# Patient Record
Sex: FEMALE | Race: WHITE | NOT HISPANIC OR LATINO | Employment: OTHER | URBAN - METROPOLITAN AREA
[De-identification: names, ages, dates, MRNs, and addresses within clinical notes are randomized per-mention and may not be internally consistent; named-entity substitution may affect disease eponyms.]

---

## 2023-09-05 ENCOUNTER — APPOINTMENT (OUTPATIENT)
Dept: CT IMAGING | Facility: HOSPITAL | Age: 68
End: 2023-09-05
Payer: MEDICARE

## 2023-09-05 ENCOUNTER — APPOINTMENT (OUTPATIENT)
Dept: GENERAL RADIOLOGY | Facility: HOSPITAL | Age: 68
End: 2023-09-05
Payer: MEDICARE

## 2023-09-05 ENCOUNTER — HOSPITAL ENCOUNTER (OUTPATIENT)
Facility: HOSPITAL | Age: 68
Setting detail: OBSERVATION
Discharge: HOME | End: 2023-09-07
Attending: EMERGENCY MEDICINE | Admitting: INTERNAL MEDICINE
Payer: MEDICARE

## 2023-09-05 DIAGNOSIS — R29.810 FACIAL DROOP: ICD-10-CM

## 2023-09-05 DIAGNOSIS — I63.9 CEREBROVASCULAR ACCIDENT (CVA), UNSPECIFIED MECHANISM: Primary | ICD-10-CM

## 2023-09-05 DIAGNOSIS — R47.9 DIFFICULTY WITH SPEECH: ICD-10-CM

## 2023-09-05 PROBLEM — I10 ESSENTIAL HYPERTENSION: Status: ACTIVE | Noted: 2023-09-05

## 2023-09-05 PROBLEM — E11.9 TYPE 2 DIABETES MELLITUS: Status: ACTIVE | Noted: 2023-09-05

## 2023-09-05 PROBLEM — I65.21 CAROTID STENOSIS, RIGHT: Status: ACTIVE | Noted: 2023-09-05

## 2023-09-05 PROBLEM — E66.01 OBESITY, CLASS III, BMI 40-49.9 (MORBID OBESITY): Status: ACTIVE | Noted: 2023-09-05

## 2023-09-05 PROBLEM — F31.81 BIPOLAR 2 DISORDER: Status: ACTIVE | Noted: 2023-09-05

## 2023-09-05 PROBLEM — E78.5 HYPERLIPIDEMIA: Status: ACTIVE | Noted: 2023-09-05

## 2023-09-05 PROBLEM — R47.01 APHASIA: Status: ACTIVE | Noted: 2023-09-05

## 2023-09-05 PROBLEM — E66.813 OBESITY, CLASS III, BMI 40-49.9 (MORBID OBESITY): Status: ACTIVE | Noted: 2023-09-05

## 2023-09-05 LAB
ALT SERPL W P-5'-P-CCNC: 9 U/L (ref 1–33)
APTT PPP: 28.7 SECONDS (ref 22–39)
AST SERPL-CCNC: 15 U/L (ref 1–32)
BASOPHILS # BLD AUTO: 0.05 10*3/MM3 (ref 0–0.2)
BASOPHILS NFR BLD AUTO: 0.5 % (ref 0–1.5)
BUN BLDA-MCNC: 20 MG/DL (ref 8–26)
CA-I BLDA-SCNC: 1.29 MMOL/L (ref 1.2–1.32)
CHLORIDE BLDA-SCNC: 102 MMOL/L (ref 98–109)
CO2 BLDA-SCNC: 25 MMOL/L (ref 24–29)
CREAT BLDA-MCNC: 1 MG/DL (ref 0.6–1.3)
DEPRECATED RDW RBC AUTO: 51.4 FL (ref 37–54)
EGFRCR SERPLBLD CKD-EPI 2021: 60.7 ML/MIN/1.73
EOSINOPHIL # BLD AUTO: 0.5 10*3/MM3 (ref 0–0.4)
EOSINOPHIL NFR BLD AUTO: 4.8 % (ref 0.3–6.2)
ERYTHROCYTE [DISTWIDTH] IN BLOOD BY AUTOMATED COUNT: 14.9 % (ref 12.3–15.4)
GLUCOSE BLDC GLUCOMTR-MCNC: 137 MG/DL (ref 70–130)
GLUCOSE BLDC GLUCOMTR-MCNC: 90 MG/DL (ref 70–130)
HCT VFR BLD AUTO: 42 % (ref 34–46.6)
HCT VFR BLDA CALC: 40 % (ref 38–51)
HGB BLD-MCNC: 13.2 G/DL (ref 12–15.9)
HGB BLDA-MCNC: 13.6 G/DL (ref 12–17)
HOLD SPECIMEN: NORMAL
IMM GRANULOCYTES # BLD AUTO: 0.03 10*3/MM3 (ref 0–0.05)
IMM GRANULOCYTES NFR BLD AUTO: 0.3 % (ref 0–0.5)
INR PPP: 1 (ref 0.9–1.1)
LYMPHOCYTES # BLD AUTO: 1.96 10*3/MM3 (ref 0.7–3.1)
LYMPHOCYTES NFR BLD AUTO: 19 % (ref 19.6–45.3)
MCH RBC QN AUTO: 29.6 PG (ref 26.6–33)
MCHC RBC AUTO-ENTMCNC: 31.4 G/DL (ref 31.5–35.7)
MCV RBC AUTO: 94.2 FL (ref 79–97)
MONOCYTES # BLD AUTO: 0.87 10*3/MM3 (ref 0.1–0.9)
MONOCYTES NFR BLD AUTO: 8.4 % (ref 5–12)
NEUTROPHILS NFR BLD AUTO: 6.9 10*3/MM3 (ref 1.7–7)
NEUTROPHILS NFR BLD AUTO: 67 % (ref 42.7–76)
NRBC BLD AUTO-RTO: 0 /100 WBC (ref 0–0.2)
PLATELET # BLD AUTO: 294 10*3/MM3 (ref 140–450)
PMV BLD AUTO: 10.6 FL (ref 6–12)
POTASSIUM BLDA-SCNC: 3.7 MMOL/L (ref 3.5–4.9)
PROTHROMBIN TIME: 13.4 SECONDS
RBC # BLD AUTO: 4.46 10*6/MM3 (ref 3.77–5.28)
SODIUM BLD-SCNC: 141 MMOL/L (ref 138–146)
TROPONIN T SERPL HS-MCNC: 14 NG/L
WBC NRBC COR # BLD: 10.31 10*3/MM3 (ref 3.4–10.8)
WHOLE BLOOD HOLD COAG: NORMAL
WHOLE BLOOD HOLD SPECIMEN: NORMAL

## 2023-09-05 PROCEDURE — 85610 PROTHROMBIN TIME: CPT

## 2023-09-05 PROCEDURE — 85730 THROMBOPLASTIN TIME PARTIAL: CPT | Performed by: EMERGENCY MEDICINE

## 2023-09-05 PROCEDURE — 93005 ELECTROCARDIOGRAM TRACING: CPT | Performed by: EMERGENCY MEDICINE

## 2023-09-05 PROCEDURE — 80047 BASIC METABLC PNL IONIZED CA: CPT

## 2023-09-05 PROCEDURE — 84484 ASSAY OF TROPONIN QUANT: CPT | Performed by: EMERGENCY MEDICINE

## 2023-09-05 PROCEDURE — 84450 TRANSFERASE (AST) (SGOT): CPT | Performed by: EMERGENCY MEDICINE

## 2023-09-05 PROCEDURE — 70496 CT ANGIOGRAPHY HEAD: CPT

## 2023-09-05 PROCEDURE — G0378 HOSPITAL OBSERVATION PER HR: HCPCS

## 2023-09-05 PROCEDURE — 36415 COLL VENOUS BLD VENIPUNCTURE: CPT

## 2023-09-05 PROCEDURE — 70450 CT HEAD/BRAIN W/O DYE: CPT

## 2023-09-05 PROCEDURE — 99285 EMERGENCY DEPT VISIT HI MDM: CPT

## 2023-09-05 PROCEDURE — 85014 HEMATOCRIT: CPT

## 2023-09-05 PROCEDURE — 82948 REAGENT STRIP/BLOOD GLUCOSE: CPT

## 2023-09-05 PROCEDURE — 99204 OFFICE O/P NEW MOD 45 MIN: CPT | Performed by: STUDENT IN AN ORGANIZED HEALTH CARE EDUCATION/TRAINING PROGRAM

## 2023-09-05 PROCEDURE — 84460 ALANINE AMINO (ALT) (SGPT): CPT | Performed by: EMERGENCY MEDICINE

## 2023-09-05 PROCEDURE — 71045 X-RAY EXAM CHEST 1 VIEW: CPT

## 2023-09-05 PROCEDURE — 70498 CT ANGIOGRAPHY NECK: CPT

## 2023-09-05 PROCEDURE — 25510000001 IOPAMIDOL PER 1 ML: Performed by: EMERGENCY MEDICINE

## 2023-09-05 PROCEDURE — 85025 COMPLETE CBC W/AUTO DIFF WBC: CPT | Performed by: EMERGENCY MEDICINE

## 2023-09-05 PROCEDURE — 0042T HC CT CEREBRAL PERFUSION W/WO CONTRAST: CPT

## 2023-09-05 RX ORDER — POLYETHYLENE GLYCOL 3350 17 G/17G
17 POWDER, FOR SOLUTION ORAL DAILY PRN
Status: DISCONTINUED | OUTPATIENT
Start: 2023-09-05 | End: 2023-09-07 | Stop reason: HOSPADM

## 2023-09-05 RX ORDER — SODIUM CHLORIDE 0.9 % (FLUSH) 0.9 %
10 SYRINGE (ML) INJECTION AS NEEDED
Status: DISCONTINUED | OUTPATIENT
Start: 2023-09-05 | End: 2023-09-07 | Stop reason: HOSPADM

## 2023-09-05 RX ORDER — SODIUM CHLORIDE 9 MG/ML
40 INJECTION, SOLUTION INTRAVENOUS AS NEEDED
Status: DISCONTINUED | OUTPATIENT
Start: 2023-09-05 | End: 2023-09-07 | Stop reason: HOSPADM

## 2023-09-05 RX ORDER — METFORMIN HYDROCHLORIDE 500 MG/1
2 TABLET, EXTENDED RELEASE ORAL EVERY 12 HOURS SCHEDULED
COMMUNITY
Start: 2023-08-31

## 2023-09-05 RX ORDER — IBUPROFEN 600 MG/1
1 TABLET ORAL
Status: DISCONTINUED | OUTPATIENT
Start: 2023-09-05 | End: 2023-09-07 | Stop reason: HOSPADM

## 2023-09-05 RX ORDER — FEXOFENADINE HYDROCHLORIDE 180 MG/1
180 TABLET, FILM COATED ORAL DAILY
COMMUNITY
Start: 2023-08-31

## 2023-09-05 RX ORDER — DEXTROSE MONOHYDRATE 25 G/50ML
25 INJECTION, SOLUTION INTRAVENOUS
Status: DISCONTINUED | OUTPATIENT
Start: 2023-09-05 | End: 2023-09-07 | Stop reason: HOSPADM

## 2023-09-05 RX ORDER — BISACODYL 5 MG/1
5 TABLET, DELAYED RELEASE ORAL DAILY PRN
Status: DISCONTINUED | OUTPATIENT
Start: 2023-09-05 | End: 2023-09-07 | Stop reason: HOSPADM

## 2023-09-05 RX ORDER — CLOPIDOGREL BISULFATE 75 MG/1
75 TABLET ORAL DAILY
Status: DISCONTINUED | OUTPATIENT
Start: 2023-09-06 | End: 2023-09-06

## 2023-09-05 RX ORDER — BISACODYL 10 MG
10 SUPPOSITORY, RECTAL RECTAL DAILY PRN
Status: DISCONTINUED | OUTPATIENT
Start: 2023-09-05 | End: 2023-09-07 | Stop reason: HOSPADM

## 2023-09-05 RX ORDER — ACETAMINOPHEN 650 MG/1
650 SUPPOSITORY RECTAL EVERY 4 HOURS PRN
Status: DISCONTINUED | OUTPATIENT
Start: 2023-09-05 | End: 2023-09-07 | Stop reason: HOSPADM

## 2023-09-05 RX ORDER — LOSARTAN POTASSIUM 50 MG/1
1 TABLET ORAL DAILY
COMMUNITY
Start: 2023-08-31

## 2023-09-05 RX ORDER — ACETAMINOPHEN 325 MG/1
650 TABLET ORAL EVERY 4 HOURS PRN
Status: DISCONTINUED | OUTPATIENT
Start: 2023-09-05 | End: 2023-09-07 | Stop reason: HOSPADM

## 2023-09-05 RX ORDER — SEMAGLUTIDE 1.34 MG/ML
1 INJECTION, SOLUTION SUBCUTANEOUS WEEKLY
COMMUNITY
Start: 2023-08-31

## 2023-09-05 RX ORDER — ATORVASTATIN CALCIUM 40 MG/1
80 TABLET, FILM COATED ORAL NIGHTLY
Status: DISCONTINUED | OUTPATIENT
Start: 2023-09-05 | End: 2023-09-07 | Stop reason: HOSPADM

## 2023-09-05 RX ORDER — VENLAFAXINE HYDROCHLORIDE 150 MG/1
1 CAPSULE, EXTENDED RELEASE ORAL DAILY
COMMUNITY
Start: 2023-08-31

## 2023-09-05 RX ORDER — QUETIAPINE FUMARATE 300 MG/1
300 TABLET, FILM COATED ORAL NIGHTLY
COMMUNITY

## 2023-09-05 RX ORDER — ASPIRIN 300 MG/1
300 SUPPOSITORY RECTAL ONCE
Status: DISCONTINUED | OUTPATIENT
Start: 2023-09-05 | End: 2023-09-06

## 2023-09-05 RX ORDER — ATORVASTATIN CALCIUM 40 MG/1
2 TABLET, FILM COATED ORAL DAILY
COMMUNITY
Start: 2023-08-31 | End: 2023-09-07 | Stop reason: HOSPADM

## 2023-09-05 RX ORDER — AMOXICILLIN 250 MG
2 CAPSULE ORAL 2 TIMES DAILY PRN
Status: DISCONTINUED | OUTPATIENT
Start: 2023-09-05 | End: 2023-09-07 | Stop reason: HOSPADM

## 2023-09-05 RX ORDER — ACETAMINOPHEN 160 MG/5ML
650 SOLUTION ORAL EVERY 4 HOURS PRN
Status: DISCONTINUED | OUTPATIENT
Start: 2023-09-05 | End: 2023-09-07 | Stop reason: HOSPADM

## 2023-09-05 RX ORDER — SODIUM CHLORIDE 0.9 % (FLUSH) 0.9 %
10 SYRINGE (ML) INJECTION EVERY 12 HOURS SCHEDULED
Status: DISCONTINUED | OUTPATIENT
Start: 2023-09-05 | End: 2023-09-07 | Stop reason: HOSPADM

## 2023-09-05 RX ORDER — ONDANSETRON 2 MG/ML
4 INJECTION INTRAMUSCULAR; INTRAVENOUS EVERY 6 HOURS PRN
Status: DISCONTINUED | OUTPATIENT
Start: 2023-09-05 | End: 2023-09-07 | Stop reason: HOSPADM

## 2023-09-05 RX ORDER — ASPIRIN 300 MG/1
300 SUPPOSITORY RECTAL DAILY
Status: DISCONTINUED | OUTPATIENT
Start: 2023-09-06 | End: 2023-09-06

## 2023-09-05 RX ORDER — VENLAFAXINE HYDROCHLORIDE 75 MG/1
150 CAPSULE, EXTENDED RELEASE ORAL DAILY
Status: DISCONTINUED | OUTPATIENT
Start: 2023-09-06 | End: 2023-09-07 | Stop reason: HOSPADM

## 2023-09-05 RX ORDER — ASPIRIN 81 MG/1
81 TABLET, CHEWABLE ORAL DAILY
Status: DISCONTINUED | OUTPATIENT
Start: 2023-09-06 | End: 2023-09-06

## 2023-09-05 RX ORDER — ONDANSETRON 4 MG/1
4 TABLET, FILM COATED ORAL EVERY 6 HOURS PRN
Status: DISCONTINUED | OUTPATIENT
Start: 2023-09-05 | End: 2023-09-07 | Stop reason: HOSPADM

## 2023-09-05 RX ORDER — NICOTINE POLACRILEX 4 MG
15 LOZENGE BUCCAL
Status: DISCONTINUED | OUTPATIENT
Start: 2023-09-05 | End: 2023-09-07 | Stop reason: HOSPADM

## 2023-09-05 RX ORDER — CETIRIZINE HYDROCHLORIDE 10 MG/1
5 TABLET ORAL DAILY
Status: DISCONTINUED | OUTPATIENT
Start: 2023-09-06 | End: 2023-09-07 | Stop reason: HOSPADM

## 2023-09-05 RX ORDER — AMLODIPINE BESYLATE 5 MG/1
1 TABLET ORAL DAILY
COMMUNITY
Start: 2023-08-31

## 2023-09-05 RX ORDER — CLOPIDOGREL BISULFATE 75 MG/1
1 TABLET ORAL DAILY
COMMUNITY
Start: 2023-08-31 | End: 2023-09-07 | Stop reason: HOSPADM

## 2023-09-05 RX ORDER — CLOPIDOGREL BISULFATE 75 MG/1
300 TABLET ORAL ONCE
Status: DISCONTINUED | OUTPATIENT
Start: 2023-09-05 | End: 2023-09-06

## 2023-09-05 RX ORDER — INSULIN LISPRO 100 [IU]/ML
2-9 INJECTION, SOLUTION INTRAVENOUS; SUBCUTANEOUS
Status: DISCONTINUED | OUTPATIENT
Start: 2023-09-05 | End: 2023-09-07 | Stop reason: HOSPADM

## 2023-09-05 RX ORDER — MULTIPLE VITAMINS W/ MINERALS TAB 9MG-400MCG
1 TAB ORAL DAILY
COMMUNITY
End: 2023-09-05

## 2023-09-05 RX ORDER — ASPIRIN 325 MG
325 TABLET ORAL ONCE
Status: DISCONTINUED | OUTPATIENT
Start: 2023-09-05 | End: 2023-09-06

## 2023-09-05 RX ADMIN — SODIUM CHLORIDE 1000 ML: 9 INJECTION, SOLUTION INTRAVENOUS at 16:04

## 2023-09-05 RX ADMIN — IOPAMIDOL 115 ML: 755 INJECTION, SOLUTION INTRAVENOUS at 15:18

## 2023-09-05 NOTE — CONSULTS
Stroke Consult Note    Patient Name: Olga Sandoval   MRN: [id number]  Age: 68 y.o.  Sex: female  : 1955    Primary Care Physician: System, Provider Not In  Referring Physician:  No ref. provider found    Chief Complaint/Reason for Consultation:  speech difficulty and confusion    Subjective .  HPI:   68 year old with T2DM, HLD, HTN , residual right facial palsy-H/o [place] palsy who had acute onset of disorientation with some word finding difficulty.  Positive for nonsensical speech..  The total episode lasted for 30 to 45 minutes.  On my evaluation in the emergency department, patient was close to her baseline.    Daughter claims that patient had a similar episode in the past.  Patient takes aspirin and Plavix at home.  No h/o afib, or any anticoagulants  Patient has a loop recorder in place in 2018, no evidence of A-fib detected so far.      Last Known Normal Date/Time:  around 1200 EST     Review of Systems   Constitutional: No fatigue  HENT: Negative for nosebleeds and rhinorrhea.    Eyes: Negative for redness.   Respiratory: Negative for cough.    Gastrointestinal: Negative for anal bleeding.   Endocrine: Negative for polydipsia.   Genitourinary: Negative for enuresis and urgency.   Musculoskeletal: Negative for joint swelling.   Neurological: Negative for tremors.   Psychiatric/Behavioral: Negative for hallucinations.      Temp:  [98.6 °F (37 °C)] 98.6 °F (37 °C)  Heart Rate:  [] 80  Resp:  [18] 18  BP: (119-151)/(66-81) 151/70        NEUR    MENTAL STATUS: AAOx3, memory intact, fund of knowledge appropriate    LANG/SPEECH: Naming and repetition intact, fluent, follows 3-step commands    CRANIAL NERVES:    Pupils equal and reactive, EOMI intact, no gaze deviation, no nystagmus  R facial droop- LMN type , cough and gag intact, shoulder shrug intact, tongue midline     MOTOR:  Moves all extremities equally    SENSORY: Normal to light touch all throughout     COORDINATION: Normal finger to nose and  heel to shin, no tremor, no dysmetria    STATION: Not assessed due to patient condition    GAIT: Not assessed due to patient condition     Acute Stroke Data    Thrombolytic Inclusion / Exclusion Criteria    Time: 18:54 EDT  Person Administering Scale: Alec Brooke MD    Inclusion Criteria  [x]   18 years of age or greater   []   Onset of symptoms < 4.5 hours before beginning treatment (stroke onset = time patient was last seen well or without symptoms).   []   Diagnosis of acute ischemic stroke causing measurable disabling deficit (Complete Hemianopia, Any Aphasia, Visual or Sensory Extinction, Any weakness limiting sustained effort against gravity)   []   Any remaining deficit considered potentially disabling in view of patient and practitioner   Exclusion criteria (Do not proceed with thrombolytic if any are checked under exclusion criteria)  []   Onset unknown or GREATER than 4.5 hours   []   ICH on CT/MRI   []   CT demonstrates hypodensity representing acute or subacute infarct   []   Significant head trauma or prior stroke in the previous 3 months   []   Symptoms suggestive of subarachnoid hemorrhage   []   History of un-ruptured intracranial aneurysm GREATER than 10 mm   []   Recent intracranial or intraspinal surgery within the last 3 months   []   Arterial puncture at a non-compressible site in the previous 7 days   []   Active internal bleeding   []   Acute bleeding tendency   []   Platelet count LESS than 100,000 for known hematological diseases such as leukemia, thrombocytopenia or chronic cirrhosis   []   Current use of anticoagulant with INR GREATER than 1.7 or PT GREATER than 15 seconds, aPTT GREATER than 40 seconds   []   Heparin received within 48 hours, resulting in abnormally elevated aPTT GREATER than upper limit of normal   []   Current use of direct thrombin inhibitors or direct factor Xa inhibitors in the past 48 hours   []   Elevated blood pressure refractory to treatment (systolic  GREATER than 185 mm/Hg or diastolic  GREATER than 110 mm/Hg   []   Suspected infective endocarditis and aortic arch dissection   []   Current use of therapeutic treatment dose of low-molecular-weight heparin (LMWH) within the previous 24 hours   []   Structural GI malignancy or bleed   Relative exclusion for all patients  [x]   Only minor non-disabling symptoms   []   Pregnancy   []   Seizure at onset with postictal residual neurological impairments   []   Major surgery or previous trauma within past 14 days   []   History of previous spontaneous ICH, intracranial neoplasm, or AV malformation   []   Postpartum (within previous 14 days)   []   Recent GI or urinary tract hemorrhage (within previous 21 days)   []   Recent acute MI (within previous 3 months)   []   History of un-ruptured intracranial aneurysm LESS than 10 mm   []   History of ruptured intracranial aneurysm   []   Blood glucose LESS than 50 mg/dL (2.7 mmol/L)   []   Dural puncture within the last 7 days   []   Known GREATER than 10 cerebral microbleeds   Additional exclusions for patients with symptoms onset between 3 and 4.5 hours.  []   Age > 80.   []   On any anticoagulants regardless of INR  >>> Warfarin (Coumadin), Heparin, Enoxaparin (Lovenox), fondaparinux (Arixtra), bivalirudin (Angiomax), Argatroban, dabigatran (Pradaxa), rivaroxaban (Xarelto), or apixaban (Eliquis)   []   Severe stroke (NIHSS > 25).   []   History of BOTH diabetes and previous ischemic stroke.   []   The risks and benefits have been discussed with the patient or family related to the administration of IV thrombolytic for stroke symptoms.   []   I have discussed and reviewed the patient's case and imaging with the attending prior to IV thrombolytic.    Time thrombolytic administered       Past Medical History:   Diagnosis Date    Aneurysm     left carotid    Bipolar 2 disorder     Diabetes mellitus     H/O [place]'s palsy     Left sided facial droop    History of loop recorder      Hyperlipidemia     Hypertension     Stroke     x 4     Past Surgical History:   Procedure Laterality Date    CARDIAC CATHETERIZATION      CATARACT EXTRACTION Bilateral      SECTION       Family History   Problem Relation Age of Onset    Hypertension Mother     Lung cancer Mother     Cataracts Mother     Transient ischemic attack Father     Hypertension Father     Cancer Father     Heart attack Brother     Stroke Brother      Social History     Socioeconomic History    Marital status:    Tobacco Use    Smoking status: Former     Packs/day: 0.50     Types: Cigarettes     Quit date:      Years since quitting: 10.6    Smokeless tobacco: Never   Vaping Use    Vaping Use: Never used   Substance and Sexual Activity    Alcohol use: Never    Drug use: Never    Sexual activity: Defer     No Known Allergies  Prior to Admission medications    Medication Sig Start Date End Date Taking? Authorizing Provider   Allergy Relief 180 MG tablet Take 1 tablet by mouth Daily. 23  Yes Joby Locke MD   amLODIPine (NORVASC) 5 MG tablet Take 1 tablet by mouth Daily. 23  Yes Joby Locke MD   atorvastatin (LIPITOR) 40 MG tablet Take 2 tablets by mouth Daily. 23  Yes Joby Locke MD   clopidogrel (PLAVIX) 75 MG tablet Take 1 tablet by mouth Daily. 23  Yes ProviderJoby MD   losartan (COZAAR) 50 MG tablet Take 1 tablet by mouth Daily. 23  Yes Joby Locke MD   metFORMIN ER (GLUCOPHAGE-XR) 500 MG 24 hr tablet Take 2 tablets by mouth Every 12 (Twelve) Hours. 23  Yes Joby Locke MD   Ozempic, 1 MG/DOSE, 4 MG/3ML solution pen-injector Inject 1 mg under the skin into the appropriate area as directed 1 (One) Time Per Week. Patient takes on 23  Yes Joby Locke MD   QUEtiapine (SEROquel) 300 MG tablet Take 1 tablet by mouth Every Night.   Yes Joby Locke MD   venlafaxine XR (EFFEXOR-XR) 150 MG 24 hr capsule Take 1  capsule by mouth Daily. 23  Yes Joby Locke MD   multivitamin with minerals (LUTEIN-ZEAXANTHIN PO) Take 1 tablet by mouth Daily.  23  ProviderJoby MD       Hospital Meds:  Scheduled-    Infusions-     PRNs-   sodium chloride    Functional Status Prior to Current Stroke/Acadia Score: 1    NIH Stroke Scale  Time: 18:54 EDT  Person Administering Scale: Alec Brooke MD    1a  Level of consciousness: 0=alert; keenly responsive   1b. LOC questions:  0=Performs both tasks correctly   1c. LOC commands: 0=Performs both tasks correctly   2.  Best Gaze: 0=normal   3.  Visual: 0=No visual loss   4. Facial Palsy: 2=Partial paralysis (total or near total paralysis of the lower face)   5a.  Motor left arm: 0=No drift, limb holds 90 (or 45) degrees for full 10 seconds   5b.  Motor right arm: 0=No drift, limb holds 90 (or 45) degrees for full 10 seconds   6a. motor left le=No drift, limb holds 90 (or 45) degrees for full 10 seconds   6b  Motor right le=No drift, limb holds 90 (or 45) degrees for full 10 seconds   7. Limb Ataxia: 0=Absent   8.  Sensory: 0=Normal; no sensory loss   9. Best Language:  0=No aphasia, normal   10. Dysarthria: 0=Normal   11. Extinction and Inattention: 0=No abnormality    Total:   2( baseline, due to [place] palsy)       Results Reviewed:  I have personally reviewed current lab, radiology, and data and agree with results.              Assessment/Plan:  Speech difficulty   2.   Encephalopathy, unspecified     -Acute stroke imaging revealed soft plaque in the left ICA, mild athero in the right ICA. I do not believe patient ICA is > 70% stenosis. On my exam, patient had subtle weakness of the left leg. But antigravity strength in all extremities at least 4+/5  except left lower extremity 4-/5. Minor ischemic stroke vs TIA cannot be ruled out. Recommend admission for stroke work up. Load with plavix 300, followed by 75 daily.  Continue aspirin 81 daily.. We will get an  carotid ultrasound, TTE and other stroke labs. Normal blood pressure goals. Lipitor 40 daily. Stroke order set initiated.  Loop recorder interrogation.      I expect patient to be in the hospital for 1 more day.    Stroke team will continue to follow the patient.     Alec Brooke MD  September 5, 2023  18:54 EDT

## 2023-09-05 NOTE — ED PROVIDER NOTES
[facility]    EMERGENCY DEPARTMENT ENCOUNTER      Pt Name: Olga Sandoval  MRN: [id number]  YOB: 1955  Date of evaluation: 2023  Provider: Joe Serra DO    CHIEF COMPLAINT       Chief Complaint   Patient presents with    Stroke         HISTORY OF PRESENT ILLNESS  (Location/Symptom, Timing/Onset, Context/Setting, Quality, Duration, Modifying Factors, Severity.)   Olga Sandoval is a 68 y.o. female who presents to the emergency department for evaluation by EMS secondary to a concern for speech difficulty, confusion, family concerned about the patient's altered state she was using a phone  and actually has a phone, having difficulty with getting her words out, inappropriate conversations per the daughter who provides majority of the history.  The patient states he has a prior history of previous strokes, chronic Bell's palsy with residual left-sided deficits.  She denies any recent headaches, falls, injury or head trauma.  She states she feels like she is returned back to her normal baseline.  With daughter thinks otherwise that she notes the patient has been altered since at least 10-12 noon today was the last time she was normal.  The patient denies any chest pain or palpitations, no fever or chills, no urinary or bowel complaints.  She denies any other acute systemic complaints at this time.  Patient is awake and alert, no slurred speech, she is answer question appropriately, she does have some left-sided      Nursing notes were reviewed.      PAST MEDICAL HISTORY     Past Medical History:   Diagnosis Date    Aneurysm     left carotid    Bipolar 2 disorder     Diabetes mellitus     H/O Bell's palsy     Left sided facial droop    History of loop recorder     Hyperlipidemia     Hypertension     Stroke     x 4         SURGICAL HISTORY       Past Surgical History:   Procedure Laterality Date    CARDIAC CATHETERIZATION      CATARACT EXTRACTION Bilateral      SECTION           CURRENT  MEDICATIONS       Current Facility-Administered Medications:     acetaminophen (TYLENOL) tablet 650 mg, 650 mg, Oral, Q4H PRN **OR** acetaminophen (TYLENOL) 160 MG/5ML solution 650 mg, 650 mg, Oral, Q4H PRN **OR** acetaminophen (TYLENOL) suppository 650 mg, 650 mg, Rectal, Q4H PRN, Michell Parekh MD    [START ON 9/6/2023] aspirin chewable tablet 81 mg, 81 mg, Oral, Daily **OR** [START ON 9/6/2023] aspirin suppository 300 mg, 300 mg, Rectal, Daily, Toña Phelps APRN    aspirin tablet 325 mg, 325 mg, Oral, Once **OR** aspirin suppository 300 mg, 300 mg, Rectal, Once, Toña Phelps APRN    atorvastatin (LIPITOR) tablet 80 mg, 80 mg, Oral, Nightly, Toña Phelps APRN    sennosides-docusate (PERICOLACE) 8.6-50 MG per tablet 2 tablet, 2 tablet, Oral, BID PRN **AND** polyethylene glycol (MIRALAX) packet 17 g, 17 g, Oral, Daily PRN **AND** bisacodyl (DULCOLAX) EC tablet 5 mg, 5 mg, Oral, Daily PRN **AND** bisacodyl (DULCOLAX) suppository 10 mg, 10 mg, Rectal, Daily PRN, Michell Parekh MD    Calcium Replacement - Follow Nurse / BPA Driven Protocol, , Does not apply, PRN, Michell Parekh MD    [START ON 9/6/2023] cetirizine (zyrTEC) tablet 5 mg, 5 mg, Oral, Daily, Michell Parekh MD    clopidogrel (PLAVIX) tablet 300 mg, 300 mg, Oral, Once **AND** [START ON 9/6/2023] clopidogrel (PLAVIX) tablet 75 mg, 75 mg, Oral, Daily, Toña Phelps APRN    dextrose (D50W) (25 g/50 mL) IV injection 25 g, 25 g, Intravenous, Q15 Min PRN, Michell Parekh MD    dextrose (GLUTOSE) oral gel 15 g, 15 g, Oral, Q15 Min PRN, Michell Parekh MD    [START ON 9/6/2023] empagliflozin (JARDIANCE) tablet 10 mg, 10 mg, Oral, Daily, Michell Parekh MD    glucagon (GLUCAGEN) injection 1 mg, 1 mg, Intramuscular, Q15 Min PRN, Michell Parekh MD    Insulin Lispro (humaLOG) injection 2-9 Units, 2-9 Units, Subcutaneous, 4x Daily AC & at Bedtime, Michell Parekh MD    Magnesium Standard Dose Replacement - Follow Nurse / BPA Driven Protocol, , Does not  apply, PRN, Michell Parekh MD    ondansetron (ZOFRAN) tablet 4 mg, 4 mg, Oral, Q6H PRN **OR** ondansetron (ZOFRAN) injection 4 mg, 4 mg, Intravenous, Q6H PRN, Michell Parekh MD    Phosphorus Replacement - Follow Nurse / BPA Driven Protocol, , Does not apply, PRNIglesia Hannah, MD    Potassium Replacement - Follow Nurse / BPA Driven Protocol, , Does not apply, PRIglesia RG Hannah, MD    sodium chloride 0.9 % flush 10 mL, 10 mL, Intravenous, PRN, Joe Serra,     sodium chloride 0.9 % flush 10 mL, 10 mL, Intravenous, Q12H, Toña Phelps, APRN    sodium chloride 0.9 % flush 10 mL, 10 mL, Intravenous, PRN, Toña Phelps, APRN    sodium chloride 0.9 % flush 10 mL, 10 mL, Intravenous, Q12H, Michell Parekh MD    sodium chloride 0.9 % flush 10 mL, 10 mL, Intravenous, PRN, Michell Parekh MD    sodium chloride 0.9 % infusion 40 mL, 40 mL, Intravenous, PRN, Toña Phelps, APRN    sodium chloride 0.9 % infusion 40 mL, 40 mL, Intravenous, PRN, Michell Parekh MD    [START ON 9/6/2023] venlafaxine XR (EFFEXOR-XR) 24 hr capsule 150 mg, 150 mg, Oral, Daily, Michell Parekh MD    ALLERGIES     Patient has no known allergies.    FAMILY HISTORY       Family History   Problem Relation Age of Onset    Hypertension Mother     Lung cancer Mother     Cataracts Mother     Transient ischemic attack Father     Hypertension Father     Cancer Father     Heart attack Brother     Stroke Brother           SOCIAL HISTORY       Social History     Socioeconomic History    Marital status:    Tobacco Use    Smoking status: Former     Packs/day: 0.50     Types: Cigarettes     Quit date: 2013     Years since quitting: 10.6    Smokeless tobacco: Never   Vaping Use    Vaping Use: Never used   Substance and Sexual Activity    Alcohol use: Never    Drug use: Never    Sexual activity: Defer         PHYSICAL EXAM    (up to 7 for level 4, 8 or more for level 5)     Vitals:    09/05/23 1732 09/05/23 1802 09/05/23 1900 09/05/23 2000    BP: 151/67 151/70 154/95 125/92   BP Location:    Left arm   Patient Position:    Lying   Pulse: 86 80 93 95   Resp:    16   Temp:    98.6 °F (37 °C)   TempSrc:    Oral   SpO2: 95% 98% 96% 96%   Weight:    110 kg (242 lb 11.6 oz)   Height:           Physical Exam  General : Patient is awake, alert, oriented, in no acute distress, nontoxic appearing  HEENT: Pupils are equally round, EOMI, conjunctivae clear  Neck: Neck is supple, full range of motion, trachea midline  Cardiac: Heart regular rate, rhythm, no murmurs, rubs, or gallops  Lungs: Lungs are clear to auscultation, there is no wheezing, rhonchi, or rales. There is no use of accessory muscles  Abdomen: Abdomen is soft, nontender, nondistended. There are no firm or pulsatile masses, no rebound rigidity or guarding  Musculoskeletal: No focal muscle deficits are appreciated  Neuro: Patient is awake alert answer question appropriately, she does have left-sided facial droop, no slurred speech, she does not have any flaccid paralysis in the bilateral upper or lower extremities, generalized weakness is noted, generalized muscle atrophy.  Please refer to stroke team for full NIH score  Dermatology: Skin is warm and dry  Psych: Mentation is grossly normal, cognition is grossly normal. Affect is appropriate      DIAGNOSTIC RESULTS     EKG:  All EKGs are interpreted by the Emergency Department Physician who either signs or Co-signs this chart in the absence of a cardiologist.    ECG 12 Lead ED Triage Standing Order; Acute Stroke (Onset <24 hrs)   Preliminary Result   Test Reason : ED Triage Standing Order~   Blood Pressure :   */*   mmHG   Vent. Rate :  90 BPM     Atrial Rate :  90 BPM      P-R Int : 176 ms          QRS Dur : 100 ms       QT Int : 336 ms       P-R-T Axes :  60 -30  43 degrees      QTc Int : 411 ms      ** Poor data quality, interpretation may be adversely affected   Normal sinus rhythm   Left axis deviation   Low voltage QRS   Abnormal ECG   No  previous ECGs available      Referred By: EDMD           Confirmed By:           RADIOLOGY:     [] Radiologist's Report Reviewed:  XR Chest 1 View   Final Result   Impression:   No acute cardiopulmonary process.         Electronically Signed: Fabricio Weber MD     9/5/2023 3:36 PM EDT     Workstation ID: [id number]      CT Angiogram Head w AI Analysis of LVO   Final Result   Impression:   CT perfusion demonstrates some expected areas of defect correlating with known and presumed areas of chronic volume loss and encephalomalacia noted on CT. There is otherwise no evidence of acute core infarct or territorial ischemic tissue at risk.      CT angiogram demonstrates focal 70 to 80% stenosis of the right ICA origin. There is otherwise no evidence of additional high-grade stenosis, large vessel occlusion or aneurysm in the head and neck.            Electronically Signed: Armen Browning MD     9/5/2023 3:34 PM EDT     Workstation ID: [id number]      CT Angiogram Neck   Final Result   Impression:   CT perfusion demonstrates some expected areas of defect correlating with known and presumed areas of chronic volume loss and encephalomalacia noted on CT. There is otherwise no evidence of acute core infarct or territorial ischemic tissue at risk.      CT angiogram demonstrates focal 70 to 80% stenosis of the right ICA origin. There is otherwise no evidence of additional high-grade stenosis, large vessel occlusion or aneurysm in the head and neck.            Electronically Signed: Armen Browning MD     9/5/2023 3:34 PM EDT     Workstation ID: [id number]      CT CEREBRAL PERFUSION WITH & WITHOUT CONTRAST   Final Result   Impression:   CT perfusion demonstrates some expected areas of defect correlating with known and presumed areas of chronic volume loss and encephalomalacia noted on CT. There is otherwise no evidence of acute core infarct or territorial ischemic tissue at risk.      CT angiogram demonstrates focal 70 to 80% stenosis  of the right ICA origin. There is otherwise no evidence of additional high-grade stenosis, large vessel occlusion or aneurysm in the head and neck.            Electronically Signed: Armen Browning MD     9/5/2023 3:34 PM EDT     Workstation ID: [id number]      CT Head Without Contrast Stroke Protocol   Final Result   Impression:   Multifocal areas of chronic appearing encephalomalacic change are present, favoring areas of old infarct and generalized volume loss. No acute intracranial findings are present otherwise. Consider MRI if there is persistent clinical concern for    superimposed acute ischemia.      Scan performed 9/5/2023 at 3:04 p.m. Results discussed with the stroke team by Nick Browning in person at time of scanning.            Electronically Signed: Armen Browning MD     9/5/2023 3:17 PM EDT     Workstation ID: [id number]      MRI Brain Without Contrast    (Results Pending)       I ordered and independently reviewed the above noted radiographic studies.      I viewed images of CT head which showed multiple chronic infarcts on the right per my independent interpretation.    See radiologist's dictation for official interpretation.      ED BEDSIDE ULTRASOUND:   Performed by ED Physician - none    LABS:    I have reviewed and interpreted all of the currently available lab results from this visit (if applicable):  Results for orders placed or performed during the hospital encounter of 09/05/23   Single High Sensitivity Troponin T    Specimen: Blood   Result Value Ref Range    HS Troponin T 14 (H) <10 ng/L   aPTT    Specimen: Blood   Result Value Ref Range    PTT 28.7 22.0 - 39.0 seconds   AST    Specimen: Blood   Result Value Ref Range    AST (SGOT) 15 1 - 32 U/L   ALT    Specimen: Blood   Result Value Ref Range    ALT (SGPT) 9 1 - 33 U/L   CBC Auto Differential    Specimen: Blood   Result Value Ref Range    WBC 10.31 3.40 - 10.80 10*3/mm3    RBC 4.46 3.77 - 5.28 10*6/mm3    Hemoglobin 13.2 12.0 - 15.9 g/dL     Hematocrit 42.0 34.0 - 46.6 %    MCV 94.2 79.0 - 97.0 fL    MCH 29.6 26.6 - 33.0 pg    MCHC 31.4 (L) 31.5 - 35.7 g/dL    RDW 14.9 12.3 - 15.4 %    RDW-SD 51.4 37.0 - 54.0 fl    MPV 10.6 6.0 - 12.0 fL    Platelets 294 140 - 450 10*3/mm3    Neutrophil % 67.0 42.7 - 76.0 %    Lymphocyte % 19.0 (L) 19.6 - 45.3 %    Monocyte % 8.4 5.0 - 12.0 %    Eosinophil % 4.8 0.3 - 6.2 %    Basophil % 0.5 0.0 - 1.5 %    Immature Grans % 0.3 0.0 - 0.5 %    Neutrophils, Absolute 6.90 1.70 - 7.00 10*3/mm3    Lymphocytes, Absolute 1.96 0.70 - 3.10 10*3/mm3    Monocytes, Absolute 0.87 0.10 - 0.90 10*3/mm3    Eosinophils, Absolute 0.50 (H) 0.00 - 0.40 10*3/mm3    Basophils, Absolute 0.05 0.00 - 0.20 10*3/mm3    Immature Grans, Absolute 0.03 0.00 - 0.05 10*3/mm3    nRBC 0.0 0.0 - 0.2 /100 WBC   POCT Protime/INR    Specimen: Blood   Result Value Ref Range    Protime 13.4 seconds    INR 1 0.9 - 1.1   POC CHEM 8    Specimen: Blood   Result Value Ref Range    Glucose 137 (H) 70 - 130 mg/dL    BUN 20 8 - 26 mg/dL    Creatinine 1.00 0.60 - 1.30 mg/dL    Sodium 141 138 - 146 mmol/L    POC Potassium 3.7 3.5 - 4.9 mmol/L    Chloride 102 98 - 109 mmol/L    Total CO2 25 24 - 29 mmol/L    Hemoglobin 13.6 12.0 - 17.0 g/dL    Hematocrit 40 38 - 51 %    Ionized Calcium 1.29 1.20 - 1.32 mmol/L    eGFR 60.7 >60.0 mL/min/1.73   ECG 12 Lead ED Triage Standing Order; Acute Stroke (Onset <24 hrs)   Result Value Ref Range    QT Interval 336 ms    QTC Interval 411 ms   Green Top (Gel)   Result Value Ref Range    Extra Tube Hold for add-ons.    Lavender Top   Result Value Ref Range    Extra Tube hold for add-on    Gold Top - SST   Result Value Ref Range    Extra Tube Hold for add-ons.    Gray Top   Result Value Ref Range    Extra Tube Hold for add-ons.    Light Blue Top   Result Value Ref Range    Extra Tube Hold for add-ons.         If labs were ordered, I independently reviewed the results and considered them in treating the patient.      EMERGENCY DEPARTMENT  COURSE and DIFFERENTIAL DIAGNOSIS/MDM:   Vitals:  AS OF 21:52 EDT    BP - 125/92  HR - 95  TEMP - 98.6 °F (37 °C) (Oral)  O2 SATS - 96%      Orders placed during this visit:  Orders Placed This Encounter   Procedures    CT Head Without Contrast Stroke Protocol    CT Angiogram Head w AI Analysis of LVO    CT Angiogram Neck    CT CEREBRAL PERFUSION WITH & WITHOUT CONTRAST    XR Chest 1 View    MRI Brain Without Contrast    [place] Draw    Single High Sensitivity Troponin T    aPTT    AST    ALT    CBC Auto Differential    Hemoglobin A1c    Lipid Panel    NPO Diet NPO Type: Strict NPO    Initiate Code Stroke    Perform NIH Stroke Scale    Measure Actual Weight    Head of Bed 30 Degrees or Less    Undress and Gown    Vital Signs    Neuro Checks    Notify MD for SBP < 80 or > 200    Notify Provider for SBP > 140 if Hemorrhagic Stroke    No Hypotonic Fluids    Nursing Dysphagia Screening (Complete Prior to Giving anything PO)    RN to Place Order SLP Consult (IF swallow screen failed) - Eval & Treat Choosing Reason of RN Dysphagia Screen Failed    Vital Signs    Pulse Oximetry, Continuous    Notify Provider    Nursing Dysphagia Screening (Complete Prior to Giving Anything By Mouth)    RN to Place Order SLP Consult - Eval & Treat Choosing Reason of RN Dysphagia Screen Failed    Nurse to Call MD or Nutrition Services for Diet if Patient Passes Dysphagia Screen    Intake and Output    Neuro Checks    NIHSS Assessment    Order CT Head Without Contrast for Neurological Decline    Provide Stroke Education Material    Place Sequential Compression Device    Maintain Sequential Compression Device    Neurological Measures    Activity As Tolerated    Vital Signs    Intake & Output    Weigh Patient    Oral Care    Saline Lock & Maintain IV Access    Place Sequential Compression Device    Maintain Sequential Compression Device    Code Status and Medical Interventions:    Inpatient Neurology Consult Stroke    Inpatient Case Management   Consult    Inpatient Diabetes Educator Consult    OT Consult: Eval & Treat    PT Consult: Eval & Treat As Tolerated    Oxygen Therapy- Nasal Cannula; Titrate 1-6 LPM Per SpO2; 90 - 95%    SLP Consult: Eval & Treat RN Dysphagia Screen Failed    SLP Consult: Eval & Treat Communication Disorder    POC CHEM 8    POCT Protime/INR    POC CHEM 8    POC Glucose Q6H    POC Glucose 4x Daily Before Meals & at Bedtime    ECG 12 Lead ED Triage Standing Order; Acute Stroke (Onset <24 hrs)    Adult Transthoracic Echo Complete W/ Cont if Necessary Per Protocol (With Agitated Saline)    Insert Large-Bore Peripheral IV - Right AC Preferred    Insert Peripheral IV    Insert Peripheral IV    Initiate Observation Status    ED Bed Request    CBC & Differential    Green Top (Gel)    Lavender Top    Gold Top - SST    Gray Top    Light Blue Top       All labs have been independently reviewed by me.  All radiology studies have been reviewed by me and the radiologist dictating the report.  All EKG's have been independently viewed and interpreted by me.      Discussion below represents my analysis of pertinent findings related to patient's condition, differential diagnosis, treatment plan and final disposition.    Differential diagnosis:  The differential diagnosis associated with the patient's presentation includes: CVA, TIA, seizure, neurological deficit    Additional sources  Discussed/ obtained information from independent historians:   [] Spouse  [] Parent  [] Family member  [] Friend  [x] EMS   [] Other:    External (non-ED) record review:   [] Inpatient record:   [] Office record:   [] Outpatient record:   [] Prior Outpatient labs:   [] Prior Outpatient radiology:   [] Primary Care record:   [] Outside ED record:   [] Other:     Patient's care impacted by:   [] Diabetes  [] Hypertension  [] CHF  [] Hyperlipidemia  [] Coronary Artery Disease   [] COPD   [] Cancer   [] Tobacco Abuse   [] Substance Abuse    [x] Other:  CVA    Care significantly affected by Social Determinants of Health (housing and economic circumstances, unemployment)    [] Yes     [x] No   If yes, Patient's care significantly limited by Social Determinants of Health including:   [] Inadequate housing   [] Low income   [] Alcoholism and drug addiction in family   [] Problems related to primary support group   [] Unemployment   [] Problems related to employment   [] Other Social Determinants of Health:       MEDICATIONS ADMINISTERED IN ED:  Medications   sodium chloride 0.9 % flush 10 mL (has no administration in time range)   sodium chloride 0.9 % flush 10 mL (has no administration in time range)   sodium chloride 0.9 % flush 10 mL (has no administration in time range)   sodium chloride 0.9 % infusion 40 mL (has no administration in time range)   atorvastatin (LIPITOR) tablet 80 mg (has no administration in time range)   aspirin tablet 325 mg (has no administration in time range)     Or   aspirin suppository 300 mg (has no administration in time range)   aspirin chewable tablet 81 mg (has no administration in time range)     Or   aspirin suppository 300 mg (has no administration in time range)   clopidogrel (PLAVIX) tablet 300 mg (has no administration in time range)     And   clopidogrel (PLAVIX) tablet 75 mg (has no administration in time range)   cetirizine (zyrTEC) tablet 5 mg (has no administration in time range)   venlafaxine XR (EFFEXOR-XR) 24 hr capsule 150 mg (has no administration in time range)   sodium chloride 0.9 % flush 10 mL (has no administration in time range)   sodium chloride 0.9 % flush 10 mL (has no administration in time range)   sodium chloride 0.9 % infusion 40 mL (has no administration in time range)   sennosides-docusate (PERICOLACE) 8.6-50 MG per tablet 2 tablet (has no administration in time range)     And   polyethylene glycol (MIRALAX) packet 17 g (has no administration in time range)     And   bisacodyl (DULCOLAX) EC tablet 5 mg  (has no administration in time range)     And   bisacodyl (DULCOLAX) suppository 10 mg (has no administration in time range)   Potassium Replacement - Follow Nurse / BPA Driven Protocol (has no administration in time range)   Magnesium Standard Dose Replacement - Follow Nurse / BPA Driven Protocol (has no administration in time range)   Phosphorus Replacement - Follow Nurse / BPA Driven Protocol (has no administration in time range)   Calcium Replacement - Follow Nurse / BPA Driven Protocol (has no administration in time range)   acetaminophen (TYLENOL) tablet 650 mg (has no administration in time range)     Or   acetaminophen (TYLENOL) 160 MG/5ML solution 650 mg (has no administration in time range)     Or   acetaminophen (TYLENOL) suppository 650 mg (has no administration in time range)   ondansetron (ZOFRAN) tablet 4 mg (has no administration in time range)     Or   ondansetron (ZOFRAN) injection 4 mg (has no administration in time range)   dextrose (GLUTOSE) oral gel 15 g (has no administration in time range)   dextrose (D50W) (25 g/50 mL) IV injection 25 g (has no administration in time range)   glucagon (GLUCAGEN) injection 1 mg (has no administration in time range)   Insulin Lispro (humaLOG) injection 2-9 Units (has no administration in time range)   empagliflozin (JARDIANCE) tablet 10 mg (has no administration in time range)   iopamidol (ISOVUE-370) 76 % injection 115 mL (115 mL Intravenous Given 9/5/23 1518)   sodium chloride 0.9 % bolus 1,000 mL (1,000 mL Intravenous New Bag 9/5/23 1604)              This is a unfortunate 68-year-old female who presents for neurological deficit, seen by myself and stroke team immediately upon arrival to CT scanner.  The patient does have history of residual left-sided weakness from chronic Bell's palsy and previous stroke.  Family is concerned about her speech difficulty, right-sided facial droop, CT of the head without acute intracranial abnormality, we did move forward  with advanced imaging, further neurological discussion.  No signs of acute large vessel occlusion, patient is outside of any window for TNK administration.  We did an IV, labs in addition to the further imaging.  Blood work and labs are stable.  The neurological team recommends admission for further neurological assessment, advanced imaging given the patient's previous history and presenting symptoms.  Case discussed with hospitalist, Dr. Schultz, for admission.    PROCEDURES:  Procedures    CRITICAL CARE TIME    Total Critical Care time was 30 minutes, excluding separately reportable procedures.  Acute neurological deficit, slurred speech, facial droop, weakness or chronic stroke work-up, neurological assessment, reevaluations, discussions with consultants. There was a high probability of clinically significant/life threatening deterioration in the patient's condition which required my urgent intervention.      FINAL IMPRESSION      1. Cerebrovascular accident (CVA), unspecified mechanism    2. Difficulty with speech    3. Facial droop          DISPOSITION/PLAN     ED Disposition       ED Disposition   Decision to Admit    Condition   --    Comment   Level of Care: Telemetry [5]   Diagnosis: Aphasia [784.3.ICD-9-CM]                   Comment: Please note this report has been produced using speech recognition software.      Joe Serra DO  Attending Emergency Physician         Joe Serra DO  09/05/23 [id number]

## 2023-09-06 ENCOUNTER — APPOINTMENT (OUTPATIENT)
Dept: CARDIOLOGY | Facility: HOSPITAL | Age: 68
End: 2023-09-06
Payer: MEDICARE

## 2023-09-06 ENCOUNTER — APPOINTMENT (OUTPATIENT)
Dept: MRI IMAGING | Facility: HOSPITAL | Age: 68
End: 2023-09-06
Payer: MEDICARE

## 2023-09-06 LAB
BH CV XLRA MEAS LEFT DIST CCA EDV: 20.4 CM/SEC
BH CV XLRA MEAS LEFT DIST CCA PSV: 96.4 CM/SEC
BH CV XLRA MEAS LEFT DIST ICA EDV: 22.7 CM/SEC
BH CV XLRA MEAS LEFT DIST ICA PSV: 90.9 CM/SEC
BH CV XLRA MEAS LEFT ICA/CCA RATIO: 1.1
BH CV XLRA MEAS LEFT MID CCA EDV: 17.2 CM/SEC
BH CV XLRA MEAS LEFT MID CCA PSV: 94.1 CM/SEC
BH CV XLRA MEAS LEFT MID ICA EDV: 26.7 CM/SEC
BH CV XLRA MEAS LEFT MID ICA PSV: 102 CM/SEC
BH CV XLRA MEAS LEFT PROX CCA EDV: 15.6 CM/SEC
BH CV XLRA MEAS LEFT PROX CCA PSV: 87.5 CM/SEC
BH CV XLRA MEAS LEFT PROX ECA EDV: 15.7 CM/SEC
BH CV XLRA MEAS LEFT PROX ECA PSV: 140 CM/SEC
BH CV XLRA MEAS LEFT PROX ICA EDV: 22 CM/SEC
BH CV XLRA MEAS LEFT PROX ICA PSV: 90.2 CM/SEC
BH CV XLRA MEAS LEFT PROX SCLA PSV: 201 CM/SEC
BH CV XLRA MEAS LEFT VERTEBRAL A EDV: 11.8 CM/SEC
BH CV XLRA MEAS LEFT VERTEBRAL A PSV: 93.2 CM/SEC
BH CV XLRA MEAS RIGHT DIST CCA EDV: 13.2 CM/SEC
BH CV XLRA MEAS RIGHT DIST CCA PSV: 67 CM/SEC
BH CV XLRA MEAS RIGHT DIST ICA EDV: 25.2 CM/SEC
BH CV XLRA MEAS RIGHT DIST ICA PSV: 86.7 CM/SEC
BH CV XLRA MEAS RIGHT ICA/CCA RATIO: 1.5
BH CV XLRA MEAS RIGHT MID CCA EDV: 13.3 CM/SEC
BH CV XLRA MEAS RIGHT MID CCA PSV: 87.6 CM/SEC
BH CV XLRA MEAS RIGHT MID ICA EDV: 20.3 CM/SEC
BH CV XLRA MEAS RIGHT MID ICA PSV: 88.4 CM/SEC
BH CV XLRA MEAS RIGHT PROX CCA EDV: 11.2 CM/SEC
BH CV XLRA MEAS RIGHT PROX CCA PSV: 84.1 CM/SEC
BH CV XLRA MEAS RIGHT PROX ECA EDV: 15.4 CM/SEC
BH CV XLRA MEAS RIGHT PROX ECA PSV: 181 CM/SEC
BH CV XLRA MEAS RIGHT PROX ICA EDV: 19.1 CM/SEC
BH CV XLRA MEAS RIGHT PROX ICA PSV: 134 CM/SEC
BH CV XLRA MEAS RIGHT PROX SCLA PSV: 173 CM/SEC
BH CV XLRA MEAS RIGHT VERTEBRAL A EDV: 19.8 CM/SEC
BH CV XLRA MEAS RIGHT VERTEBRAL A PSV: 99.9 CM/SEC
CHOLEST SERPL-MCNC: 148 MG/DL (ref 0–200)
GLUCOSE BLDC GLUCOMTR-MCNC: 129 MG/DL (ref 70–130)
GLUCOSE BLDC GLUCOMTR-MCNC: 140 MG/DL (ref 70–130)
GLUCOSE BLDC GLUCOMTR-MCNC: 163 MG/DL (ref 70–130)
HBA1C MFR BLD: 5.4 % (ref 4.8–5.6)
HDLC SERPL-MCNC: 44 MG/DL (ref 40–60)
INR PPP: 1.1 (ref 0.8–1.2)
LDLC SERPL CALC-MCNC: 88 MG/DL (ref 0–100)
LDLC/HDLC SERPL: 1.99 {RATIO}
LEFT ARM BP: NORMAL MMHG
PROTHROMBIN TIME: 13.4 SECONDS (ref 12.8–15.2)
QT INTERVAL: 336 MS
QTC INTERVAL: 411 MS
RIGHT ARM BP: NORMAL MMHG
TRIGL SERPL-MCNC: 82 MG/DL (ref 0–150)
VLDLC SERPL-MCNC: 16 MG/DL (ref 5–40)

## 2023-09-06 PROCEDURE — 80061 LIPID PANEL: CPT | Performed by: NURSE PRACTITIONER

## 2023-09-06 PROCEDURE — G0378 HOSPITAL OBSERVATION PER HR: HCPCS

## 2023-09-06 PROCEDURE — 93306 TTE W/DOPPLER COMPLETE: CPT | Performed by: INTERNAL MEDICINE

## 2023-09-06 PROCEDURE — 92610 EVALUATE SWALLOWING FUNCTION: CPT

## 2023-09-06 PROCEDURE — 83036 HEMOGLOBIN GLYCOSYLATED A1C: CPT | Performed by: NURSE PRACTITIONER

## 2023-09-06 PROCEDURE — 92523 SPEECH SOUND LANG COMPREHEN: CPT

## 2023-09-06 PROCEDURE — 93306 TTE W/DOPPLER COMPLETE: CPT

## 2023-09-06 PROCEDURE — 93880 EXTRACRANIAL BILAT STUDY: CPT

## 2023-09-06 PROCEDURE — 82948 REAGENT STRIP/BLOOD GLUCOSE: CPT

## 2023-09-06 PROCEDURE — 97165 OT EVAL LOW COMPLEX 30 MIN: CPT

## 2023-09-06 PROCEDURE — 97161 PT EVAL LOW COMPLEX 20 MIN: CPT

## 2023-09-06 PROCEDURE — 70551 MRI BRAIN STEM W/O DYE: CPT

## 2023-09-06 PROCEDURE — 93880 EXTRACRANIAL BILAT STUDY: CPT | Performed by: INTERNAL MEDICINE

## 2023-09-06 RX ORDER — ASPIRIN 81 MG/1
81 TABLET, CHEWABLE ORAL DAILY
Status: DISCONTINUED | OUTPATIENT
Start: 2023-09-07 | End: 2023-09-07 | Stop reason: HOSPADM

## 2023-09-06 RX ORDER — ASPIRIN 300 MG/1
300 SUPPOSITORY RECTAL DAILY
Status: DISCONTINUED | OUTPATIENT
Start: 2023-09-07 | End: 2023-09-07 | Stop reason: HOSPADM

## 2023-09-06 RX ORDER — QUETIAPINE FUMARATE 100 MG/1
200 TABLET, FILM COATED ORAL NIGHTLY
Status: DISCONTINUED | OUTPATIENT
Start: 2023-09-06 | End: 2023-09-07 | Stop reason: HOSPADM

## 2023-09-06 RX ORDER — CLOPIDOGREL BISULFATE 75 MG/1
300 TABLET ORAL ONCE
Status: COMPLETED | OUTPATIENT
Start: 2023-09-06 | End: 2023-09-06

## 2023-09-06 RX ORDER — ASPIRIN 300 MG/1
300 SUPPOSITORY RECTAL ONCE
Status: COMPLETED | OUTPATIENT
Start: 2023-09-06 | End: 2023-09-06

## 2023-09-06 RX ORDER — ASPIRIN 325 MG
325 TABLET ORAL ONCE
Status: COMPLETED | OUTPATIENT
Start: 2023-09-06 | End: 2023-09-06

## 2023-09-06 RX ORDER — CLOPIDOGREL BISULFATE 75 MG/1
75 TABLET ORAL DAILY
Status: DISCONTINUED | OUTPATIENT
Start: 2023-09-07 | End: 2023-09-07

## 2023-09-06 RX ADMIN — ATORVASTATIN CALCIUM 80 MG: 40 TABLET, FILM COATED ORAL at 21:37

## 2023-09-06 RX ADMIN — VENLAFAXINE HYDROCHLORIDE 150 MG: 75 CAPSULE, EXTENDED RELEASE ORAL at 10:21

## 2023-09-06 RX ADMIN — Medication 10 ML: at 21:37

## 2023-09-06 RX ADMIN — CETIRIZINE HYDROCHLORIDE 5 MG: 10 TABLET, FILM COATED ORAL at 10:19

## 2023-09-06 RX ADMIN — EMPAGLIFLOZIN 10 MG: 10 TABLET, FILM COATED ORAL at 10:19

## 2023-09-06 RX ADMIN — Medication 10 ML: at 00:01

## 2023-09-06 RX ADMIN — Medication 10 ML: at 00:02

## 2023-09-06 RX ADMIN — ASPIRIN 325 MG: 325 TABLET ORAL at 10:19

## 2023-09-06 RX ADMIN — CLOPIDOGREL BISULFATE 300 MG: 75 TABLET ORAL at 10:18

## 2023-09-06 RX ADMIN — QUETIAPINE FUMARATE 200 MG: 100 TABLET ORAL at 21:37

## 2023-09-06 NOTE — PROGRESS NOTES
"    [facility] Medicine Services  PROGRESS NOTE    Patient Name: Olga Sandoval  : 1955  MRN: [id number]    Date of Admission: 2023  Primary Care Physician: System, Provider Not In    Subjective   Subjective     CC:  Speech difficulty, f/u    HPI:  Daughter reports this was \"worse one\" in a while in relation to her speech difficult. H/o prior TIAs  We discuss findings to now  Patient very anxiuos to go home and back to baseline per her report      Objective   Objective     Vital Signs:   Temp:  [98.3 °F (36.8 °C)-98.9 °F (37.2 °C)] 98.7 °F (37.1 °C)  Heart Rate:  [] 78  Resp:  [16-19] 19  BP: ()/(52-95) 144/71     Physical Exam:  Constitutional: No acute distress, awake, alert. Hearing device in place for assist  HENT: NCAT, mucous membranes moist  Respiratory: Clear to auscultation bilaterally, respiratory effort normal   Cardiovascular: RRR, no murmurs, rubs, or gallops  Gastrointestinal: Soft, nontender, nondistended  Musculoskeletal: Muscle tone within normal limits, no joint effusions appreciated  Psychiatric: Appropriate affect, cooperative  Neurologic: Alert and oriented, facial movements symmetric and spontaneous movement of all 4 extremities grossly equal bilaterally, speech clear  Skin: No rashes    Results Reviewed:  LAB RESULTS:      Lab 23  1515 23  1513 23  151   WBC  --   --  10.31   HEMOGLOBIN  --   --  13.2   HEMOGLOBIN, POC  --  13.6  --    HEMATOCRIT  --   --  42.0   HEMATOCRIT POC  --  40  --    PLATELETS  --   --  294   NEUTROS ABS  --   --  6.90   IMMATURE GRANS (ABS)  --   --  0.03   LYMPHS ABS  --   --  1.96   MONOS ABS  --   --  0.87   EOS ABS  --   --  0.50*   MCV  --   --  94.2   PROTIME 13.4  --  13.4   APTT  --   --  28.7         Lab 23  0633 23  1513   CREATININE  --  1.00   EGFR  --  60.7   HEMOGLOBIN A1C 5.40  --          Lab 23  151   ALT (SGPT) 9   AST (SGOT) 15         Lab 23  1515 " 09/05/23  1512   HSTROP T  --  14*   PROTIME 13.4 13.4   INR 1 1.1         Lab 09/06/23  0633   CHOLESTEROL 148   LDL CHOL 88   HDL CHOL 44   TRIGLYCERIDES 82             Brief Urine Lab Results       None            Microbiology Results Abnormal       None            Adult Transthoracic Echo Complete W/ Cont if Necessary Per Protocol (With Agitated Saline)    Result Date: 9/6/2023    Left ventricular ejection fraction appears to be 56 - 60%.   Mild mitral valve stenosis is present. The mitral valve peak gradient is 10 mmHg. The mitral valve mean gradient is 4 mmHg. The mitral valve area (PHT) is 2.4 cm2.   Estimated right ventricular systolic pressure from tricuspid regurgitation is normal (<35 mmHg).   There is a trivial pericardial effusion.     CT Angiogram Neck    Result Date: 9/5/2023  CT CEREBRAL PERFUSION W WO CONTRAST, CT ANGIOGRAM NECK, CT ANGIOGRAM HEAD W AI ANALYSIS OF LVO Date of Exam: 9/5/2023 3:11 PM EDT Indication: Neuro deficit, acute stroke suspected.  Comparison: None available. Technique: Axial CT images of the brain were obtained prior to and after the administration of 115 mL Isovue-370. Core blood volume, core blood flow, mean transit time, and Tmax images were obtained utilizing the Rapid software protocol. A limited CT angiogram of the head was also performed to measure the blood vessel density. Axial IV arterial phase contrast-enhanced CT angiogram of the head and neck. Three-dimensional reconstructions were postprocessed. The radiation dose reduction device was turned on for each scan per the ALARA (As Low as Reasonably Achievable) protocol. Findings: CT perfusion: Color maps demonstrate some expected areas of defect reflecting chronic volume loss noted on noncontrast CT. There is no evidence of definite acute core infarct or significant territorial ischemic tissue at risk. CT ANGIOGRAM: The lung apices are clear. Evaluation of the neck soft tissues demonstrates no pathologic cervical  adenopathy or unexpected aerodigestive tract mass. The osseous structures demonstrate no evidence of acute fracture or aggressive osseous lesion, with some multilevel spondylosis change present. Patent aortic arch with 4 vessel branching, with the left vertebral artery arising directly. The visualized subclavian arteries appear patent proximally. On the right, there is calcific atherosclerosis of the ICA origin with focal 70 to 80% narrowing present by NASCET criteria. Less severe atherosclerosis is present on the left, with mild, less than 50% narrowing noted. The vertebral arteries are normal in course and caliber bilaterally, somewhat diffusely diminutive on the left. Intracranially, the carotid siphons demonstrate calcific atherosclerosis without evidence of significant associated narrowing. The anterior cerebral arteries are normal in course and caliber bilaterally. The right middle cerebral artery demonstrates no evidence of flow-limiting stenosis, large vessel occlusion or aneurysm. The left middle cerebral artery is similarly normal in course and caliber. The vertebrobasilar system is patent, with the left vertebral artery appearing to terminate in PICA. The posterior cerebral arteries are normal in course and caliber bilaterally.     Impression: Impression: CT perfusion demonstrates some expected areas of defect correlating with known and presumed areas of chronic volume loss and encephalomalacia noted on CT. There is otherwise no evidence of acute core infarct or territorial ischemic tissue at risk. CT angiogram demonstrates focal 70 to 80% stenosis of the right ICA origin. There is otherwise no evidence of additional high-grade stenosis, large vessel occlusion or aneurysm in the head and neck. Electronically Signed: Armen Browning MD  9/5/2023 3:34 PM EDT  Workstation ID: [id number]    MRI Brain Without Contrast    Result Date: 9/6/2023  MRI BRAIN WO CONTRAST Date of Exam: 9/6/2023 4:02 PM EDT Indication:  Stroke, follow up.  Comparison: CT 1 day prior. Technique:  Routine multiplanar/multisequence sequence images of the brain were obtained without contrast administration. Findings: No acute infarct is present on diffusion weighted sequences. Some areas of increased diffusion signal are present, without definite ADC correlate along cortical margins of the left occipital lobe which also demonstrate FLAIR hyperintensity, favoring T2 shine through and possible area of evolving early chronic ischemia. There is otherwise relatively advanced age-related change, with some moderate generalized volume loss as well as more focal extensive volume loss of the right temporal lobe, in addition to confluent areas of periventricular white matter T2 hyperintensity, favored to reflect sequela of chronic microvascular ischemia. There is no evidence of intracranial hemorrhage, mass or mass effect. There is ex vacuo prominence of the ventricle secondary to surrounding volume loss. The orbits are normal. The paranasal sinuses appear clear.     Impression: Impression: Probable late subacute to early chronic area of ischemia noted along the left occipital lobe cortex. No evidence of acute infarct. Advanced chronic and age-related changes are present as above, including prominent periventricular leukomalacia and evidence of prior watershed lacunar infarcts, greater on the right. Electronically Signed: Armen Browning MD  9/6/2023 4:25 PM EDT  Workstation ID: [id number]    XR Chest 1 View    Result Date: 9/5/2023  XR CHEST 1 VW Date of Exam: 9/5/2023 3:16 PM EDT Indication: Acute Stroke Protocol (onset < 12 hrs) Comparison: None available. Findings: The lungs are clear. The heart and mediastinal contours appear normal. There is no pleural effusion. The pulmonary vasculature appears normal. The osseous structures appear intact.     Impression: Impression: No acute cardiopulmonary process. Electronically Signed: Fabricio Weber MD  9/5/2023 3:36  PM EDT  Workstation ID: [id number]    Duplex Carotid Ultrasound CAR    Result Date: 9/6/2023    Right internal carotid artery demonstrates a less than 50% stenosis.   Left internal carotid artery demonstrates a less than 50% stenosis.   Bilateral antegrade vertebral artery flow. No previous for comparison     CT Head Without Contrast Stroke Protocol    Result Date: 9/5/2023  CT HEAD WO CONTRAST STROKE PROTOCOL Date of Exam: 9/5/2023 3:05 PM EDT Indication: Neuro deficit, acute, stroke suspected. Comparison: None available. Technique: Axial CT images were obtained of the head without contrast administration.  Reconstructed coronal images were also obtained. Automated exposure control and iterative construction methods were used. Findings: Areas of chronic appearing encephalomalacic change are noted within the right frontal lobe and left posterior temporal and occipital lobes, with some additional focal volume loss present involving the right temporal lobe. Fairly confluent hypoattenuating  periventricular white matter changes are present, likely reflecting sequela of chronic microvascular ischemia. There is otherwise no evidence of intracranial hemorrhage, mass or mass effect. There is ex vacuo prominence of the ventricles secondary to surrounding volume loss. The orbits are normal. The paranasal sinuses are grossly clear.     Impression: Impression: Multifocal areas of chronic appearing encephalomalacic change are present, favoring areas of old infarct and generalized volume loss. No acute intracranial findings are present otherwise. Consider MRI if there is persistent clinical concern for superimposed acute ischemia. Scan performed 9/5/2023 at 3:04 p.m. Results discussed with the stroke team by Nick Browning in person at time of scanning. Electronically Signed: Armen Browning MD  9/5/2023 3:17 PM EDT  Workstation ID: [id number]    CT Angiogram Head w AI Analysis of LVO    Result Date: 9/5/2023  CT CEREBRAL PERFUSION  W WO CONTRAST, CT ANGIOGRAM NECK, CT ANGIOGRAM HEAD W AI ANALYSIS OF LVO Date of Exam: 9/5/2023 3:11 PM EDT Indication: Neuro deficit, acute stroke suspected.  Comparison: None available. Technique: Axial CT images of the brain were obtained prior to and after the administration of 115 mL Isovue-370. Core blood volume, core blood flow, mean transit time, and Tmax images were obtained utilizing the Rapid software protocol. A limited CT angiogram of the head was also performed to measure the blood vessel density. Axial IV arterial phase contrast-enhanced CT angiogram of the head and neck. Three-dimensional reconstructions were postprocessed. The radiation dose reduction device was turned on for each scan per the ALARA (As Low as Reasonably Achievable) protocol. Findings: CT perfusion: Color maps demonstrate some expected areas of defect reflecting chronic volume loss noted on noncontrast CT. There is no evidence of definite acute core infarct or significant territorial ischemic tissue at risk. CT ANGIOGRAM: The lung apices are clear. Evaluation of the neck soft tissues demonstrates no pathologic cervical adenopathy or unexpected aerodigestive tract mass. The osseous structures demonstrate no evidence of acute fracture or aggressive osseous lesion, with some multilevel spondylosis change present. Patent aortic arch with 4 vessel branching, with the left vertebral artery arising directly. The visualized subclavian arteries appear patent proximally. On the right, there is calcific atherosclerosis of the ICA origin with focal 70 to 80% narrowing present by NASCET criteria. Less severe atherosclerosis is present on the left, with mild, less than 50% narrowing noted. The vertebral arteries are normal in course and caliber bilaterally, somewhat diffusely diminutive on the left. Intracranially, the carotid siphons demonstrate calcific atherosclerosis without evidence of significant associated narrowing. The anterior cerebral  arteries are normal in course and caliber bilaterally. The right middle cerebral artery demonstrates no evidence of flow-limiting stenosis, large vessel occlusion or aneurysm. The left middle cerebral artery is similarly normal in course and caliber. The vertebrobasilar system is patent, with the left vertebral artery appearing to terminate in PICA. The posterior cerebral arteries are normal in course and caliber bilaterally.     Impression: Impression: CT perfusion demonstrates some expected areas of defect correlating with known and presumed areas of chronic volume loss and encephalomalacia noted on CT. There is otherwise no evidence of acute core infarct or territorial ischemic tissue at risk. CT angiogram demonstrates focal 70 to 80% stenosis of the right ICA origin. There is otherwise no evidence of additional high-grade stenosis, large vessel occlusion or aneurysm in the head and neck. Electronically Signed: Armen Browning MD  9/5/2023 3:34 PM EDT  Workstation ID: [id number]    CT CEREBRAL PERFUSION WITH & WITHOUT CONTRAST    Result Date: 9/5/2023  CT CEREBRAL PERFUSION W WO CONTRAST, CT ANGIOGRAM NECK, CT ANGIOGRAM HEAD W AI ANALYSIS OF LVO Date of Exam: 9/5/2023 3:11 PM EDT Indication: Neuro deficit, acute stroke suspected.  Comparison: None available. Technique: Axial CT images of the brain were obtained prior to and after the administration of 115 mL Isovue-370. Core blood volume, core blood flow, mean transit time, and Tmax images were obtained utilizing the Rapid software protocol. A limited CT angiogram of the head was also performed to measure the blood vessel density. Axial IV arterial phase contrast-enhanced CT angiogram of the head and neck. Three-dimensional reconstructions were postprocessed. The radiation dose reduction device was turned on for each scan per the ALARA (As Low as Reasonably Achievable) protocol. Findings: CT perfusion: Color maps demonstrate some expected areas of defect reflecting  chronic volume loss noted on noncontrast CT. There is no evidence of definite acute core infarct or significant territorial ischemic tissue at risk. CT ANGIOGRAM: The lung apices are clear. Evaluation of the neck soft tissues demonstrates no pathologic cervical adenopathy or unexpected aerodigestive tract mass. The osseous structures demonstrate no evidence of acute fracture or aggressive osseous lesion, with some multilevel spondylosis change present. Patent aortic arch with 4 vessel branching, with the left vertebral artery arising directly. The visualized subclavian arteries appear patent proximally. On the right, there is calcific atherosclerosis of the ICA origin with focal 70 to 80% narrowing present by NASCET criteria. Less severe atherosclerosis is present on the left, with mild, less than 50% narrowing noted. The vertebral arteries are normal in course and caliber bilaterally, somewhat diffusely diminutive on the left. Intracranially, the carotid siphons demonstrate calcific atherosclerosis without evidence of significant associated narrowing. The anterior cerebral arteries are normal in course and caliber bilaterally. The right middle cerebral artery demonstrates no evidence of flow-limiting stenosis, large vessel occlusion or aneurysm. The left middle cerebral artery is similarly normal in course and caliber. The vertebrobasilar system is patent, with the left vertebral artery appearing to terminate in PICA. The posterior cerebral arteries are normal in course and caliber bilaterally.     Impression: Impression: CT perfusion demonstrates some expected areas of defect correlating with known and presumed areas of chronic volume loss and encephalomalacia noted on CT. There is otherwise no evidence of acute core infarct or territorial ischemic tissue at risk. CT angiogram demonstrates focal 70 to 80% stenosis of the right ICA origin. There is otherwise no evidence of additional high-grade stenosis, large  vessel occlusion or aneurysm in the head and neck. Electronically Signed: Armen Browning MD  9/5/2023 3:34 PM EDT  Workstation ID: [id number]     Results for orders placed during the hospital encounter of 09/05/23    Adult Transthoracic Echo Complete W/ Cont if Necessary Per Protocol (With Agitated Saline)    Interpretation Summary    Left ventricular ejection fraction appears to be 56 - 60%.    Mild mitral valve stenosis is present. The mitral valve peak gradient is 10 mmHg. The mitral valve mean gradient is 4 mmHg. The mitral valve area (PHT) is 2.4 cm2.    Estimated right ventricular systolic pressure from tricuspid regurgitation is normal (<35 mmHg).    There is a trivial pericardial effusion.      Current medications:  Scheduled Meds:[START ON 9/7/2023] aspirin, 81 mg, Oral, Daily   Or  [START ON 9/7/2023] aspirin, 300 mg, Rectal, Daily  atorvastatin, 80 mg, Oral, Nightly  cetirizine, 5 mg, Oral, Daily  [START ON 9/7/2023] clopidogrel, 75 mg, Oral, Daily  empagliflozin, 10 mg, Oral, Daily  insulin lispro, 2-9 Units, Subcutaneous, 4x Daily AC & at Bedtime  sodium chloride, 10 mL, Intravenous, Q12H  sodium chloride, 10 mL, Intravenous, Q12H  venlafaxine XR, 150 mg, Oral, Daily      Continuous Infusions:   PRN Meds:.  acetaminophen **OR** acetaminophen **OR** acetaminophen    senna-docusate sodium **AND** polyethylene glycol **AND** bisacodyl **AND** bisacodyl    Calcium Replacement - Follow Nurse / BPA Driven Protocol    dextrose    dextrose    glucagon (human recombinant)    Magnesium Standard Dose Replacement - Follow Nurse / BPA Driven Protocol    ondansetron **OR** ondansetron    Phosphorus Replacement - Follow Nurse / BPA Driven Protocol    Potassium Replacement - Follow Nurse / BPA Driven Protocol    sodium chloride    sodium chloride    sodium chloride    sodium chloride    sodium chloride    Assessment & Plan   Assessment & Plan     Active Hospital Problems    Diagnosis  POA    **Aphasia [R47.01]  Yes     Bipolar 2 disorder [F31.81]  Yes    Type 2 diabetes mellitus [E11.9]  Yes    Essential hypertension [I10]  Yes    Hyperlipidemia [E78.5]  Yes    Carotid stenosis, right [I65.21]  Yes    Obesity, Class III, BMI 40-49.9 (morbid obesity) [E66.01]  Yes      Resolved Hospital Problems   No resolved problems to display.        Brief Hospital Course to date:  Olga Sandoval is a 68 y.o. female w h/o prior CVAs, DMII, HTN who presented with episode of speech difficulty, concerning for TIA.    Episode of speech difficulty, concern for TIA  Subacute CVA  H/o CVAs  -concern for TIA based on presentation and patient prior history. MRI w subacute CVA, not consistent in timing to current presentation  -statin, plavix, ASA    Concern for R-ICA stenosis - initial concern for 70-80% stenosis, on doppler reassuringly <50%, not needing intervention     DMII - well controlled on metformin alone, no further checks needed except per stroke protocol  HTN - controlled wo home amlodipine or losartan for now, will monitor  BMI 42  Bipolar II - home seroquel 200 mg verified w daughter 9/6, scripts in [place] so unable to verify w secondary source.    Expected Discharge Location and Transportation: home  Expected Discharge 9/7  Expected Discharge Date: 9/7/2023; Expected Discharge Time:      DVT prophylaxis:  Mechanical DVT prophylaxis orders are present.        CODE STATUS:   Code Status and Medical Interventions:   Ordered at: 09/05/23 2045     Level Of Support Discussed With:    Patient     Code Status (Patient has no pulse and is not breathing):    CPR (Attempt to Resuscitate)     Medical Interventions (Patient has pulse or is breathing):    Full Support       Tonie Warner MD  09/06/23

## 2023-09-06 NOTE — THERAPY EVALUATION
Patient Name: Olga Sandoval  : 1955    MRN: [id number]                              Today's Date: 2023       Admit Date: 2023    Visit Dx:     ICD-10-CM ICD-9-CM   1. Cerebrovascular accident (CVA), unspecified mechanism  I63.9 434.91   2. Difficulty with speech  R47.9 784.59   3. Facial droop  R29.810 781.94     Patient Active Problem List   Diagnosis    Aphasia    Bipolar 2 disorder    Type 2 diabetes mellitus    Essential hypertension    Hyperlipidemia    Carotid stenosis, right    Obesity, Class III, BMI 40-49.9 (morbid obesity)     Past Medical History:   Diagnosis Date    Aneurysm     left carotid    Bipolar 2 disorder     Diabetes mellitus     H/O Bell's palsy     Left sided facial droop    History of loop recorder     Hyperlipidemia     Hypertension     Stroke     x 4     Past Surgical History:   Procedure Laterality Date    CARDIAC CATHETERIZATION      CATARACT EXTRACTION Bilateral      SECTION        General Information       Row Name 23 1057          Physical Therapy Time and Intention    Document Type evaluation  -MB     Mode of Treatment physical therapy  -MB       Row Name 23 1057          General Information    Patient Profile Reviewed yes  -MB     Prior Level of Function independent:;bed mobility;ADL's;transfer;w/c or scooter  PTA pt was I w/ ADLs, w/c tranfsers, and household distances with w/c mobility. Pt has not walked in approx 5 years d/t being fearful of falls; endorses 5 or 6 recent falls.  -MB     Existing Precautions/Restrictions fall  Belkofski has amplifier in room, anxious w/ mobility  -MB     Barriers to Rehab medically complex;previous functional deficit;hearing deficit  -MB       Row Name 23 1050          Living Environment    People in Home child(janki), adult  Pt. currently staying w/ dtr and plans to return there at D/C.  -MB       Row Name 23 1057          Home Main Entrance    Number of Stairs, Main Entrance other (see comments)  ramp  -MB        Row Name 09/06/23 1057          Stairs Within Home, Primary    Number of Stairs, Within Home, Primary none  -MB       Row Name 09/06/23 1057          Cognition    Orientation Status (Cognition) oriented x 4  -MB       Row Name 09/06/23 1057          Safety Issues, Functional Mobility    Safety Issues Affecting Function (Mobility) awareness of need for assistance;insight into deficits/self-awareness;positioning of assistive device;safety precaution awareness;safety precautions follow-through/compliance  -MB     Impairments Affecting Function (Mobility) endurance/activity tolerance;shortness of breath;strength;balance  -MB               User Key  (r) = Recorded By, (t) = Taken By, (c) = Cosigned By      Initials Name Provider Type    MB Veronica Christian, PT Physical Therapist                   Mobility       Row Name 09/06/23 1356          Bed Mobility    Bed Mobility supine-sit;sit-supine  -MB     Supine-Sit [place] (Bed Mobility) contact guard  -MB     Sit-Supine [place] (Bed Mobility) minimum assist (75% patient effort)  -MB     Assistive Device (Bed Mobility) bed rails;head of bed elevated  -MB     Comment, (Bed Mobility) Pt. required assist to support trunk and manage BLEs.  -MB       Row Name 09/06/23 1356          Transfers    Comment, (Transfers) VCs for safe hand placement, sequencing, and upright posture in standing.  -MB       Row Name 09/06/23 1356          Sit-Stand Transfer    Sit-Stand [place] (Transfers) minimum assist (75% patient effort);2 person assist;verbal cues;nonverbal cues (demo/gesture)  -MB     Assistive Device (Sit-Stand Transfers) walker, front-wheeled  -MB       Row Name 09/06/23 1356          Gait/Stairs (Locomotion)    [place] Level (Gait) minimum assist (75% patient effort);verbal cues  -MB     Assistive Device (Gait) walker, front-wheeled  -MB     Distance in Feet (Gait) 2  -MB     Deviations/Abnormal Patterns (Gait) base of support, wide;devon  decreased;stride length decreased  -MB     Bilateral Gait Deviations forward flexed posture;heel strike decreased  -MB     Comment, (Gait/Stairs) Pt. side-stepped to HOB w/ increased forward flexion and dec B step length. VCs for step sequencing, upright posture, and safe use of RW.  -MB               User Key  (r) = Recorded By, (t) = Taken By, (c) = Cosigned By      Initials Name Provider Type    Veronica Day, PT Physical Therapist                   Obj/Interventions       Row Name 09/06/23 1358          Range of Motion Comprehensive    General Range of Motion bilateral lower extremity ROM WFL;bilateral upper extremity ROM WFL  -MB       Row Name 09/06/23 1358          Strength Comprehensive (MMT)    General Manual Muscle Testing (MMT) Assessment lower extremity strength deficits identified  -MB     Comment, General Manual Muscle Testing (MMT) Assessment BLEs grossly 4/5  -MB       Row Name 09/06/23 1358          Balance    Balance Assessment sitting static balance;standing static balance;standing dynamic balance  -MB     Dynamic Sitting Balance standby assist  -MB     Position, Sitting Balance unsupported;sitting edge of bed  -MB     Static Standing Balance minimal assist  -MB     Dynamic Standing Balance minimal assist  -MB     Position/Device Used, Standing Balance supported;walker, front-wheeled  -MB     Balance Interventions standing;sit to stand;occupation based/functional task;weight shifting activity  -MB       Row Name 09/06/23 1357          Sensory Assessment (Somatosensory)    Sensory Assessment (Somatosensory) LE sensation intact  -MB               User Key  (r) = Recorded By, (t) = Taken By, (c) = Cosigned By      Initials Name Provider Type    Veronica Day, PT Physical Therapist                   Goals/Plan       Row Name 09/06/23 2905          Bed Mobility Goal 1 (PT)    Activity/Assistive Device (Bed Mobility Goal 1, PT) sit to supine/supine to sit  -MB     [place]  Level/Cues Needed (Bed Mobility Goal 1, PT) independent  -MB     Time Frame (Bed Mobility Goal 1, PT) 10 days  -MB       Row Name 09/06/23 1403          Transfer Goal 1 (PT)    Activity/Assistive Device (Transfer Goal 1, PT) sit-to-stand/stand-to-sit;bed-to-chair/chair-to-bed;wheelchair transfer  -MB     [place] Level/Cues Needed (Transfer Goal 1, PT) modified independence  -MB     Time Frame (Transfer Goal 1, PT) 10 days  -MB       Row Name 09/06/23 1403          Problem Specific Goal 1 (PT)    Problem Specific Goal 1 (PT) Pt. will independently manage and propel manual w/c x household distances.  -MB     Time Frame (Problem Specific Goal 1, PT) 2 weeks  -MB       Row Name 09/06/23 1403          Patient Education Goal (PT)    Activity (Patient Education Goal, PT) HEP  -MB     Sandwich/Cues/Accuracy (Memory Goal 2, PT) demonstrates adequately  -MB     Time Frame (Patient Education Goal, PT) 1 week  -MB       Row Name 09/06/23 1403          Therapy Assessment/Plan (PT)    Planned Therapy Interventions (PT) balance training;bed mobility training;gait training;home exercise program;patient/family education;strengthening;transfer training;wheelchair management/propulsion training  -MB               User Key  (r) = Recorded By, (t) = Taken By, (c) = Cosigned By      Initials Name Provider Type    Veronica Day, PT Physical Therapist                   Clinical Impression       Row Name 09/06/23 1358          Pain    Pretreatment Pain Rating 0/10 - no pain  -MB     Posttreatment Pain Rating 0/10 - no pain  -MB       Row Name 09/06/23 1354          Plan of Care Review    Plan of Care Reviewed With patient;daughter  -MB     Progress no change  -MB     Outcome Evaluation Patient presents w/ generalized weakness, impaired balance with h/o falls, decreased activity tolerance, and is below her functional baseline. She required min A x 2 for transfers. Skilled IPPT warranted to promote return to PLOF. Recommend  home w/ assist and HHPT when medically appropriate.  -MB       Row Name 09/06/23 1359          Therapy Assessment/Plan (PT)    Patient/Family Therapy Goals Statement (PT) Return to home and PLOF.  -MB     Criteria for Skilled Interventions Met (PT) yes;meets criteria;skilled treatment is necessary  -MB       Row Name 09/06/23 1359          Vital Signs    Pre Systolic BP Rehab 153  -MB     Pre Treatment Diastolic BP 52  -MB     Intra Systolic BP Rehab 176  -MB     Intra Treatment Diastolic BP 93  -MB     Post Systolic BP Rehab 186  -MB     Post Treatment Diastolic BP 94  -MB     Pre SpO2 (%) 95  -MB     O2 Delivery Pre Treatment room air  -MB     O2 Delivery Intra Treatment room air  -MB     Post SpO2 (%) 96  -MB     O2 Delivery Post Treatment room air  -MB     Pre Patient Position Supine  -MB     Intra Patient Position Standing  -MB     Post Patient Position Supine  -MB       Row Name 09/06/23 1359          Positioning and Restraints    Pre-Treatment Position in bed  -MB     Post Treatment Position bed  -MB     In Bed notified nsg;supine;call light within reach;encouraged to call for assist;exit alarm on;with family/caregiver;heels elevated  -MB               User Key  (r) = Recorded By, (t) = Taken By, (c) = Cosigned By      Initials Name Provider Type    Veronica Day, PT Physical Therapist                   Outcome Measures       Row Name 09/06/23 1406          How much help from another person do you currently need...    Turning from your back to your side while in flat bed without using bedrails? 4  -MB     Moving from lying on back to sitting on the side of a flat bed without bedrails? 3  -MB     Moving to and from a bed to a chair (including a wheelchair)? 3  -MB     Standing up from a chair using your arms (e.g., wheelchair, bedside chair)? 3  -MB     Climbing 3-5 steps with a railing? 1  -MB       Row Name 09/06/23 1406 09/06/23 1153       Modified Imani Scale    Pre-Stroke Modified Ralls Scale  -- 6 - Unable to determine (UTD) from the medical record documentation  -MR    Modified Lake Scale 4 - Moderately severe disability.  Unable to walk without assistance, and unable to attend to own bodily needs without assistance.  -MB 4 - Moderately severe disability.  Unable to walk without assistance, and unable to attend to own bodily needs without assistance.  -      Row Name 09/06/23 1406 09/06/23 1153       Functional Assessment    Outcome Measure Options AM-PAC 6 Clicks Basic Mobility (PT)  -MB AM-PAC 6 Clicks Daily Activity (OT);Modified Imani  -MR              User Key  (r) = Recorded By, (t) = Taken By, (c) = Cosigned By      Initials Name Provider Type    Veronica Day, PT Physical Therapist    MR MaycoYessenia, OT Occupational Therapist                                 Physical Therapy Education       Title: PT OT SLP Therapies (In Progress)       Topic: Physical Therapy (Done)       Point: Mobility training (Done)       Learning Progress Summary             Patient Acceptance, E,D, VU by MB at 9/6/2023 1407                         Point: Home exercise program (Done)       Learning Progress Summary             Patient Acceptance, E,D, VU by MB at 9/6/2023 1407                         Point: Body mechanics (Done)       Learning Progress Summary             Patient Acceptance, E,D, VU by MB at 9/6/2023 1407                         Point: Precautions (Done)       Learning Progress Summary             Patient Acceptance, E,D, VU by MB at 9/6/2023 1407                                         User Key       Initials Effective Dates Name Provider Type Discipline    MB 06/16/21 -  Veronica Christian, PT Physical Therapist PT                  PT Recommendation and Plan  Planned Therapy Interventions (PT): balance training, bed mobility training, gait training, home exercise program, patient/family education, strengthening, transfer training, wheelchair management/propulsion training  Plan of Care  Reviewed With: patient, daughter  Progress: no change  Outcome Evaluation: Patient presents w/ generalized weakness, impaired balance with h/o falls, decreased activity tolerance, and is below her functional baseline. She required min A x 2 for transfers. Skilled IPPT warranted to promote return to PLOF. Recommend home w/ assist and HHPT when medically appropriate.     Time Calculation:   PT Evaluation Complexity  History, PT Evaluation Complexity: 1-2 personal factors and/or comorbidities  Examination of Body Systems (PT Eval Complexity): total of 3 or more elements  Clinical Presentation (PT Evaluation Complexity): evolving  Clinical Decision Making (PT Evaluation Complexity): low complexity  Overall Complexity (PT Evaluation Complexity): low complexity     PT Charges       Row Name 09/06/23 1408             Time Calculation    Start Time 1057  -MB      PT Received On 09/06/23  -MB      PT Goal Re-Cert Due Date 09/16/23  -MB         Untimed Charges    PT Eval/Re-eval Minutes 47  -MB         Total Minutes    Untimed Charges Total Minutes 47  -MB       Total Minutes 47  -MB                User Key  (r) = Recorded By, (t) = Taken By, (c) = Cosigned By      Initials Name Provider Type    Veronica Day, PT Physical Therapist                  Therapy Charges for Today       Code Description Service Date Service Provider Modifiers Qty    81588090826 HC PT EVAL LOW COMPLEXITY 4 9/6/2023 Veronica Christian, PT GP 1            PT G-Codes  Outcome Measure Options: AM-PAC 6 Clicks Basic Mobility (PT)  AM-PAC 6 Clicks Score (OT): 14  Modified Webb Scale: 4 - Moderately severe disability.  Unable to walk without assistance, and unable to attend to own bodily needs without assistance.  PT Discharge Summary  Anticipated Discharge Disposition (PT): home with assist, home with home health    Veronica Christian, PT  9/6/2023

## 2023-09-06 NOTE — CASE MANAGEMENT/SOCIAL WORK
Discharge Planning Assessment  [facility]     Patient Name: Olga Sandoval  MRN: [id number]  Today's Date: 9/6/2023    Admit Date: 9/5/2023    Plan: Home   Discharge Needs Assessment       Row Name 09/06/23 1500       Living Environment    People in Home alone    Current Living Arrangements apartment    Primary Care Provided by self    Provides Primary Care For no one    Family Caregiver if Needed child(janki), adult    Family Caregiver Names Reva Kumar (daughter) [phone]    Quality of Family Relationships unable to assess    Able to Return to Prior Arrangements yes       Transition Planning    Patient/Family Anticipates Transition to home    Patient/Family Anticipated Services at Transition     Transportation Anticipated family or friend will provide       Discharge Needs Assessment    Readmission Within the Last 30 Days no previous admission in last 30 days    Equipment Currently Used at Home wheelchair                   Discharge Plan       Row Name 09/06/23 1501       Plan    Plan Home    Patient/Family in Agreement with Plan yes    Plan Comments Spoke with patient in room, to initiate discharge planning. Ms. Sandoval lives alone in [place], in an apartment. She plans to stay with daughter at discharge. She verified insurance as [employer]/[place] Medicaid. She has prescription coverage and uses [facility]. She has a wheelchair she uses at home to assist with mobility. No other DME. Not current with Home Health. Patients PCP is in [place]. Prior to admission she was independent with ADL's with some assistance from her daughter for shopping, housekeeping, cooking and transportation. Will await therapy recommendations for proper discharge. CM will continue to follow.    Final Discharge Disposition Code 01 - home or self-care                  Continued Care and Services - Admitted Since 9/5/2023    Coordination has not been started for this encounter.       Expected  Discharge Date and Time       Expected Discharge Date Expected Discharge Time    Sep 11, 2023            Demographic Summary       Row Name 09/06/23 1457       General Information    Admission Type observation    Arrived From emergency department    Referral Source admission list    Reason for Consult discharge planning    Preferred Language English       Contact Information    Permission Granted to Share Info With     Contact Information Obtained for     Contact Information Comments Reva Kumar (daughter) [phone]                   Functional Status       Row Name 09/06/23 1459       Functional Status    Usual Activity Tolerance moderate    Current Activity Tolerance moderate       Functional Status, IADL    Medications independent    Meal Preparation assistive person    Housekeeping assistive person    Laundry assistive person    Shopping assistive person                   Psychosocial    No documentation.                  Abuse/Neglect    No documentation.                  Legal    No documentation.                  Substance Abuse    No documentation.                  Patient Forms    No documentation.                     Sherrill Cifuentes RN

## 2023-09-06 NOTE — H&P
[facility] Medicine Services  HISTORY AND PHYSICAL    Patient Name: Olga Sandoval  : 1955  MRN: [id number]  Primary Care Physician: System, Provider Not In  Date of admission: 2023      Subjective   Subjective     Chief Complaint:  Speech difficulty    HPI:  Olga Sandoval is a 68 y.o. female with history of prior strokes with mild residual left sided weakness, Bell's palsy, DM2, HTN who presented to the ED due to speech difficulty that began earlier today and was noticed by her daughter.  Patient has been having difficulty getting the correct words out occasionally.  In the ED,  CT scans showed old strokes and 70-80% stenosis of right ICA.  Stroke team evaluated the patient, loaded on aspirin and plavix and admission recommended for further management.      Review of Systems   Gen- No fevers, chills  CV- No chest pain, palpitations  Resp- No cough, dyspnea    Personal History     Past Medical History:   Diagnosis Date    Aneurysm     left carotid    Bipolar 2 disorder     Diabetes mellitus     H/O Bell's palsy     Left sided facial droop    History of loop recorder     Hyperlipidemia     Hypertension     Stroke     x 4             Past Surgical History:   Procedure Laterality Date    CARDIAC CATHETERIZATION      CATARACT EXTRACTION Bilateral      SECTION         Family History: family history includes Cancer in her father; Cataracts in her mother; Heart attack in her brother; Hypertension in her father and mother; Lung cancer in her mother; Stroke in her brother; Transient ischemic attack in her father.     Social History:  reports that she quit smoking about 10 years ago. Her smoking use included cigarettes. She smoked an average of .5 packs per day. She has never used smokeless tobacco. She reports that she does not drink alcohol and does not use drugs.  Social History     Social History Narrative    Not on file       Medications:  Available home medication information  reviewed.  Medications Prior to Admission   Medication Sig Dispense Refill Last Dose    Allergy Relief 180 MG tablet Take 1 tablet by mouth Daily.   9/4/2023    amLODIPine (NORVASC) 5 MG tablet Take 1 tablet by mouth Daily.   9/4/2023    atorvastatin (LIPITOR) 40 MG tablet Take 2 tablets by mouth Daily.   9/4/2023    clopidogrel (PLAVIX) 75 MG tablet Take 1 tablet by mouth Daily.   9/4/2023    losartan (COZAAR) 50 MG tablet Take 1 tablet by mouth Daily.   9/4/2023    metFORMIN ER (GLUCOPHAGE-XR) 500 MG 24 hr tablet Take 2 tablets by mouth Every 12 (Twelve) Hours.   9/4/2023    Ozempic, 1 MG/DOSE, 4 MG/3ML solution pen-injector Inject 1 mg under the skin into the appropriate area as directed 1 (One) Time Per Week. Patient takes on Friday   Past Week    QUEtiapine (SEROquel) 300 MG tablet Take 1 tablet by mouth Every Night.   9/4/2023    venlafaxine XR (EFFEXOR-XR) 150 MG 24 hr capsule Take 1 capsule by mouth Daily.   9/4/2023       No Known Allergies    Objective   Objective     Vital Signs:   Temp:  [98.6 °F (37 °C)] 98.6 °F (37 °C)  Heart Rate:  [] 95  Resp:  [16-18] 16  BP: (119-154)/(66-95) 125/92  Total (NIH Stroke Scale): 6    Physical Exam   Constitutional: No acute distress, awake, alert, laying in bed  HENT: NCAT, mucous membranes moist  Respiratory: Clear to auscultation bilaterally, respiratory effort normal   Cardiovascular: RRR, no murmurs  Gastrointestinal: Soft, nontender, nondistended  Musculoskeletal: No pitting ankle edema  Psychiatric: Appropriate affect, cooperative  Neurologic: Strength 4+/5 LUE, 5/5 RUE, 4+/5 bilateral LEs, right facial droop, mild expressive aphasia  Skin: No rashes on exposed surfaces    Result Review:  I have personally reviewed the results from the time of this admission to 9/5/2023 20:54 EDT and agree with these findings:  [x]  Laboratory list / accordion  []  Microbiology  [x]  Radiology  []  EKG/Telemetry   []  Cardiology/Vascular   []  Pathology  []  Old  records  []  Other:  Most notable findings include:         LAB RESULTS:      Lab 09/05/23  1515 09/05/23  1513 09/05/23  1512   WBC  --   --  10.31   HEMOGLOBIN  --   --  13.2   HEMOGLOBIN, POC  --  13.6  --    HEMATOCRIT  --   --  42.0   HEMATOCRIT POC  --  40  --    PLATELETS  --   --  294   NEUTROS ABS  --   --  6.90   IMMATURE GRANS (ABS)  --   --  0.03   LYMPHS ABS  --   --  1.96   MONOS ABS  --   --  0.87   EOS ABS  --   --  0.50*   MCV  --   --  94.2   PROTIME 13.4  --   --    INR 1  --   --    APTT  --   --  28.7         Lab 09/05/23  1513   CREATININE 1.00   EGFR 60.7         Lab 09/05/23  1512   ALT (SGPT) 9   AST (SGOT) 15         Lab 09/05/23  1512   HSTROP T 14*                     Microbiology Results (last 10 days)       ** No results found for the last 240 hours. **            CT Angiogram Neck    Result Date: 9/5/2023  CT CEREBRAL PERFUSION W WO CONTRAST, CT ANGIOGRAM NECK, CT ANGIOGRAM HEAD W AI ANALYSIS OF LVO Date of Exam: 9/5/2023 3:11 PM EDT Indication: Neuro deficit, acute stroke suspected.  Comparison: None available. Technique: Axial CT images of the brain were obtained prior to and after the administration of 115 mL Isovue-370. Core blood volume, core blood flow, mean transit time, and Tmax images were obtained utilizing the Rapid software protocol. A limited CT angiogram of the head was also performed to measure the blood vessel density. Axial IV arterial phase contrast-enhanced CT angiogram of the head and neck. Three-dimensional reconstructions were postprocessed. The radiation dose reduction device was turned on for each scan per the ALARA (As Low as Reasonably Achievable) protocol. Findings: CT perfusion: Color maps demonstrate some expected areas of defect reflecting chronic volume loss noted on noncontrast CT. There is no evidence of definite acute core infarct or significant territorial ischemic tissue at risk. CT ANGIOGRAM: The lung apices are clear. Evaluation of the neck soft  tissues demonstrates no pathologic cervical adenopathy or unexpected aerodigestive tract mass. The osseous structures demonstrate no evidence of acute fracture or aggressive osseous lesion, with some multilevel spondylosis change present. Patent aortic arch with 4 vessel branching, with the left vertebral artery arising directly. The visualized subclavian arteries appear patent proximally. On the right, there is calcific atherosclerosis of the ICA origin with focal 70 to 80% narrowing present by NASCET criteria. Less severe atherosclerosis is present on the left, with mild, less than 50% narrowing noted. The vertebral arteries are normal in course and caliber bilaterally, somewhat diffusely diminutive on the left. Intracranially, the carotid siphons demonstrate calcific atherosclerosis without evidence of significant associated narrowing. The anterior cerebral arteries are normal in course and caliber bilaterally. The right middle cerebral artery demonstrates no evidence of flow-limiting stenosis, large vessel occlusion or aneurysm. The left middle cerebral artery is similarly normal in course and caliber. The vertebrobasilar system is patent, with the left vertebral artery appearing to terminate in PICA. The posterior cerebral arteries are normal in course and caliber bilaterally.     Impression: Impression: CT perfusion demonstrates some expected areas of defect correlating with known and presumed areas of chronic volume loss and encephalomalacia noted on CT. There is otherwise no evidence of acute core infarct or territorial ischemic tissue at risk. CT angiogram demonstrates focal 70 to 80% stenosis of the right ICA origin. There is otherwise no evidence of additional high-grade stenosis, large vessel occlusion or aneurysm in the head and neck. Electronically Signed: Armen Browning MD  9/5/2023 3:34 PM EDT  Workstation ID: [id number]    XR Chest 1 View    Result Date: 9/5/2023  XR CHEST 1 VW Date of Exam: 9/5/2023  3:16 PM EDT Indication: Acute Stroke Protocol (onset < 12 hrs) Comparison: None available. Findings: The lungs are clear. The heart and mediastinal contours appear normal. There is no pleural effusion. The pulmonary vasculature appears normal. The osseous structures appear intact.     Impression: Impression: No acute cardiopulmonary process. Electronically Signed: Fabricio Weber MD  9/5/2023 3:36 PM EDT  Workstation ID: [id number]    CT Head Without Contrast Stroke Protocol    Result Date: 9/5/2023  CT HEAD WO CONTRAST STROKE PROTOCOL Date of Exam: 9/5/2023 3:05 PM EDT Indication: Neuro deficit, acute, stroke suspected. Comparison: None available. Technique: Axial CT images were obtained of the head without contrast administration.  Reconstructed coronal images were also obtained. Automated exposure control and iterative construction methods were used. Findings: Areas of chronic appearing encephalomalacic change are noted within the right frontal lobe and left posterior temporal and occipital lobes, with some additional focal volume loss present involving the right temporal lobe. Fairly confluent hypoattenuating  periventricular white matter changes are present, likely reflecting sequela of chronic microvascular ischemia. There is otherwise no evidence of intracranial hemorrhage, mass or mass effect. There is ex vacuo prominence of the ventricles secondary to surrounding volume loss. The orbits are normal. The paranasal sinuses are grossly clear.     Impression: Impression: Multifocal areas of chronic appearing encephalomalacic change are present, favoring areas of old infarct and generalized volume loss. No acute intracranial findings are present otherwise. Consider MRI if there is persistent clinical concern for superimposed acute ischemia. Scan performed 9/5/2023 at 3:04 p.m. Results discussed with the stroke team by Nick Browning in person at time of scanning. Electronically Signed: Armen Browning MD   9/5/2023 3:17 PM EDT  Workstation ID: [id number]    CT Angiogram Head w AI Analysis of LVO    Result Date: 9/5/2023  CT CEREBRAL PERFUSION W WO CONTRAST, CT ANGIOGRAM NECK, CT ANGIOGRAM HEAD W AI ANALYSIS OF LVO Date of Exam: 9/5/2023 3:11 PM EDT Indication: Neuro deficit, acute stroke suspected.  Comparison: None available. Technique: Axial CT images of the brain were obtained prior to and after the administration of 115 mL Isovue-370. Core blood volume, core blood flow, mean transit time, and Tmax images were obtained utilizing the Rapid software protocol. A limited CT angiogram of the head was also performed to measure the blood vessel density. Axial IV arterial phase contrast-enhanced CT angiogram of the head and neck. Three-dimensional reconstructions were postprocessed. The radiation dose reduction device was turned on for each scan per the ALARA (As Low as Reasonably Achievable) protocol. Findings: CT perfusion: Color maps demonstrate some expected areas of defect reflecting chronic volume loss noted on noncontrast CT. There is no evidence of definite acute core infarct or significant territorial ischemic tissue at risk. CT ANGIOGRAM: The lung apices are clear. Evaluation of the neck soft tissues demonstrates no pathologic cervical adenopathy or unexpected aerodigestive tract mass. The osseous structures demonstrate no evidence of acute fracture or aggressive osseous lesion, with some multilevel spondylosis change present. Patent aortic arch with 4 vessel branching, with the left vertebral artery arising directly. The visualized subclavian arteries appear patent proximally. On the right, there is calcific atherosclerosis of the ICA origin with focal 70 to 80% narrowing present by NASCET criteria. Less severe atherosclerosis is present on the left, with mild, less than 50% narrowing noted. The vertebral arteries are normal in course and caliber bilaterally, somewhat diffusely diminutive on the left.  Intracranially, the carotid siphons demonstrate calcific atherosclerosis without evidence of significant associated narrowing. The anterior cerebral arteries are normal in course and caliber bilaterally. The right middle cerebral artery demonstrates no evidence of flow-limiting stenosis, large vessel occlusion or aneurysm. The left middle cerebral artery is similarly normal in course and caliber. The vertebrobasilar system is patent, with the left vertebral artery appearing to terminate in PICA. The posterior cerebral arteries are normal in course and caliber bilaterally.     Impression: Impression: CT perfusion demonstrates some expected areas of defect correlating with known and presumed areas of chronic volume loss and encephalomalacia noted on CT. There is otherwise no evidence of acute core infarct or territorial ischemic tissue at risk. CT angiogram demonstrates focal 70 to 80% stenosis of the right ICA origin. There is otherwise no evidence of additional high-grade stenosis, large vessel occlusion or aneurysm in the head and neck. Electronically Signed: Armen Browning MD  9/5/2023 3:34 PM EDT  Workstation ID: [id number]    CT CEREBRAL PERFUSION WITH & WITHOUT CONTRAST    Result Date: 9/5/2023  CT CEREBRAL PERFUSION W WO CONTRAST, CT ANGIOGRAM NECK, CT ANGIOGRAM HEAD W AI ANALYSIS OF LVO Date of Exam: 9/5/2023 3:11 PM EDT Indication: Neuro deficit, acute stroke suspected.  Comparison: None available. Technique: Axial CT images of the brain were obtained prior to and after the administration of 115 mL Isovue-370. Core blood volume, core blood flow, mean transit time, and Tmax images were obtained utilizing the Rapid software protocol. A limited CT angiogram of the head was also performed to measure the blood vessel density. Axial IV arterial phase contrast-enhanced CT angiogram of the head and neck. Three-dimensional reconstructions were postprocessed. The radiation dose reduction device was turned on for each  scan per the ALARA (As Low as Reasonably Achievable) protocol. Findings: CT perfusion: Color maps demonstrate some expected areas of defect reflecting chronic volume loss noted on noncontrast CT. There is no evidence of definite acute core infarct or significant territorial ischemic tissue at risk. CT ANGIOGRAM: The lung apices are clear. Evaluation of the neck soft tissues demonstrates no pathologic cervical adenopathy or unexpected aerodigestive tract mass. The osseous structures demonstrate no evidence of acute fracture or aggressive osseous lesion, with some multilevel spondylosis change present. Patent aortic arch with 4 vessel branching, with the left vertebral artery arising directly. The visualized subclavian arteries appear patent proximally. On the right, there is calcific atherosclerosis of the ICA origin with focal 70 to 80% narrowing present by NASCET criteria. Less severe atherosclerosis is present on the left, with mild, less than 50% narrowing noted. The vertebral arteries are normal in course and caliber bilaterally, somewhat diffusely diminutive on the left. Intracranially, the carotid siphons demonstrate calcific atherosclerosis without evidence of significant associated narrowing. The anterior cerebral arteries are normal in course and caliber bilaterally. The right middle cerebral artery demonstrates no evidence of flow-limiting stenosis, large vessel occlusion or aneurysm. The left middle cerebral artery is similarly normal in course and caliber. The vertebrobasilar system is patent, with the left vertebral artery appearing to terminate in PICA. The posterior cerebral arteries are normal in course and caliber bilaterally.     Impression: Impression: CT perfusion demonstrates some expected areas of defect correlating with known and presumed areas of chronic volume loss and encephalomalacia noted on CT. There is otherwise no evidence of acute core infarct or territorial ischemic tissue at risk.  CT angiogram demonstrates focal 70 to 80% stenosis of the right ICA origin. There is otherwise no evidence of additional high-grade stenosis, large vessel occlusion or aneurysm in the head and neck. Electronically Signed: Armen Browning MD  9/5/2023 3:34 PM EDT  Workstation ID: [id number]         Assessment & Plan   Assessment & Plan     Active Hospital Problems    Diagnosis  POA    **Aphasia [R47.01]  Yes    Bipolar 2 disorder [F31.81]  Yes    Type 2 diabetes mellitus [E11.9]  Yes    Essential hypertension [I10]  Yes    Hyperlipidemia [E78.5]  Yes    Carotid stenosis, right [I65.21]  Yes    Obesity, Class III, BMI 40-49.9 (morbid obesity) [E66.01]  Yes       Aphasia  Right carotid stenosis  -Stroke team following  -Continue ASA and plavix (loaded on admission)  -Atorvastatin 80mg daily (on 40mg at home)  -MRI brain  -Carotid duplex  -Echocardiogram  -PT/OT/SLP  -Patient has had a loop recorder in for several years and says no arrhythmia was ever identified    HTN  -Permissive  -When normotensive goals can resume home medications    DM2  -Hold metformin  -SSI  -Add Jardiance    Morbid obesity  -BMI 42  -Complicates all care    DVT prophylaxis:  SCDs      CODE STATUS:    Code Status and Medical Interventions:   Ordered at: 09/05/23 2045     Level Of Support Discussed With:    Patient     Code Status (Patient has no pulse and is not breathing):    CPR (Attempt to Resuscitate)     Medical Interventions (Patient has pulse or is breathing):    Full Support       Expected Discharge   Expected Discharge Date: 9/6/2023; Expected Discharge Time:      Michell Parekh MD  09/05/23

## 2023-09-06 NOTE — THERAPY EVALUATION
Patient Name: Olga Sandoval  : 1955    MRN: [id number]                              Today's Date: 2023       Admit Date: 2023    Visit Dx:     ICD-10-CM ICD-9-CM   1. Cerebrovascular accident (CVA), unspecified mechanism  I63.9 434.91   2. Difficulty with speech  R47.9 784.59   3. Facial droop  R29.810 781.94     Patient Active Problem List   Diagnosis    Aphasia    Bipolar 2 disorder    Type 2 diabetes mellitus    Essential hypertension    Hyperlipidemia    Carotid stenosis, right    Obesity, Class III, BMI 40-49.9 (morbid obesity)     Past Medical History:   Diagnosis Date    Aneurysm     left carotid    Bipolar 2 disorder     Diabetes mellitus     H/O Bell's palsy     Left sided facial droop    History of loop recorder     Hyperlipidemia     Hypertension     Stroke     x 4     Past Surgical History:   Procedure Laterality Date    CARDIAC CATHETERIZATION      CATARACT EXTRACTION Bilateral      SECTION        General Information       Row Name 23 1135          OT Time and Intention    Document Type evaluation  -MR     Mode of Treatment occupational therapy  -MR       Row Name 23 1135          General Information    Patient Profile Reviewed yes  -MR     Prior Level of Function independent:;transfer;w/c or scooter;bed mobility;ADL's  PTA pt was I w/ ADLs, w/c tranfsers and HH distances of w/c mobility. Pt has not walked in approx 5 years d/t being fearful of falls. Daughter endorsing at least 5 or 6 recent falls. Has a shower bench but recently completing sponge bathing.  -MR     Existing Precautions/Restrictions fall;other (see comments)  [facility] has amplifier in room; Anxious w/ mobility  -MR     Barriers to Rehab medically complex;hearing deficit;previous functional deficit  -MR       Row Name 23 1135          Living Environment    People in Home child(janki), adult;other (see comments)  Currently lives w/ daughter and plans to reside w/ her upon d/c. Pt has her own home.  -MR        Row Name 09/06/23 1135          Home Main Entrance    Number of Stairs, Main Entrance other (see comments)  Ramp  -MR     Stair Railings, Main Entrance none  -MR       Row Name 09/06/23 1135          Stairs Within Home, Primary    Number of Stairs, Within Home, Primary none  -MR       Row Name 09/06/23 1135          Cognition    Orientation Status (Cognition) oriented x 4  -MR       Row Name 09/06/23 1135          Safety Issues, Functional Mobility    Safety Issues Affecting Function (Mobility) safety precaution awareness;safety precautions follow-through/compliance;insight into deficits/self-awareness;awareness of need for assistance  -MR     Impairments Affecting Function (Mobility) endurance/activity tolerance;shortness of breath;strength  -MR               User Key  (r) = Recorded By, (t) = Taken By, (c) = Cosigned By      Initials Name Provider Type    MR Yessenia Mao, OT Occupational Therapist                     Mobility/ADL's       Row Name 09/06/23 1142          Bed Mobility    Bed Mobility supine-sit  -MR     Supine-Sit [place] (Bed Mobility) contact guard  -MR     Assistive Device (Bed Mobility) bed rails;head of bed elevated  -MR     Comment, (Bed Mobility) Pt reaching out for therapist support when advancing to EOB.  -MR       Row Name 09/06/23 1142          Transfers    Transfers sit-stand transfer  -MR     Comment, (Transfers) Pt requiring v/c for hand placement, sequencing and safety.  -MR       Row Name 09/06/23 1142          Sit-Stand Transfer    Sit-Stand [place] (Transfers) minimum assist (75% patient effort);2 person assist;verbal cues;nonverbal cues (demo/gesture)  -MR     Assistive Device (Sit-Stand Transfers) walker, front-wheeled  -MR       Row Name 09/06/23 1142          Functional Mobility    Functional Mobility- Ind. Level not tested  -MR       Row Name 09/06/23 1142          Activities of Daily Living    BADL Assessment/Intervention lower body dressing;upper body  dressing;toileting  -MR       Row Name 09/06/23 1142          Lower Body Dressing Assessment/Training    [place] Level (Lower Body Dressing) don;socks;maximum assist (25% patient effort)  -MR     Position (Lower Body Dressing) edge of bed sitting  -MR       Row Name 09/06/23 1142          Upper Body Dressing Assessment/Training    [place] Level (Upper Body Dressing) don;doff;other (see comments);moderate assist (50% patient effort)  hospital gown  -MR     Position (Upper Body Dressing) edge of bed sitting  -MR     Comment, (Upper Body Dressing) Pt requiring w/ line management and sequencing. Pt very concerned w/ modesty during ADLs.  -MR       Row Name 09/06/23 1142          Toileting Assessment/Training    [place] Level (Toileting) change pad/brief;dependent (less than 25% patient effort)  -MR     Assistive Devices (Toileting) other (see comments)  episode of incontinence noted w/ purewick  -MR     Position (Toileting) supported sitting;supported standing  -MR     Comment, (Toileting) Pt requiring assist for full bed change and removal of purewick per pt request. Pt demonstrating functional ability to complete SPT from bed to BSC for toileting needs. RN and PCT notified.  -MR               User Key  (r) = Recorded By, (t) = Taken By, (c) = Cosigned By      Initials Name Provider Type    Yessenia Patel, OT Occupational Therapist                   Obj/Interventions       Row Name 09/06/23 1146          Sensory Assessment (Somatosensory)    Sensory Assessment (Somatosensory) UE sensation intact  -MR       Row Name 09/06/23 1146          Vision Assessment/Intervention    Visual Impairment/Limitations WFL;other (see comments)  Recent cataract surgery w/ pt endorsing improvements in vision.  -MR       Row Name 09/06/23 1146          Range of Motion Comprehensive    General Range of Motion bilateral upper extremity ROM WFL  -MR       Row Name 09/06/23 1146          Strength Comprehensive (MMT)     Comment, General Manual Muscle Testing (MMT) Assessment BUE grossly 4/5 in all functional planes.  -MR       Row Name 09/06/23 1146          Balance    Balance Assessment sitting static balance;sitting dynamic balance;standing static balance;standing dynamic balance  -MR     Static Sitting Balance standby assist  -MR     Dynamic Sitting Balance contact guard  -MR     Position, Sitting Balance unsupported;sitting edge of bed  -MR     Static Standing Balance contact guard  -MR     Dynamic Standing Balance minimal assist;verbal cues  -MR     Position/Device Used, Standing Balance supported;walker, front-wheeled  -MR     Balance Interventions sitting;standing;sit to stand;supported;static;dynamic;occupation based/functional task  -MR     Comment, Balance No overt LOB noted w/ unsupported sitting for ADL based task. Pt requiring Theo for balnace when completing side stepping to advance higher in the bed. Pt anxious and fearful of falling w/ mobility.  -MR               User Key  (r) = Recorded By, (t) = Taken By, (c) = Cosigned By      Initials Name Provider Type    MR Yessenia Mao, OT Occupational Therapist                   Goals/Plan       Row Name 09/06/23 1151          Bed Mobility Goal 1 (OT)    Activity/Assistive Device (Bed Mobility Goal 1, OT) sit to supine;supine to sit  -MR     [place] Level/Cues Needed (Bed Mobility Goal 1, OT) standby assist;verbal cues required  -MR     Time Frame (Bed Mobility Goal 1, OT) long term goal (LTG);by discharge  -MR     Progress/Outcomes (Bed Mobility Goal 1, OT) new goal  -MR       Row Name 09/06/23 1151          Transfer Goal 1 (OT)    Activity/Assistive Device (Transfer Goal 1, OT) wheelchair transfer;other (see comments)  bed <> w/c  -MR     [place] Level/Cues Needed (Transfer Goal 1, OT) minimum assist (75% or more patient effort);tactile cues required;verbal cues required  -MR     Time Frame (Transfer Goal 1, OT) long term goal (LTG);by discharge  -MR      Progress/Outcome (Transfer Goal 1, OT) new goal  -MR       Row Name 09/06/23 1151          Dressing Goal 1 (OT)    Activity/Device (Dressing Goal 1, OT) lower body dressing;upper body dressing  -MR     [place]/Cues Needed (Dressing Goal 1, OT) minimum assist (75% or more patient effort);tactile cues required;verbal cues required  -MR     Time Frame (Dressing Goal 1, OT) long term goal (LTG);by discharge  -MR     Progress/Outcome (Dressing Goal 1, OT) new goal  -MR       Row Name 09/06/23 1151          Problem Specific Goal 1 (OT)    Problem Specific Goal 1 (OT) Patient will demonstrate ability to complete HH distances of functional w/c mobility w/ SUP.  -MR     Time Frame (Problem Specific Goal 1, OT) long term goal (LTG);by discharge  -MR     Progress/Outcome (Problem Specific Goal 1, OT) new goal  -MR       Row Name 09/06/23 1151          Therapy Assessment/Plan (OT)    Planned Therapy Interventions (OT) activity tolerance training;adaptive equipment training;BADL retraining;functional balance retraining;IADL retraining;occupation/activity based interventions;patient/caregiver education/training;transfer/mobility retraining;wheelchair assessment/training;ROM/therapeutic exercise;strengthening exercise  -MR               User Key  (r) = Recorded By, (t) = Taken By, (c) = Cosigned By      Initials Name Provider Type    Yessenia Patel, OT Occupational Therapist                   Clinical Impression       Row Name 09/06/23 1148          Pain Assessment    Pretreatment Pain Rating 0/10 - no pain  -MR     Posttreatment Pain Rating 0/10 - no pain  -MR     Pain Intervention(s) Ambulation/increased activity;Repositioned  -MR       Row Name 09/06/23 1144          Plan of Care Review    Plan of Care Reviewed With patient;daughter  -MR     Progress no change  Initial Eval  -MR     Outcome Evaluation Pt presenting below her functional baseline w/ endurance, mobility and transfers limting her independence w/ ADL task  perfromance. Pt requiring assist for bed mobility, LB dressing, transfers and UB dressing. IP OT warranted to address deficits. Recommend home w/ assist and HH OT/PT.  -MR       Row Name 09/06/23 1148          Therapy Assessment/Plan (OT)    Patient/Family Therapy Goal Statement (OT) Return to PLOF  -MR     Rehab Potential (OT) good, to achieve stated therapy goals  -MR     Criteria for Skilled Therapeutic Interventions Met (OT) yes;meets criteria;skilled treatment is necessary  -MR     Therapy Frequency (OT) daily  -MR       Row Name 09/06/23 1148          Therapy Plan Review/Discharge Plan (OT)    Anticipated Discharge Disposition (OT) home with assist;home with home health  -MR       Row Name 09/06/23 1148          Vital Signs    Pre Systolic BP Rehab 176  -MR     Pre Treatment Diastolic BP 93  -MR     Post Systolic BP Rehab 186  -MR     Post Treatment Diastolic BP 94  -MR     Pretreatment Heart Rate (beats/min) 83  -MR     Posttreatment Heart Rate (beats/min) 88  -MR     Pre SpO2 (%) 96  -MR     O2 Delivery Pre Treatment room air  -MR     O2 Delivery Intra Treatment room air  -MR     Post SpO2 (%) 95  -MR     O2 Delivery Post Treatment room air  -MR     Pre Patient Position Supine  -MR     Intra Patient Position Standing  -MR     Post Patient Position Sitting  -MR       Row Name 09/06/23 1148          Positioning and Restraints    Pre-Treatment Position in bed  -MR     Post Treatment Position bed  -MR     In Bed sitting EOB;with PT  -MR               User Key  (r) = Recorded By, (t) = Taken By, (c) = Cosigned By      Initials Name Provider Type    MR Yessenia Mao, OT Occupational Therapist                   Outcome Measures       Row Name 09/06/23 1153          How much help from another is currently needed...    Putting on and taking off regular lower body clothing? 2  -MR     Bathing (including washing, rinsing, and drying) 2  -MR     Toileting (which includes using toilet bed pan or urinal) 2  -MR      Putting on and taking off regular upper body clothing 2  -MR     Taking care of personal grooming (such as brushing teeth) 3  -MR     Eating meals 3  -MR     AM-PAC 6 Clicks Score (OT) 14  -       Row Name 09/06/23 1153          Modified [place] Scale    Pre-Stroke Modified [place] Scale 6 - Unable to determine (UTD) from the medical record documentation  -MR     Modified [place] Scale 4 - Moderately severe disability.  Unable to walk without assistance, and unable to attend to own bodily needs without assistance.  -MR       Row Name 09/06/23 1153          Functional Assessment    Outcome Measure Options AM-PAC 6 Clicks Daily Activity (OT);Modified [place]  -MR               User Key  (r) = Recorded By, (t) = Taken By, (c) = Cosigned By      Initials Name Provider Type     Yessenia Mao, OT Occupational Therapist                    Occupational Therapy Education       Title: PT OT SLP Therapies (In Progress)       Topic: Occupational Therapy (In Progress)       Point: ADL training (Done)       Description:   Instruct learner(s) on proper safety adaptation and remediation techniques during self care or transfers.   Instruct in proper use of assistive devices.                  Learning Progress Summary             Patient Acceptance, E, VU by MR at 9/6/2023 1154   Family Acceptance, E, VU by MR at 9/6/2023 1154                         Point: Home exercise program (Not Started)       Description:   Instruct learner(s) on appropriate technique for monitoring, assisting and/or progressing therapeutic exercises/activities.                  Learner Progress:  Not documented in this visit.              Point: Precautions (Done)       Description:   Instruct learner(s) on prescribed precautions during self-care and functional transfers.                  Learning Progress Summary             Patient Acceptance, E, VU by MR at 9/6/2023 1154   Family Acceptance, E, VU by MR at 9/6/2023 1154                         Point: Body  mechanics (Done)       Description:   Instruct learner(s) on proper positioning and spine alignment during self-care, functional mobility activities and/or exercises.                  Learning Progress Summary             Patient Acceptance, E, VU by MR at 9/6/2023 1154   Family Acceptance, E, VU by MR at 9/6/2023 1154                                         User Key       Initials Effective Dates Name Provider Type Discipline    MR 09/22/22 -  Yessenia Mao, OT Occupational Therapist OT                  OT Recommendation and Plan  Planned Therapy Interventions (OT): activity tolerance training, adaptive equipment training, BADL retraining, functional balance retraining, IADL retraining, occupation/activity based interventions, patient/caregiver education/training, transfer/mobility retraining, wheelchair assessment/training, ROM/therapeutic exercise, strengthening exercise  Therapy Frequency (OT): daily  Plan of Care Review  Plan of Care Reviewed With: patient, daughter  Progress: no change (Initial Eval)  Outcome Evaluation: Pt presenting below her functional baseline w/ endurance, mobility and transfers limting her independence w/ ADL task perfromance. Pt requiring assist for bed mobility, LB dressing, transfers and UB dressing. IP OT warranted to address deficits. Recommend home w/ assist and HH OT/PT.     Time Calculation:   Evaluation Complexity (OT)  Review Occupational Profile/Medical/Therapy History Complexity: brief/low complexity  Assessment, Occupational Performance/Identification of Deficit Complexity: 1-3 performance deficits  Clinical Decision Making Complexity (OT): problem focused assessment/low complexity  Overall Complexity of Evaluation (OT): low complexity     Time Calculation- OT       Row Name 09/06/23 1313             Time Calculation- OT    OT Start Time 1054  -MR      OT Received On 09/06/23  -MR      OT Goal Re-Cert Due Date 09/16/23  -MR         Untimed Charges    OT Eval/Re-eval Minutes  46  -MR         Total Minutes    Untimed Charges Total Minutes 46  -MR       Total Minutes 46  -MR                User Key  (r) = Recorded By, (t) = Taken By, (c) = Cosigned By      Initials Name Provider Type    Yessenia Patel OT Occupational Therapist                  Therapy Charges for Today       Code Description Service Date Service Provider Modifiers Qty    [id number]  OT EVAL LOW COMPLEXITY 4 9/6/2023 Yessenia Mao OT GO 1                 Yessenia Mao OT  9/6/2023

## 2023-09-06 NOTE — PLAN OF CARE
Goal Outcome Evaluation:  Plan of Care Reviewed With: patient, daughter      SLP evaluation completed. Will sign-off as no overt clinical s/sxs oropharyngeal dysphagia & pt/dtr feel mild-mod cognitive-linguistic deficits are baseline. They declined further SLP intervention @ this time. Please see note for further details and recommendations.

## 2023-09-06 NOTE — PLAN OF CARE
Problem: Adult Inpatient Plan of Care  Goal: Absence of Hospital-Acquired Illness or Injury  Intervention: Identify and Manage Fall Risk  Recent Flowsheet Documentation  Taken 9/5/2023 1955 by Lorena Cowan RN  Safety Promotion/Fall Prevention:   activity supervised   assistive device/personal items within reach   clutter free environment maintained   fall prevention program maintained   room organization consistent   safety round/check completed  Intervention: Prevent Skin Injury  Recent Flowsheet Documentation  Taken 9/5/2023 1955 by Lorena Cowan RN  Body Position: supine  Skin Protection:   adhesive use limited   incontinence pads utilized   skin-to-skin areas padded   transparent dressing maintained   tubing/devices free from skin contact  Intervention: Prevent and Manage VTE (Venous Thromboembolism) Risk  Recent Flowsheet Documentation  Taken 9/5/2023 1955 by Lorena Cowan RN  Activity Management: bedrest  Range of Motion: active ROM (range of motion) encouraged  Intervention: Prevent Infection  Recent Flowsheet Documentation  Taken 9/5/2023 1955 by Lorena Cowan RN  Infection Prevention:   environmental surveillance performed   equipment surfaces disinfected   hand hygiene promoted  Goal: Optimal Comfort and Wellbeing  Intervention: Provide Person-Centered Care  Recent Flowsheet Documentation  Taken 9/5/2023 1955 by Lorena Cowan RN  Trust Relationship/Rapport:   care explained   choices provided   emotional support provided   empathic listening provided   questions answered   questions encouraged   reassurance provided   thoughts/feelings acknowledged  Goal: Readiness for Transition of Care  Intervention: Mutually Develop Transition Plan  Recent Flowsheet Documentation  Taken 9/5/2023 2024 by Lorena Cowan RN  Transportation Anticipated: health plan transportation  Patient/Family Anticipated Services at Transition: none  Patient/Family Anticipates Transition to: home  Taken 9/5/2023  2021 by Lorena Cowan RN  Equipment Currently Used at Home:   wheelchair   ramp     Problem: Skin Injury Risk Increased  Goal: Skin Health and Integrity  Intervention: Optimize Skin Protection  Recent Flowsheet Documentation  Taken 9/5/2023 1955 by Lorena Cowan RN  Pressure Reduction Techniques:   frequent weight shift encouraged   heels elevated off bed   weight shift assistance provided  Head of Bed (HOB) Positioning: HOB elevated  Pressure Reduction Devices:   positioning supports utilized   pressure-redistributing mattress utilized  Skin Protection:   adhesive use limited   incontinence pads utilized   skin-to-skin areas padded   transparent dressing maintained   tubing/devices free from skin contact     Problem: Fall Injury Risk  Goal: Absence of Fall and Fall-Related Injury  Intervention: Identify and Manage Contributors  Recent Flowsheet Documentation  Taken 9/5/2023 1955 by Lorena Cowan RN  Medication Review/Management: medications reviewed  Self-Care Promotion:   independence encouraged   BADL personal objects within reach  Intervention: Promote Injury-Free Environment  Recent Flowsheet Documentation  Taken 9/5/2023 1955 by Lorena Cowan RN  Safety Promotion/Fall Prevention:   activity supervised   assistive device/personal items within reach   clutter free environment maintained   fall prevention program maintained   room organization consistent   safety round/check completed   Goal Outcome Evaluation:

## 2023-09-06 NOTE — PLAN OF CARE
Goal Outcome Evaluation:  Plan of Care Reviewed With: patient, daughter        Progress: no change (Initial Eval)  Outcome Evaluation: Pt presenting below her functional baseline w/ endurance, mobility and transfers limting her independence w/ ADL task perfromance. Pt requiring assist for bed mobility, LB dressing, transfers and UB dressing. IP OT warranted to address deficits. Recommend home w/ assist and HH OT/PT.      Anticipated Discharge Disposition (OT): home with assist, home with home health

## 2023-09-06 NOTE — PLAN OF CARE
Goal Outcome Evaluation:  Plan of Care Reviewed With: patient, daughter        Progress: no change  Outcome Evaluation: Patient presents w/ generalized weakness, impaired balance with h/o falls, decreased activity tolerance, and is below her functional baseline. She required min A x 2 for transfers. Skilled IPPT warranted to promote return to PLOF. Recommend home w/ assist and HHPT when medically appropriate.      Anticipated Discharge Disposition (PT): home with assist, home with home health

## 2023-09-07 ENCOUNTER — APPOINTMENT (OUTPATIENT)
Dept: NEUROLOGY | Facility: HOSPITAL | Age: 68
End: 2023-09-07
Payer: MEDICARE

## 2023-09-07 VITALS
RESPIRATION RATE: 18 BRPM | OXYGEN SATURATION: 94 % | TEMPERATURE: 98.4 F | HEIGHT: 63 IN | WEIGHT: 242.73 LBS | BODY MASS INDEX: 43.01 KG/M2 | HEART RATE: 82 BPM | SYSTOLIC BLOOD PRESSURE: 150 MMHG | DIASTOLIC BLOOD PRESSURE: 81 MMHG

## 2023-09-07 LAB
GLUCOSE BLDC GLUCOMTR-MCNC: 106 MG/DL (ref 70–130)
GLUCOSE BLDC GLUCOMTR-MCNC: 91 MG/DL (ref 70–130)
PA ADP PRP-ACNC: 155 PRU

## 2023-09-07 PROCEDURE — 99214 OFFICE O/P EST MOD 30 MIN: CPT

## 2023-09-07 PROCEDURE — G0378 HOSPITAL OBSERVATION PER HR: HCPCS

## 2023-09-07 PROCEDURE — 85576 BLOOD PLATELET AGGREGATION: CPT

## 2023-09-07 PROCEDURE — 82948 REAGENT STRIP/BLOOD GLUCOSE: CPT

## 2023-09-07 PROCEDURE — 95816 EEG AWAKE AND DROWSY: CPT

## 2023-09-07 PROCEDURE — 95816 EEG AWAKE AND DROWSY: CPT | Performed by: PSYCHIATRY & NEUROLOGY

## 2023-09-07 RX ORDER — ASPIRIN 325 MG
325 TABLET ORAL DAILY
COMMUNITY

## 2023-09-07 RX ORDER — ATORVASTATIN CALCIUM 80 MG/1
80 TABLET, FILM COATED ORAL NIGHTLY
Qty: 90 TABLET | Refills: 30 | Status: SHIPPED | OUTPATIENT
Start: 2023-09-07

## 2023-09-07 RX ADMIN — VENLAFAXINE HYDROCHLORIDE 150 MG: 75 CAPSULE, EXTENDED RELEASE ORAL at 11:47

## 2023-09-07 RX ADMIN — EMPAGLIFLOZIN 10 MG: 10 TABLET, FILM COATED ORAL at 09:24

## 2023-09-07 RX ADMIN — Medication 10 ML: at 09:25

## 2023-09-07 RX ADMIN — ASPIRIN 81 MG: 81 TABLET, CHEWABLE ORAL at 09:25

## 2023-09-07 RX ADMIN — CLOPIDOGREL BISULFATE 75 MG: 75 TABLET ORAL at 09:25

## 2023-09-07 RX ADMIN — CETIRIZINE HYDROCHLORIDE 5 MG: 10 TABLET, FILM COATED ORAL at 09:25

## 2023-09-07 NOTE — PROGRESS NOTES
Stroke Progress Note       Chief Complaint: Transient aphasia    Subjective    Subjective     Subjective:  No acute events overnight.  Patient feels back to baseline.  She tells me that she does not recall the events that brought her to the hospital.  She did not lose bowel/bladder during her episode.    Review of Systems   Constitutional:  Positive for activity change. Negative for chills, fatigue and fever.   HENT: Negative.     Eyes: Negative.    Respiratory: Negative.     Cardiovascular: Negative.  Negative for palpitations.   Gastrointestinal: Negative.    Genitourinary: Negative.    Musculoskeletal:  Positive for gait problem.   Skin: Negative.    Neurological:  Positive for facial asymmetry (chronic right from [place] palsy) and weakness (generalized weakness). Negative for dizziness, tremors, seizures, speech difficulty, numbness and headaches.        Facial spasm/twitching   Hematological: Negative.    Psychiatric/Behavioral:  Positive for confusion.           Objective    Objective      Temp:  [98.2 °F (36.8 °C)-98.9 °F (37.2 °C)] 98.2 °F (36.8 °C)  Heart Rate:  [] 72  Resp:  [18-19] 18  BP: (137-196)/(65-94) 140/94        Neurological Exam  Mental Status  Alert. Oriented to person, place, time and situation. Oriented to person, place, and time. Speech is normal. Language is fluent with no aphasia. Attention and concentration are normal.    Cranial Nerves  CN II: Visual fields full to confrontation.  CN III, IV, VI: Extraocular movements intact bilaterally. Pupils equal round and reactive to light bilaterally.  CN V: Facial sensation is normal.  CN VII:  Right: There is peripheral facial weakness.  CN VIII: Hearing appears to be intact bilaterally.  CN IX, X: Palate elevates symmetrically    Motor  Decreased muscle bulk throughout. No fasciculations present. Normal muscle tone.  Generalized weakness noted, no drift noted, 4+/5 strength bilaterally.    Sensory  Sensation is intact to light touch,  pinprick, vibration and proprioception in all four extremities.    Coordination  Right: Finger-to-nose normal.Left: Finger-to-nose normal.    Gait    Not observed.    Physical Exam  Vitals and nursing note reviewed.   Constitutional:       General: She is not in acute distress.     Appearance: She is obese. She is not ill-appearing.   HENT:      Head: Normocephalic and atraumatic.      Mouth/Throat:      Mouth: Mucous membranes are moist.   Eyes:      Extraocular Movements: Extraocular movements intact.      Pupils: Pupils are equal, round, and reactive to light.   Cardiovascular:      Rate and Rhythm: Normal rate and regular rhythm.   Pulmonary:      Effort: Pulmonary effort is normal. No respiratory distress.      Comments: On room air  Musculoskeletal:      Right lower leg: Edema present.      Left lower leg: Edema present.   Skin:     General: Skin is warm and dry.   Neurological:      Mental Status: She is alert and oriented to person, place, and time.      Cranial Nerves: Cranial nerve deficit present.      Sensory: No sensory deficit.      Motor: Weakness (generalized) present.      Coordination: Coordination normal.   Psychiatric:         Mood and Affect: Mood normal.         Speech: Speech normal.         Behavior: Behavior normal.       Results Review:    I reviewed the patient's new clinical results.    MRI of the brain without contrast with late subacute to early chronic infarct within the left occipital lobe.  No evidence of hemorrhage.  Chronic white matter disease noted as well as chronic lacunar infarcts.    CTA head/neck with estimated 70 to 80% stenosis of right ICA.  No evidence of LVO.    Results for orders placed during the hospital encounter of 09/05/23    Duplex Carotid Ultrasound CAR    Interpretation Summary    Right internal carotid artery demonstrates a less than 50% stenosis.    Left internal carotid artery demonstrates a less than 50% stenosis.    Bilateral antegrade vertebral artery  flow.    No previous for comparison    Results for orders placed during the hospital encounter of 09/05/23    Adult Transthoracic Echo Complete W/ Cont if Necessary Per Protocol (With Agitated Saline)    Interpretation Summary    Left ventricular ejection fraction appears to be 56 - 60%.    Mild mitral valve stenosis is present. The mitral valve peak gradient is 10 mmHg. The mitral valve mean gradient is 4 mmHg. The mitral valve area (PHT) is 2.4 cm2.    Estimated right ventricular systolic pressure from tricuspid regurgitation is normal (<35 mmHg).    There is a trivial pericardial effusion.  Left atrial cavity normal size, bubble study negative          A1c 5.40  LDL 88  H/H13.2/42.0  Platelets 294  AST 15  ALT 9        Assessment/Plan     Assessment/Plan:    This is a 68-year-old female with known medical diagnoses of essential hypertension, hyperlipidemia, diabetes mellitus type 2, remote Bell's palsy (residual right facial droop), bipolar disorder, left carotid aneurysm, and remote tobacco abuse who [facility] after experiencing a transient episode of acute altered mental status/disorientation and word finding difficulties.  Symptoms lasted approximately 30 to 45 minutes prior to spontaneously resolving.  She was not a candidate for IV thrombolytic therapy given nondisabling symptoms and was not a candidate for endovascular therapy as CTA head/neck and CT perfusion scan were negative for large vessel occlusive stroke.      Antiplatelet PTA: ASA 81 mg and Plavix 75 mg  Anticoagulant PTA: None      Transient ischemic attack, left hemispheric        Subacute left occipital stroke  -Differential includes stroke recrudescence verses partial complex seizure  -TIA/CVA order set is currently in place  -Continue DAPT therapy (ASA 81 mg and Plavix 75 mg)  -CTA head/neck with overestimation of stenosis; carotid duplex with Proximal /19 and ICA/CCA ratio 1.5  -Will check P2Y12  given patient has had a new event while compliant with DAPT; has been on this regimen for ~1 year. P2Y12 155 indicating adequate Plavix responsiveness. Recommend increasing ASA to 325mg daily.  If patient has another episode will need to change to Brilinta 90mg BID.  -TTE negative for cardioembolic source; bubble study was not performed secondary to IV access.  I spoke with the cardiovascular lab who will perform bubble study this morning. Bubble study negative (1033)  -Activity as tolerated, PT/OT continue to follow.  They recommend home health OT/PT at discharge.  Case management following for discharge placement needs.  -Loop recorder interrogation pending; patient reports she has never been contacted by  with any abnormal results  -She would like to follow-up with her PCP in [place] and see a neurologist closer to home  -Check EEG to r/o seizure given patient's report of no recollection of events leading up to hospitalization and confusion following event.  Patient has facial twitching during my assessment (daughter reports that this is normal for her after stroke; ? hemifacial spasm) - negative for seizure    Essential hypertension  -Okay for normal blood pressures, goal <140/80  -Management per hospitalist    Hyperlipidemia  -LDL 88, goal <70  -Continue atorvastatin 80 mg nightly; patient was previously on 40 mg prior to admission    Diabetes mellitus type 2  -A1c 5.40  -Maintain euglycemia, goal blood glucose <140  -Management per hospitalist    Plan of care was discussed with the patient, Dr. Brooke, and primary team.  From a neurological standpoint the patient can be discharged home once her bubble study has been performed.  She will follow-up in stroke clinic in 6 to 8 weeks.  She has been encouraged to contact her office should she have any questions or concerns prior to her follow-up appointment.    Discussed the importance of medication compliance Plavix 75mg daily, Aspirin 81mg daily, and  Atorvastatin 80mg nightly to help reduce the risk of future cerebrovascular events.  Also discussed the signs symptoms that would warrant the patient return back to the emergency department including unilateral weakness, unilateral numbness, visual disturbances, loss of balance, speech difficulties, and/or a sudden severe headache.  Patient voices her understanding.      ANGELIKA Roque  09/07/23  08:35 EDT    Discussed with Dr. Warner; patient was on ASA 325mg and Plavix 75mg PTA.  Will discontinue Plavix and start patient on Brilinta 90mg BID and ASA 81mg daily given she experienced TIA while on maximal medical therapy with a therapeutic P2Y12.    Kallie Barrera MSN, APRN, AGACNP-BC  Stroke Neurology

## 2023-09-07 NOTE — DISCHARGE SUMMARY
[facility] Medicine Services  DISCHARGE SUMMARY    Patient Name: Olga Sandoval  : 1955  MRN: [id number]    Date of Admission: 2023  3:03 PM  Date of Discharge:    Primary Care Physician: System, Provider Not In    Consults       Date and Time Order Name Status Description    2023  3:11 PM Inpatient Neurology Consult Stroke Completed             Hospital Course     Presenting Problem: aphasia episode    Active Hospital Problems    Diagnosis  POA    **Aphasia [R47.01]  Yes    Bipolar 2 disorder [F31.81]  Yes    Type 2 diabetes mellitus [E11.9]  Yes    Essential hypertension [I10]  Yes    Hyperlipidemia [E78.5]  Yes    Carotid stenosis, right [I65.21]  Yes    Obesity, Class III, BMI 40-49.9 (morbid obesity) [E66.01]  Yes      Resolved Hospital Problems   No resolved problems to display.          Hospital Course:  Olga Sandoval is a 68 y.o. female w h/o prior CVA, DMII, HTN who presented with episode of speech difficulty as a stroke rule-out.  Evaluation included MRI which showed subacute/chronic CVA region but no acute stroke to correlate w symptoms. Diagnosed with TIA. Given patient was previously on  + plavix, will transition to  mg + brillinta for now with increased atorvastatin dose and neurology-stroke follow-up that family will arrange (trying to re-establish w neurologist in [place]).  Initial concern for significant R-ICA stenosis based on CT, but on doppler found to be <50% so no intervention required.    Continue other home medications without change.    Discharge Follow Up Recommendations for outpatient labs/diagnostics:   F/u stroke-neurology (family to arrange) within 1 month    Day of Discharge     HPI:   Patient feeling well- anxious to get out of hospital today  Daughter bedside- answered all questions    Review of Systems  Gen- No fevers, chills  CV- No chest pain, palpitations  Resp- No cough, dyspnea  GI- No N/V/D, abd pain    Vital Signs:    Temp:  [98.2 °F (36.8 °C)-98.5 °F (36.9 °C)] 98.4 °F (36.9 °C)  Heart Rate:  [] 82  Resp:  [18] 18  BP: (137-196)/(65-94) 150/81      Physical Exam:  Constitutional: No acute distress, awake, alert female sitting up in bed. Hearing aide in place  HENT: NCAT, mucous membranes moist  Respiratory: Clear to auscultation bilaterally, respiratory effort normal   Cardiovascular: RRR, no murmurs, rubs, or gallops  Gastrointestinal: Soft, nontender, nondistended  Musculoskeletal: Muscle tone within normal limits, no joint effusions appreciated  Psychiatric: Appropriate affect, cooperative  Neurologic: Alert and oriented, facial movements symmetric and spontaneous movement of all 4 extremities grossly equal bilaterally, speech clear  Skin: No rashes    Pertinent  and/or Most Recent Results     LAB RESULTS:      Lab 09/05/23  1515 09/05/23  1513 09/05/23  1512   WBC  --   --  10.31   HEMOGLOBIN  --   --  13.2   HEMOGLOBIN, POC  --  13.6  --    HEMATOCRIT  --   --  42.0   HEMATOCRIT POC  --  40  --    PLATELETS  --   --  294   NEUTROS ABS  --   --  6.90   IMMATURE GRANS (ABS)  --   --  0.03   LYMPHS ABS  --   --  1.96   MONOS ABS  --   --  0.87   EOS ABS  --   --  0.50*   MCV  --   --  94.2   PROTIME 13.4  --  13.4   APTT  --   --  28.7         Lab 09/06/23 0633 09/05/23  1513   CREATININE  --  1.00   EGFR  --  60.7   HEMOGLOBIN A1C 5.40  --          Lab 09/05/23  1512   ALT (SGPT) 9   AST (SGOT) 15         Lab 09/05/23  1515 09/05/23  1512   HSTROP T  --  14*   PROTIME 13.4 13.4   INR 1 1.1         Lab 09/06/23  0633   CHOLESTEROL 148   LDL CHOL 88   HDL CHOL 44   TRIGLYCERIDES 82             Brief Urine Lab Results       None          Microbiology Results (last 10 days)       ** No results found for the last 240 hours. **            EEG    Result Date: 9/7/2023  Reason for referral: 68 y.o.female with facial twitching, altered speech, consideration of seizure Technical Summary:  A 19 channel digital EEG was performed  using the international 10-20 placement system, including eye leads and EKG leads. Duration: 21 minutes Findings: The patient is awake.  The background shows diffuse low amplitude theta and alpha activity present symmetrically over both hemispheres.  As a study proceeds, bursts of slower medium amplitude 5 to 6 Hz theta are seen prominently.  In addition, slower medium amplitude 2 Hz delta is seen at times over the left temporal leads which is not present on the right.  Drowsiness is seen with mild slowing of the background but stage II sleep is not present.  During the study, left facial twitching is observed, without EEG correlate.  Photic stimulation does not change the background.    Hyperventilation is not performed.  No focal features or epileptiform activity are seen. Video: Available Technical quality: Superior SUMMARY: Intermittent generalized slow, mild Independent intermittent left temporal slow Left facial twitching has no EEG correlate No epileptiform activity is seen     Diffuse cerebral dysfunction of mild degree, nonspecific, affecting the left temporal lobe more so No evidence for epilepsy is seen This report is transcribed using the Dragon dictation system.      CT Angiogram Neck    Result Date: 9/5/2023  CT CEREBRAL PERFUSION W WO CONTRAST, CT ANGIOGRAM NECK, CT ANGIOGRAM HEAD W AI ANALYSIS OF LVO Date of Exam: 9/5/2023 3:11 PM EDT Indication: Neuro deficit, acute stroke suspected.  Comparison: None available. Technique: Axial CT images of the brain were obtained prior to and after the administration of 115 mL Isovue-370. Core blood volume, core blood flow, mean transit time, and Tmax images were obtained utilizing the Rapid software protocol. A limited CT angiogram of the head was also performed to measure the blood vessel density. Axial IV arterial phase contrast-enhanced CT angiogram of the head and neck. Three-dimensional reconstructions were postprocessed. The radiation dose reduction device  was turned on for each scan per the ALARA (As Low as Reasonably Achievable) protocol. Findings: CT perfusion: Color maps demonstrate some expected areas of defect reflecting chronic volume loss noted on noncontrast CT. There is no evidence of definite acute core infarct or significant territorial ischemic tissue at risk. CT ANGIOGRAM: The lung apices are clear. Evaluation of the neck soft tissues demonstrates no pathologic cervical adenopathy or unexpected aerodigestive tract mass. The osseous structures demonstrate no evidence of acute fracture or aggressive osseous lesion, with some multilevel spondylosis change present. Patent aortic arch with 4 vessel branching, with the left vertebral artery arising directly. The visualized subclavian arteries appear patent proximally. On the right, there is calcific atherosclerosis of the ICA origin with focal 70 to 80% narrowing present by NASCET criteria. Less severe atherosclerosis is present on the left, with mild, less than 50% narrowing noted. The vertebral arteries are normal in course and caliber bilaterally, somewhat diffusely diminutive on the left. Intracranially, the carotid siphons demonstrate calcific atherosclerosis without evidence of significant associated narrowing. The anterior cerebral arteries are normal in course and caliber bilaterally. The right middle cerebral artery demonstrates no evidence of flow-limiting stenosis, large vessel occlusion or aneurysm. The left middle cerebral artery is similarly normal in course and caliber. The vertebrobasilar system is patent, with the left vertebral artery appearing to terminate in PICA. The posterior cerebral arteries are normal in course and caliber bilaterally.     Impression: CT perfusion demonstrates some expected areas of defect correlating with known and presumed areas of chronic volume loss and encephalomalacia noted on CT. There is otherwise no evidence of acute core infarct or territorial ischemic tissue  at risk. CT angiogram demonstrates focal 70 to 80% stenosis of the right ICA origin. There is otherwise no evidence of additional high-grade stenosis, large vessel occlusion or aneurysm in the head and neck. Electronically Signed: Armen Browning MD  9/5/2023 3:34 PM EDT  Workstation ID: [id number]    MRI Brain Without Contrast    Result Date: 9/6/2023  MRI BRAIN WO CONTRAST Date of Exam: 9/6/2023 4:02 PM EDT Indication: Stroke, follow up.  Comparison: CT 1 day prior. Technique:  Routine multiplanar/multisequence sequence images of the brain were obtained without contrast administration. Findings: No acute infarct is present on diffusion weighted sequences. Some areas of increased diffusion signal are present, without definite ADC correlate along cortical margins of the left occipital lobe which also demonstrate FLAIR hyperintensity, favoring T2 shine through and possible area of evolving early chronic ischemia. There is otherwise relatively advanced age-related change, with some moderate generalized volume loss as well as more focal extensive volume loss of the right temporal lobe, in addition to confluent areas of periventricular white matter T2 hyperintensity, favored to reflect sequela of chronic microvascular ischemia. There is no evidence of intracranial hemorrhage, mass or mass effect. There is ex vacuo prominence of the ventricle secondary to surrounding volume loss. The orbits are normal. The paranasal sinuses appear clear.     Impression: Probable late subacute to early chronic area of ischemia noted along the left occipital lobe cortex. No evidence of acute infarct. Advanced chronic and age-related changes are present as above, including prominent periventricular leukomalacia and evidence of prior watershed lacunar infarcts, greater on the right. Electronically Signed: Armen Browning MD  9/6/2023 4:25 PM EDT  Workstation ID: [id number]    XR Chest 1 View    Result Date: 9/5/2023  XR CHEST 1 VW Date of Exam:  9/5/2023 3:16 PM EDT Indication: Acute Stroke Protocol (onset < 12 hrs) Comparison: None available. Findings: The lungs are clear. The heart and mediastinal contours appear normal. There is no pleural effusion. The pulmonary vasculature appears normal. The osseous structures appear intact.     Impression: No acute cardiopulmonary process. Electronically Signed: Fabricio Weber MD  9/5/2023 3:36 PM EDT  Workstation ID: [id number]    Duplex Carotid Ultrasound CAR    Result Date: 9/6/2023    Right internal carotid artery demonstrates a less than 50% stenosis.   Left internal carotid artery demonstrates a less than 50% stenosis.   Bilateral antegrade vertebral artery flow. No previous for comparison     CT Head Without Contrast Stroke Protocol    Result Date: 9/5/2023  CT HEAD WO CONTRAST STROKE PROTOCOL Date of Exam: 9/5/2023 3:05 PM EDT Indication: Neuro deficit, acute, stroke suspected. Comparison: None available. Technique: Axial CT images were obtained of the head without contrast administration.  Reconstructed coronal images were also obtained. Automated exposure control and iterative construction methods were used. Findings: Areas of chronic appearing encephalomalacic change are noted within the right frontal lobe and left posterior temporal and occipital lobes, with some additional focal volume loss present involving the right temporal lobe. Fairly confluent hypoattenuating  periventricular white matter changes are present, likely reflecting sequela of chronic microvascular ischemia. There is otherwise no evidence of intracranial hemorrhage, mass or mass effect. There is ex vacuo prominence of the ventricles secondary to surrounding volume loss. The orbits are normal. The paranasal sinuses are grossly clear.     Impression: Multifocal areas of chronic appearing encephalomalacic change are present, favoring areas of old infarct and generalized volume loss. No acute intracranial findings are present otherwise.  Consider MRI if there is persistent clinical concern for superimposed acute ischemia. Scan performed 9/5/2023 at 3:04 p.m. Results discussed with the stroke team by Nick Browning in person at time of scanning. Electronically Signed: Armen Browning MD  9/5/2023 3:17 PM EDT  Workstation ID: [id number]    CT Angiogram Head w AI Analysis of LVO    Result Date: 9/5/2023  CT CEREBRAL PERFUSION W WO CONTRAST, CT ANGIOGRAM NECK, CT ANGIOGRAM HEAD W AI ANALYSIS OF LVO Date of Exam: 9/5/2023 3:11 PM EDT Indication: Neuro deficit, acute stroke suspected.  Comparison: None available. Technique: Axial CT images of the brain were obtained prior to and after the administration of 115 mL Isovue-370. Core blood volume, core blood flow, mean transit time, and Tmax images were obtained utilizing the Rapid software protocol. A limited CT angiogram of the head was also performed to measure the blood vessel density. Axial IV arterial phase contrast-enhanced CT angiogram of the head and neck. Three-dimensional reconstructions were postprocessed. The radiation dose reduction device was turned on for each scan per the ALARA (As Low as Reasonably Achievable) protocol. Findings: CT perfusion: Color maps demonstrate some expected areas of defect reflecting chronic volume loss noted on noncontrast CT. There is no evidence of definite acute core infarct or significant territorial ischemic tissue at risk. CT ANGIOGRAM: The lung apices are clear. Evaluation of the neck soft tissues demonstrates no pathologic cervical adenopathy or unexpected aerodigestive tract mass. The osseous structures demonstrate no evidence of acute fracture or aggressive osseous lesion, with some multilevel spondylosis change present. Patent aortic arch with 4 vessel branching, with the left vertebral artery arising directly. The visualized subclavian arteries appear patent proximally. On the right, there is calcific atherosclerosis of the ICA origin with focal 70 to 80%  narrowing present by NASCET criteria. Less severe atherosclerosis is present on the left, with mild, less than 50% narrowing noted. The vertebral arteries are normal in course and caliber bilaterally, somewhat diffusely diminutive on the left. Intracranially, the carotid siphons demonstrate calcific atherosclerosis without evidence of significant associated narrowing. The anterior cerebral arteries are normal in course and caliber bilaterally. The right middle cerebral artery demonstrates no evidence of flow-limiting stenosis, large vessel occlusion or aneurysm. The left middle cerebral artery is similarly normal in course and caliber. The vertebrobasilar system is patent, with the left vertebral artery appearing to terminate in PICA. The posterior cerebral arteries are normal in course and caliber bilaterally.     Impression: CT perfusion demonstrates some expected areas of defect correlating with known and presumed areas of chronic volume loss and encephalomalacia noted on CT. There is otherwise no evidence of acute core infarct or territorial ischemic tissue at risk. CT angiogram demonstrates focal 70 to 80% stenosis of the right ICA origin. There is otherwise no evidence of additional high-grade stenosis, large vessel occlusion or aneurysm in the head and neck. Electronically Signed: Armen Browning MD  9/5/2023 3:34 PM EDT  Workstation ID: [id number]    CT CEREBRAL PERFUSION WITH & WITHOUT CONTRAST    Result Date: 9/5/2023  CT CEREBRAL PERFUSION W WO CONTRAST, CT ANGIOGRAM NECK, CT ANGIOGRAM HEAD W AI ANALYSIS OF LVO Date of Exam: 9/5/2023 3:11 PM EDT Indication: Neuro deficit, acute stroke suspected.  Comparison: None available. Technique: Axial CT images of the brain were obtained prior to and after the administration of 115 mL Isovue-370. Core blood volume, core blood flow, mean transit time, and Tmax images were obtained utilizing the Rapid software protocol. A limited CT angiogram of the head was also  performed to measure the blood vessel density. Axial IV arterial phase contrast-enhanced CT angiogram of the head and neck. Three-dimensional reconstructions were postprocessed. The radiation dose reduction device was turned on for each scan per the ALARA (As Low as Reasonably Achievable) protocol. Findings: CT perfusion: Color maps demonstrate some expected areas of defect reflecting chronic volume loss noted on noncontrast CT. There is no evidence of definite acute core infarct or significant territorial ischemic tissue at risk. CT ANGIOGRAM: The lung apices are clear. Evaluation of the neck soft tissues demonstrates no pathologic cervical adenopathy or unexpected aerodigestive tract mass. The osseous structures demonstrate no evidence of acute fracture or aggressive osseous lesion, with some multilevel spondylosis change present. Patent aortic arch with 4 vessel branching, with the left vertebral artery arising directly. The visualized subclavian arteries appear patent proximally. On the right, there is calcific atherosclerosis of the ICA origin with focal 70 to 80% narrowing present by NASCET criteria. Less severe atherosclerosis is present on the left, with mild, less than 50% narrowing noted. The vertebral arteries are normal in course and caliber bilaterally, somewhat diffusely diminutive on the left. Intracranially, the carotid siphons demonstrate calcific atherosclerosis without evidence of significant associated narrowing. The anterior cerebral arteries are normal in course and caliber bilaterally. The right middle cerebral artery demonstrates no evidence of flow-limiting stenosis, large vessel occlusion or aneurysm. The left middle cerebral artery is similarly normal in course and caliber. The vertebrobasilar system is patent, with the left vertebral artery appearing to terminate in PICA. The posterior cerebral arteries are normal in course and caliber bilaterally.     Impression: CT perfusion  demonstrates some expected areas of defect correlating with known and presumed areas of chronic volume loss and encephalomalacia noted on CT. There is otherwise no evidence of acute core infarct or territorial ischemic tissue at risk. CT angiogram demonstrates focal 70 to 80% stenosis of the right ICA origin. There is otherwise no evidence of additional high-grade stenosis, large vessel occlusion or aneurysm in the head and neck. Electronically Signed: Armen Browning MD  9/5/2023 3:34 PM EDT  Workstation ID: [id number]     Results for orders placed during the hospital encounter of 09/05/23    Duplex Carotid Ultrasound CAR    Interpretation Summary    Right internal carotid artery demonstrates a less than 50% stenosis.    Left internal carotid artery demonstrates a less than 50% stenosis.    Bilateral antegrade vertebral artery flow.    No previous for comparison      Results for orders placed during the hospital encounter of 09/05/23    Duplex Carotid Ultrasound CAR    Interpretation Summary    Right internal carotid artery demonstrates a less than 50% stenosis.    Left internal carotid artery demonstrates a less than 50% stenosis.    Bilateral antegrade vertebral artery flow.    No previous for comparison          Plan for Follow-up of Pending Labs/Results: none    Discharge Details        Discharge Medications        New Medications        Instructions Start Date   ticagrelor 90 MG tablet tablet  Commonly known as: BRILINTA   90 mg, Oral, 2 Times Daily             Changes to Medications        Instructions Start Date   atorvastatin 80 MG tablet  Commonly known as: LIPITOR  What changed:   medication strength  when to take this   80 mg, Oral, Nightly             Continue These Medications        Instructions Start Date   Allergy Relief 180 MG tablet  Generic drug: fexofenadine   180 mg, Oral, Daily      amLODIPine 5 MG tablet  Commonly known as: NORVASC   1 tablet, Oral, Daily      aspirin 325 MG tablet   325 mg,  Oral, Daily      losartan 50 MG tablet  Commonly known as: COZAAR   1 tablet, Oral, Daily      metFORMIN  MG 24 hr tablet  Commonly known as: GLUCOPHAGE-XR   2 tablets, Oral, Every 12 Hours Scheduled      Ozempic (1 MG/DOSE) 4 MG/3ML solution pen-injector  Generic drug: Semaglutide (1 MG/DOSE)   1 mg, Subcutaneous, Weekly, Patient takes on Friday      QUEtiapine 300 MG tablet  Commonly known as: SEROquel   300 mg, Oral, Nightly      venlafaxine  MG 24 hr capsule  Commonly known as: EFFEXOR-XR   1 capsule, Oral, Daily             Stop These Medications      clopidogrel 75 MG tablet  Commonly known as: PLAVIX              No Known Allergies      Discharge Disposition:  Home or Self Care    Diet:  Hospital:  Diet Order   Procedures    Diet: Cardiac Diets, Diabetic Diets; Healthy Heart (2-3 Na+); Consistent Carbohydrate; Texture: Regular Texture (IDDSI 7); Fluid Consistency: Thin (IDDSI 0)       Activity:      Restrictions or Other Recommendations:         CODE STATUS:    Code Status and Medical Interventions:   Ordered at: 09/05/23 2045     Level Of Support Discussed With:    Patient     Code Status (Patient has no pulse and is not breathing):    CPR (Attempt to Resuscitate)     Medical Interventions (Patient has pulse or is breathing):    Full Support       No future appointments.    Additional Instructions for the Follow-ups that You Need to Schedule       Discharge Follow-up with Specified Provider: Neurology-stroke, patient to arrange at facility of choice   As directed      To: Neurology-stroke, patient to arrange at facility of choice                      Tonie Warner MD  09/07/23      Time Spent on Discharge:  I spent  55 minutes on this discharge activity which included: face-to-face encounter with the patient, reviewing the data in the system, coordination of the care with the nursing staff as well as consultants, documentation, and entering orders.  D/w stroke navigator, counseling patient,  coordinating many studies today

## 2023-09-07 NOTE — CASE MANAGEMENT/SOCIAL WORK
Continued Stay Note  [facility]     Patient Name: Olga Sandoval  MRN: [id number]  Today's Date: 9/7/2023    Admit Date: 9/5/2023    Plan: Home   Discharge Plan       Row Name 09/07/23 1044       Plan    Plan Home    Plan Comments Spoke with patient in room.  Therapy recommends home health at discharge.  Explained to patient that CM is unable to arrange home health since her PCP is in [place] where she lives.  Patient understands and states that her plan is to return home with her daughter in [place] immediately after discharge.  She will have her PCP arrange home health if needed when she gets back to [place].  No other needs noted at this time.  CM will continue to follow.    Final Discharge Disposition Code 01 - home or self-care                   Discharge Codes    No documentation.                 Expected Discharge Date and Time       Expected Discharge Date Expected Discharge Time    Sep 8, 2023               Anitra Alva RN

## 2023-09-11 LAB
BH CV ECHO MEAS - AO MAX PG: 7.4 MMHG
BH CV ECHO MEAS - AO MEAN PG: 4 MMHG
BH CV ECHO MEAS - AO ROOT DIAM: 2.7 CM
BH CV ECHO MEAS - AO V2 MAX: 136 CM/SEC
BH CV ECHO MEAS - AO V2 VTI: 26.4 CM
BH CV ECHO MEAS - AVA(I,D): 1.78 CM2
BH CV ECHO MEAS - EDV(CUBED): 71.3 ML
BH CV ECHO MEAS - EDV(MOD-SP2): 31.7 ML
BH CV ECHO MEAS - EDV(MOD-SP4): 64.2 ML
BH CV ECHO MEAS - EF(MOD-BP): 50.3 %
BH CV ECHO MEAS - EF(MOD-SP2): 45.4 %
BH CV ECHO MEAS - EF(MOD-SP4): 55.8 %
BH CV ECHO MEAS - ESV(CUBED): 18.3 ML
BH CV ECHO MEAS - ESV(MOD-SP2): 17.3 ML
BH CV ECHO MEAS - ESV(MOD-SP4): 28.4 ML
BH CV ECHO MEAS - FS: 36.4 %
BH CV ECHO MEAS - IVS/LVPW: 1.01 CM
BH CV ECHO MEAS - IVSD: 1 CM
BH CV ECHO MEAS - LA DIMENSION: 3.6 CM
BH CV ECHO MEAS - LAT PEAK E' VEL: 6 CM/SEC
BH CV ECHO MEAS - LV MASS(C)D: 132.6 GRAMS
BH CV ECHO MEAS - LV MAX PG: 2.8 MMHG
BH CV ECHO MEAS - LV MEAN PG: 1 MMHG
BH CV ECHO MEAS - LV V1 MAX: 83.6 CM/SEC
BH CV ECHO MEAS - LV V1 VTI: 15.1 CM
BH CV ECHO MEAS - LVIDD: 4.1 CM
BH CV ECHO MEAS - LVIDS: 2.6 CM
BH CV ECHO MEAS - LVOT AREA: 3.1 CM2
BH CV ECHO MEAS - LVOT DIAM: 1.99 CM
BH CV ECHO MEAS - LVPWD: 0.98 CM
BH CV ECHO MEAS - MED PEAK E' VEL: 6.3 CM/SEC
BH CV ECHO MEAS - MV A MAX VEL: 142.6 CM/SEC
BH CV ECHO MEAS - MV DEC SLOPE: 394 CM/SEC2
BH CV ECHO MEAS - MV DEC TIME: 0.3 MSEC
BH CV ECHO MEAS - MV E MAX VEL: 122 CM/SEC
BH CV ECHO MEAS - MV E/A: 0.86
BH CV ECHO MEAS - MV MAX PG: 10 MMHG
BH CV ECHO MEAS - MV MEAN PG: 3.7 MMHG
BH CV ECHO MEAS - MV P1/2T: 90.8 MSEC
BH CV ECHO MEAS - MV V2 VTI: 36.4 CM
BH CV ECHO MEAS - MVA(P1/2T): 2.42 CM2
BH CV ECHO MEAS - MVA(VTI): 1.29 CM2
BH CV ECHO MEAS - PA ACC TIME: 0.07 SEC
BH CV ECHO MEAS - RAP SYSTOLE: 3 MMHG
BH CV ECHO MEAS - RVSP: 30.8 MMHG
BH CV ECHO MEAS - SV(LVOT): 47 ML
BH CV ECHO MEAS - SV(MOD-SP2): 14.4 ML
BH CV ECHO MEAS - SV(MOD-SP4): 35.8 ML
BH CV ECHO MEAS - TAPSE (>1.6): 1.86 CM
BH CV ECHO MEAS - TR MAX PG: 27.8 MMHG
BH CV ECHO MEAS - TR MAX VEL: 263.6 CM/SEC
BH CV ECHO MEASUREMENTS AVERAGE E/E' RATIO: 19.84
BH CV ECHO SHUNT ASSESSMENT PERFORMED (HIDDEN SCRIPTING): 1
BH CV XLRA - RV BASE: 4.1 CM
BH CV XLRA - RV LENGTH: 6.1 CM
BH CV XLRA - RV MID: 3.3 CM
BH CV XLRA - TDI S': 8.9 CM/SEC
LEFT ATRIUM VOLUME INDEX: 11.6 ML/M2

## 2024-09-16 ENCOUNTER — OFFICE VISIT (OUTPATIENT)
Dept: FAMILY MEDICINE CLINIC | Facility: CLINIC | Age: 69
End: 2024-09-16
Payer: MEDICARE

## 2024-09-16 ENCOUNTER — LAB (OUTPATIENT)
Dept: LAB | Facility: HOSPITAL | Age: 69
End: 2024-09-16
Payer: MEDICARE

## 2024-09-16 VITALS
TEMPERATURE: 97.7 F | SYSTOLIC BLOOD PRESSURE: 126 MMHG | OXYGEN SATURATION: 97 % | HEART RATE: 84 BPM | DIASTOLIC BLOOD PRESSURE: 78 MMHG

## 2024-09-16 DIAGNOSIS — I10 HYPERTENSION, UNSPECIFIED TYPE: ICD-10-CM

## 2024-09-16 DIAGNOSIS — Z12.11 ENCOUNTER FOR COLORECTAL CANCER SCREENING: ICD-10-CM

## 2024-09-16 DIAGNOSIS — K21.9 GASTROESOPHAGEAL REFLUX DISEASE, UNSPECIFIED WHETHER ESOPHAGITIS PRESENT: ICD-10-CM

## 2024-09-16 DIAGNOSIS — Z86.73 HISTORY OF CVA (CEREBROVASCULAR ACCIDENT): ICD-10-CM

## 2024-09-16 DIAGNOSIS — E11.9 TYPE 2 DIABETES MELLITUS WITHOUT COMPLICATION, WITHOUT LONG-TERM CURRENT USE OF INSULIN: Primary | ICD-10-CM

## 2024-09-16 DIAGNOSIS — Z23 IMMUNIZATION DUE: ICD-10-CM

## 2024-09-16 DIAGNOSIS — E78.5 HYPERLIPIDEMIA, UNSPECIFIED HYPERLIPIDEMIA TYPE: ICD-10-CM

## 2024-09-16 DIAGNOSIS — Z12.12 ENCOUNTER FOR COLORECTAL CANCER SCREENING: ICD-10-CM

## 2024-09-16 DIAGNOSIS — F31.81 BIPOLAR 2 DISORDER: ICD-10-CM

## 2024-09-16 LAB
ALBUMIN SERPL-MCNC: 4.5 G/DL (ref 3.5–5.2)
ALBUMIN/GLOB SERPL: 1.5 G/DL
ALP SERPL-CCNC: 114 U/L (ref 39–117)
ALT SERPL W P-5'-P-CCNC: 6 U/L (ref 1–33)
ANION GAP SERPL CALCULATED.3IONS-SCNC: 15 MMOL/L (ref 5–15)
AST SERPL-CCNC: 14 U/L (ref 1–32)
BILIRUB SERPL-MCNC: 0.4 MG/DL (ref 0–1.2)
BUN SERPL-MCNC: 22 MG/DL (ref 8–23)
BUN/CREAT SERPL: 15.8 (ref 7–25)
CALCIUM SPEC-SCNC: 10.9 MG/DL (ref 8.6–10.5)
CHLORIDE SERPL-SCNC: 103 MMOL/L (ref 98–107)
CHOLEST SERPL-MCNC: 133 MG/DL (ref 0–200)
CO2 SERPL-SCNC: 23 MMOL/L (ref 22–29)
CREAT SERPL-MCNC: 1.39 MG/DL (ref 0.57–1)
EGFRCR SERPLBLD CKD-EPI 2021: 40.7 ML/MIN/1.73
EXPIRATION DATE: NORMAL
GLOBULIN UR ELPH-MCNC: 3 GM/DL
GLUCOSE SERPL-MCNC: 113 MG/DL (ref 65–99)
HBA1C MFR BLD: 5.4 % (ref 4.5–5.7)
HDLC SERPL-MCNC: 57 MG/DL (ref 40–60)
LDLC SERPL CALC-MCNC: 54 MG/DL (ref 0–100)
LDLC/HDLC SERPL: 0.89 {RATIO}
Lab: NORMAL
POTASSIUM SERPL-SCNC: 5.8 MMOL/L (ref 3.5–5.2)
PROT SERPL-MCNC: 7.5 G/DL (ref 6–8.5)
SODIUM SERPL-SCNC: 141 MMOL/L (ref 136–145)
TRIGL SERPL-MCNC: 126 MG/DL (ref 0–150)
VLDLC SERPL-MCNC: 22 MG/DL (ref 5–40)

## 2024-09-16 PROCEDURE — 3078F DIAST BP <80 MM HG: CPT | Performed by: STUDENT IN AN ORGANIZED HEALTH CARE EDUCATION/TRAINING PROGRAM

## 2024-09-16 PROCEDURE — 3044F HG A1C LEVEL LT 7.0%: CPT | Performed by: STUDENT IN AN ORGANIZED HEALTH CARE EDUCATION/TRAINING PROGRAM

## 2024-09-16 PROCEDURE — 80053 COMPREHEN METABOLIC PANEL: CPT

## 2024-09-16 PROCEDURE — 3074F SYST BP LT 130 MM HG: CPT | Performed by: STUDENT IN AN ORGANIZED HEALTH CARE EDUCATION/TRAINING PROGRAM

## 2024-09-16 PROCEDURE — 1159F MED LIST DOCD IN RCRD: CPT | Performed by: STUDENT IN AN ORGANIZED HEALTH CARE EDUCATION/TRAINING PROGRAM

## 2024-09-16 PROCEDURE — 80061 LIPID PANEL: CPT

## 2024-09-16 PROCEDURE — 99214 OFFICE O/P EST MOD 30 MIN: CPT | Performed by: STUDENT IN AN ORGANIZED HEALTH CARE EDUCATION/TRAINING PROGRAM

## 2024-09-16 PROCEDURE — 1160F RVW MEDS BY RX/DR IN RCRD: CPT | Performed by: STUDENT IN AN ORGANIZED HEALTH CARE EDUCATION/TRAINING PROGRAM

## 2024-09-16 PROCEDURE — 83036 HEMOGLOBIN GLYCOSYLATED A1C: CPT | Performed by: STUDENT IN AN ORGANIZED HEALTH CARE EDUCATION/TRAINING PROGRAM

## 2024-09-16 RX ORDER — ATORVASTATIN CALCIUM 80 MG/1
80 TABLET, FILM COATED ORAL NIGHTLY
Qty: 90 TABLET | Refills: 1 | Status: SHIPPED | OUTPATIENT
Start: 2024-09-16 | End: 2024-09-16

## 2024-09-16 RX ORDER — CHOLECALCIFEROL (VITAMIN D3) 25 MCG
1000 TABLET ORAL DAILY
COMMUNITY

## 2024-09-16 RX ORDER — QUETIAPINE FUMARATE 300 MG/1
600 TABLET, FILM COATED ORAL NIGHTLY
Qty: 180 TABLET | Refills: 1 | Status: SHIPPED | OUTPATIENT
Start: 2024-09-16

## 2024-09-16 RX ORDER — QUETIAPINE FUMARATE 300 MG/1
600 TABLET, FILM COATED ORAL NIGHTLY
Qty: 180 TABLET | Refills: 1 | Status: SHIPPED | OUTPATIENT
Start: 2024-09-16 | End: 2024-09-16

## 2024-09-16 RX ORDER — MECOBALAMIN 5000 MCG
15 TABLET,DISINTEGRATING ORAL
COMMUNITY
Start: 2024-04-09 | End: 2024-09-16 | Stop reason: SDUPTHER

## 2024-09-16 RX ORDER — MECOBALAMIN 5000 MCG
15 TABLET,DISINTEGRATING ORAL DAILY
Qty: 90 CAPSULE | Refills: 1 | Status: SHIPPED | OUTPATIENT
Start: 2024-09-16

## 2024-09-16 RX ORDER — SEMAGLUTIDE 1.34 MG/ML
1 INJECTION, SOLUTION SUBCUTANEOUS WEEKLY
Qty: 9 ML | Refills: 1 | Status: SHIPPED | OUTPATIENT
Start: 2024-09-16

## 2024-09-16 RX ORDER — ASPIRIN 81 MG/1
81 TABLET, CHEWABLE ORAL DAILY
COMMUNITY
Start: 2024-05-19

## 2024-09-16 RX ORDER — MECOBALAMIN 5000 MCG
15 TABLET,DISINTEGRATING ORAL DAILY
Qty: 90 CAPSULE | Refills: 1 | Status: SHIPPED | OUTPATIENT
Start: 2024-09-16 | End: 2024-09-16

## 2024-09-16 RX ORDER — ATORVASTATIN CALCIUM 80 MG/1
80 TABLET, FILM COATED ORAL NIGHTLY
Qty: 90 TABLET | Refills: 1 | Status: SHIPPED | OUTPATIENT
Start: 2024-09-16

## 2024-09-16 RX ORDER — SEMAGLUTIDE 1.34 MG/ML
1 INJECTION, SOLUTION SUBCUTANEOUS WEEKLY
Qty: 9 ML | Refills: 1 | Status: SHIPPED | OUTPATIENT
Start: 2024-09-16 | End: 2024-09-16

## 2024-09-17 ENCOUNTER — HOME HEALTH ADMISSION (OUTPATIENT)
Dept: HOME HEALTH SERVICES | Facility: HOME HEALTHCARE | Age: 69
End: 2024-09-17
Payer: COMMERCIAL

## 2024-09-17 DIAGNOSIS — E83.52 HYPERCALCEMIA: ICD-10-CM

## 2024-09-17 DIAGNOSIS — R79.89 ELEVATED SERUM CREATININE: Primary | ICD-10-CM

## 2024-09-17 DIAGNOSIS — E87.5 HYPERKALEMIA: ICD-10-CM

## 2024-09-17 PROBLEM — K21.9 GASTROESOPHAGEAL REFLUX DISEASE: Status: ACTIVE | Noted: 2024-09-17

## 2024-09-17 PROBLEM — Z12.11 ENCOUNTER FOR SCREENING FOR COLORECTAL CANCER IN HIGH RISK PATIENT: Status: ACTIVE | Noted: 2024-09-17

## 2024-09-17 PROBLEM — Z86.73 HISTORY OF CVA (CEREBROVASCULAR ACCIDENT): Status: ACTIVE | Noted: 2024-09-17

## 2024-09-17 PROBLEM — Z12.12 ENCOUNTER FOR SCREENING FOR COLORECTAL CANCER IN HIGH RISK PATIENT: Status: ACTIVE | Noted: 2024-09-17

## 2024-09-17 PROBLEM — Z23 IMMUNIZATION DUE: Status: ACTIVE | Noted: 2024-09-17

## 2024-09-17 PROBLEM — I10 HYPERTENSION: Status: ACTIVE | Noted: 2024-09-17

## 2024-09-17 PROBLEM — Z91.89 ENCOUNTER FOR SCREENING FOR COLORECTAL CANCER IN HIGH RISK PATIENT: Status: ACTIVE | Noted: 2024-09-17

## 2024-09-17 PROCEDURE — G0008 ADMIN INFLUENZA VIRUS VAC: HCPCS | Performed by: STUDENT IN AN ORGANIZED HEALTH CARE EDUCATION/TRAINING PROGRAM

## 2024-09-17 PROCEDURE — 90662 IIV NO PRSV INCREASED AG IM: CPT | Performed by: STUDENT IN AN ORGANIZED HEALTH CARE EDUCATION/TRAINING PROGRAM

## 2024-09-21 ENCOUNTER — PATIENT ROUNDING (BHMG ONLY) (OUTPATIENT)
Dept: FAMILY MEDICINE CLINIC | Facility: CLINIC | Age: 69
End: 2024-09-21
Payer: MEDICARE

## 2024-09-23 ENCOUNTER — TELEPHONE (OUTPATIENT)
Dept: FAMILY MEDICINE CLINIC | Facility: CLINIC | Age: 69
End: 2024-09-23
Payer: MEDICARE

## 2024-09-23 DIAGNOSIS — Z86.73 HISTORY OF CVA (CEREBROVASCULAR ACCIDENT): Primary | ICD-10-CM

## 2024-09-30 ENCOUNTER — TRANSCRIBE ORDERS (OUTPATIENT)
Dept: ADMINISTRATIVE | Facility: HOSPITAL | Age: 69
End: 2024-09-30
Payer: MEDICARE

## 2024-09-30 DIAGNOSIS — Z95.9 S/P ARTERIAL STENT: Primary | ICD-10-CM

## 2024-10-03 ENCOUNTER — HOME CARE VISIT (OUTPATIENT)
Dept: HOME HEALTH SERVICES | Facility: HOME HEALTHCARE | Age: 69
End: 2024-10-03
Payer: COMMERCIAL

## 2024-10-03 ENCOUNTER — TELEPHONE (OUTPATIENT)
Dept: FAMILY MEDICINE CLINIC | Facility: CLINIC | Age: 69
End: 2024-10-03
Payer: MEDICARE

## 2024-10-03 NOTE — TELEPHONE ENCOUNTER
Caller: Reva Kumra    Relationship: Emergency Contact    Best call back number: [phone]     What is the best time to reach you: ANY    Who are you requesting to speak with (clinical staff, provider,  specific staff member): CLINICAL STAFF    Do you know the name of the person who called: SELF    What was the call regarding: PT STATED THEY CAN'T DO ANYTHING DUE TO THE EXCESS LIMPHODEMA. THEY SUGGESTED THAT BE ADDRESSED FIRST AND ALSO FOR A POWER CHAIR. ONCE THE LIMPHODEMA IS ADDRESSED THEY CAN COME BACK FOR PT AND THEY ALSO SUGGESTED OT. PLEASE CALL TO DISCUSS

## 2024-10-04 NOTE — HOME HEALTH
SOC Note:     Several strokes most recent 7 years ago.  Patient now living with daughter in a 1 story home, recently received a new manual wheelchair.  Wears glasses.  Primary caregiver/ patient daughter currently has broken ribs from an injury therefore is unable to assist patient.     Home Health ordered for: disciplines ***    Reason for Hosp/Primary Dx/Co-morbidities: ***    Focus of Care: ***    Patient's goal(s):***    Current Fun ctional status/mobility/DME: rollator, manual wheelchair     HB status/Living Arrangements: ***    Skin Integrity/wound status: ***    Code Status: ***    Fall Risk/Safety concerns: ***    Educated on Emergency Plan, steps to take prior to going to the ER and when to Call [facility]:  ***     Medication issues/Concerns:***    Additional Problems/Concerns: ***    SDOH Barriers (i.e. caregiver concerns, social isolation, transporta tion, food insecurity, environment, income etc.)/Need for MSW: ***

## 2024-10-06 NOTE — CASE COMMUNICATION
Patient is a non admit to home care services at this time.  Patient and adult caregiver/daughter agree to pursue oupatient lymphedema management to her bilateral LE at this time. [facility] contact information given to patient should any future need arise.

## 2024-10-07 NOTE — TELEPHONE ENCOUNTER
Reva notified of providers response message. They will keep appointment 10/14/24 and address swelling at that time.

## 2024-10-14 ENCOUNTER — OFFICE VISIT (OUTPATIENT)
Dept: FAMILY MEDICINE CLINIC | Facility: CLINIC | Age: 69
End: 2024-10-14
Payer: MEDICARE

## 2024-10-14 ENCOUNTER — LAB (OUTPATIENT)
Dept: LAB | Facility: HOSPITAL | Age: 69
End: 2024-10-14
Payer: MEDICARE

## 2024-10-14 VITALS
TEMPERATURE: 98.5 F | HEART RATE: 95 BPM | DIASTOLIC BLOOD PRESSURE: 72 MMHG | SYSTOLIC BLOOD PRESSURE: 126 MMHG | OXYGEN SATURATION: 98 %

## 2024-10-14 DIAGNOSIS — F31.81 BIPOLAR 2 DISORDER: ICD-10-CM

## 2024-10-14 DIAGNOSIS — K21.9 GASTROESOPHAGEAL REFLUX DISEASE, UNSPECIFIED WHETHER ESOPHAGITIS PRESENT: ICD-10-CM

## 2024-10-14 DIAGNOSIS — I10 HYPERTENSION, UNSPECIFIED TYPE: ICD-10-CM

## 2024-10-14 DIAGNOSIS — R79.89 ELEVATED SERUM CREATININE: ICD-10-CM

## 2024-10-14 DIAGNOSIS — E78.5 HYPERLIPIDEMIA, UNSPECIFIED HYPERLIPIDEMIA TYPE: ICD-10-CM

## 2024-10-14 DIAGNOSIS — E83.52 HYPERCALCEMIA: ICD-10-CM

## 2024-10-14 DIAGNOSIS — I89.0 LYMPHEDEMA: ICD-10-CM

## 2024-10-14 DIAGNOSIS — Z78.0 POST-MENOPAUSAL: ICD-10-CM

## 2024-10-14 DIAGNOSIS — E87.5 HYPERKALEMIA: ICD-10-CM

## 2024-10-14 DIAGNOSIS — Z00.00 HEALTHCARE MAINTENANCE: ICD-10-CM

## 2024-10-14 DIAGNOSIS — E11.9 TYPE 2 DIABETES MELLITUS WITHOUT COMPLICATION, WITHOUT LONG-TERM CURRENT USE OF INSULIN: ICD-10-CM

## 2024-10-14 DIAGNOSIS — H92.21 BLEEDING FROM RIGHT EAR: ICD-10-CM

## 2024-10-14 DIAGNOSIS — Z23 IMMUNIZATION DUE: ICD-10-CM

## 2024-10-14 DIAGNOSIS — Z00.00 MEDICARE ANNUAL WELLNESS VISIT, SUBSEQUENT: Primary | ICD-10-CM

## 2024-10-14 LAB
ANION GAP SERPL CALCULATED.3IONS-SCNC: 15.1 MMOL/L (ref 5–15)
BUN SERPL-MCNC: 18 MG/DL (ref 8–23)
BUN/CREAT SERPL: 12.9 (ref 7–25)
CALCIUM SPEC-SCNC: 10.3 MG/DL (ref 8.6–10.5)
CHLORIDE SERPL-SCNC: 102 MMOL/L (ref 98–107)
CO2 SERPL-SCNC: 22.9 MMOL/L (ref 22–29)
CREAT SERPL-MCNC: 1.39 MG/DL (ref 0.57–1)
EGFRCR SERPLBLD CKD-EPI 2021: 40.7 ML/MIN/1.73
GLUCOSE SERPL-MCNC: 110 MG/DL (ref 65–99)
HCV AB SER QL: NORMAL
POTASSIUM SERPL-SCNC: 4.9 MMOL/L (ref 3.5–5.2)
SODIUM SERPL-SCNC: 140 MMOL/L (ref 136–145)

## 2024-10-14 PROCEDURE — 80048 BASIC METABOLIC PNL TOTAL CA: CPT

## 2024-10-14 PROCEDURE — 36415 COLL VENOUS BLD VENIPUNCTURE: CPT

## 2024-10-14 PROCEDURE — 1160F RVW MEDS BY RX/DR IN RCRD: CPT | Performed by: STUDENT IN AN ORGANIZED HEALTH CARE EDUCATION/TRAINING PROGRAM

## 2024-10-14 PROCEDURE — 3074F SYST BP LT 130 MM HG: CPT | Performed by: STUDENT IN AN ORGANIZED HEALTH CARE EDUCATION/TRAINING PROGRAM

## 2024-10-14 PROCEDURE — 90677 PCV20 VACCINE IM: CPT | Performed by: STUDENT IN AN ORGANIZED HEALTH CARE EDUCATION/TRAINING PROGRAM

## 2024-10-14 PROCEDURE — G0439 PPPS, SUBSEQ VISIT: HCPCS | Performed by: STUDENT IN AN ORGANIZED HEALTH CARE EDUCATION/TRAINING PROGRAM

## 2024-10-14 PROCEDURE — 3078F DIAST BP <80 MM HG: CPT | Performed by: STUDENT IN AN ORGANIZED HEALTH CARE EDUCATION/TRAINING PROGRAM

## 2024-10-14 PROCEDURE — 3044F HG A1C LEVEL LT 7.0%: CPT | Performed by: STUDENT IN AN ORGANIZED HEALTH CARE EDUCATION/TRAINING PROGRAM

## 2024-10-14 PROCEDURE — 86803 HEPATITIS C AB TEST: CPT

## 2024-10-14 PROCEDURE — 1170F FXNL STATUS ASSESSED: CPT | Performed by: STUDENT IN AN ORGANIZED HEALTH CARE EDUCATION/TRAINING PROGRAM

## 2024-10-14 PROCEDURE — 1126F AMNT PAIN NOTED NONE PRSNT: CPT | Performed by: STUDENT IN AN ORGANIZED HEALTH CARE EDUCATION/TRAINING PROGRAM

## 2024-10-14 PROCEDURE — 1159F MED LIST DOCD IN RCRD: CPT | Performed by: STUDENT IN AN ORGANIZED HEALTH CARE EDUCATION/TRAINING PROGRAM

## 2024-10-14 PROCEDURE — 99214 OFFICE O/P EST MOD 30 MIN: CPT | Performed by: STUDENT IN AN ORGANIZED HEALTH CARE EDUCATION/TRAINING PROGRAM

## 2024-10-14 PROCEDURE — G0009 ADMIN PNEUMOCOCCAL VACCINE: HCPCS | Performed by: STUDENT IN AN ORGANIZED HEALTH CARE EDUCATION/TRAINING PROGRAM

## 2024-10-14 RX ORDER — LOSARTAN POTASSIUM 50 MG/1
50 TABLET ORAL DAILY
Qty: 90 TABLET | Refills: 1 | Status: SHIPPED | OUTPATIENT
Start: 2024-10-14

## 2024-10-14 RX ORDER — VENLAFAXINE HYDROCHLORIDE 150 MG/1
150 CAPSULE, EXTENDED RELEASE ORAL DAILY
Qty: 90 CAPSULE | Refills: 1 | Status: SHIPPED | OUTPATIENT
Start: 2024-10-14

## 2024-10-14 RX ORDER — METFORMIN HCL 500 MG
1000 TABLET, EXTENDED RELEASE 24 HR ORAL EVERY 12 HOURS SCHEDULED
Qty: 360 TABLET | Refills: 1 | Status: SHIPPED | OUTPATIENT
Start: 2024-10-14

## 2024-10-14 NOTE — LETTER
[facility]  Vaccine Consent Form    Patient Name:  Olga Sandoval  Patient :  1955     Vaccine(s) Ordered    Pneumococcal Conjugate Vaccine 20-Valent (PCV20)        Screening Checklist  The following questions should be completed prior to vaccination. If you answer “yes” to any question, it does not necessarily mean you should not be vaccinated. It just means we may need to clarify or ask more questions. If a question is unclear, please ask your healthcare provider to explain it.    Yes No   Any fever or moderate to severe illness today (mild illness and/or antibiotic treatment are not contraindications)?     Do you have a history of a serious reaction to any previous vaccinations, such as anaphylaxis, encephalopathy within 7 days, Guillain-Edgewater syndrome within 6 weeks, seizure?     Have you received any live vaccine(s) (e.g MMR, PENNY) or any other vaccines in the last month (to ensure duplicate doses aren't given)?     Do you have an anaphylactic allergy to latex (DTaP, DTaP-IPV, Hep A, Hep B, MenB, RV, Td, Tdap), baker’s yeast (Hep B, HPV), polysorbates (RSV, nirsevimab, PCV 20, Rotavirrus, Tdap, Shingrix), or gelatin (PENNY, MMR)?     Do you have an anaphylactic allergy to neomycin (Rabies, PENNY, MMR, IPV, Hep A), polymyxin B (IPV), or streptomycin (IPV)?      Any cancer, leukemia, AIDS, or other immune system disorder? (PENNY, MMR, RV)     Do you have a parent, brother, or sister with an immune system problem (if immune competence of vaccine recipient clinically verified, can proceed)? (MMR, PENNY)     Any recent steroid treatments for >2 weeks, chemotherapy, or radiation treatment? (PENNY, MMR)     Have you received antibody-containing blood transfusions or IVIG in the past 11 months (recommended interval is dependent on product)? (MMR, PENNY)     Have you taken antiviral drugs (acyclovir, famciclovir, valacyclovir for PENNY) in the last 24 or 48 hours, respectively?      Are you pregnant or planning to become  "pregnant within 1 month? (PENNY, MMR, HPV, IPV, MenB, Abrexvy; For Hep B- refer to Engerix-B; For RSV - Abrysvo is indicated for 32-36 weeks of pregnancy from September to January)     For infants, have you ever been told your child has had intussusception or a medical emergency involving obstruction of the intestine (Rotavirus)? If not for an infant, can skip this question.         *Ordering Physicians/APC should be consulted if \"yes\" is checked by the patient or guardian above.  I have received, read, and understand the Vaccine Information Statement (VIS) for each vaccine ordered.  I have considered my or my child's health status as well as the health status of my close contacts.  I have taken the opportunity to discuss my vaccine questions with my or my child's health care provider.   I have requested that the ordered vaccine(s) be given to me or my child.  I understand the benefits and risks of the vaccines.  I understand that I should remain in the clinic for 15 minutes after receiving the vaccine(s).  _________________________________________________________  Signature of Patient or Parent/Legal Guardian ____________________  Date     "

## 2024-10-14 NOTE — ASSESSMENT & PLAN NOTE
- Hemoglobin A1c of 5.4 last visit  - Will continue metformin 1000 mg twice daily and Ozempic 1 mg weekly  - Ordered urine microalbumin for patient to do at home and to bring back to the lab  -Advised patient to get diabetic eye exam, daughter will call and schedule that appointment

## 2024-10-14 NOTE — ASSESSMENT & PLAN NOTE
- Patient needs a lot of assistance due to her CVA but she does have good support at home, she does have decreased strength in bilateral lower extremities and has not been able to walk since 2017  - I did refer her to home physical therapy at her last visit but they could not do much for her because of the lymphedema in her legs  - We are getting her referred to vascular surgery to assist with that  - I did order a Cologuard test at her last visit, patient has not done the test yet, so I reminded her of this today  - Patient continues to decline mammogram  - DEXA scan ordered, will follow-up on those results and advise further  - Pneumonia vaccine administered today  - Advised patient to get shingles, Tdap, RSV and COVID booster at pharmacy

## 2024-10-14 NOTE — ASSESSMENT & PLAN NOTE
- Patient with bilateral lymphedema that has been chronic  - She has decreased strength in bilateral lower extremities and physical therapy will not work with her until she has lymphedema addressed  - Referral placed to vascular surgery for further evaluation, advised patient to reach out to our clinic once this has been addressed by vascular surgery and we can see if we can get her to work with physical therapy again

## 2024-10-14 NOTE — PROGRESS NOTES
Subjective   The ABCs of the Annual Wellness Visit  Medicare Wellness Visit      Olga Sandoval is a 69 y.o. patient who presents for a Medicare Wellness Visit.    Has a history of strokes, first in 2017,  sees neurology at .  Has one stent  in brain.  She had the stent placed this summer.  Daughter reports that she has had some aphasia intermittently since getting the stent placed.  She does have an MRI and MRA scheduled next week.  This is being worked up by her neurosurgeon.  Patient has not been able to walk since that surgery.  She is getting weaker in her legs and is currently in a wheelchair.  We did order home physical therapy at her last visit but they were unable to have much benefit due to the lymphedema in her legs.  They said that she needs to get that worked out prior to them working with her.  They are okay with a vascular surgery referral.    Hypertension: Patient is currently on losartan 50 mg daily.  She denies any chest pain, dizziness, or blurry vision.     Diabetes: Patient is currently on metformin 1000 mg twice daily and Ozempic 1 mg weekly.  She needs refills on these medications.     GERD: She is on lansoprazole for acid reflux.  She reports good symptom control on this medicine.     Bipolar: On seroquel 600mg and venlafaxine 150mg daily for this. She has been on these medications for a number of years.  She reports good symptom control on these medicines.     HLD: Patient is currently on atorvastatin 80 mg daily.  She reports compliance with this medicine.       This morning, she has noticed that her right ear is bleeding.    HM:   -She is not interested in mammogram  -She is okay with doing colorectal cancer screening, Cologuard was ordered at last visit but patient has not done it yet  -Okay with pneumonia vaccine today  -Okay with DEXA scan        The following portions of the patient's history were reviewed and   updated as appropriate: allergies, current medications, past family  history, past medical history, past social history, past surgical history, and problem list.    Compared to one year ago, the patient's physical   health is worse.  Compared to one year ago, the patient's mental   health is the same.    Recent Hospitalizations:  She was admitted within the past 365 days at [facility].     Current Medical Providers:  Patient Care Team:  Ileana Kerr MD as PCP - General (Family Medicine)  Jose Roberto Casas APRN as Nurse Practitioner (Nurse Practitioner)    Outpatient Medications Prior to Visit   Medication Sig Dispense Refill    aspirin 81 MG chewable tablet Chew 1 tablet Daily.      atorvastatin (LIPITOR) 80 MG tablet Take 1 tablet by mouth Every Night. 90 tablet 1    Cholecalciferol 25 MCG (1000 UT) tablet Take 1 tablet by mouth Daily.      lansoprazole (PREVACID) 15 MG capsule Take 1 capsule by mouth Daily. 90 capsule 1    losartan (COZAAR) 50 MG tablet Take 1 tablet by mouth Daily.      Magnesium Oxide 140 MG capsule Take 400 mg by mouth Daily.      metFORMIN ER (GLUCOPHAGE-XR) 500 MG 24 hr tablet Take 2 tablets by mouth Every 12 (Twelve) Hours.      Ozempic, 1 MG/DOSE, 4 MG/3ML solution pen-injector Inject 1 mg under the skin into the appropriate area as directed 1 (One) Time Per Week. Patient takes on Friday 9 mL 1    QUEtiapine (SEROquel) 300 MG tablet Take 2 tablets by mouth Every Night. 180 tablet 1    ticagrelor (BRILINTA) 90 MG tablet tablet Take 1 tablet by mouth 2 (Two) Times a Day. 60 tablet 0    venlafaxine XR (EFFEXOR-XR) 150 MG 24 hr capsule Take 1 capsule by mouth Daily.       No facility-administered medications prior to visit.     No opioid medication identified on active medication list. I have reviewed chart for other potential  high risk medication/s and harmful drug interactions in the elderly.      Aspirin is on active medication list. Aspirin use is indicated based on review of current medical condition/s. Pros and cons of this therapy have  "been discussed today. Benefits of this medication outweigh potential harm.  Patient has been encouraged to continue taking this medication.  .      Patient Active Problem List   Diagnosis    Aphasia    Bipolar 2 disorder    Type 2 diabetes mellitus    Essential hypertension    Hyperlipidemia    Carotid stenosis, right    Obesity, Class III, BMI 40-49.9 (morbid obesity)    History of CVA (cerebrovascular accident)    Hypertension    Gastroesophageal reflux disease    Encounter for screening for colorectal cancer in high risk patient    Immunization due     Advance Care Planning Advance Directive is not on file.  ACP discussion was held with the patient during this visit. Patient does not have an advance directive, information provided.            Objective   Vitals:    10/14/24 1031   BP: 126/72   BP Location: Left arm   Patient Position: Sitting   Cuff Size: Large Adult   Pulse: 95   Temp: 98.5 °F (36.9 °C)   TempSrc: Infrared   SpO2: 98%   Weight: Comment: couldnt get   Height: Comment: coulnt get   PainSc: 0-No pain       Estimated body mass index is 42.46 kg/m² as calculated from the following:    Height as of 23: 161 cm (63.4\").    Weight as of 23: 110 kg (242 lb 11.6 oz).            Does the patient have evidence of cognitive impairment? Yes  Lab Results   Component Value Date    TRIG 126 2024    HDL 57 2024    LDL 54 2024    VLDL 22 2024    HGBA1C 5.4 2024                                                                                                Health  Risk Assessment    Smoking Status:  Social History     Tobacco Use   Smoking Status Former    Current packs/day: 0.00    Average packs/day: 0.8 packs/day for 5.0 years (3.8 ttl pk-yrs)    Types: Cigarettes    Start date:     Quit date: 2017    Years since quittin.7   Smokeless Tobacco Never     Alcohol Consumption:  Social History     Substance and Sexual Activity   Alcohol Use Never       Fall Risk " Screen  STEADI Fall Risk Assessment was completed, and patient is at HIGH risk for falls. Assessment completed on:10/14/2024    Depression Screening:      10/14/2024    10:35 AM   PHQ-2/PHQ-9 Depression Screening   Little interest or pleasure in doing things Not at all   Feeling down, depressed, or hopeless Not at all     Health Habits and Functional and Cognitive Screening:      10/14/2024    10:33 AM   Functional & Cognitive Status   Do you have difficulty preparing food and eating? Yes   Do you have difficulty bathing yourself, getting dressed or grooming yourself? Yes   Do you have difficulty using the toilet? Yes   Do you have difficulty moving around from place to place? Yes   Do you have trouble with steps or getting out of a bed or a chair? Yes   Current Diet Well Balanced Diet   Dental Exam Not up to date   Eye Exam Up to date   Exercise (times per week) 0 times per week   Current Exercises Include No Regular Exercise   Do you need help using the phone?  No   Are you deaf or do you have serious difficulty hearing?  Yes   Do you need help to go to places out of walking distance? Yes   Do you need help shopping? Yes   Do you need help preparing meals?  Yes   Do you need help with housework?  Yes   Do you need help with laundry? Yes   Do you need help taking your medications? Yes   Do you need help managing money? No   Do you ever drive or ride in a car without wearing a seat belt? No   Have you felt unusual stress, anger or loneliness in the last month? No   Who do you live with? Child   If you need help, do you have trouble finding someone available to you? No   Have you been bothered in the last four weeks by sexual problems? No   Do you have difficulty concentrating, remembering or making decisions? Yes           Age-appropriate Screening Schedule:  Refer to the list below for future screening recommendations based on patient's age, sex and/or medical conditions. Orders for these recommended tests are  listed in the plan section. The patient has been provided with a written plan.    Health Maintenance List  Health Maintenance   Topic Date Due    MAMMOGRAM  Never done    URINE MICROALBUMIN  Never done    DXA SCAN  Never done    COLORECTAL CANCER SCREENING  Never done    Pneumococcal Vaccine 65+ (1 of 2 - PCV) Never done    DIABETIC EYE EXAM  Never done    TDAP/TD VACCINES (1 - Tdap) Never done    ZOSTER VACCINE (1 of 2) Never done    HEPATITIS C SCREENING  Never done    ANNUAL WELLNESS VISIT  Never done    DIABETIC FOOT EXAM  Never done    COVID-19 Vaccine (3 - 2023-24 season) 12/06/2024 (Originally 9/1/2024)    HEMOGLOBIN A1C  03/16/2025    LIPID PANEL  09/16/2025    INFLUENZA VACCINE  Completed                                                                                                                                                CMS Preventative Services Quick Reference  Risk Factors Identified During Encounter  Fall Risk-High or Moderate:  Referral placed to vascular surgery to dressed lymphedema so patient can work with physical therapy  Immunizations Discussed/Encouraged: Tdap, Prevnar 20 (Pneumococcal 20-valent conjugate), Shingrix, and RSV (Respiratory Syncytial Virus)  Vision Screening Recommended    The above risks/problems have been discussed with the patient.  Pertinent information has been shared with the patient in the After Visit Summary.  An After Visit Summary and PPPS were made available to the patient.    Follow Up:   Next Medicare Wellness visit to be scheduled in 1 year.         Additional E&M Note during same encounter follows:  Patient has additional, significant, and separately identifiable condition(s)/problem(s) that require work above and beyond the Medicare Wellness Visit     Chief Complaint  Medicare Wellness-subsequent    Subjective   HPI  Olga is also being seen today for additional medical problem/s.    Has a history of strokes, first in 2017,  sees neurology at .  Has one  stent  in brain.  She had the stent placed this summer.  Daughter reports that she has had some aphasia intermittently since getting the stent placed.  She does have an MRI and MRA scheduled next week.  This is being worked up by her neurosurgeon.  Patient has not been able to walk since that surgery.  She is getting weaker in her legs and is currently in a wheelchair.  We did order home physical therapy at her last visit but they were unable to have much benefit due to the lymphedema in her legs.  They said that she needs to get that worked out prior to them working with her.  They are okay with a vascular surgery referral.    Hypertension: Patient is currently on losartan 50 mg daily.  She denies any chest pain, dizziness, or blurry vision.     Diabetes: Patient is currently on metformin 1000 mg twice daily and Ozempic 1 mg weekly.  She needs refills on these medications.     GERD: She is on lansoprazole for acid reflux.  She reports good symptom control on this medicine.     Bipolar: On seroquel 600mg and venlafaxine 150mg daily for this. She has been on these medications for a number of years.  She reports good symptom control on these medicines.     HLD: Patient is currently on atorvastatin 80 mg daily.  She reports compliance with this medicine.       This morning, she has noticed that her right ear is bleeding.    HM:   -She is not interested in mammogram  -She is okay with doing colorectal cancer screening, Cologuard was ordered at last visit but patient has not done it yet  -Okay with pneumonia vaccine today  -Okay with DEXA scan            Objective   Vital Signs:  /72 (BP Location: Left arm, Patient Position: Sitting, Cuff Size: Large Adult)   Pulse 95   Temp 98.5 °F (36.9 °C) (Infrared)   SpO2 98%   Physical Exam  Vitals reviewed.   Constitutional:       Appearance: Normal appearance.      Comments: Sitting in wheelchair   HENT:      Head: Normocephalic.      Right Ear: Tympanic membrane normal.       Left Ear: Tympanic membrane normal.      Ears:      Comments: Right ear with lesion that appears to be bleeding     Nose: Nose normal.      Mouth/Throat:      Mouth: Mucous membranes are moist.   Eyes:      Conjunctiva/sclera: Conjunctivae normal.   Cardiovascular:      Rate and Rhythm: Normal rate and regular rhythm.   Pulmonary:      Effort: Pulmonary effort is normal.      Breath sounds: Normal breath sounds.   Abdominal:      General: Abdomen is flat.      Palpations: Abdomen is soft.   Musculoskeletal:      Comments: Moving all extremities spontaneously,  Left-sided weakness in both upper and lower extremity, wheelchair-bound   Skin:     General: Skin is warm.   Neurological:      Mental Status: She is alert and oriented to person, place, and time. Mental status is at baseline.      Comments: Left-sided facial droop   Psychiatric:         Mood and Affect: Mood normal.         Behavior: Behavior normal.               Assessment and Plan             Medicare annual wellness visit, subsequent  - Patient needs a lot of assistance due to her CVA but she does have good support at home, she does have decreased strength in bilateral lower extremities and has not been able to walk since 2017  - I did refer her to home physical therapy at her last visit but they could not do much for her because of the lymphedema in her legs  - We are getting her referred to vascular surgery to assist with that  - I did order a Cologuard test at her last visit, patient has not done the test yet, so I reminded her of this today  - Patient continues to decline mammogram  - DEXA scan ordered, will follow-up on those results and advise further  - Pneumonia vaccine administered today  - Advised patient to get shingles, Tdap, RSV and COVID booster at pharmacy  Type 2 diabetes mellitus without complication, without long-term current use of insulin  - Hemoglobin A1c of 5.4 last visit  - Will continue metformin 1000 mg twice daily and  Ozempic 1 mg weekly  - Ordered urine microalbumin for patient to do at home and to bring back to the lab  -Advised patient to get diabetic eye exam, daughter will call and schedule that appointment  Lymphedema  - Patient with bilateral lymphedema that has been chronic  - She has decreased strength in bilateral lower extremities and physical therapy will not work with her until she has lymphedema addressed  - Referral placed to vascular surgery for further evaluation, advised patient to reach out to our clinic once this has been addressed by vascular surgery and we can see if we can get her to work with physical therapy again  Bleeding from right ear  - Patient appears to have a lesion in her right ear that is bleeding, may be a tumor?  - Referral placed to ENT for further evaluation  Hyperlipidemia, unspecified hyperlipidemia type  - Lipid panel up-to-date, with normal LDL  - Will continue atorvastatin 80 mg daily  Bipolar 2 disorder  - Stable  - Will continue Seroquel 600 mg and venlafaxine 150 mg daily  Hypertension, unspecified type  - Controlled  - Continue losartan 50 mg daily  - Last CMP with elevated creatinine, patient's daughter reports that this is chronic but will recheck BMP today and follow-up on those results and advised  Gastroesophageal reflux disease, unspecified whether esophagitis present  - Controlled  - Continue lansoprazole 15 mg daily  Healthcare maintenance  - Obtaining one-time hepatitis C screening  Post-menopausal  - DEXA scan ordered  Immunization due  -Prevnar 20 administered today    Orders Placed This Encounter   Procedures    DEXA Bone Density Axial     Order Specific Question:   Is patient taking or have taken long term Glucocorticoid (steroids)?     Answer:   No     Order Specific Question:   Does the patient have rheumatoid arthritis?     Answer:   No     Order Specific Question:   Does the patient have secondary osteoporosis?     Answer:   No     Order Specific Question:   Reason  for Exam:     Answer:   post-menopaisal screening     Order Specific Question:   Release to patient     Answer:   Routine Release [[id number]]    Pneumococcal Conjugate Vaccine 20-Valent (PCV20)    Microalbumin / Creatinine Urine Ratio - Urine, Clean Catch     Standing Status:   Future     Standing Expiration Date:   10/14/2025     Order Specific Question:   Release to patient     Answer:   Routine Release [[id number]]    Hepatitis C antibody     Standing Status:   Future     Number of Occurrences:   1     Standing Expiration Date:   10/14/2025     Order Specific Question:   Release to patient     Answer:   Routine Release [[id number]]    Ambulatory Referral to Vascular Surgery     Referral Priority:   Urgent     Referral Type:   Consultation     Referral Reason:   Specialty Services Required     Requested Specialty:   Vascular Surgery     Number of Visits Requested:   1    Ambulatory Referral to ENT (Otolaryngology)     Referral Priority:   Urgent     Referral Type:   Consultation     Referral Reason:   Specialty Services Required     Requested Specialty:   Otolaryngology     Number of Visits Requested:   1             Follow Up   Return in about 4 months (around 2/14/2025) for chronic medical conditions.  Patient was given instructions and counseling regarding her condition or for health maintenance advice. Please see specific information pulled into the AVS if appropriate.

## 2024-10-14 NOTE — ASSESSMENT & PLAN NOTE
- Patient appears to have a lesion in her right ear that is bleeding, may be a tumor?  - Referral placed to ENT for further evaluation

## 2024-10-14 NOTE — ASSESSMENT & PLAN NOTE
- Controlled  - Continue losartan 50 mg daily  - Last CMP with elevated creatinine, patient's daughter reports that this is chronic but will recheck BMP today and follow-up on those results and advised

## 2024-10-14 NOTE — PATIENT INSTRUCTIONS
Vaccines for pharmacy: Td vaccine, Shingles, RSV    Health Maintenance, Female  Adopting a healthy lifestyle and getting preventive care can go a long way to promote health and wellness. Talk with your health care provider about what schedule of regular examinations is right for you. This is a good chance for you to check in with your provider about disease prevention and staying healthy.  In between checkups, there are plenty of things you can do on your own. Experts have done a lot of research about which lifestyle changes and preventive measures are most likely to keep you healthy. Ask your health care provider for more information.  Weight and diet  Eat a healthy diet  Be sure to include plenty of vegetables, fruits, low-fat dairy products, and lean protein.  Do not eat a lot of foods high in solid fats, added sugars, or salt.  Get regular exercise. This is one of the most important things you can do for your health.  Most adults should exercise for at least 150 minutes each week. The exercise should increase your heart rate and make you sweat (moderate-intensity exercise).  Most adults should also do strengthening exercises at least twice a week. This is in addition to the moderate-intensity exercise.     Maintain a healthy weight  Body mass index (BMI) is a measurement that can be used to identify possible weight problems. It estimates body fat based on height and weight. Your health care provider can help determine your BMI and help you achieve or maintain a healthy weight.  For females 20 years of age and older:  A BMI below 18.5 is considered underweight.  A BMI of 18.5 to 24.9 is normal.  A BMI of 25 to 29.9 is considered overweight.  A BMI of 30 and above is considered obese.     Watch levels of cholesterol and blood lipids  You should start having your blood tested for lipids and cholesterol at 20 years of age, then have this test every 5 years.  You may need to have your cholesterol levels checked more  often if:  Your lipid or cholesterol levels are high.  You are older than 50 years of age.  You are at high risk for heart disease.     Cancer screening  Lung Cancer  Lung cancer screening is recommended for adults 55-80 years old who are at high risk for lung cancer because of a history of smoking.  A yearly low-dose CT scan of the lungs is recommended for people who:  Currently smoke.  Have quit within the past 15 years.  Have at least a 30-pack-year history of smoking. A pack year is smoking an average of one pack of cigarettes a day for 1 year.  Yearly screening should continue until it has been 15 years since you quit.  Yearly screening should stop if you develop a health problem that would prevent you from having lung cancer treatment.     Breast Cancer  Practice breast self-awareness. This means understanding how your breasts normally appear and feel.  It also means doing regular breast self-exams. Let your health care provider know about any changes, no matter how small.  If you are in your 20s or 30s, you should have a clinical breast exam (CBE) by a health care provider every 1-3 years as part of a regular health exam.  If you are 40 or older, have a CBE every year. Also consider having a breast X-ray (mammogram) every year.  If you have a family history of breast cancer, talk to your health care provider about genetic screening.  If you are at high risk for breast cancer, talk to your health care provider about having an MRI and a mammogram every year.  Breast cancer gene (BRCA) assessment is recommended for women who have family members with BRCA-related cancers. BRCA-related cancers include:  Breast.  Ovarian.  Tubal.  Peritoneal cancers.  Results of the assessment will determine the need for genetic counseling and BRCA1 and BRCA2 testing.     Cervical Cancer  Your health care provider may recommend that you be screened regularly for cancer of the pelvic organs (ovaries, uterus, and vagina). This  screening involves a pelvic examination, including checking for microscopic changes to the surface of your cervix (Pap test). You may be encouraged to have this screening done every 3 years, beginning at age 21.  For women ages 30-65, health care providers may recommend pelvic exams and Pap testing every 3 years, or they may recommend the Pap and pelvic exam, combined with testing for human papilloma virus (HPV), every 5 years. Some types of HPV increase your risk of cervical cancer. Testing for HPV may also be done on women of any age with unclear Pap test results.  Other health care providers may not recommend any screening for nonpregnant women who are considered low risk for pelvic cancer and who do not have symptoms. Ask your health care provider if a screening pelvic exam is right for you.  If you have had past treatment for cervical cancer or a condition that could lead to cancer, you need Pap tests and screening for cancer for at least 20 years after your treatment. If Pap tests have been discontinued, your risk factors (such as having a new sexual partner) need to be reassessed to determine if screening should resume. Some women have medical problems that increase the chance of getting cervical cancer. In these cases, your health care provider may recommend more frequent screening and Pap tests.     Colorectal Cancer  This type of cancer can be detected and often prevented.  Routine colorectal cancer screening usually begins at 50 years of age and continues through 75 years of age.  Your health care provider may recommend screening at an earlier age if you have risk factors for colon cancer.  Your health care provider may also recommend using home test kits to check for hidden blood in the stool.  A small camera at the end of a tube can be used to examine your colon directly (sigmoidoscopy or colonoscopy). This is done to check for the earliest forms of colorectal cancer.  Routine screening usually begins  at age 50.  Direct examination of the colon should be repeated every 5-10 years through 75 years of age. However, you may need to be screened more often if early forms of precancerous polyps or small growths are found.     Skin Cancer  Check your skin from head to toe regularly.  Tell your health care provider about any new moles or changes in moles, especially if there is a change in a mole's shape or color.  Also tell your health care provider if you have a mole that is larger than the size of a pencil eraser.  Always use sunscreen. Apply sunscreen liberally and repeatedly throughout the day.  Protect yourself by wearing long sleeves, pants, a wide-brimmed hat, and sunglasses whenever you are outside.     Heart disease, diabetes, and high blood pressure  High blood pressure causes heart disease and increases the risk of stroke. High blood pressure is more likely to develop in:  People who have blood pressure in the high end of the normal range (130-139/85-89 mm Hg).  People who are overweight or obese.  People who are .  If you are 18-39 years of age, have your blood pressure checked every 3-5 years. If you are 40 years of age or older, have your blood pressure checked every year. You should have your blood pressure measured twice--once when you are at a hospital or clinic, and once when you are not at a hospital or clinic. Record the average of the two measurements. To check your blood pressure when you are not at a hospital or clinic, you can use:  An automated blood pressure machine at a pharmacy.  A home blood pressure monitor.  If you are between 55 years and 79 years old, ask your health care provider if you should take aspirin to prevent strokes.  Have regular diabetes screenings. This involves taking a blood sample to check your fasting blood sugar level.  If you are at a normal weight and have a low risk for diabetes, have this test once every three years after 45 years of age.  If you are  overweight and have a high risk for diabetes, consider being tested at a younger age or more often.  Preventing infection  Hepatitis B  If you have a higher risk for hepatitis B, you should be screened for this virus. You are considered at high risk for hepatitis B if:  You were born in a country where hepatitis B is common. Ask your health care provider which countries are considered high risk.  Your parents were born in a high-risk country, and you have not been immunized against hepatitis B (hepatitis B vaccine).  You have HIV or AIDS.  You use needles to inject street drugs.  You live with someone who has hepatitis B.  You have had sex with someone who has hepatitis B.  You get hemodialysis treatment.  You take certain medicines for conditions, including cancer, organ transplantation, and autoimmune conditions.     Hepatitis C  Blood testing is recommended for:  Everyone born from 1945 through 1965.  Anyone with known risk factors for hepatitis C.     Sexually transmitted infections (STIs)  You should be screened for sexually transmitted infections (STIs) including gonorrhea and chlamydia if:  You are sexually active and are younger than 24 years of age.  You are older than 24 years of age and your health care provider tells you that you are at risk for this type of infection.  Your sexual activity has changed since you were last screened and you are at an increased risk for chlamydia or gonorrhea. Ask your health care provider if you are at risk.  If you do not have HIV, but are at risk, it may be recommended that you take a prescription medicine daily to prevent HIV infection. This is called pre-exposure prophylaxis (PrEP). You are considered at risk if:  You are sexually active and do not regularly use condoms or know the HIV status of your partner(s).  You take drugs by injection.  You are sexually active with a partner who has HIV.     Talk with your health care provider about whether you are at high risk  of being infected with HIV. If you choose to begin PrEP, you should first be tested for HIV. You should then be tested every 3 months for as long as you are taking PrEP.  Pregnancy  If you are premenopausal and you may become pregnant, ask your health care provider about preconception counseling.  If you may become pregnant, take 400 to 800 micrograms (mcg) of folic acid every day.  If you want to prevent pregnancy, talk to your health care provider about birth control (contraception).  Osteoporosis and menopause  Osteoporosis is a disease in which the bones lose minerals and strength with aging. This can result in serious bone fractures. Your risk for osteoporosis can be identified using a bone density scan.  If you are 65 years of age or older, or if you are at risk for osteoporosis and fractures, ask your health care provider if you should be screened.  Ask your health care provider whether you should take a calcium or vitamin D supplement to lower your risk for osteoporosis.  Menopause may have certain physical symptoms and risks.  Hormone replacement therapy may reduce some of these symptoms and risks.  Talk to your health care provider about whether hormone replacement therapy is right for you.  Follow these instructions at home:  Schedule regular health, dental, and eye exams.  Stay current with your immunizations.  Do not use any tobacco products including cigarettes, chewing tobacco, or electronic cigarettes.  If you are pregnant, do not drink alcohol.  If you are breastfeeding, limit how much and how often you drink alcohol.  Limit alcohol intake to no more than 1 drink per day for nonpregnant women. One drink equals 12 ounces of beer, 5 ounces of wine, or 1½ ounces of hard liquor.  Do not use street drugs.  Do not share needles.  Ask your health care provider for help if you need support or information about quitting drugs.  Tell your health care provider if you often feel depressed.  Tell your health  care provider if you have ever been abused or do not feel safe at home.  This information is not intended to replace advice given to you by your health care provider. Make sure you discuss any questions you have with your health care provider.  Document Released: 07/02/2012 Document Revised: 05/25/2017 Document Reviewed: 09/20/2016  [employer] Patient Education © 2018 [employer]

## 2024-10-14 NOTE — LETTER
[facility]  Vaccine Consent Form    Patient Name:  Olga Sandoval  Patient :  1955     Vaccine(s) Ordered    Pneumococcal Conjugate Vaccine 20-Valent (PCV20)        Screening Checklist  The following questions should be completed prior to vaccination. If you answer “yes” to any question, it does not necessarily mean you should not be vaccinated. It just means we may need to clarify or ask more questions. If a question is unclear, please ask your healthcare provider to explain it.    Yes No   Any fever or moderate to severe illness today (mild illness and/or antibiotic treatment are not contraindications)?     Do you have a history of a serious reaction to any previous vaccinations, such as anaphylaxis, encephalopathy within 7 days, Guillain-[place] syndrome within 6 weeks, seizure?     Have you received any live vaccine(s) (e.g MMR, PENNY) or any other vaccines in the last month (to ensure duplicate doses aren't given)?     Do you have an anaphylactic allergy to latex (DTaP, DTaP-IPV, Hep A, Hep B, MenB, RV, Td, Tdap), baker’s yeast (Hep B, HPV), polysorbates (RSV, nirsevimab, PCV 20, Rotavirrus, Tdap, Shingrix), or gelatin (PENNY, MMR)?     Do you have an anaphylactic allergy to neomycin (Rabies, PENNY, MMR, IPV, Hep A), polymyxin B (IPV), or streptomycin (IPV)?      Any cancer, leukemia, AIDS, or other immune system disorder? (PENNY, MMR, RV)     Do you have a parent, brother, or sister with an immune system problem (if immune competence of vaccine recipient clinically verified, can proceed)? (MMR, PENNY)     Any recent steroid treatments for >2 weeks, chemotherapy, or radiation treatment? (PENNY, MMR)     Have you received antibody-containing blood transfusions or IVIG in the past 11 months (recommended interval is dependent on product)? (MMR, PENNY)     Have you taken antiviral drugs (acyclovir, famciclovir, valacyclovir for PENNY) in the last 24 or 48 hours, respectively?      Are you pregnant or planning to become  "pregnant within 1 month? (PENNY, MMR, HPV, IPV, MenB, Abrexvy; For Hep B- refer to Engerix-B; For RSV - Abrysvo is indicated for 32-36 weeks of pregnancy from September to January)     For infants, have you ever been told your child has had intussusception or a medical emergency involving obstruction of the intestine (Rotavirus)? If not for an infant, can skip this question.         *Ordering Physicians/APC should be consulted if \"yes\" is checked by the patient or guardian above.  I have received, read, and understand the Vaccine Information Statement (VIS) for each vaccine ordered.  I have considered my or my child's health status as well as the health status of my close contacts.  I have taken the opportunity to discuss my vaccine questions with my or my child's health care provider.   I have requested that the ordered vaccine(s) be given to me or my child.  I understand the benefits and risks of the vaccines.  I understand that I should remain in the clinic for 15 minutes after receiving the vaccine(s).  _________________________________________________________  Signature of Patient or Parent/Legal Guardian ____________________  Date     "

## 2024-10-23 ENCOUNTER — APPOINTMENT (OUTPATIENT)
Dept: GENERAL RADIOLOGY | Facility: HOSPITAL | Age: 69
DRG: 690 | End: 2024-10-23
Payer: MEDICARE

## 2024-10-23 ENCOUNTER — APPOINTMENT (OUTPATIENT)
Dept: CT IMAGING | Facility: HOSPITAL | Age: 69
DRG: 690 | End: 2024-10-23
Payer: MEDICARE

## 2024-10-23 ENCOUNTER — HOSPITAL ENCOUNTER (INPATIENT)
Facility: HOSPITAL | Age: 69
LOS: 10 days | Discharge: NURSING, REHAB OR LONG-TERM CARE | DRG: 690 | End: 2024-11-04
Attending: EMERGENCY MEDICINE | Admitting: INTERNAL MEDICINE
Payer: MEDICARE

## 2024-10-23 DIAGNOSIS — R41.841 COGNITIVE COMMUNICATION DEFICIT: ICD-10-CM

## 2024-10-23 DIAGNOSIS — N39.0 ACUTE URINARY TRACT INFECTION: ICD-10-CM

## 2024-10-23 DIAGNOSIS — E86.0 ACUTE DEHYDRATION: ICD-10-CM

## 2024-10-23 DIAGNOSIS — G93.40 ACUTE ENCEPHALOPATHY: ICD-10-CM

## 2024-10-23 DIAGNOSIS — R47.81 SLURRED SPEECH: ICD-10-CM

## 2024-10-23 DIAGNOSIS — F31.81 BIPOLAR 2 DISORDER: ICD-10-CM

## 2024-10-23 DIAGNOSIS — R53.1 LEFT-SIDED WEAKNESS: Primary | ICD-10-CM

## 2024-10-23 PROBLEM — R47.1 DYSARTHRIA: Status: ACTIVE | Noted: 2024-10-23

## 2024-10-23 LAB
ALBUMIN SERPL-MCNC: 4.5 G/DL (ref 3.5–5.2)
ALBUMIN/GLOB SERPL: 1.5 G/DL
ALP SERPL-CCNC: 104 U/L (ref 39–117)
ALT SERPL W P-5'-P-CCNC: 9 U/L (ref 1–33)
ALT SERPL W P-5'-P-CCNC: 9 U/L (ref 1–33)
ANION GAP SERPL CALCULATED.3IONS-SCNC: 18 MMOL/L (ref 5–15)
APTT PPP: 24.9 SECONDS (ref 22–39)
AST SERPL-CCNC: 18 U/L (ref 1–32)
AST SERPL-CCNC: 18 U/L (ref 1–32)
BACTERIA UR QL AUTO: ABNORMAL /HPF
BASOPHILS # BLD AUTO: 0.05 10*3/MM3 (ref 0–0.2)
BASOPHILS NFR BLD AUTO: 0.3 % (ref 0–1.5)
BILIRUB SERPL-MCNC: 0.6 MG/DL (ref 0–1.2)
BILIRUB UR QL STRIP: NEGATIVE
BUN BLDA-MCNC: 37 MG/DL (ref 8–26)
BUN SERPL-MCNC: 25 MG/DL (ref 8–23)
BUN/CREAT SERPL: 18.9 (ref 7–25)
CA-I BLDA-SCNC: 1.02 MMOL/L (ref 1.2–1.32)
CALCIUM SPEC-SCNC: 10.1 MG/DL (ref 8.6–10.5)
CHLORIDE BLDA-SCNC: 107 MMOL/L (ref 98–109)
CHLORIDE SERPL-SCNC: 102 MMOL/L (ref 98–107)
CLARITY UR: ABNORMAL
CO2 BLDA-SCNC: 23 MMOL/L (ref 24–29)
CO2 SERPL-SCNC: 21 MMOL/L (ref 22–29)
COLOR UR: YELLOW
CREAT BLDA-MCNC: 1.4 MG/DL (ref 0.6–1.3)
CREAT BLDA-MCNC: 1.4 MG/DL (ref 0.6–1.3)
CREAT SERPL-MCNC: 1.32 MG/DL (ref 0.57–1)
D-LACTATE SERPL-SCNC: 1.8 MMOL/L (ref 0.5–2)
DEPRECATED RDW RBC AUTO: 53.4 FL (ref 37–54)
EGFRCR SERPLBLD CKD-EPI 2021: 40.3 ML/MIN/1.73
EGFRCR SERPLBLD CKD-EPI 2021: 43.3 ML/MIN/1.73
EOSINOPHIL # BLD AUTO: 0.17 10*3/MM3 (ref 0–0.4)
EOSINOPHIL NFR BLD AUTO: 1 % (ref 0.3–6.2)
ERYTHROCYTE [DISTWIDTH] IN BLOOD BY AUTOMATED COUNT: 16.4 % (ref 12.3–15.4)
FLUAV RNA RESP QL NAA+PROBE: NOT DETECTED
FLUBV RNA RESP QL NAA+PROBE: NOT DETECTED
GLOBULIN UR ELPH-MCNC: 3.1 GM/DL
GLUCOSE BLDC GLUCOMTR-MCNC: 133 MG/DL (ref 70–130)
GLUCOSE BLDC GLUCOMTR-MCNC: 141 MG/DL (ref 70–130)
GLUCOSE SERPL-MCNC: 125 MG/DL (ref 65–99)
GLUCOSE UR STRIP-MCNC: NEGATIVE MG/DL
HCT VFR BLD AUTO: 43.1 % (ref 34–46.6)
HCT VFR BLDA CALC: 44 % (ref 38–51)
HGB BLD-MCNC: 13.8 G/DL (ref 12–15.9)
HGB BLDA-MCNC: 15 G/DL (ref 12–17)
HGB UR QL STRIP.AUTO: ABNORMAL
HOLD SPECIMEN: NORMAL
HYALINE CASTS UR QL AUTO: ABNORMAL /LPF
IMM GRANULOCYTES # BLD AUTO: 0.04 10*3/MM3 (ref 0–0.05)
IMM GRANULOCYTES NFR BLD AUTO: 0.2 % (ref 0–0.5)
KETONES UR QL STRIP: ABNORMAL
LEUKOCYTE ESTERASE UR QL STRIP.AUTO: ABNORMAL
LYMPHOCYTES # BLD AUTO: 0.77 10*3/MM3 (ref 0.7–3.1)
LYMPHOCYTES NFR BLD AUTO: 4.7 % (ref 19.6–45.3)
MCH RBC QN AUTO: 28.5 PG (ref 26.6–33)
MCHC RBC AUTO-ENTMCNC: 32 G/DL (ref 31.5–35.7)
MCV RBC AUTO: 88.9 FL (ref 79–97)
MONOCYTES # BLD AUTO: 0.87 10*3/MM3 (ref 0.1–0.9)
MONOCYTES NFR BLD AUTO: 5.4 % (ref 5–12)
NEUTROPHILS NFR BLD AUTO: 14.33 10*3/MM3 (ref 1.7–7)
NEUTROPHILS NFR BLD AUTO: 88.4 % (ref 42.7–76)
NITRITE UR QL STRIP: NEGATIVE
NRBC BLD AUTO-RTO: 0 /100 WBC (ref 0–0.2)
PH UR STRIP.AUTO: 5.5 [PH] (ref 5–8)
PLATELET # BLD AUTO: 385 10*3/MM3 (ref 140–450)
PMV BLD AUTO: 10.6 FL (ref 6–12)
POTASSIUM BLDA-SCNC: 5.8 MMOL/L (ref 3.5–4.9)
POTASSIUM SERPL-SCNC: 4.4 MMOL/L (ref 3.5–5.2)
PROT SERPL-MCNC: 7.6 G/DL (ref 6–8.5)
PROT UR QL STRIP: ABNORMAL
RBC # BLD AUTO: 4.85 10*6/MM3 (ref 3.77–5.28)
RBC # UR STRIP: ABNORMAL /HPF
REF LAB TEST METHOD: ABNORMAL
SARS-COV-2 RNA RESP QL NAA+PROBE: NOT DETECTED
SODIUM BLD-SCNC: 138 MMOL/L (ref 138–146)
SODIUM SERPL-SCNC: 141 MMOL/L (ref 136–145)
SP GR UR STRIP: 1.09 (ref 1–1.03)
SQUAMOUS #/AREA URNS HPF: ABNORMAL /HPF
TROPONIN T SERPL HS-MCNC: 25 NG/L
TSH SERPL DL<=0.05 MIU/L-ACNC: 3.95 UIU/ML (ref 0.27–4.2)
UROBILINOGEN UR QL STRIP: ABNORMAL
WBC # UR STRIP: ABNORMAL /HPF
WBC NRBC COR # BLD AUTO: 16.23 10*3/MM3 (ref 3.4–10.8)
WHOLE BLOOD HOLD COAG: NORMAL
WHOLE BLOOD HOLD SPECIMEN: NORMAL

## 2024-10-23 PROCEDURE — G0378 HOSPITAL OBSERVATION PER HR: HCPCS

## 2024-10-23 PROCEDURE — 25010000002 CEFTRIAXONE PER 250 MG: Performed by: EMERGENCY MEDICINE

## 2024-10-23 PROCEDURE — 25510000001 IOPAMIDOL PER 1 ML: Performed by: EMERGENCY MEDICINE

## 2024-10-23 PROCEDURE — 87636 SARSCOV2 & INF A&B AMP PRB: CPT | Performed by: EMERGENCY MEDICINE

## 2024-10-23 PROCEDURE — 87040 BLOOD CULTURE FOR BACTERIA: CPT | Performed by: EMERGENCY MEDICINE

## 2024-10-23 PROCEDURE — 82607 VITAMIN B-12: CPT | Performed by: STUDENT IN AN ORGANIZED HEALTH CARE EDUCATION/TRAINING PROGRAM

## 2024-10-23 PROCEDURE — 70496 CT ANGIOGRAPHY HEAD: CPT

## 2024-10-23 PROCEDURE — 99223 1ST HOSP IP/OBS HIGH 75: CPT | Performed by: NURSE PRACTITIONER

## 2024-10-23 PROCEDURE — 93005 ELECTROCARDIOGRAM TRACING: CPT | Performed by: EMERGENCY MEDICINE

## 2024-10-23 PROCEDURE — 25010000002 ZIPRASIDONE MESYLATE PER 10 MG: Performed by: STUDENT IN AN ORGANIZED HEALTH CARE EDUCATION/TRAINING PROGRAM

## 2024-10-23 PROCEDURE — 84484 ASSAY OF TROPONIN QUANT: CPT | Performed by: EMERGENCY MEDICINE

## 2024-10-23 PROCEDURE — 80053 COMPREHEN METABOLIC PANEL: CPT | Performed by: EMERGENCY MEDICINE

## 2024-10-23 PROCEDURE — 87086 URINE CULTURE/COLONY COUNT: CPT | Performed by: NURSE PRACTITIONER

## 2024-10-23 PROCEDURE — 36415 COLL VENOUS BLD VENIPUNCTURE: CPT

## 2024-10-23 PROCEDURE — 70450 CT HEAD/BRAIN W/O DYE: CPT

## 2024-10-23 PROCEDURE — 25010000002 HALOPERIDOL LACTATE PER 5 MG: Performed by: EMERGENCY MEDICINE

## 2024-10-23 PROCEDURE — 25810000003 SODIUM CHLORIDE 0.9 % SOLUTION: Performed by: EMERGENCY MEDICINE

## 2024-10-23 PROCEDURE — 80047 BASIC METABLC PNL IONIZED CA: CPT | Performed by: EMERGENCY MEDICINE

## 2024-10-23 PROCEDURE — 85730 THROMBOPLASTIN TIME PARTIAL: CPT | Performed by: EMERGENCY MEDICINE

## 2024-10-23 PROCEDURE — 84443 ASSAY THYROID STIM HORMONE: CPT | Performed by: STUDENT IN AN ORGANIZED HEALTH CARE EDUCATION/TRAINING PROGRAM

## 2024-10-23 PROCEDURE — 85025 COMPLETE CBC W/AUTO DIFF WBC: CPT | Performed by: EMERGENCY MEDICINE

## 2024-10-23 PROCEDURE — 99222 1ST HOSP IP/OBS MODERATE 55: CPT | Performed by: STUDENT IN AN ORGANIZED HEALTH CARE EDUCATION/TRAINING PROGRAM

## 2024-10-23 PROCEDURE — 81001 URINALYSIS AUTO W/SCOPE: CPT | Performed by: NURSE PRACTITIONER

## 2024-10-23 PROCEDURE — 82948 REAGENT STRIP/BLOOD GLUCOSE: CPT

## 2024-10-23 PROCEDURE — 71045 X-RAY EXAM CHEST 1 VIEW: CPT

## 2024-10-23 PROCEDURE — 82565 ASSAY OF CREATININE: CPT

## 2024-10-23 PROCEDURE — 83605 ASSAY OF LACTIC ACID: CPT | Performed by: NURSE PRACTITIONER

## 2024-10-23 PROCEDURE — 4A03X5D MEASUREMENT OF ARTERIAL FLOW, INTRACRANIAL, EXTERNAL APPROACH: ICD-10-PCS | Performed by: STUDENT IN AN ORGANIZED HEALTH CARE EDUCATION/TRAINING PROGRAM

## 2024-10-23 PROCEDURE — 99285 EMERGENCY DEPT VISIT HI MDM: CPT

## 2024-10-23 PROCEDURE — 85014 HEMATOCRIT: CPT | Performed by: EMERGENCY MEDICINE

## 2024-10-23 PROCEDURE — 25010000002 HALOPERIDOL LACTATE PER 5 MG: Performed by: STUDENT IN AN ORGANIZED HEALTH CARE EDUCATION/TRAINING PROGRAM

## 2024-10-23 PROCEDURE — 70498 CT ANGIOGRAPHY NECK: CPT

## 2024-10-23 RX ORDER — QUETIAPINE FUMARATE 25 MG/1
50 TABLET, FILM COATED ORAL EVERY 12 HOURS SCHEDULED
Status: DISCONTINUED | OUTPATIENT
Start: 2024-10-23 | End: 2024-11-04 | Stop reason: HOSPADM

## 2024-10-23 RX ORDER — HALOPERIDOL 5 MG/ML
2 INJECTION INTRAMUSCULAR ONCE
Status: COMPLETED | OUTPATIENT
Start: 2024-10-23 | End: 2024-10-23

## 2024-10-23 RX ORDER — LOSARTAN POTASSIUM 50 MG/1
50 TABLET ORAL DAILY
Status: DISCONTINUED | OUTPATIENT
Start: 2024-10-24 | End: 2024-10-25

## 2024-10-23 RX ORDER — IOPAMIDOL 755 MG/ML
80 INJECTION, SOLUTION INTRAVASCULAR
Status: COMPLETED | OUTPATIENT
Start: 2024-10-23 | End: 2024-10-23

## 2024-10-23 RX ORDER — HALOPERIDOL 5 MG/ML
2 INJECTION INTRAMUSCULAR EVERY 6 HOURS PRN
Status: DISCONTINUED | OUTPATIENT
Start: 2024-10-23 | End: 2024-10-23

## 2024-10-23 RX ORDER — QUETIAPINE FUMARATE 100 MG/1
100 TABLET, FILM COATED ORAL EVERY 12 HOURS SCHEDULED
Status: DISCONTINUED | OUTPATIENT
Start: 2024-10-23 | End: 2024-10-23

## 2024-10-23 RX ORDER — CHOLECALCIFEROL (VITAMIN D3) 25 MCG
1000 TABLET ORAL DAILY
Status: DISCONTINUED | OUTPATIENT
Start: 2024-10-24 | End: 2024-11-04 | Stop reason: HOSPADM

## 2024-10-23 RX ORDER — PANTOPRAZOLE SODIUM 40 MG/1
40 TABLET, DELAYED RELEASE ORAL
Status: DISCONTINUED | OUTPATIENT
Start: 2024-10-24 | End: 2024-11-04 | Stop reason: HOSPADM

## 2024-10-23 RX ORDER — ZIPRASIDONE MESYLATE 20 MG/ML
10 INJECTION, POWDER, LYOPHILIZED, FOR SOLUTION INTRAMUSCULAR EVERY 12 HOURS PRN
Status: DISCONTINUED | OUTPATIENT
Start: 2024-10-23 | End: 2024-11-04 | Stop reason: HOSPADM

## 2024-10-23 RX ORDER — NICOTINE POLACRILEX 4 MG
15 LOZENGE BUCCAL
Status: DISCONTINUED | OUTPATIENT
Start: 2024-10-23 | End: 2024-11-04 | Stop reason: HOSPADM

## 2024-10-23 RX ORDER — ASPIRIN 81 MG/1
81 TABLET, CHEWABLE ORAL DAILY
Status: DISCONTINUED | OUTPATIENT
Start: 2024-10-24 | End: 2024-11-04 | Stop reason: HOSPADM

## 2024-10-23 RX ORDER — DEXTROSE MONOHYDRATE 25 G/50ML
25 INJECTION, SOLUTION INTRAVENOUS
Status: DISCONTINUED | OUTPATIENT
Start: 2024-10-23 | End: 2024-11-04 | Stop reason: HOSPADM

## 2024-10-23 RX ORDER — ACETAMINOPHEN 325 MG/1
650 TABLET ORAL EVERY 6 HOURS PRN
Status: DISCONTINUED | OUTPATIENT
Start: 2024-10-23 | End: 2024-11-04 | Stop reason: HOSPADM

## 2024-10-23 RX ORDER — NYSTATIN 100000 [USP'U]/G
POWDER TOPICAL EVERY 12 HOURS SCHEDULED
Status: DISCONTINUED | OUTPATIENT
Start: 2024-10-23 | End: 2024-11-04 | Stop reason: HOSPADM

## 2024-10-23 RX ORDER — SODIUM CHLORIDE 0.9 % (FLUSH) 0.9 %
10 SYRINGE (ML) INJECTION AS NEEDED
Status: DISCONTINUED | OUTPATIENT
Start: 2024-10-23 | End: 2024-11-04 | Stop reason: HOSPADM

## 2024-10-23 RX ORDER — INSULIN LISPRO 100 [IU]/ML
2-7 INJECTION, SOLUTION INTRAVENOUS; SUBCUTANEOUS
Status: DISCONTINUED | OUTPATIENT
Start: 2024-10-24 | End: 2024-11-04 | Stop reason: HOSPADM

## 2024-10-23 RX ORDER — VENLAFAXINE HYDROCHLORIDE 75 MG/1
150 CAPSULE, EXTENDED RELEASE ORAL DAILY
Status: DISCONTINUED | OUTPATIENT
Start: 2024-10-24 | End: 2024-11-04 | Stop reason: HOSPADM

## 2024-10-23 RX ORDER — IBUPROFEN 600 MG/1
1 TABLET ORAL
Status: DISCONTINUED | OUTPATIENT
Start: 2024-10-23 | End: 2024-11-04 | Stop reason: HOSPADM

## 2024-10-23 RX ORDER — ATORVASTATIN CALCIUM 40 MG/1
80 TABLET, FILM COATED ORAL NIGHTLY
Status: DISCONTINUED | OUTPATIENT
Start: 2024-10-23 | End: 2024-11-04 | Stop reason: HOSPADM

## 2024-10-23 RX ADMIN — HALOPERIDOL LACTATE 2 MG: 5 INJECTION, SOLUTION INTRAMUSCULAR at 20:20

## 2024-10-23 RX ADMIN — SODIUM CHLORIDE 2000 MG: 900 INJECTION INTRAVENOUS at 21:27

## 2024-10-23 RX ADMIN — ZIPRASIDONE MESYLATE 10 MG: 20 INJECTION, POWDER, LYOPHILIZED, FOR SOLUTION INTRAMUSCULAR at 23:19

## 2024-10-23 RX ADMIN — HALOPERIDOL LACTATE 2 MG: 5 INJECTION, SOLUTION INTRAMUSCULAR at 21:27

## 2024-10-23 RX ADMIN — SODIUM CHLORIDE 1000 ML: 9 INJECTION, SOLUTION INTRAVENOUS at 21:27

## 2024-10-23 RX ADMIN — IOPAMIDOL 80 ML: 755 INJECTION, SOLUTION INTRAVENOUS at 17:42

## 2024-10-23 NOTE — CONSULTS
Stroke Consult Note    Patient Name: Olga Sandoval   MRN: [id number]  Age: 69 y.o.  Sex: female  : 1955    Primary Care Physician: Ileana Kerr MD  Referring Physician: Dr. Jadyn Richards    TIME STROKE TEAM CALLED: 1710 EST     TIME PATIENT SEEN: 1715 EST    Handedness: Right  Race:     Chief Complaint/Reason for Consultation: AMS, Slurred Speech    Subjective .  HPI:   Olga Sandoval is a 69-year-old  female with a past medical history significant for carotid artery aneurysm status post pipeline embolization (20, [facility]), COPD, frequent falls, hyperlipidemia, hypertension, remote Bell's palsy, bipolar disorder, prior tobacco abuse, multiple CVAs (, , ), morbid obesity, and implantable loop recorder ().  Presented to [facility] emergency department via EMS with complaints of worsening confusion, slurred speech, and vomiting.  Last known well according to EMS was new when her daughter left her to go to a dr appointment. When she returned she found her down in the floor and called 911. Secondary to her altered mental status most of her past medical history is taken from the EMR. On arrival her NIH 8 on my examination. She has baseline facial drooping secondary to her remote bells palsy. Speech is mildly aphasic and dysarthric. Blood pressure per EMS was mildly hypertensive and has since continued to normalize. She will be admitted to the Hospital Medicine Team for further workup and evaluation.     Last Known Normal Date/Time: 1200 EST     Review of Systems   Unable to perform ROS: Mental status change      Past Medical History:   Diagnosis Date    Aneurysm     left carotid    Bipolar 2 disorder     Diabetes mellitus     H/O Bell's palsy     Left sided facial droop    History of loop recorder     Hyperlipidemia     Hypertension     Stroke     x 4     Past Surgical History:   Procedure Laterality Date    CARDIAC CATHETERIZATION       CATARACT EXTRACTION Bilateral      SECTION       Family History   Problem Relation Age of Onset    Hypertension Mother     Lung cancer Mother     Cataracts Mother     Transient ischemic attack Father     Hypertension Father     Cancer Father     Heart attack Brother     Stroke Brother      Social History     Socioeconomic History    Marital status:    Tobacco Use    Smoking status: Former     Current packs/day: 0.00     Average packs/day: 0.8 packs/day for 5.0 years (3.8 ttl pk-yrs)     Types: Cigarettes     Start date:      Quit date:      Years since quittin.8    Smokeless tobacco: Never   Vaping Use    Vaping status: Never Used   Substance and Sexual Activity    Alcohol use: Never    Drug use: Never    Sexual activity: Defer     Allergies   Allergen Reactions    Codeine Palpitations and Unknown (See Comments)     Other reaction(s): Unknown     Prior to Admission medications    Medication Sig Start Date End Date Taking? Authorizing Provider   aspirin 81 MG chewable tablet Chew 1 tablet Daily. 24   Joby Locke MD   atorvastatin (LIPITOR) 80 MG tablet Take 1 tablet by mouth Every Night. 24   Ileana Kerr MD   Cholecalciferol 25 MCG (1000 UT) tablet Take 1 tablet by mouth Daily.    Joby Locke MD   lansoprazole (PREVACID) 15 MG capsule Take 1 capsule by mouth Daily. 24   Ileana Kerr MD   losartan (COZAAR) 50 MG tablet Take 1 tablet by mouth Daily. 10/14/24   Ileana Kerr MD   Magnesium Oxide 140 MG capsule Take 400 mg by mouth Daily.    Joby Locke MD   metFORMIN ER (GLUCOPHAGE-XR) 500 MG 24 hr tablet Take 2 tablets by mouth Every 12 (Twelve) Hours. 10/14/24   Ileana Kerr MD   Ozempic, 1 MG/DOSE, 4 MG/3ML solution pen-injector Inject 1 mg under the skin into the appropriate area as directed 1 (One) Time Per Week. Patient takes on 24   Ileana Kerr MD   QUEtiapine (SEROquel) 300 MG tablet Take 2 tablets  by mouth Every Night. 9/16/24   Ileana Kerr MD   ticagrelor (BRILINTA) 90 MG tablet tablet Take 1 tablet by mouth 2 (Two) Times a Day. 9/7/23   Tonie Warner MD   venlafaxine XR (EFFEXOR-XR) 150 MG 24 hr capsule Take 1 capsule by mouth Daily. 10/14/24   Ileana Kerr MD     Objective     Neurological Exam  Mental Status  Alert. Oriented to person, place, time and situation. Mild dysarthria present. Expressive aphasia present. Fund of knowledge is appropriate for level of education.    Cranial Nerves  CN II: Visual fields full to confrontation.  CN III, IV, VI: Extraocular movements intact bilaterally. Pupils equal round and reactive to light bilaterally.  CN VII:  Left: There is peripheral facial weakness. Chronic secondary to bells palsy .  CN VIII: Hearing appears to be intact bilaterally .  CN IX, X:  Right: Palate is normal.  Left: Palate is normal.    Motor  Normal muscle bulk throughout. No fasciculations present. Normal muscle tone.  Left arm drift noted  LLE weakness noted.    Sensory  Sensation: Withdrawals to stimuli in all 4 extremities.     Coordination    Unable to assess secondary to confusion .    Gait    Not observed secondary to the acuity .      Physical Exam  Constitutional:       General: She is not in acute distress.     Appearance: She is obese.   HENT:      Head: Normocephalic.      Mouth/Throat:      Pharynx: Oropharynx is clear.   Eyes:      Extraocular Movements: Extraocular movements intact.      Pupils: Pupils are equal, round, and reactive to light.   Cardiovascular:      Rate and Rhythm: Normal rate and regular rhythm.   Pulmonary:      Effort: Pulmonary effort is normal.   Musculoskeletal:         General: Normal range of motion.   Skin:     General: Skin is warm and dry.   Neurological:      Mental Status: She is alert. She is disoriented.      Cranial Nerves: Cranial nerve deficit and dysarthria present.      Motor: Weakness present.   Psychiatric:         Mood and  Affect: Mood normal.         Behavior: Behavior normal.       Acute Stroke Data    IV Thrombolytic (TPA/Tenecteplase) Inclusion / Exclusion Criteria    Time: 17:26 EDT  Person Administering Scale: ANGELIKA Badillo    Inclusion Criteria  [x]   18 years of age or greater   []   Onset of symptoms < 4.5 hours before beginning treatment (stroke onset = time patient was last seen well or without symptoms).   []   Diagnosis of acute ischemic stroke causing measurable disabling deficit (Complete Hemianopia, Any Aphasia, Visual or Sensory Extinction, Any weakness limiting sustained effort against gravity)   []   Any remaining deficit considered potentially disabling in view of patient and practitioner   Exclusion criteria (Do not proceed with Alteplase if any are checked under exclusion criteria)  [x]   Onset unknown or GREATER than 4.5 hours   []   ICH on CT/MRI   []   CT demonstrates hypodensity representing acute or subacute infarct   []   Significant head trauma or prior stroke in the previous 3 months   []   Symptoms suggestive of subarachnoid hemorrhage   []   History of un-ruptured intracranial aneurysm GREATER than 10 mm   []   Recent intracranial or intraspinal surgery within the last 3 months   []   Arterial puncture at a non-compressible site in the previous 7 days   []   Active internal bleeding   []   Acute bleeding tendency   []   Platelet count LESS than 100,000 for known hematological diseases such as leukemia, thrombocytopenia or chronic cirrhosis   []   Current use of anticoagulant with INR GREATER than 1.7 or PT GREATER than 15 seconds, aPTT GREATER than 40 seconds   []   Heparin received within 48 hours, resulting in abnormally elevated aPTT GREATER than upper limit of normal   []   Current use of direct thrombin inhibitors or direct factor Xa inhibitors in the past 48 hours   []   Elevated blood pressure refractory to treatment (systolic GREATER than 185 mm/Hg or diastolic  GREATER than 110 mm/Hg    []   Suspected infective endocarditis and aortic arch dissection   []   Current use of therapeutic treatment dose of low-molecular-weight heparin (LMWH) within the previous 24 hours   []   Structural GI malignancy or bleed   Relative exclusion for all patients  []   Only minor nondisabling symptoms   []   Pregnancy   []   Seizure at onset with postictal residual neurological impairments   []   Major surgery or previous trauma within past 14 days   []   History of previous spontaneous ICH, intracranial neoplasm, or AV malformation   []   Postpartum (within previous 14 days)   []   Recent GI or urinary tract hemorrhage (within previous 21 days)   []   Recent acute MI (within previous 3 months)   []   History of unruptured intracranial aneurysm LESS than 10 mm   []   History of ruptured intracranial aneurysm   []   Blood glucose LESS than 50 mg/dL (2.7 mmol/L)   []   Dural puncture within the last 7 days   []   Known GREATER than 10 cerebral microbleeds   Additional exclusions for patients with symptoms onset between 3 and 4.5 hours.  []   Age > 80.   []   On any anticoagulants regardless of INR  >>> Warfarin (Coumadin), Heparin, Enoxaparin (Lovenox), fondaparinux (Arixtra), bivalirudin (Angiomax), Argatroban, dabigatran (Pradaxa), rivaroxaban (Xarelto), or apixaban (Eliquis)   []   Severe stroke (NIHSS > 25).   []   History of BOTH diabetes and previous ischemic stroke.   []   The risks and benefits have been discussed with the patient or family related to the administration of IV alteplase for stroke symptoms.   []   I have discussed and reviewed the patient's case and imaging with the attending prior to IV Thrombolytic (TPA/Tenecteplase).   N/A Time Thrombolytic administered     Hospital Meds:  Scheduled-    Infusions-     PRNs-   sodium chloride    Functional Status Prior to Current Stroke/Alum Bridge Score: 3    NIH Stroke Scale  Time: 17:26 EDT  Person Administering Scale: ANGELIKA Badillo    1a  Level of  consciousness: 0=alert; keenly responsive   1b. LOC questions:  1=Performs one task correctly   1c. LOC commands: 0=Performs both tasks correctly   2.  Best Gaze: 0=normal   3.  Visual: 0=No visual loss   4. Facial Palsy: 2=Partial paralysis (total or near total paralysis of the lower face)   5a.  Motor left arm: 1=Drift, limb holds 90 (or 45) degrees but drifts down before full 10 seconds: does not hit bed   5b.  Motor right arm: 0=No drift, limb holds 90 (or 45) degrees for full 10 seconds   6a. motor left le=Some effort against gravity, limb cannot get to or maintain (if cured) 90 (or 45) degrees, drifts down to bed, but has some effort against gravity   6b  Motor right le=No drift, limb holds 90 (or 45) degrees for full 10 seconds   7. Limb Ataxia: 0=Absent   8.  Sensory: 0=Normal; no sensory loss   9. Best Language:  1=Mild to moderate aphasia; some obvious loss of fluency or facility of comprehension without significant limitation on ideas expressed or form of expression.   10. Dysarthria: 1=Mild to moderate, patient slurs at least some words and at worst, can be understood with some difficulty   11. Extinction and Inattention: 0=No abnormality    Total:   8       Results Reviewed:  I have personally reviewed current lab, radiology, and data and agree with results.    CT Head Without Contrast Stroke Protocol    Result Date: 10/23/2024  CT HEAD WO CONTRAST STROKE PROTOCOL Date of Exam: 10/23/2024 5:18 PM EDT Indication: Neuro deficit, acute, stroke suspected Neuro Deficit, acute, Stroke suspected. Comparison: 2023 Technique: Axial CT images were obtained of the head without contrast administration.  Reconstructed coronal images were also obtained. Automated exposure control and iterative construction methods were used. Scan Time: 5:04 p.m. Results discussed with stroke team at 5:24 p.m. Findings: No large territory infarct. Old right frontal and left occipital infarcts. There is no evidence of  hemorrhage. No mass effect, edema or midline shift Severe subcortical and periventricular white matter hypodensities, nonspecific but most likely represents chronic small vessel ischemic changes. No extra-axial fluid collection. Prominent ventricular system secondary to chronic parenchymal volume loss. Status post bilateral lens extraction. Otherwise the visualized orbits are intact. Trace bilateral mastoid effusions. Otherwise the paranasal sinuses are clear. The visualized soft tissues are unremarkable. No acute osseous abnormality.     Impression: Impression: No acute intracranial abnormality. Specifically no acute intracranial hemorrhage. Electronically Signed: Derek Rich DO  10/23/2024 5:24 PM EDT  Workstation ID: [id number]     CT ANGIOGRAM HEAD W AI ANALYSIS OF LVO, CT ANGIOGRAM NECK     Date of Exam: 10/23/2024 5:25 PM EDT     Indication: Neuro Deficit, acute, Stroke suspected  Neuro deficit, acute stroke suspected.     Comparison: 9/5/2023     Technique: CTA of the head was performed after the uneventful intravenous administration of 80 cc Isovue-370. Reconstructed coronal and sagittal images were also obtained. In addition, a 3-D volume rendered image was created for interpretation. Automated   exposure control and iterative reconstruction methods were used.      Findings:  Contrast opacification: Excellent     Aortic Arch: Unremarkable     Right:     Innominate: Patent  Subclavian: Mild to moderate stenosis at the origin secondary to calcified plaques. Otherwise abnormal  Common Carotid: Patent  External Carotid: Patent  Internal Carotid: Evidence of at least 60 to 70% stenosis noted within the proximal cervical internal carotid artery. Otherwise unremarkable. Please note that evaluation is limited due to motion artifact.     Intracranial ICA: Patient is status post stenting of the right ICA. Otherwise unremarkable  MCA:Patent  MARISELA: Patent  PCA: Moderate to severe stenosis noted within the distal P2  segment. Distally the vessels are patent.  Vertebral: Mild stenosis within the V4 segment secondary to calcified plaques. Otherwise unremarkable     Left:     Subclavian: Mild stenosis at the origin secondary to calcified plaques. Otherwise unremarkable  Common Carotid: Patent  External Carotid:Patent  Internal Carotid: Approximately 60% stenosis noted within the proximal cervical internal carotid artery secondary to calcified and noncalcified plaques.     Intracranial ICA: Laterally directed aneurysm measuring 1.0 x 0.9 cm arising from the cavernous segment ICA. Otherwise unremarkable.  MCA:Patent  MARISELA: Patent  PCA: Moderate stenosis noted within the distal segments. Otherwise unremarkable  Vertebral: Patent      Basilar: Patent      Soft Tissue: Unremarkable  Lungs: Unremarkable  Bones: No acute osseous abnormality.     IMPRESSION:  Impression:  No acute arterial abnormality of the head and neck.     Chronic moderate stenoses noted within the bilateral cervical internal carotid arteries secondary to calcified and noncalcified plaques. Please note that the stenosis may be exaggerated due to motion artifact in this region.     Additionally there is at least moderate stenoses noted within the bilateral PCAs distally.     1.0 cm aneurysm arising from the left cavernous segment ICA.      Electronically Signed: Derek Rich DO    10/23/2024 6:00 PM EDT    Workstation ID: [id number]    WBC   Date Value Ref Range Status   10/23/2024 16.23 (H) 3.40 - 10.80 10*3/mm3 Final     RBC   Date Value Ref Range Status   10/23/2024 4.85 3.77 - 5.28 10*6/mm3 Final     Hemoglobin   Date Value Ref Range Status   10/23/2024 13.8 12.0 - 15.9 g/dL Final   10/23/2024 15.0 12.0 - 17.0 g/dL Final     Comment:     Serial Number: [id number]:  [id number]     Hematocrit   Date Value Ref Range Status   10/23/2024 43.1 34.0 - 46.6 % Final   10/23/2024 44 38 - 51 % Final     MCV   Date Value Ref Range Status   10/23/2024 88.9 79.0 - 97.0 fL  Final     MCH   Date Value Ref Range Status   10/23/2024 28.5 26.6 - 33.0 pg Final     MCHC   Date Value Ref Range Status   10/23/2024 32.0 31.5 - 35.7 g/dL Final     RDW   Date Value Ref Range Status   10/23/2024 16.4 (H) 12.3 - 15.4 % Final     RDW-SD   Date Value Ref Range Status   10/23/2024 53.4 37.0 - 54.0 fl Final     MPV   Date Value Ref Range Status   10/23/2024 10.6 6.0 - 12.0 fL Final     Platelets   Date Value Ref Range Status   10/23/2024 385 140 - 450 10*3/mm3 Final     Neutrophil %   Date Value Ref Range Status   10/23/2024 88.4 (H) 42.7 - 76.0 % Final     Lymphocyte %   Date Value Ref Range Status   10/23/2024 4.7 (L) 19.6 - 45.3 % Final     Monocyte %   Date Value Ref Range Status   10/23/2024 5.4 5.0 - 12.0 % Final     Eosinophil %   Date Value Ref Range Status   10/23/2024 1.0 0.3 - 6.2 % Final     Basophil %   Date Value Ref Range Status   10/23/2024 0.3 0.0 - 1.5 % Final     Immature Grans %   Date Value Ref Range Status   10/23/2024 0.2 0.0 - 0.5 % Final     Neutrophils, Absolute   Date Value Ref Range Status   10/23/2024 14.33 (H) 1.70 - 7.00 10*3/mm3 Final     Lymphocytes, Absolute   Date Value Ref Range Status   10/23/2024 0.77 0.70 - 3.10 10*3/mm3 Final     Monocytes, Absolute   Date Value Ref Range Status   10/23/2024 0.87 0.10 - 0.90 10*3/mm3 Final     Eosinophils, Absolute   Date Value Ref Range Status   10/23/2024 0.17 0.00 - 0.40 10*3/mm3 Final     Basophils, Absolute   Date Value Ref Range Status   10/23/2024 0.05 0.00 - 0.20 10*3/mm3 Final     Immature Grans, Absolute   Date Value Ref Range Status   10/23/2024 0.04 0.00 - 0.05 10*3/mm3 Final     nRBC   Date Value Ref Range Status   10/23/2024 0.0 0.0 - 0.2 /100 WBC Final     Lab Results   Component Value Date    GLUCOSE 110 (H) 10/14/2024    BUN 18 10/14/2024    CREATININE 1.40 (H) 10/23/2024     10/14/2024    K 4.9 10/14/2024     10/14/2024    CALCIUM 10.3 10/14/2024    PROTEINTOT 7.5 09/16/2024    ALBUMIN 4.5 09/16/2024     ALT 9 10/23/2024    AST 18 10/23/2024    ALKPHOS 114 09/16/2024    BILITOT 0.4 09/16/2024    GLOB 3.0 09/16/2024    AGRATIO 1.5 09/16/2024    BCR 12.9 10/14/2024    ANIONGAP 15.1 (H) 10/14/2024    EGFR 40.3 (L) 10/23/2024     ECG 12 Lead ED Triage Standing Order; Acute Stroke (Onset <24 hrs)   Preliminary Result   Test Reason : ED Triage Standing Order~   Blood Pressure :   */*   mmHG   Vent. Rate :  93 BPM     Atrial Rate :  93 BPM      P-R Int : 168 ms          QRS Dur :  88 ms       QT Int : 356 ms       P-R-T Axes :  59 -30  61 degrees      QTc Int : 442 ms      Normal sinus rhythm   Left axis deviation   Abnormal ECG   When compared with ECG of 05-SEP-2023 15:42,   T wave amplitude has increased in Anterior leads      Referred By: ED MD           Confirmed By:           Assessment/Plan:  69-year-old  female with multiple vascular risk factors who presented to [facility] emergency department via EMS with complaints of slurred speech, increased altered mental status after being found on the floor.  EMS does note that she had multiple episodes of vomiting of vomiting as well.  Secondary to extended last known well she is not deemed to be an appropriate IV thrombolytic therapy candidate.  Secondary to lack of large vessel occlusion on CTA imaging she is not deemed to be an appropriate emergent endovascular therapy candidate.     Antiplatelet PTA: Brilinta and aspirin  Anticoagulant PTA: None      Altered mental status, Slurred Speech   -CVA order set  -STAT CT imaging in ED  -Urinalysis ordered  -Bedside dysphagia screening prior to any p.o. intake including medications  -If passes appropriate for cardiac diabetic diet  -MRI brain without contrast, routine  -PT/OT/SLP to see and assess  -Continue metabolic work-up   -CM assistance with DC planning needs   -A1c and FLP in AM   -Continue home ASA, Brilinta, and Lipitor     Stroke neurology will continue to follow. Plan discussed with   Maurice. Thanks for the consult in the care of this patient. Please call with any questions or concerns.    Jennifer Germain, APRN  October 23, 2024  17:26 EDT

## 2024-10-23 NOTE — ED PROVIDER NOTES
Subjective   History of Present Illness  69-year-old female who was brought in by [facility] due to weakness, confusion, slurred speech.  The patient reportedly has had confusion and generalized weakness going on for greater than 2 weeks.  After returning home from a doctor's visit today the patient was noted to be more confused around noon today.  The daughter then reportedly went to a doctor's visit upon returning from the doctor's visit at roughly 4:00 the patient was noted to be down on the ground and more altered.  She has chronic left facial weakness secondary to Bell's palsy.  She also has chronic left upper extremity weakness secondary to previous stroke.  She does not take anticoagulation based off her report or record review.  No reported recent fever.  No complaints of chest pain abdominal pain or change in bowel or urinary function.  No new medications reported.  History is limited secondary to patient's decreased mentation.  No other acute complaints.      Review of Systems   Unable to perform ROS: Mental status change   Constitutional:  Positive for activity change. Negative for chills, fatigue and fever.   HENT:  Negative for congestion, ear pain, postnasal drip, sinus pressure and sore throat.    Eyes:  Negative for pain, redness and visual disturbance.   Respiratory:  Negative for cough, chest tightness and shortness of breath.    Cardiovascular:  Negative for chest pain, palpitations and leg swelling.   Gastrointestinal:  Negative for abdominal pain, anal bleeding, blood in stool, diarrhea, nausea and vomiting.   Endocrine: Negative for polydipsia and polyuria.   Genitourinary:  Negative for difficulty urinating, dysuria, frequency and urgency.   Musculoskeletal:  Negative for arthralgias, back pain and neck pain.   Skin:  Negative for pallor and rash.   Allergic/Immunologic: Negative for environmental allergies and immunocompromised state.   Neurological:  Positive for speech difficulty.  Negative for dizziness, weakness and headaches.   Hematological:  Negative for adenopathy.   Psychiatric/Behavioral:  Positive for confusion and decreased concentration. Negative for self-injury and suicidal ideas. The patient is not nervous/anxious.    All other systems reviewed and are negative.      Past Medical History:   Diagnosis Date    Aneurysm     left carotid    Bipolar 2 disorder     Diabetes mellitus     H/O Bell's palsy     Left sided facial droop    History of loop recorder     Hyperlipidemia     Hypertension     Stroke     x 4       Allergies   Allergen Reactions    Codeine Palpitations and Unknown (See Comments)     Other reaction(s): Unknown       Past Surgical History:   Procedure Laterality Date    CARDIAC CATHETERIZATION      CATARACT EXTRACTION Bilateral      SECTION         Family History   Problem Relation Age of Onset    Hypertension Mother     Lung cancer Mother     Cataracts Mother     Transient ischemic attack Father     Hypertension Father     Cancer Father     Heart attack Brother     Stroke Brother        Social History     Socioeconomic History    Marital status:    Tobacco Use    Smoking status: Former     Current packs/day: 0.00     Average packs/day: 0.8 packs/day for 5.0 years (3.8 ttl pk-yrs)     Types: Cigarettes     Start date:      Quit date:      Years since quittin.8    Smokeless tobacco: Never   Vaping Use    Vaping status: Never Used   Substance and Sexual Activity    Alcohol use: Never    Drug use: Never    Sexual activity: Defer           Objective   Physical Exam  Vitals and nursing note reviewed.   Constitutional:       General: She is not in acute distress.     Appearance: Normal appearance. She is well-developed. She is not toxic-appearing or diaphoretic.   HENT:      Head: Normocephalic and atraumatic.      Right Ear: External ear normal.      Left Ear: External ear normal.      Nose: Nose normal.   Eyes:      General: Lids are normal.       Pupils: Pupils are equal, round, and reactive to light.   Neck:      Trachea: No tracheal deviation.   Cardiovascular:      Rate and Rhythm: Normal rate and regular rhythm.      Pulses: No decreased pulses.      Heart sounds: Normal heart sounds. No murmur heard.     No friction rub. No gallop.   Pulmonary:      Effort: Pulmonary effort is normal. No respiratory distress.      Breath sounds: Normal breath sounds. No decreased breath sounds, wheezing, rhonchi or rales.   Abdominal:      General: Bowel sounds are normal.      Palpations: Abdomen is soft.      Tenderness: There is no abdominal tenderness. There is no guarding or rebound.   Musculoskeletal:         General: No deformity. Normal range of motion.      Cervical back: Normal range of motion and neck supple.   Lymphadenopathy:      Cervical: No cervical adenopathy.   Skin:     General: Skin is warm and dry.      Findings: No rash.   Neurological:      Mental Status: She is alert and oriented to person, place, and time. She is confused.      GCS: GCS eye subscore is 4. GCS verbal subscore is 4. GCS motor subscore is 6.      Cranial Nerves: No cranial nerve deficit.      Sensory: No sensory deficit.      Comments: Left upper extremity weakness    Left facial droop.    Mild confusion.   Psychiatric:         Speech: Speech normal.         Behavior: Behavior normal.         Thought Content: Thought content normal.         Judgment: Judgment normal.         Procedures           ED Course  ED Course as of 10/25/24 2005   Wed Oct 23, 2024   1731 Based off last known well time the patient not considered a tenecteplase candidate.  CT head without contrast, CT perfusion, CT angiogram of the head and neck will be pursued. [NS]   1949 The patient with a previous history of stroke presents with complaint of slurred speech, weakness, and more prominent left-sided weakness.  No acute abnormalities were identified on CT imaging.  She has chronic significant vessel disease.   Not considered a tenecteplase or acute interventional candidate.  Stroke service has consult on the patient and recommends admission for further observation and workup.  Patient stable at this given time.  No reported or recorded fever.  She is afebrile here currently.  Was initially hypertensive but that has improved with observation alone. [NS]   2006 Urine question for urinary tract infection also shows elevated specific gravity concern for dehydration.  Given the elevated white blood cell count antibiotics will be initiated.    The patient exhibited agitation on repeat evaluation here in the ER.  This was not initially present when the patient came in.  She was found down on the ground at home and perhaps could have been agitated at that given time.  The daughter who is the historian who is present at bedside denies this ever being present in the past.    Haldol will be administered to help calm the patient. [NS]   Fri Oct 25, 2024   2001  who will evaluate to determine status admission. [NS]      ED Course User Index  [NS] Jadyn Richards MD                            Total (NIH Stroke Scale): 6                  Medical Decision Making  Differential includes urinary tract infection, pneumonia, viral illness, TIA, intracranial hemorrhage, stroke, renal sufficiency, electrolyte abnormality, other unspecified urology.    Urine is questionable for infection but I will be simply represent contamination    Kidney function is nonconcerning with normal electrolytes.  Elevated white count to 16,000.  CT scan of the head without contrastShows no acute intracranial abnormality.  No acute hemorrhage.     CXR shows no acute abnormalities.     CT angiogram of the head and neck shows chronic moderate stenosis noted within the bilateral cervical internal carotid artery secondary to calcified and noncalcified plaques.  Moderate stenosis noted within the bilateral PCAs distally.    The stroke service recommends  admission, I therefore spoke with the hospitali    Problems Addressed:  Acute dehydration: complicated acute illness or injury with systemic symptoms  Acute encephalopathy: complicated acute illness or injury with systemic symptoms  Acute urinary tract infection: complicated acute illness or injury with systemic symptoms  Left-sided weakness: chronic illness or injury with exacerbation, progression, or side effects of treatment  Slurred speech: complicated acute illness or injury with systemic symptoms    Amount and/or Complexity of Data Reviewed  External Data Reviewed: labs, radiology and ECG.  Labs: ordered. Decision-making details documented in ED Course.  Radiology: ordered and independent interpretation performed. Decision-making details documented in ED Course.  ECG/medicine tests: ordered and independent interpretation performed. Decision-making details documented in ED Course.     Details: EKG independently interpreted by myself shows sinus rhythm, left axis deviation, no acute ischemic changes.    Risk  Prescription drug management.  Decision regarding hospitalization.        Final diagnoses:   Left-sided weakness   Slurred speech   Acute dehydration   Acute urinary tract infection   Acute encephalopathy       ED Disposition  ED Disposition       ED Disposition   Decision to Admit    Condition   --    Comment   Level of Care: Telemetry [5]   Diagnosis: Dysarthria [784.51.ICD-9-CM]   Admitting Physician: SOHA CONTE [[id number]]   Attending Physician: SOHA CONTE [[id number]]                 No follow-up provider specified.       Medication List      No changes were made to your prescriptions during this visit.            Jadyn Richards MD  10/25/24 2005

## 2024-10-24 ENCOUNTER — APPOINTMENT (OUTPATIENT)
Dept: GENERAL RADIOLOGY | Facility: HOSPITAL | Age: 69
DRG: 690 | End: 2024-10-24
Payer: MEDICARE

## 2024-10-24 ENCOUNTER — APPOINTMENT (OUTPATIENT)
Dept: NEUROLOGY | Facility: HOSPITAL | Age: 69
DRG: 690 | End: 2024-10-24
Payer: MEDICARE

## 2024-10-24 ENCOUNTER — APPOINTMENT (OUTPATIENT)
Dept: MRI IMAGING | Facility: HOSPITAL | Age: 69
DRG: 690 | End: 2024-10-24
Payer: COMMERCIAL

## 2024-10-24 ENCOUNTER — APPOINTMENT (OUTPATIENT)
Dept: GENERAL RADIOLOGY | Facility: HOSPITAL | Age: 69
DRG: 690 | End: 2024-10-24
Payer: COMMERCIAL

## 2024-10-24 ENCOUNTER — APPOINTMENT (OUTPATIENT)
Dept: CARDIOLOGY | Facility: HOSPITAL | Age: 69
DRG: 690 | End: 2024-10-24
Payer: MEDICARE

## 2024-10-24 LAB
ANION GAP SERPL CALCULATED.3IONS-SCNC: 13 MMOL/L (ref 5–15)
B PARAPERT DNA SPEC QL NAA+PROBE: NOT DETECTED
B PERT DNA SPEC QL NAA+PROBE: NOT DETECTED
BASOPHILS # BLD AUTO: 0.02 10*3/MM3 (ref 0–0.2)
BASOPHILS NFR BLD AUTO: 0.2 % (ref 0–1.5)
BH CV ECHO MEAS - AO MAX PG: 4.8 MMHG
BH CV ECHO MEAS - AO MEAN PG: 3 MMHG
BH CV ECHO MEAS - AO ROOT DIAM: 2.7 CM
BH CV ECHO MEAS - AO V2 MAX: 110 CM/SEC
BH CV ECHO MEAS - AO V2 VTI: 18.9 CM
BH CV ECHO MEAS - AVA(I,D): 2.09 CM2
BH CV ECHO MEAS - EDV(CUBED): 97.3 ML
BH CV ECHO MEAS - ESV(CUBED): 35.9 ML
BH CV ECHO MEAS - FS: 28.3 %
BH CV ECHO MEAS - IVS/LVPW: 0.9 CM
BH CV ECHO MEAS - IVSD: 0.9 CM
BH CV ECHO MEAS - LA DIMENSION: 3 CM
BH CV ECHO MEAS - LV MASS(C)D: 148.1 GRAMS
BH CV ECHO MEAS - LV MAX PG: 4.3 MMHG
BH CV ECHO MEAS - LV MEAN PG: 2 MMHG
BH CV ECHO MEAS - LV V1 MAX: 104 CM/SEC
BH CV ECHO MEAS - LV V1 VTI: 15.5 CM
BH CV ECHO MEAS - LVIDD: 4.6 CM
BH CV ECHO MEAS - LVIDS: 3.3 CM
BH CV ECHO MEAS - LVOT AREA: 2.5 CM2
BH CV ECHO MEAS - LVOT DIAM: 1.8 CM
BH CV ECHO MEAS - LVPWD: 1 CM
BH CV ECHO MEAS - MV A MAX VEL: 106 CM/SEC
BH CV ECHO MEAS - MV DEC TIME: 0.2 SEC
BH CV ECHO MEAS - MV E MAX VEL: 84.4 CM/SEC
BH CV ECHO MEAS - MV E/A: 0.8
BH CV ECHO MEAS - MV MAX PG: 5.8 MMHG
BH CV ECHO MEAS - MV MEAN PG: 3 MMHG
BH CV ECHO MEAS - MV V2 VTI: 25.4 CM
BH CV ECHO MEAS - MVA(VTI): 1.55 CM2
BH CV ECHO MEAS - PA ACC TIME: 0.06 SEC
BH CV ECHO MEAS - SV(LVOT): 39.4 ML
BH CV ECHO MEAS - SVI(LVOT): 19.1 ML/M2
BH CV VAS BP LEFT ARM: NORMAL MMHG
BUN SERPL-MCNC: 25 MG/DL (ref 8–23)
BUN/CREAT SERPL: 21.4 (ref 7–25)
C PNEUM DNA NPH QL NAA+NON-PROBE: NOT DETECTED
CALCIUM SPEC-SCNC: 9.8 MG/DL (ref 8.6–10.5)
CHLORIDE SERPL-SCNC: 105 MMOL/L (ref 98–107)
CHOLEST SERPL-MCNC: 163 MG/DL (ref 0–200)
CO2 SERPL-SCNC: 25 MMOL/L (ref 22–29)
CREAT SERPL-MCNC: 1.17 MG/DL (ref 0.57–1)
DEPRECATED RDW RBC AUTO: 53.3 FL (ref 37–54)
EGFRCR SERPLBLD CKD-EPI 2021: 50 ML/MIN/1.73
EOSINOPHIL # BLD AUTO: 0.01 10*3/MM3 (ref 0–0.4)
EOSINOPHIL NFR BLD AUTO: 0.1 % (ref 0.3–6.2)
ERYTHROCYTE [DISTWIDTH] IN BLOOD BY AUTOMATED COUNT: 16.7 % (ref 12.3–15.4)
FLUAV SUBTYP SPEC NAA+PROBE: NOT DETECTED
FLUBV RNA ISLT QL NAA+PROBE: NOT DETECTED
GLUCOSE BLDC GLUCOMTR-MCNC: 105 MG/DL (ref 70–130)
GLUCOSE BLDC GLUCOMTR-MCNC: 111 MG/DL (ref 70–130)
GLUCOSE BLDC GLUCOMTR-MCNC: 121 MG/DL (ref 70–130)
GLUCOSE BLDC GLUCOMTR-MCNC: 130 MG/DL (ref 70–130)
GLUCOSE BLDC GLUCOMTR-MCNC: 164 MG/DL (ref 70–130)
GLUCOSE SERPL-MCNC: 100 MG/DL (ref 65–99)
HADV DNA SPEC NAA+PROBE: NOT DETECTED
HBA1C MFR BLD: 5.4 % (ref 4.8–5.6)
HCOV 229E RNA SPEC QL NAA+PROBE: NOT DETECTED
HCOV HKU1 RNA SPEC QL NAA+PROBE: NOT DETECTED
HCOV NL63 RNA SPEC QL NAA+PROBE: NOT DETECTED
HCOV OC43 RNA SPEC QL NAA+PROBE: NOT DETECTED
HCT VFR BLD AUTO: 35.3 % (ref 34–46.6)
HDLC SERPL-MCNC: 60 MG/DL (ref 40–60)
HGB BLD-MCNC: 11.6 G/DL (ref 12–15.9)
HMPV RNA NPH QL NAA+NON-PROBE: NOT DETECTED
HPIV1 RNA ISLT QL NAA+PROBE: NOT DETECTED
HPIV2 RNA SPEC QL NAA+PROBE: NOT DETECTED
HPIV3 RNA NPH QL NAA+PROBE: NOT DETECTED
HPIV4 P GENE NPH QL NAA+PROBE: NOT DETECTED
IMM GRANULOCYTES # BLD AUTO: 0.04 10*3/MM3 (ref 0–0.05)
IMM GRANULOCYTES NFR BLD AUTO: 0.4 % (ref 0–0.5)
LDLC SERPL CALC-MCNC: 87 MG/DL (ref 0–100)
LDLC/HDLC SERPL: 1.44 {RATIO}
LYMPHOCYTES # BLD AUTO: 1.36 10*3/MM3 (ref 0.7–3.1)
LYMPHOCYTES NFR BLD AUTO: 12.4 % (ref 19.6–45.3)
M PNEUMO IGG SER IA-ACNC: NOT DETECTED
MCH RBC QN AUTO: 28.8 PG (ref 26.6–33)
MCHC RBC AUTO-ENTMCNC: 32.9 G/DL (ref 31.5–35.7)
MCV RBC AUTO: 87.6 FL (ref 79–97)
MONOCYTES # BLD AUTO: 1.32 10*3/MM3 (ref 0.1–0.9)
MONOCYTES NFR BLD AUTO: 12 % (ref 5–12)
NEUTROPHILS NFR BLD AUTO: 74.9 % (ref 42.7–76)
NEUTROPHILS NFR BLD AUTO: 8.21 10*3/MM3 (ref 1.7–7)
NRBC BLD AUTO-RTO: 0 /100 WBC (ref 0–0.2)
PLATELET # BLD AUTO: 315 10*3/MM3 (ref 140–450)
PMV BLD AUTO: 10.6 FL (ref 6–12)
POTASSIUM SERPL-SCNC: 3.8 MMOL/L (ref 3.5–5.2)
QT INTERVAL: 346 MS
QT INTERVAL: 356 MS
QTC INTERVAL: 437 MS
QTC INTERVAL: 442 MS
RBC # BLD AUTO: 4.03 10*6/MM3 (ref 3.77–5.28)
RHINOVIRUS RNA SPEC NAA+PROBE: NOT DETECTED
RSV RNA NPH QL NAA+NON-PROBE: NOT DETECTED
SARS-COV-2 RNA NPH QL NAA+NON-PROBE: NOT DETECTED
SODIUM SERPL-SCNC: 143 MMOL/L (ref 136–145)
TRIGL SERPL-MCNC: 83 MG/DL (ref 0–150)
VIT B12 BLD-MCNC: 294 PG/ML (ref 211–946)
VLDLC SERPL-MCNC: 16 MG/DL (ref 5–40)
WBC NRBC COR # BLD AUTO: 10.96 10*3/MM3 (ref 3.4–10.8)

## 2024-10-24 PROCEDURE — 83036 HEMOGLOBIN GLYCOSYLATED A1C: CPT | Performed by: STUDENT IN AN ORGANIZED HEALTH CARE EDUCATION/TRAINING PROGRAM

## 2024-10-24 PROCEDURE — 97165 OT EVAL LOW COMPLEX 30 MIN: CPT

## 2024-10-24 PROCEDURE — 74230 X-RAY XM SWLNG FUNCJ C+: CPT

## 2024-10-24 PROCEDURE — 99233 SBSQ HOSP IP/OBS HIGH 50: CPT | Performed by: STUDENT IN AN ORGANIZED HEALTH CARE EDUCATION/TRAINING PROGRAM

## 2024-10-24 PROCEDURE — 93005 ELECTROCARDIOGRAM TRACING: CPT | Performed by: STUDENT IN AN ORGANIZED HEALTH CARE EDUCATION/TRAINING PROGRAM

## 2024-10-24 PROCEDURE — 0202U NFCT DS 22 TRGT SARS-COV-2: CPT | Performed by: STUDENT IN AN ORGANIZED HEALTH CARE EDUCATION/TRAINING PROGRAM

## 2024-10-24 PROCEDURE — 25010000002 CEFTRIAXONE PER 250 MG: Performed by: STUDENT IN AN ORGANIZED HEALTH CARE EDUCATION/TRAINING PROGRAM

## 2024-10-24 PROCEDURE — 25010000002 SULFUR HEXAFLUORIDE MICROSPH 60.7-25 MG RECONSTITUTED SUSPENSION: Performed by: NURSE PRACTITIONER

## 2024-10-24 PROCEDURE — 80048 BASIC METABOLIC PNL TOTAL CA: CPT | Performed by: STUDENT IN AN ORGANIZED HEALTH CARE EDUCATION/TRAINING PROGRAM

## 2024-10-24 PROCEDURE — G0378 HOSPITAL OBSERVATION PER HR: HCPCS

## 2024-10-24 PROCEDURE — 92611 MOTION FLUOROSCOPY/SWALLOW: CPT

## 2024-10-24 PROCEDURE — 95816 EEG AWAKE AND DROWSY: CPT | Performed by: PSYCHIATRY & NEUROLOGY

## 2024-10-24 PROCEDURE — 93306 TTE W/DOPPLER COMPLETE: CPT | Performed by: INTERNAL MEDICINE

## 2024-10-24 PROCEDURE — 93306 TTE W/DOPPLER COMPLETE: CPT

## 2024-10-24 PROCEDURE — 70551 MRI BRAIN STEM W/O DYE: CPT

## 2024-10-24 PROCEDURE — 93010 ELECTROCARDIOGRAM REPORT: CPT | Performed by: INTERNAL MEDICINE

## 2024-10-24 PROCEDURE — 92523 SPEECH SOUND LANG COMPREHEN: CPT

## 2024-10-24 PROCEDURE — 95816 EEG AWAKE AND DROWSY: CPT

## 2024-10-24 PROCEDURE — 74018 RADEX ABDOMEN 1 VIEW: CPT

## 2024-10-24 PROCEDURE — 85025 COMPLETE CBC W/AUTO DIFF WBC: CPT | Performed by: STUDENT IN AN ORGANIZED HEALTH CARE EDUCATION/TRAINING PROGRAM

## 2024-10-24 PROCEDURE — 99232 SBSQ HOSP IP/OBS MODERATE 35: CPT | Performed by: STUDENT IN AN ORGANIZED HEALTH CARE EDUCATION/TRAINING PROGRAM

## 2024-10-24 PROCEDURE — 97162 PT EVAL MOD COMPLEX 30 MIN: CPT

## 2024-10-24 PROCEDURE — 92610 EVALUATE SWALLOWING FUNCTION: CPT

## 2024-10-24 PROCEDURE — 80061 LIPID PANEL: CPT | Performed by: STUDENT IN AN ORGANIZED HEALTH CARE EDUCATION/TRAINING PROGRAM

## 2024-10-24 PROCEDURE — 82948 REAGENT STRIP/BLOOD GLUCOSE: CPT

## 2024-10-24 PROCEDURE — 63710000001 INSULIN LISPRO (HUMAN) PER 5 UNITS: Performed by: STUDENT IN AN ORGANIZED HEALTH CARE EDUCATION/TRAINING PROGRAM

## 2024-10-24 RX ORDER — L.ACID,PARA/B.BIFIDUM/S.THERM 8B CELL
1 CAPSULE ORAL DAILY
Status: DISCONTINUED | OUTPATIENT
Start: 2024-10-24 | End: 2024-11-04 | Stop reason: HOSPADM

## 2024-10-24 RX ADMIN — ASPIRIN 81 MG 81 MG: 81 TABLET ORAL at 09:39

## 2024-10-24 RX ADMIN — VENLAFAXINE HYDROCHLORIDE 150 MG: 75 CAPSULE, EXTENDED RELEASE ORAL at 09:40

## 2024-10-24 RX ADMIN — BARIUM SULFATE 20 ML: 400 PASTE ORAL at 11:12

## 2024-10-24 RX ADMIN — NYSTATIN: 100000 POWDER TOPICAL at 03:33

## 2024-10-24 RX ADMIN — Medication 1 CAPSULE: at 09:40

## 2024-10-24 RX ADMIN — QUETIAPINE FUMARATE 50 MG: 25 TABLET ORAL at 09:39

## 2024-10-24 RX ADMIN — SODIUM CHLORIDE 2000 MG: 900 INJECTION INTRAVENOUS at 22:00

## 2024-10-24 RX ADMIN — NYSTATIN: 100000 POWDER TOPICAL at 09:39

## 2024-10-24 RX ADMIN — Medication 10 ML: at 09:51

## 2024-10-24 RX ADMIN — BARIUM SULFATE 100 ML: 0.81 POWDER, FOR SUSPENSION ORAL at 11:12

## 2024-10-24 RX ADMIN — Medication 1000 UNITS: at 09:39

## 2024-10-24 RX ADMIN — QUETIAPINE FUMARATE 50 MG: 25 TABLET ORAL at 21:59

## 2024-10-24 RX ADMIN — TICAGRELOR 90 MG: 90 TABLET ORAL at 22:00

## 2024-10-24 RX ADMIN — TICAGRELOR 90 MG: 90 TABLET ORAL at 09:23

## 2024-10-24 RX ADMIN — NYSTATIN: 100000 POWDER TOPICAL at 21:59

## 2024-10-24 RX ADMIN — SULFUR HEXAFLUORIDE 2 ML: KIT at 16:44

## 2024-10-24 RX ADMIN — ATORVASTATIN CALCIUM 80 MG: 40 TABLET, FILM COATED ORAL at 21:59

## 2024-10-24 RX ADMIN — INSULIN LISPRO 2 UNITS: 100 INJECTION, SOLUTION INTRAVENOUS; SUBCUTANEOUS at 12:21

## 2024-10-24 NOTE — CONSULTS
Diabetes Education    Patient Name:  Olga Sandoval  YOB: 1955  MRN: [id number]  Admit Date:  10/23/2024        Consult received for diabetes education per stroke protocol. Chart reviewed. A1c is 5.4%. Home medications include Metformin and Ozempic. Patient GCS is 14 with disorientation to time and situation. Attempted to see patient and /or family at bedside. No family present and patient not appropriate for education. Per chart review, patient lives with her daughter. Attempted to call the daughter with no answer. Will continue to follow.  No f/u stroke class schedule due to CT negative for acute changes. PT is also recommending inpatient rehab.       Electronically signed by:  Quiana العراقي RN  10/24/24 16:37 EDT

## 2024-10-24 NOTE — H&P
[facility]  HISTORY AND PHYSICAL    Patient Name: Olga Sandoval  : 1955  MRN: [id number]  Primary Care Physician: Ileana Kerr MD  Date of admission: 10/23/2024    Subjective   Subjective     Chief Complaint:  Altered mental status    HPI:  Olga Sandoval is a 69 y.o. 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  She was found on the floor by family member. Patient's daughter was reportedly bedside in the ER, but then left and is not answering the phone at the number listed in the chart. History taken by chart review, as patient unable to provide history.     In the ER, patient was afebrile, heart rate 88, blood pressure 176/88, satting 96% on room air.  Labs notable for UA with leuk esterase, too numerous to count WBCs, 2+ bacteria, 21-30 squamous cells, WBCs 16, lactic 1.8. CXR with no acute abnormality, CTA head/neck with w chronic moderate stenoses with b/l cervical internal carotid arteries, moderate stenoses within the b/l PCAs, 1cm aneurysm from left cavernous segment ICA. She was given haldol, NS bolus, CTX. Seen by stroke service.      Review of Systems   As above          Personal History     Past Medical History:   Diagnosis Date    Aneurysm     left carotid    Bipolar 2 disorder     Diabetes mellitus     H/O Bell's palsy     Left sided facial droop    History of loop recorder     Hyperlipidemia     Hypertension     Stroke     x 4             Past Surgical History:   Procedure Laterality Date    CARDIAC CATHETERIZATION      CATARACT EXTRACTION Bilateral      SECTION         Family History: family history includes Cancer in her father; Cataracts in her mother; Heart attack in her brother;  Hypertension in her father and mother; Lung cancer in her mother; Stroke in her brother; Transient ischemic attack in her father.     Social History:  reports that she quit smoking about 7 years ago. Her smoking use included cigarettes. She started smoking about 12 years ago. She has a 3.8 pack-year smoking history. She has never used smokeless tobacco. She reports that she does not drink alcohol and does not use drugs.  Social History     Social History Narrative    Not on file       Medications:  Magnesium Oxide, QUEtiapine, Semaglutide (1 MG/DOSE), aspirin, atorvastatin, cholecalciferol, lansoprazole, losartan, metFORMIN ER, ticagrelor, and venlafaxine XR    Allergies   Allergen Reactions    Codeine Palpitations and Unknown (See Comments)     Other reaction(s): Unknown       Objective   Objective     Vital Signs:   Temp:  [98.1 °F (36.7 °C)] 98.1 °F (36.7 °C)  Heart Rate:  [81-88] 81  Resp:  [18] 18  BP: (169-176)/(66-91) 169/66    Physical Exam   Constitutional: No acute distress, awake, alert, obese, laying in bed  HENT: NCAT, mucous membranes moist  Respiratory: Clear to auscultation bilaterally, respiratory effort normal   Cardiovascular: RRR, no murmurs  Gastrointestinal: Positive bowel sounds, soft, nontender, nondistended  Musculoskeletal: bilateral ankle edema  Psychiatric: agitated, picking at her lines/tubes, intermittently cooperative, trying to pull herself across the bed  Neurologic: self, [facility], unable to tell me year or why she is here. Follows simple commands.   Skin: Erythema under breasts and in groin with satellite lesions      Result Review:  I have personally reviewed the results from the time of this admission to 10/23/2024 21:39 EDT and agree with these findings:  []  Laboratory list / accordion  []  Microbiology  []  Radiology  []  EKG/Telemetry   []  Cardiology/Vascular   []  Pathology  []  Old records  []  Other:  Most notable findings include:     LAB RESULTS:      Lab  10/23/24  1732 10/23/24  1726   WBC 16.23*  --    HEMOGLOBIN 13.8  --    HEMOGLOBIN, POC  --  15.0   HEMATOCRIT 43.1  --    HEMATOCRIT POC  --  44   PLATELETS 385  --    NEUTROS ABS 14.33*  --    IMMATURE GRANS (ABS) 0.04  --    LYMPHS ABS 0.77  --    MONOS ABS 0.87  --    EOS ABS 0.17  --    MCV 88.9  --    LACTATE 1.8  --    APTT 24.9  --          Lab 10/23/24  1732 10/23/24  1731 10/23/24  1726   SODIUM 141  --   --    POTASSIUM 4.4  --   --    CHLORIDE 102  --   --    CO2 21.0*  --   --    ANION GAP 18.0*  --   --    BUN 25*  --   --    CREATININE 1.32* 1.40* 1.40*   EGFR 43.3*  --  40.3*   GLUCOSE 125*  --   --    CALCIUM 10.1  --   --    TSH 3.950  --   --          Lab 10/23/24  1732   TOTAL PROTEIN 7.6   ALBUMIN 4.5   GLOBULIN 3.1   ALT (SGPT) 9  9   AST (SGOT) 18  18   BILIRUBIN 0.6   ALK PHOS 104         Lab 10/23/24  1732   HSTROP T 25*                 Brief Urine Lab Results  (Last result in the past 365 days)        Color   Clarity   Blood   Leuk Est   Nitrite   Protein   CREAT   Urine HCG        10/23/24 1937 Yellow   Turbid   Trace   Moderate (2+)   Negative   30 mg/dL (1+)                 Microbiology Results (last 10 days)       Procedure Component Value - Date/Time    COVID PRE-OP / PRE-PROCEDURE SCREENING ORDER (NO ISOLATION) - Swab, Nasopharynx [[id number]]  (Normal) Collected: 10/23/24 1938    Lab Status: Final result Specimen: Swab from Nasopharynx Updated: 10/23/24 2007    Narrative:      The following orders were created for panel order COVID PRE-OP / PRE-PROCEDURE SCREENING ORDER (NO ISOLATION) - Swab, Nasopharynx.  Procedure                               Abnormality         Status                     ---------                               -----------         ------                     COVID-19 and FLU A/B PCR...[[id number]]  Normal              Final result                 Please view results for these tests on the individual orders.    COVID-19 and FLU A/B PCR, 1 HR TAT - Swab, Nasopharynx  [[id number]]  (Normal) Collected: 10/23/24 1938    Lab Status: Final result Specimen: Swab from Nasopharynx Updated: 10/23/24 2007     COVID19 Not Detected     Influenza A PCR Not Detected     Influenza B PCR Not Detected    Narrative:      Fact sheet for providers: https://www.fda.gov/media/601726/download    Fact sheet for patients: https://www.fda.gov/media/062094/download    Test performed by PCR.            CT Angiogram Head w AI Analysis of LVO    Result Date: 10/23/2024  CT ANGIOGRAM HEAD W AI ANALYSIS OF LVO, CT ANGIOGRAM NECK Date of Exam: 10/23/2024 5:25 PM EDT Indication: Neuro Deficit, acute, Stroke suspected Neuro deficit, acute stroke suspected. Comparison: 9/5/2023 Technique: CTA of the head was performed after the uneventful intravenous administration of 80 cc Isovue-370. Reconstructed coronal and sagittal images were also obtained. In addition, a 3-D volume rendered image was created for interpretation. Automated  exposure control and iterative reconstruction methods were used. Findings: Contrast opacification: Excellent Aortic Arch: Unremarkable Right: Innominate: Patent Subclavian: Mild to moderate stenosis at the origin secondary to calcified plaques. Otherwise abnormal Common Carotid: Patent External Carotid: Patent Internal Carotid: Evidence of at least 60 to 70% stenosis noted within the proximal cervical internal carotid artery. Otherwise unremarkable. Please note that evaluation is limited due to motion artifact. Intracranial ICA: Patient is status post stenting of the right ICA. Otherwise unremarkable MCA:Patent MARISELA: Patent PCA: Moderate to severe stenosis noted within the distal P2 segment. Distally the vessels are patent. Vertebral: Mild stenosis within the V4 segment secondary to calcified plaques. Otherwise unremarkable Left: Subclavian: Mild stenosis at the origin secondary to calcified plaques. Otherwise unremarkable Common Carotid: Patent External Carotid:Patent Internal Carotid:  Approximately 60% stenosis noted within the proximal cervical internal carotid artery secondary to calcified and noncalcified plaques. Intracranial ICA: Laterally directed aneurysm measuring 1.0 x 0.9 cm arising from the cavernous segment ICA. Otherwise unremarkable. MCA:Patent MARISELA: Patent PCA: Moderate stenosis noted within the distal segments. Otherwise unremarkable Vertebral: Patent Basilar: Patent Soft Tissue: Unremarkable Lungs: Unremarkable Bones: No acute osseous abnormality.     Impression: Impression: No acute arterial abnormality of the head and neck. Chronic moderate stenoses noted within the bilateral cervical internal carotid arteries secondary to calcified and noncalcified plaques. Please note that the stenosis may be exaggerated due to motion artifact in this region. Additionally there is at least moderate stenoses noted within the bilateral PCAs distally. 1.0 cm aneurysm arising from the left cavernous segment ICA. Electronically Signed: Derek Rich DO  10/23/2024 6:00 PM EDT  Workstation ID: [id number]    CT Angiogram Neck    Result Date: 10/23/2024  CT ANGIOGRAM HEAD W AI ANALYSIS OF LVO, CT ANGIOGRAM NECK Date of Exam: 10/23/2024 5:25 PM EDT Indication: Neuro Deficit, acute, Stroke suspected Neuro deficit, acute stroke suspected. Comparison: 9/5/2023 Technique: CTA of the head was performed after the uneventful intravenous administration of 80 cc Isovue-370. Reconstructed coronal and sagittal images were also obtained. In addition, a 3-D volume rendered image was created for interpretation. Automated  exposure control and iterative reconstruction methods were used. Findings: Contrast opacification: Excellent Aortic Arch: Unremarkable Right: Innominate: Patent Subclavian: Mild to moderate stenosis at the origin secondary to calcified plaques. Otherwise abnormal Common Carotid: Patent External Carotid: Patent Internal Carotid: Evidence of at least 60 to 70% stenosis noted within the proximal  cervical internal carotid artery. Otherwise unremarkable. Please note that evaluation is limited due to motion artifact. Intracranial ICA: Patient is status post stenting of the right ICA. Otherwise unremarkable MCA:Patent MARISELA: Patent PCA: Moderate to severe stenosis noted within the distal P2 segment. Distally the vessels are patent. Vertebral: Mild stenosis within the V4 segment secondary to calcified plaques. Otherwise unremarkable Left: Subclavian: Mild stenosis at the origin secondary to calcified plaques. Otherwise unremarkable Common Carotid: Patent External Carotid:Patent Internal Carotid: Approximately 60% stenosis noted within the proximal cervical internal carotid artery secondary to calcified and noncalcified plaques. Intracranial ICA: Laterally directed aneurysm measuring 1.0 x 0.9 cm arising from the cavernous segment ICA. Otherwise unremarkable. MCA:Patent MARISELA: Patent PCA: Moderate stenosis noted within the distal segments. Otherwise unremarkable Vertebral: Patent Basilar: Patent Soft Tissue: Unremarkable Lungs: Unremarkable Bones: No acute osseous abnormality.     Impression: Impression: No acute arterial abnormality of the head and neck. Chronic moderate stenoses noted within the bilateral cervical internal carotid arteries secondary to calcified and noncalcified plaques. Please note that the stenosis may be exaggerated due to motion artifact in this region. Additionally there is at least moderate stenoses noted within the bilateral PCAs distally. 1.0 cm aneurysm arising from the left cavernous segment ICA. Electronically Signed: Derek Rich DO  10/23/2024 6:00 PM EDT  Workstation ID: [id number]    XR Chest 1 View    Result Date: 10/23/2024  XR CHEST 1 VW Date of Exam: 10/23/2024 5:22 PM EDT Indication: Acute Stroke Protocol (onset < 12 hrs) Comparison: 9/5/2023 Findings: Unchanged enlarged cardiac silhouette. Cardiac device overlies the left chest. Lower lung volumes. No definite focal  airspace consolidation. No pleural effusion or pneumothorax. No acute bone abnormality.     Impression: Impression: No acute cardiopulmonary abnormality. Electronically Signed: Vick Wild MD  10/23/2024 5:52 PM EDT  Workstation ID: [id number]    CT Head Without Contrast Stroke Protocol    Result Date: 10/23/2024  CT HEAD WO CONTRAST STROKE PROTOCOL Date of Exam: 10/23/2024 5:18 PM EDT Indication: Neuro deficit, acute, stroke suspected Neuro Deficit, acute, Stroke suspected. Comparison: 9/5/2023 Technique: Axial CT images were obtained of the head without contrast administration.  Reconstructed coronal images were also obtained. Automated exposure control and iterative construction methods were used. Scan Time: 5:04 p.m. Results discussed with stroke team at 5:24 p.m. Findings: No large territory infarct. Old right frontal and left occipital infarcts. There is no evidence of hemorrhage. No mass effect, edema or midline shift Severe subcortical and periventricular white matter hypodensities, nonspecific but most likely represents chronic small vessel ischemic changes. No extra-axial fluid collection. Prominent ventricular system secondary to chronic parenchymal volume loss. Status post bilateral lens extraction. Otherwise the visualized orbits are intact. Trace bilateral mastoid effusions. Otherwise the paranasal sinuses are clear. The visualized soft tissues are unremarkable. No acute osseous abnormality.     Impression: Impression: No acute intracranial abnormality. Specifically no acute intracranial hemorrhage. Electronically Signed: Derek Rich DO  10/23/2024 5:24 PM EDT  Workstation ID: [id number]     Results for orders placed during the hospital encounter of 09/05/23    Adult Transthoracic Echo Complete W/ Cont if Necessary Per Protocol (With Agitated Saline)    Interpretation Summary    Left ventricular ejection fraction appears to be 56 - 60%.    Saline test results are negative.    Mild mitral valve  stenosis is present. The mitral valve peak gradient is 10 mmHg. The mitral valve mean gradient is 4 mmHg. The mitral valve area (PHT) is 2.4 cm2.    Estimated right ventricular systolic pressure from tricuspid regurgitation is normal (<35 mmHg).    There is a trivial pericardial effusion.      Assessment & Plan   Assessment & Plan       Dysarthria      69 y.o. 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  She was found on the floor by family member. Patient's daughter was reportedly bedside in the ER, but then left and is not answering the phone at the number listed in the chart. History taken by chart review, as patient unable to provide history.     In the ER, patient was afebrile, heart rate 88, blood pressure 176/88, satting 96% on room air.  Labs notable for UA with leuk esterase, too numerous to count WBCs, 2+ bacteria, 21-30 squamous cells, WBCs 16, lactic 1.8. CXR with no acute abnormality, CTA head/neck with w chronic moderate stenoses with b/l cervical internal carotid arteries, moderate stenoses within the b/l PCAs, 1cm aneurysm from left cavernous segment ICA. She was given haldol, NS bolus, CTX. Seen by stroke service.    AMS  Slurred speech  Prior CVAs  HLD  HTN  Bipolar disorder  -CVA vs TIA vs infection-related  -Bedside dysphagia screening  -MRI brain pending  -A1c, lipid panel in the morning  -PT/OT/speech consults  -Continue home aspirin, Brilinta, Lipitor per stroke service  -Permissive HTN for now  -Stroke service consulted  -Neurochecks per protocol  -Seroquel resumed at lower dosing given unable to verify home dosing   -Geodon PRN given patient had no response to 4mg haldol  -Sitter ordered    1cm aneurysm left cavernous segment  ICA  -Management per neuro    Concern for UTI  -Follow-up urine culture  -Continue ceftriaxone once daily  -Urinary retention protocol    Candida dermatitis  -Nystatin     DM2 - A1C in the AM; SSI + glucose checks + hypoglycemia protocol  COPD  Morbid obesity    Called the patient's daughter at 9:23pm at number listed in the chart and it rang several times with no answer. Called again at 9:25pm with no answer.     Will need to verify home med list in the AM. Pharmacy consult placed.     Will need to verify code status in the AM. Patient without decision-making capacity and next of kin unreachable on admission x2.     DVT prophylaxis:  SCDs    Expected Discharge  Expected Discharge Date: 10/26/2024; Expected Discharge Time:       This note has been completed as part of a split-shared workflow.     Signature: Electronically signed by Jen Andrade MD, 10/23/24, 9:31 PM EDT

## 2024-10-24 NOTE — CASE MANAGEMENT/SOCIAL WORK
Discharge Planning Assessment  [facility]     Patient Name: Olga Sandoval  MRN: [id number]  Today's Date: 10/24/2024    Admit Date: 10/23/2024    Plan: Ongoing   Discharge Needs Assessment       Row Name 10/24/24 1338       Living Environment    People in Home child(janki), adult    Current Living Arrangements home    Potentially Unsafe Housing Conditions none    Primary Care Provided by child(janki)    Provides Primary Care For no one    Family Caregiver if Needed child(janki), adult    Quality of Family Relationships involved;supportive    Able to Return to Prior Arrangements yes       Resource/Environmental Concerns    Resource/Environmental Concerns none    Transportation Concerns none       Transition Planning    Patient/Family Anticipates Transition to home with help/services    Patient/Family Anticipated Services at Transition ;rehabilitation services    Transportation Anticipated family or friend will provide       Discharge Needs Assessment    Readmission Within the Last 30 Days no previous admission in last 30 days    Equipment Currently Used at Home wheelchair    Concerns to be Addressed discharge planning    Anticipated Changes Related to Illness inability to care for self                   Discharge Plan       Row Name 10/24/24 1338       Plan    Plan Ongoing    Plan Comments Spoke with patient's daughter, Reva by phone to initiate discharge planning.  Patient recently moved to live with her in [facility].  Prior to admission, patient used a wheelchair for mobility and was dependent with ADL's.  She has no other DME at home and is not current with home health.  Her PCP is Ileana Kerr.  She does not have an advanced directive.  Verified that she has [place] [employer] Replacement.  Ms. Sandoval has RX coverage and has her scripts filled at [facility].  Her discharge plan is undetermined at this time.  Will await therapy recommendations to determine proper discharge palcement.  CM will continue  to follow.                  Continued Care and Services - Admitted Since 10/23/2024    No active coordination exists for this encounter.       Expected Discharge Date and Time       Expected Discharge Date Expected Discharge Time    Oct 26, 2024            Demographic Summary       Row Name 10/24/24 1335       General Information    Admission Type observation    Arrived From emergency department    Referral Source admission list    Reason for Consult discharge planning    Preferred Language English                   Functional Status       Row Name 10/24/24 1337       Functional Status    Usual Activity Tolerance fair    Current Activity Tolerance fair       Functional Status, IADL    Medications completely dependent    Meal Preparation completely dependent    Housekeeping completely dependent    Laundry completely dependent    Shopping completely dependent                   Psychosocial    No documentation.                  Abuse/Neglect    No documentation.                  Legal    No documentation.                  Substance Abuse    No documentation.                  Patient Forms    No documentation.                     Anitra Alva RN

## 2024-10-24 NOTE — ED NOTES
" Olga Sandoval    Nursing Report ED to Floor:  Mental status: A&0 2  Ambulatory status: WHEELCHAIR  Oxygen Therapy:  RA  Cardiac Rhythm: NSR  Admitted from: ER  Safety Concerns:  FALL RISK/CONFUSED  Social Issues: NONE  ED Room #:  10    ED Nurse Phone Extension - [phone] or may call [phone].      HPI:   Chief Complaint   Patient presents with    Stroke       Past Medical History:  Past Medical History:   Diagnosis Date    Aneurysm     left carotid    Bipolar 2 disorder     Diabetes mellitus     H/O Bell's palsy     Left sided facial droop    History of loop recorder     Hyperlipidemia     Hypertension     Stroke     x 4        Past Surgical History:  Past Surgical History:   Procedure Laterality Date    CARDIAC CATHETERIZATION      CATARACT EXTRACTION Bilateral      SECTION          Admitting Doctor:   Jen Andrade MD    Consulting Provider(s):  Consults       Date and Time Order Name Status Description    10/23/2024  5:17 PM Inpatient Neurology Consult Stroke Completed              Admitting Diagnosis:   The primary encounter diagnosis was Left-sided weakness. Diagnoses of Slurred speech, Acute dehydration, Acute urinary tract infection, and Acute encephalopathy were also pertinent to this visit.    Most Recent Vitals:   Vitals:    10/23/24 1822 10/23/24 1827 10/23/24 1830 10/23/24 1900   BP: 176/88  170/91 169/66   BP Location: Right arm      Patient Position: Lying      Pulse:   87 81   Resp: 18      Temp:  98.1 °F (36.7 °C)     TempSrc:  Axillary     SpO2:   99% 98%   Weight:  125 kg (275 lb)     Height:  160 cm (63\")         Active LDAs/IV Access:   Lines, Drains & Airways       Active LDAs       Name Placement date Placement time Site Days    Peripheral IV 10/23/24 1736 Left Antecubital 10/23/24  1736  Antecubital  less than 1    Peripheral IV 10/23/24 173 Anterior;Proximal;Right;Upper Arm 10/23/24  1736  Arm  less than 1                    Labs (abnormal labs have a star):   Labs Reviewed   SINGLE HS " TROPONIN T - Abnormal; Notable for the following components:       Result Value    HS Troponin T 25 (*)     All other components within normal limits    Narrative:     High Sensitive Troponin T Reference Range:  <14.0 ng/L- Negative Female for AMI  <22.0 ng/L- Negative Male for AMI  >=14 - Abnormal Female indicating possible myocardial injury.  >=22 - Abnormal Male indicating possible myocardial injury.   Clinicians would have to utilize clinical acumen, EKG, Troponin, and serial changes to determine if it is an Acute Myocardial Infarction or myocardial injury due to an underlying chronic condition.        CBC WITH AUTO DIFFERENTIAL - Abnormal; Notable for the following components:    WBC 16.23 (*)     RDW 16.4 (*)     Neutrophil % 88.4 (*)     Lymphocyte % 4.7 (*)     Neutrophils, Absolute 14.33 (*)     All other components within normal limits   URINALYSIS W/ CULTURE IF INDICATED - Abnormal; Notable for the following components:    Appearance, UA Turbid (*)     Specific Gravity, UA 1.093 (*)     Ketones, UA 15 mg/dL (1+) (*)     Blood, UA Trace (*)     Protein, UA 30 mg/dL (1+) (*)     Leuk Esterase, UA Moderate (2+) (*)     All other components within normal limits    Narrative:     In absence of clinical symptoms, the presence of pyuria, bacteria, and/or nitrites on the urinalysis result does not correlate with infection.   URINALYSIS, MICROSCOPIC ONLY - Abnormal; Notable for the following components:    RBC, UA 3-5 (*)     WBC, UA Too Numerous to Count (*)     Bacteria, UA 2+ (*)     Squamous Epithelial Cells, UA 21-30 (*)     All other components within normal limits   POCT CHEM 8 - Abnormal; Notable for the following components:    Glucose 133 (*)     BUN 37 (*)     Creatinine 1.40 (*)     POC Potassium 5.8 (*)     Total CO2 23 (*)     Ionized Calcium 1.02 (*)     eGFR 40.3 (*)     All other components within normal limits   POCT CREATININE - Abnormal; Notable for the following components:    Creatinine 1.40  (*)     All other components within normal limits   COVID-19 AND FLU A/B, NP SWAB IN TRANSPORT MEDIA 1 HR TAT - Normal    Narrative:     Fact sheet for providers: https://www.fda.gov/media/103137/download    Fact sheet for patients: https://www.fda.gov/media/944715/download    Test performed by PCR.   APTT - Normal    Narrative:     PTT = The equivalent PTT values for the therapeutic range of heparin levels at 0.3 to 0.5 U/ml are 60 to 70 seconds.   AST - Normal   ALT - Normal   LACTIC ACID, PLASMA - Normal   COVID PRE-OP / PRE-PROCEDURE SCREENING ORDER (NO ISOLATION)    Narrative:     The following orders were created for panel order COVID PRE-OP / PRE-PROCEDURE SCREENING ORDER (NO ISOLATION) - Swab, Nasopharynx.  Procedure                               Abnormality         Status                     ---------                               -----------         ------                     COVID-19 and FLU A/B PCR...[[id number]]  Normal              Final result                 Please view results for these tests on the individual orders.   BLOOD CULTURE   BLOOD CULTURE   URINE CULTURE   RAINBOW DRAW    Narrative:     The following orders were created for panel order Tyler Draw.  Procedure                               Abnormality         Status                     ---------                               -----------         ------                     Green Top (Gel)[[id number]]                                  Final result               Lavender Top[[id number]]                                     Final result               Gold Top - SST[[id number]]                                   Final result               Black Top[[id number]]                                         Final result               Light Blue Top[[id number]]                                   Final result                 Please view results for these tests on the individual orders.   POCT PROTIME - INR   CBC AND DIFFERENTIAL    Narrative:     The following  orders were created for panel order CBC & Differential.  Procedure                               Abnormality         Status                     ---------                               -----------         ------                     CBC Auto Differential[[id number]]        Abnormal            Final result                 Please view results for these tests on the individual orders.   GREEN TOP   LAVENDER TOP   GOLD TOP - SST   GRAY TOP   LIGHT BLUE TOP       Meds Given in ED:   Medications   sodium chloride 0.9 % flush 10 mL (has no administration in time range)   sodium chloride 0.9 % bolus 1,000 mL (has no administration in time range)   cefTRIAXone (ROCEPHIN) 2,000 mg in sodium chloride 0.9 % 100 mL MBP (has no administration in time range)   iopamidol (ISOVUE-370) 76 % injection 80 mL (80 mL Intravenous Given 10/23/24 1742)   haloperidol lactate (HALDOL) injection 2 mg (2 mg Intravenous Given 10/23/24 2020)           Last NIH score:                                                          Dysphagia screening results:        Ju Coma Scale:  No data recorded     CIWA:        Restraint Type:            Isolation Status:  No active isolations

## 2024-10-24 NOTE — PROGRESS NOTES
[facility] Medicine Services  PROGRESS NOTE    Patient Name: Olga Sandoval  : 1955  MRN: [id number]    Date of Admission: 10/23/2024  Primary Care Physician: Ileana Kerr MD    Subjective   Subjective     CC:  Altered mental status    HPI:  Patient still very confused this morning, only able to follow some commands, redirectable. Unable to provide any history whatsoever.      Objective   Objective     Vital Signs:   Temp:  [98.1 °F (36.7 °C)-100.5 °F (38.1 °C)] 98.6 °F (37 °C)  Heart Rate:  [] 105  Resp:  [18-20] 20  BP: (136-199)/() 189/104     Physical Exam:  Constitutional: Awake, alert, resting comfortably  HENT: NCAT, mucous membranes moist  Respiratory: Clear to auscultation bilaterally, respiratory effort normal   Cardiovascular: Mildly tachycardic  Gastrointestinal: soft, nontender, nondistended  Musculoskeletal: No bilateral ankle edema  Psychiatric: Appropriate affect, cooperative  Neurologic: Alert, only oriented to self, no focal deficits  Skin: No rashes      Results Reviewed:  LAB RESULTS:      Lab 10/23/24  1732 10/23/24  1726   WBC 16.23*  --    HEMOGLOBIN 13.8  --    HEMOGLOBIN, POC  --  15.0   HEMATOCRIT 43.1  --    HEMATOCRIT POC  --  44   PLATELETS 385  --    NEUTROS ABS 14.33*  --    IMMATURE GRANS (ABS) 0.04  --    LYMPHS ABS 0.77  --    MONOS ABS 0.87  --    EOS ABS 0.17  --    MCV 88.9  --    LACTATE 1.8  --    APTT 24.9  --    HSTROP T 25*  --          Lab 10/23/24  1732 10/23/24  1731 10/23/24  172   SODIUM 141  --   --    POTASSIUM 4.4  --   --    CHLORIDE 102  --   --    CO2 21.0*  --   --    ANION GAP 18.0*  --   --    BUN 25*  --   --    CREATININE 1.32* 1.40* 1.40*   EGFR 43.3*  --  40.3*   GLUCOSE 125*  --   --    CALCIUM 10.1  --   --    TSH 3.950  --   --          Lab 10/23/24  1732   TOTAL PROTEIN 7.6   ALBUMIN 4.5   GLOBULIN 3.1   ALT (SGPT) 9  9   AST (SGOT) 18  18   BILIRUBIN 0.6   ALK PHOS 104         Lab 10/23/24  8598    HSTROP T 25*             Lab 10/23/24  1732   VITAMIN B 12 294         Brief Urine Lab Results  (Last result in the past 365 days)        Color   Clarity   Blood   Leuk Est   Nitrite   Protein   CREAT   Urine HCG        10/23/24 1937 Yellow   Turbid   Trace   Moderate (2+)   Negative   30 mg/dL (1+)                   Microbiology Results Abnormal       Procedure Component Value - Date/Time    Urine Culture - Urine, Straight Cath [[id number]]  (Normal) Collected: 10/23/24 1937    Lab Status: Preliminary result Specimen: Urine from Straight Cath Updated: 10/24/24 1021     Urine Culture No growth    COVID PRE-OP / PRE-PROCEDURE SCREENING ORDER (NO ISOLATION) - Swab, Nasopharynx [[id number]]  (Normal) Collected: 10/23/24 1938    Lab Status: Final result Specimen: Swab from Nasopharynx Updated: 10/23/24 2007    Narrative:      The following orders were created for panel order COVID PRE-OP / PRE-PROCEDURE SCREENING ORDER (NO ISOLATION) - Swab, Nasopharynx.  Procedure                               Abnormality         Status                     ---------                               -----------         ------                     COVID-19 and FLU A/B PCR...[[id number]]  Normal              Final result                 Please view results for these tests on the individual orders.    COVID-19 and FLU A/B PCR, 1 HR TAT - Swab, Nasopharynx [[id number]]  (Normal) Collected: 10/23/24 1938    Lab Status: Final result Specimen: Swab from Nasopharynx Updated: 10/23/24 2007     COVID19 Not Detected     Influenza A PCR Not Detected     Influenza B PCR Not Detected    Narrative:      Fact sheet for providers: https://www.fda.gov/media/461707/download    Fact sheet for patients: https://www.fda.gov/media/282557/download    Test performed by PCR.            XR Abdomen KUB    Result Date: 10/24/2024  XR ABDOMEN KUB Date of Exam: 10/24/2024 8:42 AM EDT Indication: MRI clearance Comparison: None available. Findings: There is no radiodense  foreign body overlying the abdomen or pelvis. Contrast in the bladder related to recent CT exam. Air-filled bowel loops overlying the left mid abdomen are mildly dilated up to 4.2 cm which could relate to ileus, nonspecific. The lung bases are clear. No evidence of pneumoperitoneum within limits of supine technique.     Impression: Impression: 1. No radiodense foreign body overlying the abdomen or pelvis or findings to prevent MRI. 2. Mildly dilated air-filled bowel loops overlying the left mid abdomen may relate to ileus, nonspecific. Electronically Signed: Rahat Stokes MD  10/24/2024 9:20 AM EDT  Workstation ID: [id number]    CT Angiogram Head w AI Analysis of LVO    Result Date: 10/23/2024  CT ANGIOGRAM HEAD W AI ANALYSIS OF LVO, CT ANGIOGRAM NECK Date of Exam: 10/23/2024 5:25 PM EDT Indication: Neuro Deficit, acute, Stroke suspected Neuro deficit, acute stroke suspected. Comparison: 9/5/2023 Technique: CTA of the head was performed after the uneventful intravenous administration of 80 cc Isovue-370. Reconstructed coronal and sagittal images were also obtained. In addition, a 3-D volume rendered image was created for interpretation. Automated  exposure control and iterative reconstruction methods were used. Findings: Contrast opacification: Excellent Aortic Arch: Unremarkable Right: Innominate: Patent Subclavian: Mild to moderate stenosis at the origin secondary to calcified plaques. Otherwise abnormal Common Carotid: Patent External Carotid: Patent Internal Carotid: Evidence of at least 60 to 70% stenosis noted within the proximal cervical internal carotid artery. Otherwise unremarkable. Please note that evaluation is limited due to motion artifact. Intracranial ICA: Patient is status post stenting of the right ICA. Otherwise unremarkable MCA:Patent MARISELA: Patent PCA: Moderate to severe stenosis noted within the distal P2 segment. Distally the vessels are patent. Vertebral: Mild stenosis within the V4 segment  secondary to calcified plaques. Otherwise unremarkable Left: Subclavian: Mild stenosis at the origin secondary to calcified plaques. Otherwise unremarkable Common Carotid: Patent External Carotid:Patent Internal Carotid: Approximately 60% stenosis noted within the proximal cervical internal carotid artery secondary to calcified and noncalcified plaques. Intracranial ICA: Laterally directed aneurysm measuring 1.0 x 0.9 cm arising from the cavernous segment ICA. Otherwise unremarkable. MCA:Patent MARISELA: Patent PCA: Moderate stenosis noted within the distal segments. Otherwise unremarkable Vertebral: Patent Basilar: Patent Soft Tissue: Unremarkable Lungs: Unremarkable Bones: No acute osseous abnormality.     Impression: Impression: No acute arterial abnormality of the head and neck. Chronic moderate stenoses noted within the bilateral cervical internal carotid arteries secondary to calcified and noncalcified plaques. Please note that the stenosis may be exaggerated due to motion artifact in this region. Additionally there is at least moderate stenoses noted within the bilateral PCAs distally. 1.0 cm aneurysm arising from the left cavernous segment ICA. Electronically Signed: Derek Rich DO  10/23/2024 6:00 PM EDT  Workstation ID: [id number]    CT Angiogram Neck    Result Date: 10/23/2024  CT ANGIOGRAM HEAD W AI ANALYSIS OF LVO, CT ANGIOGRAM NECK Date of Exam: 10/23/2024 5:25 PM EDT Indication: Neuro Deficit, acute, Stroke suspected Neuro deficit, acute stroke suspected. Comparison: 9/5/2023 Technique: CTA of the head was performed after the uneventful intravenous administration of 80 cc Isovue-370. Reconstructed coronal and sagittal images were also obtained. In addition, a 3-D volume rendered image was created for interpretation. Automated  exposure control and iterative reconstruction methods were used. Findings: Contrast opacification: Excellent Aortic Arch: Unremarkable Right: Innominate: Patent Subclavian: Mild  to moderate stenosis at the origin secondary to calcified plaques. Otherwise abnormal Common Carotid: Patent External Carotid: Patent Internal Carotid: Evidence of at least 60 to 70% stenosis noted within the proximal cervical internal carotid artery. Otherwise unremarkable. Please note that evaluation is limited due to motion artifact. Intracranial ICA: Patient is status post stenting of the right ICA. Otherwise unremarkable MCA:Patent MARISELA: Patent PCA: Moderate to severe stenosis noted within the distal P2 segment. Distally the vessels are patent. Vertebral: Mild stenosis within the V4 segment secondary to calcified plaques. Otherwise unremarkable Left: Subclavian: Mild stenosis at the origin secondary to calcified plaques. Otherwise unremarkable Common Carotid: Patent External Carotid:Patent Internal Carotid: Approximately 60% stenosis noted within the proximal cervical internal carotid artery secondary to calcified and noncalcified plaques. Intracranial ICA: Laterally directed aneurysm measuring 1.0 x 0.9 cm arising from the cavernous segment ICA. Otherwise unremarkable. MCA:Patent MARISELA: Patent PCA: Moderate stenosis noted within the distal segments. Otherwise unremarkable Vertebral: Patent Basilar: Patent Soft Tissue: Unremarkable Lungs: Unremarkable Bones: No acute osseous abnormality.     Impression: Impression: No acute arterial abnormality of the head and neck. Chronic moderate stenoses noted within the bilateral cervical internal carotid arteries secondary to calcified and noncalcified plaques. Please note that the stenosis may be exaggerated due to motion artifact in this region. Additionally there is at least moderate stenoses noted within the bilateral PCAs distally. 1.0 cm aneurysm arising from the left cavernous segment ICA. Electronically Signed: Derek Rich DO  10/23/2024 6:00 PM EDT  Workstation ID: [id number]    XR Chest 1 View    Result Date: 10/23/2024  XR CHEST 1 VW Date of Exam: 10/23/2024  5:22 PM EDT Indication: Acute Stroke Protocol (onset < 12 hrs) Comparison: 9/5/2023 Findings: Unchanged enlarged cardiac silhouette. Cardiac device overlies the left chest. Lower lung volumes. No definite focal airspace consolidation. No pleural effusion or pneumothorax. No acute bone abnormality.     Impression: Impression: No acute cardiopulmonary abnormality. Electronically Signed: Vick Wild MD  10/23/2024 5:52 PM EDT  Workstation ID: [id number]    CT Head Without Contrast Stroke Protocol    Result Date: 10/23/2024  CT HEAD WO CONTRAST STROKE PROTOCOL Date of Exam: 10/23/2024 5:18 PM EDT Indication: Neuro deficit, acute, stroke suspected Neuro Deficit, acute, Stroke suspected. Comparison: 9/5/2023 Technique: Axial CT images were obtained of the head without contrast administration.  Reconstructed coronal images were also obtained. Automated exposure control and iterative construction methods were used. Scan Time: 5:04 p.m. Results discussed with stroke team at 5:24 p.m. Findings: No large territory infarct. Old right frontal and left occipital infarcts. There is no evidence of hemorrhage. No mass effect, edema or midline shift Severe subcortical and periventricular white matter hypodensities, nonspecific but most likely represents chronic small vessel ischemic changes. No extra-axial fluid collection. Prominent ventricular system secondary to chronic parenchymal volume loss. Status post bilateral lens extraction. Otherwise the visualized orbits are intact. Trace bilateral mastoid effusions. Otherwise the paranasal sinuses are clear. The visualized soft tissues are unremarkable. No acute osseous abnormality.     Impression: Impression: No acute intracranial abnormality. Specifically no acute intracranial hemorrhage. Electronically Signed: Derek Rich DO  10/23/2024 5:24 PM EDT  Workstation ID: [id number]     Results for orders placed during the hospital encounter of 09/05/23    Adult Transthoracic Echo  Complete W/ Cont if Necessary Per Protocol (With Agitated Saline)    Interpretation Summary    Left ventricular ejection fraction appears to be 56 - 60%.    Saline test results are negative.    Mild mitral valve stenosis is present. The mitral valve peak gradient is 10 mmHg. The mitral valve mean gradient is 4 mmHg. The mitral valve area (PHT) is 2.4 cm2.    Estimated right ventricular systolic pressure from tricuspid regurgitation is normal (<35 mmHg).    There is a trivial pericardial effusion.      Current medications:  Scheduled Meds:aspirin, 81 mg, Oral, Daily  atorvastatin, 80 mg, Oral, Nightly  cefTRIAXone, 2,000 mg, Intravenous, Q24H  cholecalciferol, 1,000 Units, Oral, Daily  insulin lispro, 2-7 Units, Subcutaneous, 4x Daily AC & at Bedtime  lactobacillus acidophilus, 1 capsule, Oral, Daily  [Held by provider] losartan, 50 mg, Oral, Daily  pharmacy consult - MTM, , Does not apply, Daily  nystatin, , Topical, Q12H  pantoprazole, 40 mg, Oral, Q AM  QUEtiapine, 50 mg, Oral, Q12H  ticagrelor, 90 mg, Oral, BID  venlafaxine XR, 150 mg, Oral, Daily      Continuous Infusions:   PRN Meds:.  acetaminophen    dextrose    dextrose    glucagon (human recombinant)    sodium chloride    ziprasidone    Assessment & Plan   Assessment & Plan     Active Hospital Problems    Diagnosis  POA    **Dysarthria [R47.1]  Yes      Resolved Hospital Problems   No resolved problems to display.        Brief Hospital Course to date:  Olga Sandoval is a 69 y.o. 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 She was found on the floor by family member.     In the ER, patient was afebrile, heart rate 88, blood pressure 176/88, satting 96% on room air.  Labs notable for UA with leuk  esterase, too numerous to count WBCs, 2+ bacteria, 21-30 squamous cells, WBCs 16, lactic 1.8. CXR with no acute abnormality, CTA head/neck with w chronic moderate stenoses with b/l cervical internal carotid arteries, moderate stenoses within the b/l PCAs, 1cm aneurysm from left cavernous segment ICA. She was given haldol, NS bolus, CTX. Seen by stroke service.     This patient's problems and plans were partially entered by my partner and updated as appropriate by me 10/24/24.    Altered mental status  -suspect infectious vs metabolic etiology, stroke work-up also underway  -LFTs normal, TSH normal, B12 normal  -Stroke Neurology following, MRI brain pending. Neurochecks per protocol. PT/OT/SLP to see.   -Continue treatment of suspected UTI. Will check resp PCR for completeness. Continue reduced dose Seroquel for now.     Leukocytosis with neutrophilia  Concern for UTI  -Continue IV ceftriaxone. Follow urine and blood cultures.   -Urinary retention protocol    1cm aneurysm left cavernous segment ICA  -Management per Neurology.    Code Status- unverified  -remains unverified, attempted to call daughter x2 but went straight to voicemail. Unable to provide update or verify Code Status on 10/24.      Candida dermatitis  -Nystatin powder BID.     Hypertension- BP goals per Neurology, permissive HTN for now, holding home losartan for now.  Hyperlipidemia- continue atorvastatin.   DM2 - A1c pending; SSI + glucose checks + hypoglycemia protocol  Anxiety- continue home venlafaxine.   COPD  Morbid obesity    Expected Discharge Location and Transportation: TBD  Expected Discharge   Expected Discharge Date: 10/26/2024; Expected Discharge Time:      VTE Prophylaxis:  Mechanical VTE prophylaxis orders are present.         AM-PAC 6 Clicks Score (PT): 12 (10/24/24 1200)    CODE STATUS:   There are no questions and answers to display.       Aretha Ziegler, DO  10/24/24

## 2024-10-24 NOTE — PLAN OF CARE
Problem: Adult Inpatient Plan of Care  Goal: Plan of Care Review  Outcome: Not Progressing  Goal: Patient-Specific Goal (Individualized)  Outcome: Not Progressing  Goal: Absence of Hospital-Acquired Illness or Injury  Outcome: Not Progressing  Intervention: Identify and Manage Fall Risk  Recent Flowsheet Documentation  Taken 10/24/2024 0000 by Aurora Ordoñez RN  Safety Promotion/Fall Prevention:   activity supervised   assistive device/personal items within reach   clutter free environment maintained   elopement precautions   fall prevention program maintained   lighting adjusted   nonskid shoes/slippers when out of bed   safety round/check completed   room organization consistent  Taken 10/23/2024 2146 by Aurora Ordoñez RN  Safety Promotion/Fall Prevention:   activity supervised   assistive device/personal items within reach   clutter free environment maintained   fall prevention program maintained   lighting adjusted   nonskid shoes/slippers when out of bed   room organization consistent   safety round/check completed  Intervention: Prevent Skin Injury  Recent Flowsheet Documentation  Taken 10/24/2024 0000 by Aurora Ordoñez RN  Body Position:   position changed independently   turned   left  Skin Protection:   transparent dressing maintained   incontinence pads utilized  Taken 10/23/2024 2146 by Aurora Ordoñez RN  Body Position:   position changed independently   tilted   right  Skin Protection:   incontinence pads utilized   transparent dressing maintained  Intervention: Prevent and Manage VTE (Venous Thromboembolism) Risk  Recent Flowsheet Documentation  Taken 10/23/2024 2146 by Aurora Ordoñez RN  VTE Prevention/Management:   bilateral   SCDs (sequential compression devices) off  Intervention: Prevent Infection  Recent Flowsheet Documentation  Taken 10/24/2024 0000 by Aurora Ordoñez RN  Infection Prevention:   cohorting utilized   environmental surveillance performed   equipment surfaces  disinfected   hand hygiene promoted   rest/sleep promoted  Taken 10/23/2024 2146 by Aurora Ordoñez RN  Infection Prevention:   cohorting utilized   environmental surveillance performed   equipment surfaces disinfected   hand hygiene promoted   rest/sleep promoted  Goal: Optimal Comfort and Wellbeing  Outcome: Not Progressing  Intervention: Provide Person-Centered Care  Recent Flowsheet Documentation  Taken 10/23/2024 2146 by Aurora Ordoñez RN  Trust Relationship/Rapport:   care explained   choices provided   emotional support provided   empathic listening provided   questions answered   questions encouraged   reassurance provided   thoughts/feelings acknowledged  Goal: Readiness for Transition of Care  Outcome: Not Progressing     Problem: Fall Injury Risk  Goal: Absence of Fall and Fall-Related Injury  Outcome: Not Progressing  Intervention: Identify and Manage Contributors  Recent Flowsheet Documentation  Taken 10/24/2024 0000 by Aurora Ordoñez RN  Medication Review/Management: medications reviewed  Self-Care Promotion:   independence encouraged   BADL personal objects within reach  Taken 10/23/2024 2146 by Aurora Ordoñez RN  Medication Review/Management: medications reviewed  Self-Care Promotion:   independence encouraged   BADL personal objects within reach  Intervention: Promote Injury-Free Environment  Recent Flowsheet Documentation  Taken 10/24/2024 0000 by Aurora Ordoñez RN  Safety Promotion/Fall Prevention:   activity supervised   assistive device/personal items within reach   clutter free environment maintained   elopement precautions   fall prevention program maintained   lighting adjusted   nonskid shoes/slippers when out of bed   safety round/check completed   room organization consistent  Taken 10/23/2024 2146 by Aurora Ordoñez RN  Safety Promotion/Fall Prevention:   activity supervised   assistive device/personal items within reach   clutter free environment maintained   fall  prevention program maintained   lighting adjusted   nonskid shoes/slippers when out of bed   room organization consistent   safety round/check completed     Problem: Skin Injury Risk Increased  Goal: Skin Health and Integrity  Outcome: Not Progressing  Intervention: Optimize Skin Protection  Recent Flowsheet Documentation  Taken 10/24/2024 0000 by Aurora Ordoñez RN  Activity Management: activity encouraged  Pressure Reduction Techniques:   frequent weight shift encouraged   weight shift assistance provided   pressure points protected  Head of Bed (HOB) Positioning: HOB elevated  Pressure Reduction Devices:   pressure-redistributing mattress utilized   positioning supports utilized  Skin Protection:   transparent dressing maintained   incontinence pads utilized  Taken 10/23/2024 2146 by Aurora Ordoñez, RN  Activity Management: activity encouraged  Pressure Reduction Techniques:   frequent weight shift encouraged   weight shift assistance provided   pressure points protected  Head of Bed (HOB) Positioning: HOB elevated  Pressure Reduction Devices:   positioning supports utilized   pressure-redistributing mattress utilized  Skin Protection:   incontinence pads utilized   transparent dressing maintained     Problem: Comorbidity Management  Goal: Maintenance of Behavioral Health Symptom Control  Outcome: Not Progressing  Intervention: Maintain Behavioral Health Symptom Control  Recent Flowsheet Documentation  Taken 10/24/2024 0000 by Aurora Ordoñez RN  Medication Review/Management: medications reviewed  Taken 10/23/2024 2146 by Aurora Ordoñez, RN  Medication Review/Management: medications reviewed  Goal: Blood Pressure in Desired Range  Outcome: Not Progressing  Intervention: Maintain Blood Pressure Management  Recent Flowsheet Documentation  Taken 10/24/2024 0000 by Aurora Ordoñez RN  Medication Review/Management: medications reviewed  Taken 10/23/2024 2146 by Aurora Ordoñez, RN  Medication  Review/Management: medications reviewed   Goal Outcome Evaluation:            10/23/24- Pt remains AOX2. NIH 6. Failed dysphagia (d/t facial droop). NPO. RA. NSR to ST. Pt became very agitated when brought to floor and began to kick bed rails/try to bite rails and staff. Pt was given one round of geodon (seemed to help with agitation slightly). Pt still remained to pull EKG stickers off and still was kicking side rails/trying to get out of bed so a sitter was ordered. With sitter at bedside, pt has been more calm (still pulling off things such as purewick). Tried to call daughter Reva Kumar (3x) to get MRI screening form complete (line was busy). Pt has stage 1 on middle/R coccyx area but continues to take pad off when applied. Pt has nystatin powder at bedside d/t MASD as said on ED report (under breasts and in groin area). Plan of care ongoing.

## 2024-10-24 NOTE — THERAPY EVALUATION
Patient Name: Olga Sandoval  : 1955    MRN: [id number]                              Today's Date: 10/24/2024       Admit Date: 10/23/2024    Visit Dx:     ICD-10-CM ICD-9-CM   1. Left-sided weakness  R53.1 728.87   2. Slurred speech  R47.81 784.59   3. Acute dehydration  E86.0 276.51   4. Acute urinary tract infection  N39.0 599.0   5. Acute encephalopathy  G93.40 348.30   6. Oral phase dysphagia  R13.11 787.21   7. Cognitive communication deficit  R41.841 799.52     Patient Active Problem List   Diagnosis    Aphasia    Bipolar 2 disorder    Type 2 diabetes mellitus    Essential hypertension    Hyperlipidemia    Carotid stenosis, right    Obesity, Class III, BMI 40-49.9 (morbid obesity)    History of CVA (cerebrovascular accident)    Hypertension    Gastroesophageal reflux disease    Encounter for screening for colorectal cancer in high risk patient    Immunization due    Medicare annual wellness visit, subsequent    Lymphedema    Bleeding from right ear    Healthcare maintenance    Post-menopausal    Dysarthria     Past Medical History:   Diagnosis Date    Aneurysm     left carotid    Bipolar 2 disorder     Diabetes mellitus     H/O Bell's palsy     Left sided facial droop    History of loop recorder     Hyperlipidemia     Hypertension     Stroke     x 4     Past Surgical History:   Procedure Laterality Date    CARDIAC CATHETERIZATION      CATARACT EXTRACTION Bilateral      SECTION        General Information       Row Name 10/24/24 1546          Physical Therapy Time and Intention    Document Type evaluation  -CD     Mode of Treatment physical therapy  -CD       Row Name 10/24/24 1546          General Information    Patient Profile Reviewed yes  -CD     Prior Level of Function --  PT IS POOR HISTORIAN DUE TO APHASIA, CONFUSION. PT REPORTS SHE GETS HELP WITH TRANSFERS TO W/C. PER NSG WHO SPOKE WITH DTR, AT BASELINE, PT PIVOTS TO W/C WITH ASSIST BUT HAS HAD MULTIPLE RECENT FALLS. PT IS NON-AMBULATORY.   -CD     Existing Precautions/Restrictions fall  L SIDED RESIDUAL WEAKNESS FROM PRIOR CVA'S, STAGE 1 WOUND COCCYX, CHRONIC LYMPHEDEMA B LE'S, CURRENTLY APHASIC.  -CD     Barriers to Rehab medically complex;previous functional deficit;cognitive status  -CD       Row Name 10/24/24 1546          Living Environment    People in Home child(janki), adult  LIVES WITH DTR. NOT SURE IF DTR IS AVAILABLE 24/7 OR IF ANYONE ELSE IS IN THE HOME. PT POOR HISTORIAN AND FAMILY NOT PRESENT.  -CD       Row Name 10/24/24 1546          Cognition    Orientation Status (Cognition) oriented to;person;unable/difficult to assess;situation;place  -CD       Row Name 10/24/24 1544          Safety Issues/Impairments Affecting Functional Mobility    Safety Issues Affecting Function (Mobility) insight into deficits/self-awareness;ability to follow commands;awareness of need for assistance;safety precaution awareness;safety precautions follow-through/compliance;sequencing abilities  -CD     Impairments Affecting Function (Mobility) balance;endurance/activity tolerance;cognition;coordination;shortness of breath;strength;range of motion (ROM)  DECREASED KNEE EXTENSION.  -CD     Cognitive Impairments, Mobility Safety/Performance awareness, need for assistance;insight into deficits/self-awareness;problem-solving/reasoning;judgment;safety precaution awareness;safety precaution follow-through;sequencing abilities;impulsivity  -CD     Comment, Safety Issues/Impairments (Mobility) PT IS IMPULSIVE AND A HIGH FALL RISK. DEMONSTRATES GENERALIZED WEAKNESS ON CHRONIC L SIDED WEAKNESS FROM PRIOR CVA. REMAINS FLEXED IN STANDING.  -CD               User Key  (r) = Recorded By, (t) = Taken By, (c) = Cosigned By      Initials Name Provider Type    CD Baylee Cabrera, PT Physical Therapist                   Mobility       Row Name 10/24/24 8530          Bed Mobility    Bed Mobility supine-sit  -CD     Supine-Sit [place] (Bed Mobility) contact guard;verbal cues  -CD      Assistive Device (Bed Mobility) bed rails;head of bed elevated  -CD     Comment, (Bed Mobility) INCREASED TIME AND EFFORT.  -CD       Row Name 10/24/24 0114          Transfers    Comment, (Transfers) CUES FOR HAND PLACEMENT. STS X 2 REPS FROM EOB. STAND STEP PIVOT VIA B UE SUPPORT BED TO RECLINER. L KNEE CRISTINE IN STANDING.  -CD       Row Name 10/24/24 1553          Bed-Chair Transfer    Bed-Chair [place] (Transfers) moderate assist (50% patient effort);2 person assist  -CD     Assistive Device (Bed-Chair Transfers) --  B UE SUPPORT.  -CD       Row Name 10/24/24 0317          Sit-Stand Transfer    Sit-Stand [place] (Transfers) minimum assist (75% patient effort);2 person assist  -CD     Assistive Device (Sit-Stand Transfers) --  B UE SUPPORT.  -CD       Row Name 10/24/24 9568          Gait/Stairs (Locomotion)    [place] Level (Gait) not tested  -CD     Comment, (Gait/Stairs) PER REPORT, PT IS NON-AMBULATORY AT BASELINE.  -CD               User Key  (r) = Recorded By, (t) = Taken By, (c) = Cosigned By      Initials Name Provider Type    CD Baylee Cabrera, PT Physical Therapist                   Obj/Interventions       Row Name 10/24/24 1054          Range of Motion Comprehensive    General Range of Motion lower extremity range of motion deficits identified  -CD     Comment, General Range of Motion PT HAS B KNEE FLEXION CONTRACTURES AND REMAINS FLEXED IN STANDING. B LE ROM WFL'S FOR SITTING EOB.  -CD       Row Name 10/24/24 3784          Strength Comprehensive (MMT)    General Manual Muscle Testing (MMT) Assessment lower extremity strength deficits identified  -CD     Comment, General Manual Muscle Testing (MMT) Assessment R LE GROSSLY 4/5 AT KNEE/ANKLE, 3/5 AT HIP. L LE GROSSLY 2/5 AT HIP, 3/5 KNEE/ANKLE.  -CD       Row Name 10/24/24 0518          Motor Skills    Motor Skills functional endurance;coordination  -CD     Coordination gross motor deficit;bilateral;lower extremity;moderate impairment   -CD     Functional Endurance PT SOA WITH ACTIVITY BUT O2 SATS 99%.  -CD       Row Name 10/24/24 1559          Balance    Static Sitting Balance contact guard  -CD     Dynamic Sitting Balance contact guard  -CD     Position, Sitting Balance unsupported;sitting edge of bed  -CD     Static Standing Balance moderate assist;2-person assist  -CD     Dynamic Standing Balance moderate assist;2-person assist  -CD     Position/Device Used, Standing Balance supported  B UE SUPPORT.  -CD     Balance Interventions sitting;standing;sit to stand;supported;static;dynamic  -CD     Comment, Balance PIVOT TRANSFER VIA B UE SUPPORT BUT VERY UNSTEADY WITH  L KNEE BUCKLING. PT UNABLE TO COME TO FULL UPRIGHT STANDING,  -CD               User Key  (r) = Recorded By, (t) = Taken By, (c) = Cosigned By      Initials Name Provider Type    CD Baylee Cabrera, PT Physical Therapist                   Goals/Plan       Row Name 10/24/24 1612          Bed Mobility Goal 1 (PT)    Activity/Assistive Device (Bed Mobility Goal 1, PT) sit to supine/supine to sit  -CD     [place] Level/Cues Needed (Bed Mobility Goal 1, PT) independent  -CD     Time Frame (Bed Mobility Goal 1, PT) short term goal (STG);1 week  -CD       Row Name 10/24/24 1612          Transfer Goal 1 (PT)    Activity/Assistive Device (Transfer Goal 1, PT) sit-to-stand/stand-to-sit;bed-to-chair/chair-to-bed  -CD     [place] Level/Cues Needed (Transfer Goal 1, PT) minimum assist (75% or more patient effort)  -CD     Time Frame (Transfer Goal 1, PT) long term goal (LTG);2 weeks  -CD       Row Name 10/24/24 1612          Therapy Assessment/Plan (PT)    Planned Therapy Interventions (PT) balance training;bed mobility training;strengthening;transfer training;postural re-education;patient/family education;stretching  -CD               User Key  (r) = Recorded By, (t) = Taken By, (c) = Cosigned By      Initials Name Provider Type    Baylee Laureano, PT Physical Therapist                    Clinical Impression       Row Name 10/24/24 1604          Pain    Pretreatment Pain Rating 0/10 - no pain  -CD     Posttreatment Pain Rating 0/10 - no pain  -CD       Row Name 10/24/24 1604          Plan of Care Review    Plan of Care Reviewed With patient  -CD     Outcome Evaluation PT PRESENTS WITH APHASIA, GENERALIZED WEAKNESS,  IMPAIRED BALANCE, AND DECLINE IN FUNCTIONAL MOBILITY.  AT BASELINE, PT HAS KNEE FLEXION CONTRACTURES, AND B LE CHRONIC LYMPHEDEMA.  PT CURRENTLY REQUIRES MOD ASSIST OF 2 FOR PIVOT TRANSFER BED TO RECLINER. PT DEMONSTRATES  L KNEE BUCKLING AND REMAINS FLEXED AT HIPS/KNEES THROUGHOUT. RECOMMEND SNF AT D/C FOR BEST OUTCOME.  -CD       Row Name 10/24/24 1604          Therapy Assessment/Plan (PT)    Patient/Family Therapy Goals Statement (PT) DID NOT STATE.  -CD     Rehab Potential (PT) good  -CD     Criteria for Skilled Interventions Met (PT) yes;meets criteria;skilled treatment is necessary  -CD     Therapy Frequency (PT) daily  -CD     Predicted Duration of Therapy Intervention (PT) 2 WEEKS  -CD       Row Name 10/24/24 1604          Vital Signs    Pre Systolic BP Rehab 182  -CD     Pre Treatment Diastolic BP 70  -CD     Post Systolic BP Rehab 187  -CD     Post Treatment Diastolic   -CD     Pretreatment Heart Rate (beats/min) 84  -CD     Posttreatment Heart Rate (beats/min) 89  -CD     O2 Delivery Pre Treatment room air  -CD     O2 Delivery Intra Treatment room air  -CD     Post SpO2 (%) 99  -CD     O2 Delivery Post Treatment room air  -CD     Pre Patient Position Supine  -CD     Intra Patient Position Standing  -CD     Post Patient Position Sitting  -CD       Row Name 10/24/24 1603          Positioning and Restraints    Pre-Treatment Position in bed  -CD     Post Treatment Position chair  -CD     In Chair reclined;call light within reach;encouraged to call for assist;exit alarm on;notified nsg;legs elevated;LUE elevated;RUE elevated;heels elevated  NSG MOVING PT TO ANOTHER ROOM.  NSG NOTIFIED OF REC'S FOR B HEEL PROTECTORS AND MECHANICAL LIFT BACK TO BED LATER.  -CD               User Key  (r) = Recorded By, (t) = Taken By, (c) = Cosigned By      Initials Name Provider Type    CD Baylee Cabrera, PT Physical Therapist                   Outcome Measures       Row Name 10/24/24 1613 10/24/24 1200       How much help from another person do you currently need...    Turning from your back to your side while in flat bed without using bedrails? 3  -CD 4  -OB    Moving from lying on back to sitting on the side of a flat bed without bedrails? 3  -CD 4  -OB    Moving to and from a bed to a chair (including a wheelchair)? 2  -CD 1  -OB    Standing up from a chair using your arms (e.g., wheelchair, bedside chair)? 2  -CD 1  -OB    Climbing 3-5 steps with a railing? 1  -CD 1  -OB    To walk in hospital room? 1  -CD 1  -OB    AM-PAC 6 Clicks Score (PT) 12  -CD 12  -OB      Row Name 10/24/24 0800          How much help from another person do you currently need...    Turning from your back to your side while in flat bed without using bedrails? 3  -OB     Moving from lying on back to sitting on the side of a flat bed without bedrails? 3  -OB     Moving to and from a bed to a chair (including a wheelchair)? 2  -OB     Standing up from a chair using your arms (e.g., wheelchair, bedside chair)? 1  -OB     Climbing 3-5 steps with a railing? 1  -OB     To walk in hospital room? 1  -OB     AM-PAC 6 Clicks Score (PT) 11  -OB       Row Name 10/24/24 1613          Modified [place] Scale    Modified [place] Scale 4 - Moderately severe disability.  Unable to walk without assistance, and unable to attend to own bodily needs without assistance.  -CD       Row Name 10/24/24 1613 10/24/24 1554       Functional Assessment    Outcome Measure Options Modified [place]  - AM-PAC 6 Clicks Daily Activity (OT)  -AN (r) BH (t) AN (c)              User Key  (r) = Recorded By, (t) = Taken By, (c) = Cosigned By      Initials Name  Provider Type    CD Baylee Cabrera, PT Physical Therapist    Sridhra Wu, RN Registered Nurse    Lali Overton, OT Occupational Therapist    Julia Lares, OT Student OT Student                                 Physical Therapy Education       Title: PT OT SLP Therapies (In Progress)       Topic: Physical Therapy (Done)       Point: Mobility training (Done)       Learning Progress Summary            Patient Acceptance, E, VU,NR by CD at 10/24/2024 1614    Comment: BENEFITS OF OOB ACTIVITY, SAFETY WITH MOBILITY, PROGRESSION OF POC, D/C PLANNING,                      Point: Home exercise program (Done)       Learning Progress Summary            Patient Acceptance, E, VU,NR by CD at 10/24/2024 1614    Comment: BENEFITS OF OOB ACTIVITY, SAFETY WITH MOBILITY, PROGRESSION OF POC, D/C PLANNING,                      Point: Body mechanics (Done)       Learning Progress Summary            Patient Acceptance, E, VU,NR by CD at 10/24/2024 1614    Comment: BENEFITS OF OOB ACTIVITY, SAFETY WITH MOBILITY, PROGRESSION OF POC, D/C PLANNING,                      Point: Precautions (Done)       Learning Progress Summary            Patient Acceptance, E, VU,NR by CD at 10/24/2024 1614    Comment: BENEFITS OF OOB ACTIVITY, SAFETY WITH MOBILITY, PROGRESSION OF POC, D/C PLANNING,                                      User Key       Initials Effective Dates Name Provider Type Discipline     02/03/23 -  Baylee Cabrera, PT Physical Therapist PT                  PT Recommendation and Plan  Planned Therapy Interventions (PT): balance training, bed mobility training, strengthening, transfer training, postural re-education, patient/family education, stretching  Outcome Evaluation: PT PRESENTS WITH APHASIA, GENERALIZED WEAKNESS,  IMPAIRED BALANCE, AND DECLINE IN FUNCTIONAL MOBILITY.  AT BASELINE, PT HAS KNEE FLEXION CONTRACTURES, AND B LE CHRONIC LYMPHEDEMA.  PT CURRENTLY REQUIRES MOD ASSIST OF 2 FOR PIVOT TRANSFER BED TO  RECLINER. PT DEMONSTRATES  L KNEE BUCKLING AND REMAINS FLEXED AT HIPS/KNEES THROUGHOUT. RECOMMEND SNF AT D/C FOR BEST OUTCOME.     Time Calculation:   PT Evaluation Complexity  History, PT Evaluation Complexity: 3 or more personal factors and/or comorbidities  Examination of Body Systems (PT Eval Complexity): total of 4 or more elements  Clinical Presentation (PT Evaluation Complexity): evolving  Clinical Decision Making (PT Evaluation Complexity): moderate complexity  Overall Complexity (PT Evaluation Complexity): moderate complexity     PT Charges       Row Name 10/24/24 1616             Time Calculation    Start Time 1520  -CD      PT Received On 10/24/24  -CD      PT Goal Re-Cert Due Date 11/03/24  -CD         Untimed Charges    PT Eval/Re-eval Minutes 63  -CD         Total Minutes    Untimed Charges Total Minutes 63  -CD       Total Minutes 63  -CD                User Key  (r) = Recorded By, (t) = Taken By, (c) = Cosigned By      Initials Name Provider Type    CD Baylee Cabrera, PT Physical Therapist                  Therapy Charges for Today       Code Description Service Date Service Provider Modifiers Qty    07759076079 HC PT EVAL MOD COMPLEXITY 4 10/24/2024 Baylee Cabrera, PT GP 1            PT G-Codes  Outcome Measure Options: Modified [place]  AM-PAC 6 Clicks Score (PT): 12  AM-PAC 6 Clicks Score (OT): 12  Modified [place] Scale: 4 - Moderately severe disability.  Unable to walk without assistance, and unable to attend to own bodily needs without assistance.  PT Discharge Summary  Anticipated Discharge Disposition (PT): skilled nursing facility    Baylee Cabrera, GISELLE  10/24/2024

## 2024-10-24 NOTE — MBS/VFSS/FEES
Acute Care - Speech Language Pathology   Swallow Initial Evaluation  Juan  Modified Barium Swallow Study (MBS)     Patient Name: Olga Sandoval  : 1955  MRN: [id number]  Today's Date: 10/24/2024               Admit Date: 10/23/2024    Visit Dx:     ICD-10-CM ICD-9-CM   1. Left-sided weakness  R53.1 728.87   2. Slurred speech  R47.81 784.59   3. Acute dehydration  E86.0 276.51   4. Acute urinary tract infection  N39.0 599.0   5. Acute encephalopathy  G93.40 348.30   6. Oral phase dysphagia  R13.11 787.21   7. Cognitive communication deficit  R41.841 799.52     Patient Active Problem List   Diagnosis    Aphasia    Bipolar 2 disorder    Type 2 diabetes mellitus    Essential hypertension    Hyperlipidemia    Carotid stenosis, right    Obesity, Class III, BMI 40-49.9 (morbid obesity)    History of CVA (cerebrovascular accident)    Hypertension    Gastroesophageal reflux disease    Encounter for screening for colorectal cancer in high risk patient    Immunization due    Medicare annual wellness visit, subsequent    Lymphedema    Bleeding from right ear    Healthcare maintenance    Post-menopausal    Dysarthria     Past Medical History:   Diagnosis Date    Aneurysm     left carotid    Bipolar 2 disorder     Diabetes mellitus     H/O Bell's palsy     Left sided facial droop    History of loop recorder     Hyperlipidemia     Hypertension     Stroke     x 4     Past Surgical History:   Procedure Laterality Date    CARDIAC CATHETERIZATION      CATARACT EXTRACTION Bilateral      SECTION         SLP Recommendation and Plan  SLP Swallowing Diagnosis: mild, oral dysphagia (10/24/24 1050)  SLP Diet Recommendation: regular textures, chopped, thin liquids, other (see comments) (ok to advance/adjust as tolerated) (10/24/24 1050)  Recommended Precautions and Strategies: upright posture during/after eating (10/24/24 1050)  SLP Rec. for Method of Medication Administration: as tolerated (whole in puree while confusion  present) (10/24/24 1050)     Monitor for Signs of Aspiration: notify SLP if any concerns (10/24/24 1050)    Swallow Criteria for Skilled Therapeutic Interventions Met: demonstrates skilled criteria (10/24/24 1050)  Anticipated Discharge Disposition (SLP): inpatient rehabilitation facility (10/24/24 1050)  Rehab Potential/Prognosis, Swallowing: good, to achieve stated therapy goals (10/24/24 1050)  Therapy Frequency (Swallow): 5 days per week (10/24/24 1050)  Predicted Duration Therapy Intervention (Days): 1 week (10/24/24 1050)  Oral Care Recommendations: Oral Care BID/PRN, Toothbrush (10/24/24 0815)                                               SWALLOW EVALUATION (Last 72 Hours)       SLP Adult Swallow Evaluation       Row Name 10/24/24 1050 10/24/24 0815                Rehab Evaluation    Document Type evaluation  -SM evaluation  -SM       Subjective Information no complaints  -SM no complaints  -SM       Patient Observations alert;cooperative  -SM alert;cooperative  -SM       Patient Effort good  -SM good  -SM          General Information    Patient Profile Reviewed -- yes  -SM       Pertinent History Of Current Problem -- COPD, hx [place] palsy, hx multiple CVAs. Adm confusion, slurred speech, found on floor. CT negative, MRI pending. ? UTI.  -SM       Current Method of Nutrition soft to chew textures;chopped;nectar/syrup-thick liquids  -SM NPO  -SM       Plans/Goals Discussed with patient;agreed upon  -SM patient;agreed upon  -SM       Barriers to Rehab -- none identified  -       Patient's Goals for Discharge -- patient could not state;patient did not state  -SM          Pain    Pretreatment Pain Rating 0/10 - no pain  -SM 0/10 - no pain  -SM       Posttreatment Pain Rating 0/10 - no pain  -SM 0/10 - no pain  -SM          Oral Musculature and Cranial Nerve Assessment    Oral Labial or Buccal Impairment, Detail, Cranial Nerve VII (Facial): -- right labial droop  -SM       Lingual Impairment, Detail. Cranial  Nerves IX, XII (Glossopharyngeal and Hypoglossal) -- reduced lingual ROM;reduced strength;bilaterally  -SM          General Eating/Swallowing Observations    Respiratory Support Currently in Use -- room air  -          Clinical Swallow Eval    Oral Prep Phase -- impaired  -SM       Oral Transit -- impaired  -SM       Oral Residue -- WFL  -SM       Pharyngeal Phase -- suspected pharyngeal impairment  -SM       Esophageal Phase -- unremarkable  -SM          Oral Prep Concerns    Oral Prep Concerns -- prolonged mastication  -       Prolonged Mastication -- regular consistencies  -          Oral Transit Concerns    Oral Transit Concerns -- increased oral transit time  -       Increased Oral Transit Time -- regular consistencies  -          Pharyngeal Phase Concerns    Pharyngeal Phase Concerns -- cough  -       Cough -- thin;other (see comments)  -       Pharyngeal Phase Concerns, Comment -- cough only initial trial though otherwise concern for swallow sequence timing and control of bolus.  -          MBS/VFSS    Utensils Used spoon;cup;straw  - --       Consistencies Trialed thin liquids;pureed;mixed consistency;regular textures  - --          MBS/VFSS Interpretation    Oral Prep Phase impaired oral phase of swallowing  -SM --       Oral Transit Phase WFL  -SM --       Oral Residue WFL  - --          Oral Preparatory Phase    Oral Preparatory Phase prolonged manipulation;spits out food/liquid prior to swallow  - --       Prolonged Manipulation regular textures;secondary to impaired cognitive status  - --       Spits Out Food/Liquid Prior to Swallow regular textures;secondary to impaired cognitive status;other (see comments)  accepted after retrial x2  - --          Initiation of Pharyngeal Swallow    Pharyngeal Phase functional pharyngeal phase of swallowing  - --          SLP Evaluation Clinical Impression    SLP Swallowing Diagnosis mild;oral dysphagia  -SM mild;oral dysphagia;suspected  pharyngeal dysphagia  -       Functional Impact risk of malnutrition  - risk of aspiration/pneumonia  -       Rehab Potential/Prognosis, Swallowing good, to achieve stated therapy goals  - --       Swallow Criteria for Skilled Therapeutic Interventions Met demonstrates skilled criteria  - --          Recommendations    Therapy Frequency (Swallow) 5 days per week  -SM --       Predicted Duration Therapy Intervention (Days) 1 week  - --       SLP Diet Recommendation regular textures;chopped;thin liquids;other (see comments)  ok to advance/adjust as tolerated  - soft to chew textures;chopped;nectar thick liquids  -       Recommended Diagnostics -- VFSS (MBS)  -       Recommended Precautions and Strategies upright posture during/after eating  - general aspiration precautions  -       Oral Care Recommendations -- Oral Care BID/PRN;Toothbrush  -       SLP Rec. for Method of Medication Administration as tolerated  whole in puree while confusion present  - meds whole;with puree  -       Monitor for Signs of Aspiration notify SLP if any concerns  - notify SLP if any concerns  -       Anticipated Discharge Disposition (SLP) inpatient rehabilitation facility  - --                 User Key  (r) = Recorded By, (t) = Taken By, (c) = Cosigned By      Initials Name Effective Dates    Maria G Randolph MS CCC-SLP 02/03/23 -                     EDUCATION  The patient has been educated in the following areas:   Dysphagia (Swallowing Impairment).        SLP GOALS       Row Name 10/24/24 4265 10/24/24 0849          (LTG) Patient will demonstrate functional swallow for    Diet Texture (Demonstrate functional swallow) regular textures  - --     Liquid viscosity (Demonstrate functional swallow) thin liquids  - --     [place] (Demonstrate functional swallow) independently (over 90% accuracy)  - --     Time Frame (Demonstrate functional swallow) 1 week  - --        (STG) Patient will  tolerate trials of    Consistencies Trialed (Tolerate trials) soft to chew (chopped) textures;thin liquids  -SM --     Desired Outcome (Tolerate trials) with adequate oral prep/transit/clearance;without signs/symptoms of aspiration  -SM --     [place] (Tolerate trials) independently (over 90% accuracy)  -SM --     Time Frame (Tolerate trials) 1 week  -SM --        (STG) Patient will tolerate therapeutic trials of    Consistencies Trialed (Tolerate therapeutic trials) regular textures  -SM --     Desired Outcome (Tolerate therapeutic trials) with adequate oral prep/transit/clearance  -SM --     [place] (Tolerate therapeutic trials) independently (over 90% accuracy)  -SM --     Time Frame (Tolerate therapeutic trials) 1 week  -SM --        Patient will demonstrate functional cognitive-linguistic skills for return to discharge environment    [place] -- with minimal cues  -     Time frame -- 1 week  -        SLP Diagnostic Treatment     Patient will participate in further assessment in the following areas -- reading comprehension;graphic expression  -     Time Frame (Diagnostic) -- 1 week  -        Comprehend Questions Goal 1 (SLP)    Improve Ability to Comprehend Questions Goal 1 (SLP) -- simple yes/no questions;simple wh questions;90%;with minimal cues (75-90%)  -     Time Frame (Comprehend Questions Goal 1, SLP) -- 1 week  -        Follow Directions Goal 2 (SLP)    Improve Ability to Follow Directions Goal 1 (SLP) -- 1 step direction without objects;100%;with minimal cues (75-90%)  -     Time Frame (Follow Directions Goal 1, SLP) -- 1 week  -        Word Retrieval Skills Goal 1 (SLP)    Improve Word Retrieval Skills By Goal 1 (SLP) -- completing open ended structured sentence;answer WH question with one word;100%;with minimal cues (75-90%)  -     Time Frame (Word Retrieval Goal 1, SLP) -- 1 week  -        Phonation Goal 1 (SLP)    Improve Phonation By Goal 1 (SLP) -- using loud  speech;90%;with minimal cues (75-90%)  -     Time Frame (Phonation Goal 1, SLP) -- 1 week  -SM        Attention Goal 1 (SLP)    Improve Attention by Goal 1 (SLP) -- attending to task;100%;with minimal cues (75-90%)  -     Time Frame (Attention Goal 1, SLP) -- 1 week  -        Orientation Goal 1 (SLP)    Improve Orientation Through Goal 1 (SLP) -- demonstrating orientation to month;demonstrating orientation to year;demonstrating orientation to place;demonstrating orientation to disease/impairment;100%;with moderate cues (50-74%)  -     Time Frame (Orientation Goal 1, SLP) -- 1 week  -               User Key  (r) = Recorded By, (t) = Taken By, (c) = Cosigned By      Initials Name Provider Type    Maria G Randolph MS CCC-SLP Speech and Language Pathologist                         Time Calculation:    Time Calculation- SLP       Row Name 10/24/24 1125 10/24/24 0933          Time Calculation- SLP    SLP Start Time 1050  -SM 0815  -     SLP Received On 10/24/24  -SM 10/24/24  -        Untimed Charges    05124-GY Eval Speech and Production w/ Language Minutes -- 25  -SM     37884-ZT Eval Oral Pharyng Swallow Minutes -- 25  -SM     21077-CC Motion Fluoro Eval Swallow Minutes 40  -SM --        Total Minutes    Untimed Charges Total Minutes 40  -SM 50  -SM      Total Minutes 40  -SM 50  -SM               User Key  (r) = Recorded By, (t) = Taken By, (c) = Cosigned By      Initials Name Provider Type    Maria G Randolph MS CCC-SLP Speech and Language Pathologist                    Therapy Charges for Today       Code Description Service Date Service Provider Modifiers Qty    73319026760 HC ST EVAL ORAL PHARYNG SWALLOW 2 10/24/2024 Maria G Hernández MS CCC-SLP GN 1    61810759174 HC ST EVAL SPEECH AND PROD W LANG  2 10/24/2024 Maria G Hernández MS CCC-SLP GN 1    [id number] HC ST MOTION FLUORO EVAL SWALLOW 3 10/24/2024 Maria G Hernández MS CCC-SLP GN 1                 Maria G BONE  MS Edgar CCC-SLP  10/24/2024

## 2024-10-24 NOTE — THERAPY EVALUATION
Acute Care - Speech Language Pathology   Swallow Initial Evaluation [facility]  Clinical Swallow Evaluation  Cognitive-Communication Evaluation     Patient Name: Olga Sandoval  : 1955  MRN: [id number]  Today's Date: 10/24/2024               Admit Date: 10/23/2024    Visit Dx:     ICD-10-CM ICD-9-CM   1. Left-sided weakness  R53.1 728.87   2. Slurred speech  R47.81 784.59   3. Acute dehydration  E86.0 276.51   4. Acute urinary tract infection  N39.0 599.0   5. Acute encephalopathy  G93.40 348.30   6. Dysphagia, unspecified type  R13.10 787.20   7. Cognitive communication deficit  R41.841 799.52     Patient Active Problem List   Diagnosis    Aphasia    Bipolar 2 disorder    Type 2 diabetes mellitus    Essential hypertension    Hyperlipidemia    Carotid stenosis, right    Obesity, Class III, BMI 40-49.9 (morbid obesity)    History of CVA (cerebrovascular accident)    Hypertension    Gastroesophageal reflux disease    Encounter for screening for colorectal cancer in high risk patient    Immunization due    Medicare annual wellness visit, subsequent    Lymphedema    Bleeding from right ear    Healthcare maintenance    Post-menopausal    Dysarthria     Past Medical History:   Diagnosis Date    Aneurysm     left carotid    Bipolar 2 disorder     Diabetes mellitus     H/O Bell's palsy     Left sided facial droop    History of loop recorder     Hyperlipidemia     Hypertension     Stroke     x 4     Past Surgical History:   Procedure Laterality Date    CARDIAC CATHETERIZATION      CATARACT EXTRACTION Bilateral      SECTION         SLP Recommendation and Plan  SLP Swallowing Diagnosis: mild, oral dysphagia, suspected pharyngeal dysphagia (10/24/24 0815)  SLP Diet Recommendation: soft to chew textures, chopped, nectar thick liquids (10/24/24 0815)  Recommended Precautions and Strategies: general aspiration precautions (10/24/24 0815)  SLP Rec. for Method of Medication Administration: meds whole, with puree  (10/24/24 0815)     Monitor for Signs of Aspiration: notify SLP if any concerns (10/24/24 0815)  Recommended Diagnostics: VFSS (MBS) (10/24/24 0815)     Anticipated Discharge Disposition (SLP): inpatient rehabilitation facility (10/24/24 0815)        Predicted Duration Therapy Intervention (Days): 1 week (10/24/24 0815)  Oral Care Recommendations: Oral Care BID/PRN, Toothbrush (10/24/24 0815)                                               SWALLOW EVALUATION (Last 72 Hours)       SLP Adult Swallow Evaluation       Row Name 10/24/24 0815                   Rehab Evaluation    Document Type evaluation  -SM        Subjective Information no complaints  -SM        Patient Observations alert;cooperative  -SM        Patient Effort good  -SM           General Information    Patient Profile Reviewed yes  -SM        Pertinent History Of Current Problem COPD, hx [place] palsy, hx multiple CVAs. Adm confusion, slurred speech, found on floor. CT negative, MRI pending. ? UTI.  -SM        Current Method of Nutrition NPO  -SM        Plans/Goals Discussed with patient;agreed upon  -SM        Barriers to Rehab none identified  -SM        Patient's Goals for Discharge patient could not state;patient did not state  -SM           Pain    Pretreatment Pain Rating 0/10 - no pain  -SM        Posttreatment Pain Rating 0/10 - no pain  -SM           Oral Musculature and Cranial Nerve Assessment    Oral Labial or Buccal Impairment, Detail, Cranial Nerve VII (Facial): right labial droop  -SM        Lingual Impairment, Detail. Cranial Nerves IX, XII (Glossopharyngeal and Hypoglossal) reduced lingual ROM;reduced strength;bilaterally  -SM           General Eating/Swallowing Observations    Respiratory Support Currently in Use room air  -SM           Clinical Swallow Eval    Oral Prep Phase impaired  -SM        Oral Transit impaired  -SM        Oral Residue WFL  -SM        Pharyngeal Phase suspected pharyngeal impairment  -SM        Esophageal Phase  unremarkable  -           Oral Prep Concerns    Oral Prep Concerns prolonged mastication  -        Prolonged Mastication regular consistencies  -           Oral Transit Concerns    Oral Transit Concerns increased oral transit time  -        Increased Oral Transit Time regular consistencies  -           Pharyngeal Phase Concerns    Pharyngeal Phase Concerns cough  -        Cough thin;other (see comments)  -        Pharyngeal Phase Concerns, Comment cough only initial trial though otherwise concern for swallow sequence timing and control of bolus.  -           SLP Evaluation Clinical Impression    SLP Swallowing Diagnosis mild;oral dysphagia;suspected pharyngeal dysphagia  -        Functional Impact risk of aspiration/pneumonia  -           Recommendations    SLP Diet Recommendation soft to chew textures;chopped;nectar thick liquids  -        Recommended Diagnostics VFSS (MBS)  -        Recommended Precautions and Strategies general aspiration precautions  -        Oral Care Recommendations Oral Care BID/PRN;Toothbrush  -        SLP Rec. for Method of Medication Administration meds whole;with puree  -        Monitor for Signs of Aspiration notify SLP if any concerns  -                  User Key  (r) = Recorded By, (t) = Taken By, (c) = Cosigned By      Initials Name Effective Dates     Maria G Hernández MS CCC-SLP 02/03/23 -                     EDUCATION  The patient has been educated in the following areas:   Dysphagia (Swallowing Impairment) Modified Diet Instruction.        SLP GOALS       Row Name 10/24/24 0815             Patient will demonstrate functional cognitive-linguistic skills for return to discharge environment    [place] with minimal cues  -      Time frame 1 week  -         SLP Diagnostic Treatment     Patient will participate in further assessment in the following areas reading comprehension;graphic expression  -      Time Frame (Diagnostic) 1 week  -          Comprehend Questions Goal 1 (SLP)    Improve Ability to Comprehend Questions Goal 1 (SLP) simple yes/no questions;simple wh questions;90%;with minimal cues (75-90%)  -SM      Time Frame (Comprehend Questions Goal 1, SLP) 1 week  -SM         Follow Directions Goal 2 (SLP)    Improve Ability to Follow Directions Goal 1 (SLP) 1 step direction without objects;100%;with minimal cues (75-90%)  -SM      Time Frame (Follow Directions Goal 1, SLP) 1 week  -SM         Word Retrieval Skills Goal 1 (SLP)    Improve Word Retrieval Skills By Goal 1 (SLP) completing open ended structured sentence;answer WH question with one word;100%;with minimal cues (75-90%)  -SM      Time Frame (Word Retrieval Goal 1, SLP) 1 week  -SM         Phonation Goal 1 (SLP)    Improve Phonation By Goal 1 (SLP) using loud speech;90%;with minimal cues (75-90%)  -      Time Frame (Phonation Goal 1, SLP) 1 week  -SM         Attention Goal 1 (SLP)    Improve Attention by Goal 1 (SLP) attending to task;100%;with minimal cues (75-90%)  -SM      Time Frame (Attention Goal 1, SLP) 1 week  -SM         Orientation Goal 1 (SLP)    Improve Orientation Through Goal 1 (SLP) demonstrating orientation to month;demonstrating orientation to year;demonstrating orientation to place;demonstrating orientation to disease/impairment;100%;with moderate cues (50-74%)  -      Time Frame (Orientation Goal 1, SLP) 1 week  -                User Key  (r) = Recorded By, (t) = Taken By, (c) = Cosigned By      Initials Name Provider Type    Maria G Randolph MS CCC-SLP Speech and Language Pathologist                         Time Calculation:    Time Calculation- SLP       Row Name 10/24/24 0993             Time Calculation- SLP    SLP Start Time 0815  -      SLP Received On 10/24/24  -         Untimed Charges    72073-AP Eval Speech and Production w/ Language Minutes 25  -SM      91789-MY Eval Oral Pharyng Swallow Minutes 25  -SM         Total Minutes    Untimed  Charges Total Minutes 50  -SM       Total Minutes 50  -SM                User Key  (r) = Recorded By, (t) = Taken By, (c) = Cosigned By      Initials Name Provider Type    Maria G Randolph MS CCC-SLP Speech and Language Pathologist                    Therapy Charges for Today       Code Description Service Date Service Provider Modifiers Qty    16638765469 HC ST EVAL ORAL PHARYNG SWALLOW 2 10/24/2024 Maria G Hernández, MS CCC-SLP GN 1    64633162322 HC ST EVAL SPEECH AND PROD W LANG  2 10/24/2024 Maria G Hernández, MS CCC-SLP GN 1                 Maria G PACK. MS Edgar CCC-SLP  10/24/2024   and Acute Care - Speech Language Pathology Initial Evaluation  [facility]     Patient Name: Olga Sandoval  : 1955  MRN: [id number]  Today's Date: 10/24/2024               Admit Date: 10/23/2024     Visit Dx:    ICD-10-CM ICD-9-CM   1. Left-sided weakness  R53.1 728.87   2. Slurred speech  R47.81 784.59   3. Acute dehydration  E86.0 276.51   4. Acute urinary tract infection  N39.0 599.0   5. Acute encephalopathy  G93.40 348.30   6. Dysphagia, unspecified type  R13.10 787.20   7. Cognitive communication deficit  R41.841 799.52     Patient Active Problem List   Diagnosis    Aphasia    Bipolar 2 disorder    Type 2 diabetes mellitus    Essential hypertension    Hyperlipidemia    Carotid stenosis, right    Obesity, Class III, BMI 40-49.9 (morbid obesity)    History of CVA (cerebrovascular accident)    Hypertension    Gastroesophageal reflux disease    Encounter for screening for colorectal cancer in high risk patient    Immunization due    Medicare annual wellness visit, subsequent    Lymphedema    Bleeding from right ear    Healthcare maintenance    Post-menopausal    Dysarthria     Past Medical History:   Diagnosis Date    Aneurysm     left carotid    Bipolar 2 disorder     Diabetes mellitus     H/O Bell's palsy     Left sided facial droop    History of loop recorder     Hyperlipidemia     Hypertension      Stroke     x 4     Past Surgical History:   Procedure Laterality Date    CARDIAC CATHETERIZATION      CATARACT EXTRACTION Bilateral      SECTION         SLP Recommendation and Plan  SLP Diagnosis: moderate, cognitive-linguistic disorder (10/24/24 0815)        Monitor for Signs of Aspiration: notify SLP if any concerns (10/24/24 0815)     [facility] Criteria for Skilled Therapy Interventions Met: yes (10/24/24 0815)  Anticipated Discharge Disposition (SLP): inpatient rehabilitation facility (10/24/24 0815)        Therapy Frequency (SLP SLC): 5 days per week (10/24/24 0815)  Predicted Duration Therapy Intervention (Days): 1 week (10/24/24 0815)  Oral Care Recommendations: Oral Care BID/PRN, Toothbrush (10/24/24 0815)                                 SLP EVALUATION (Last 72 Hours)       SLP SLC Evaluation       Row Name 10/24/24 0815                   Comprehension Assessment/Intervention    Comprehension Assessment/Intervention Auditory Comprehension  -SM           Auditory Comprehension Assessment/Intervention    Answers Questions (Communication) yes/no;wh questions;personal;simple;moderate impairment  -SM        Able to Follow Commands (Communication) 1-step;moderate impairment  -SM        Narrative Discourse conversational level;severe impairment  -SM        Successful Auditory Strategies (Communication) repetition;visual cues;other (see comments)  -SM        Auditory Comprehension Communication, Comment best with closed-ended Y/N  -SM           Expression Assessment/Intervention    Expression Assessment/Intervention verbal expression  -SM           Verbal Expression Assessment/Intervention    Spontaneous/Functional Words simple;moderate impairment  -SM        Conversational Discourse/Fluency severe impairment  -SM        Verbal Expression, Comment Decreased word retrieval and thought formation. Needs prompts and cues to assist  -SM           Motor Speech Assessment/Intervention    Motor Speech Function mild  impairment  -        Characteristics Consistent with Dysarthria decreased intensity;decreased articulation  -        Speech intelligibility 90%;in quiet environment;with unfamiliar listener  -           Cursory Voice Assessment/Intervention    Quality and Resonance (Voice) WFL  -           Cognitive Assessment Intervention- SLP    Orientation Status (Cognition) person;other (see comments)  to first name only.  -        Attention (Cognitive) selective;mild impairment;sustained;moderate impairment  -        Cognition, Comment Difficult to further assess 2' limited output/response  -           SLP Evaluation Clinical Impressions    SLP Diagnosis moderate;cognitive-linguistic disorder  -        Rehab Potential/Prognosis good  -        SLC Criteria for Skilled Therapy Interventions Met yes  -        Functional Impact difficulty communicating wants, needs;unable to make medical decisions  -           Recommendations    Therapy Frequency (SLP SLC) 5 days per week  -        Predicted Duration Therapy Intervention (Days) 1 week  -        Anticipated Discharge Disposition (SLP) inpatient rehabilitation facility  -                  User Key  (r) = Recorded By, (t) = Taken By, (c) = Cosigned By      Initials Name Effective Dates     Maria G Hernández MS CCC-SLP 02/03/23 -                        EDUCATION  The patient has been educated in the following areas:     Cognitive Impairment Communication Impairment.           SLP GOALS       Row Name 10/24/24 0815             Patient will demonstrate functional cognitive-linguistic skills for return to discharge environment    [place] with minimal cues  -      Time frame 1 week  -         SLP Diagnostic Treatment     Patient will participate in further assessment in the following areas reading comprehension;graphic expression  -      Time Frame (Diagnostic) 1 week  -         Comprehend Questions Goal 1 (SLP)    Improve Ability to  Comprehend Questions Goal 1 (SLP) simple yes/no questions;simple wh questions;90%;with minimal cues (75-90%)  -SM      Time Frame (Comprehend Questions Goal 1, SLP) 1 week  -SM         Follow Directions Goal 2 (SLP)    Improve Ability to Follow Directions Goal 1 (SLP) 1 step direction without objects;100%;with minimal cues (75-90%)  -SM      Time Frame (Follow Directions Goal 1, SLP) 1 week  -SM         Word Retrieval Skills Goal 1 (SLP)    Improve Word Retrieval Skills By Goal 1 (SLP) completing open ended structured sentence;answer WH question with one word;100%;with minimal cues (75-90%)  -SM      Time Frame (Word Retrieval Goal 1, SLP) 1 week  -SM         Phonation Goal 1 (SLP)    Improve Phonation By Goal 1 (SLP) using loud speech;90%;with minimal cues (75-90%)  -SM      Time Frame (Phonation Goal 1, SLP) 1 week  -SM         Attention Goal 1 (SLP)    Improve Attention by Goal 1 (SLP) attending to task;100%;with minimal cues (75-90%)  -SM      Time Frame (Attention Goal 1, SLP) 1 week  -SM         Orientation Goal 1 (SLP)    Improve Orientation Through Goal 1 (SLP) demonstrating orientation to month;demonstrating orientation to year;demonstrating orientation to place;demonstrating orientation to disease/impairment;100%;with moderate cues (50-74%)  -      Time Frame (Orientation Goal 1, SLP) 1 week  -SM                User Key  (r) = Recorded By, (t) = Taken By, (c) = Cosigned By      Initials Name Provider Type    Maria G Randolph MS CCC-SLP Speech and Language Pathologist                              Time Calculation:      Time Calculation- SLP       Row Name 10/24/24 0933             Time Calculation- SLP    SLP Start Time 0815  -      SLP Received On 10/24/24  -         Untimed Charges    09764-HQ Eval Speech and Production w/ Language Minutes 25  -SM      41804-BL Eval Oral Pharyng Swallow Minutes 25  -SM         Total Minutes    Untimed Charges Total Minutes 50  -SM       Total Minutes 50  -SM                 User Key  (r) = Recorded By, (t) = Taken By, (c) = Cosigned By      Initials Name Provider Type    Maria G Randolph, MS CCC-SLP Speech and Language Pathologist                    Therapy Charges for Today       Code Description Service Date Service Provider Modifiers Qty    81798088717 HC ST EVAL ORAL PHARYNG SWALLOW 2 10/24/2024 Maria G Hernández MS CCC-SLP GN 1    [id number] HC ST EVAL SPEECH AND PROD W LANG  2 10/24/2024 Maria G Hernández, MS CCC-SLP GN 1                       Maria G Hernández MS CCC-SLP  10/24/2024

## 2024-10-24 NOTE — PLAN OF CARE
Goal Outcome Evaluation:  Plan of Care Reviewed With: (P) patient        Progress: (P) no change  Outcome Evaluation: (P) OT eval complete. Pt presents w/ generalized weakness, balance and cognitive deficits, and decreased activity tolerane impacting ADL performance. Pt was Mod A x2 w/ BUE support for STS from EOB to chair. Continue IP OT to address current deficits. Rec SNF at d/c for best functional outcome.    Anticipated Discharge Disposition (OT): (P) skilled nursing facility

## 2024-10-24 NOTE — NURSING NOTE
"[facility] consult for \"  Stage 1-blanchable red spot present on admission towards middle coccyx/R gluteal region        10/24/24 0125   &     MASD Present on admission in the groin and under bilateral breasts   \"   Visited patient and noted yeast dermatitis to left breast and groin area.  Antifungal powder applied.    Patient does have some areas of discoloration on backside/Gluteal's.  Nothing appears to be deep. Epidermis is barely peeling in some areas.  Everything appears to be blanchable.  Recommend just preventative care.    Does have slightly darker discolored area on the left Gluteal, dry blanchable wound bed..  Possibly a healing pressure injury.        Will sign off.  "

## 2024-10-24 NOTE — PLAN OF CARE
Goal Outcome Evaluation:  Plan of Care Reviewed With: patient           Outcome Evaluation: PT PRESENTS WITH APHASIA, GENERALIZED WEAKNESS,  IMPAIRED BALANCE, AND DECLINE IN FUNCTIONAL MOBILITY.  AT BASELINE, PT HAS KNEE FLEXION CONTRACTURES, AND B LE CHRONIC LYMPHEDEMA.  PT CURRENTLY REQUIRES MOD ASSIST OF 2 FOR PIVOT TRANSFER BED TO RECLINER. PT DEMONSTRATES  L KNEE BUCKLING AND REMAINS FLEXED AT HIPS/KNEES THROUGHOUT. RECOMMEND SNF AT D/C FOR BEST OUTCOME.    Anticipated Discharge Disposition (PT): skilled nursing facility

## 2024-10-24 NOTE — PROGRESS NOTES
Stroke Neurology Progress Note     Subjective     This patient was seen in follow-up for: encephalopathy   Present for the encounter were: self, patient     Subjective:  My first encounter with the patient.  The patient was admitted overnight. She appears confused and is disoriented.  If she is unable to describe the stroke picture or name.  She is able to repeat.  Strength 3/5 throughout.  Neck supple.  Will attempt to call family to obtain additional history.    Objective      Temp:  [98.1 °F (36.7 °C)-100.5 °F (38.1 °C)] 98.6 °F (37 °C)  Heart Rate:  [] 105  Resp:  [18-20] 20  BP: (136-199)/() 189/104            Objective    Physical Exam:  General Appearance: Alert, confused appearing  HEENT: anicteric sclera, no scleral injection.  Neck is supple.  Lungs: respirations appear comfortable, no obvious increased work of breathing  Extremities: No cyanosis or fingernail clubbing   Skin: No rashes in exposed skin areas     Neurological Examination:   Mental status: Alert.  Oriented to person only. No dysarthria.  Unable to describe the stroke picture.  Unable to name.  Able to repeat.  Cranial Nerves: Visual fields intact. Extraocular movements intact with no nystagmus. Midline gaze. Face symmetric.    Sensory: Response to light touch in all extremities.    Motor: Normal tone. Absent pronator drift.  Strength: 3/5 in all extremities with encouragement      Labs:    Lab Results   Component Value Date    HGBA1C 5.4 09/16/2024      Lab Results   Component Value Date    CHOL 133 09/16/2024    CHLPL 181 03/24/2021    TRIG 126 09/16/2024    HDL 57 09/16/2024    LDL 54 09/16/2024       Lab Results   Component Value Date    WBC 16.23 (H) 10/23/2024    HGB 13.8 10/23/2024    HCT 43.1 10/23/2024    MCV 88.9 10/23/2024     10/23/2024     Lab Results   Component Value Date    GLUCOSE 125 (H) 10/23/2024    BUN 25 (H) 10/23/2024    CREATININE 1.32 (H) 10/23/2024    EGFRIFNONA 66 03/24/2021    EGFRIFAFRI 77  "03/24/2021    BCR 18.9 10/23/2024    CO2 21.0 (L) 10/23/2024    CALCIUM 10.1 10/23/2024    ALBUMIN 4.5 10/23/2024    AST 18 10/23/2024    AST 18 10/23/2024    ALT 9 10/23/2024    ALT 9 10/23/2024       Results from last 7 days   Lab Units 10/23/24  1732 10/23/24  1731 10/23/24  1726   SODIUM mmol/L 141  --   --    POTASSIUM mmol/L 4.4  --   --    CHLORIDE mmol/L 102  --   --    CO2 mmol/L 21.0*  --   --    BUN mg/dL 25*  --   --    CREATININE mg/dL 1.32* 1.40* 1.40*   CALCIUM mg/dL 10.1  --   --    BILIRUBIN mg/dL 0.6  --   --    ALK PHOS U/L 104  --   --    ALT (SGPT) U/L 9  9  --   --    AST (SGOT) U/L 18  18  --   --    GLUCOSE mg/dL 125*  --   --        No results found for: \"BLOODCX\"  UA          10/23/2024    19:37   Urinalysis   Squamous Epithelial Cells, UA 21-30    Specific Gravity, UA 1.093    Ketones, UA 15 mg/dL (1+)    Blood, UA Trace    Leukocytes, UA Moderate (2+)    Nitrite, UA Negative    RBC, UA 3-5    WBC, UA Too Numerous to Count    Bacteria, UA 2+      Lab Results   Component Value Date    URINECX No growth 10/23/2024       Results Review:      All brain images and reports were personally reviewed and I agree with the interpretations except as noted below.    CT Angiogram Head and Neck 10/23/2024  Impression: No acute arterial abnormality of the head and neck. Chronic moderate stenoses noted within the bilateral cervical internal carotid arteries secondary to calcified and noncalcified plaques. Please note that the stenosis may be exaggerated due to motion artifact in this region. Additionally there is at least moderate stenoses noted within the bilateral PCAs distally. 1.0 cm aneurysm arising from the left cavernous segment ICA.     CT Head Without Contrast Stroke Protocol 10/23/2024  Impression: No acute intracranial abnormality. Specifically no acute intracranial hemorrhage.         Assessment/Plan     Assessment:    Olga Sandoval is a 69-year-old female with a past medical history " significant for carotid artery aneurysm status post pipeline embolization (6/27/2024, [facility]), COPD, frequent falls, hyperlipidemia, hypertension, remote Bell's palsy, bipolar disorder, prior tobacco abuse, chronic right frontal and left occipital infarcts, BMI 42, and implantable loop recorder (2019) who presented to [facility] emergency department via EMS with complaints of worsening confusion, slurred speech, and vomiting.  Stroke neurology was consulted for further evaluation.  CT head 10/23 did not reveal intracranial abnormality.  CTA head and neck 10/23 revealed moderate stenoses bilateral internal carotid arteries and bilateral PCAs, as well as 1 cm left ICA aneurysm.  On exam, the patient is confused and aphasic.    # Encephalopathy  # Speech deficits per daughter this is her baseline since her stent placement.   Urinalysis reveals urinary tract infection with too numerous to count WBC, 2+ bacteria, moderate leukocytes.  I suspect this is the cause of the patient's encephalopathy and speech deficits.      -Continue aspirin 81 mg daily and ticagrelor 90 mg BID  -Continue atorvastatin 80 mg daily (LDL 54)  -MRI pending  -TTE pending  -PT OT pending  -Neuro-checks per unit protocol while inpatient  -Blood pressure goal: permissive  -DVT prophylaxis: SCD    # 1 cm left ICA aneurysm  -Follow-up in neurosurgery clinic in 1 year for repeat CTA head and neck   -discussed bleed risk with patient daughter         Stroke neurology will follow results of above workup.  Please call with questions.     Dimple Huertas MD  [facility] STROKE NEURO  10/24/24  14:01 EDT

## 2024-10-24 NOTE — PLAN OF CARE
Goal Outcome Evaluation:  Plan of Care Reviewed With: patient                Anticipated Discharge Disposition (SLP): inpatient rehabilitation facility    SLP Diagnosis: moderate, cognitive-linguistic disorder (10/24/24 0815)     SLP Swallowing Diagnosis: mild, oral dysphagia, suspected pharyngeal dysphagia (10/24/24 0815)      Modified PO diet. MBS to follow today.

## 2024-10-24 NOTE — PLAN OF CARE
Goal Outcome Evaluation:  Plan of Care Reviewed With: patient                Anticipated Discharge Disposition (SLP): inpatient rehabilitation facility         SLP Swallowing Diagnosis: mild, oral dysphagia (10/24/24 8910)

## 2024-10-24 NOTE — THERAPY EVALUATION
Patient Name: Olga Sandoval  : 1955    MRN: [id number]                              Today's Date: 10/24/2024       Admit Date: 10/23/2024    Visit Dx:     ICD-10-CM ICD-9-CM   1. Left-sided weakness  R53.1 728.87   2. Slurred speech  R47.81 784.59   3. Acute dehydration  E86.0 276.51   4. Acute urinary tract infection  N39.0 599.0   5. Acute encephalopathy  G93.40 348.30   6. Oral phase dysphagia  R13.11 787.21   7. Cognitive communication deficit  R41.841 799.52     Patient Active Problem List   Diagnosis    Aphasia    Bipolar 2 disorder    Type 2 diabetes mellitus    Essential hypertension    Hyperlipidemia    Carotid stenosis, right    Obesity, Class III, BMI 40-49.9 (morbid obesity)    History of CVA (cerebrovascular accident)    Hypertension    Gastroesophageal reflux disease    Encounter for screening for colorectal cancer in high risk patient    Immunization due    Medicare annual wellness visit, subsequent    Lymphedema    Bleeding from right ear    Healthcare maintenance    Post-menopausal    Dysarthria     Past Medical History:   Diagnosis Date    Aneurysm     left carotid    Bipolar 2 disorder     Diabetes mellitus     H/O Bell's palsy     Left sided facial droop    History of loop recorder     Hyperlipidemia     Hypertension     Stroke     x 4     Past Surgical History:   Procedure Laterality Date    CARDIAC CATHETERIZATION      CATARACT EXTRACTION Bilateral      SECTION        General Information       Row Name 10/24/24 1542          OT Time and Intention    Document Type evaluation (P)   -     Mode of Treatment occupational therapy (P)   -       Row Name 10/24/24 1542          General Information    Patient Profile Reviewed yes (P)   -     Prior Level of Function -- (P)   Pt poor historian, aphasic, pt reported needing assistance w/ ADLs, pt uses w/c at baseline  -     Existing Precautions/Restrictions fall (P)   -     Barriers to Rehab medically complex;previous functional  deficit;cognitive status (P)   -[facility] Name 10/24/24 1542          Living Environment    People in Home child(janki), adult (P)   -[facility] Name 10/24/24 1542          Home Main Entrance    Number of Stairs, Main Entrance none (P)   -[facility] Name 10/24/24 1542          Stairs Within Home, Primary    Number of Stairs, Within Home, Primary none (P)   -[facility] Name 10/24/24 1542          Cognition    Orientation Status (Cognition) disoriented to;person;place;situation;time (P)   Able to state she was in hospital after given choices  -[facility] Name 10/24/24 1542          Safety Issues/Impairments Affecting Functional Mobility    Safety Issues Affecting Function (Mobility) awareness of need for assistance;insight into deficits/self-awareness;judgment;problem-solving;safety precaution awareness;safety precautions follow-through/compliance;sequencing abilities (P)   -     Impairments Affecting Function (Mobility) balance;cognition;endurance/activity tolerance;range of motion (ROM);shortness of breath;strength (P)   -     Cognitive Impairments, Mobility Safety/Performance awareness, need for assistance;insight into deficits/self-awareness;judgment;problem-solving/reasoning;safety precaution awareness;safety precaution follow-through;sequencing abilities (P)   -               User Key  (r) = Recorded By, (t) = Taken By, (c) = Cosigned By      Initials Name Provider Type     Julia Mcrae, OT Student OT Student                     Mobility/ADL's       [facility] Name 10/24/24 1548          Bed Mobility    Bed Mobility supine-sit (P)   -     Supine-Sit [place] (Bed Mobility) 1 person assist;contact guard (P)   -     Assistive Device (Bed Mobility) bed rails;head of bed elevated (P)   -[facility] Name 10/24/24 1548          Transfers    Transfers sit-stand transfer;stand-sit transfer;bed-chair transfer (P)   -[facility] Name 10/24/24 1548          Bed-Chair Transfer    Bed-Chair  [place] (Transfers) moderate assist (50% patient effort);2 person assist (P)   -     Assistive Device (Bed-Chair Transfers) other (see comments) (P)   BUE support  -     Comment, (Bed-Chair Transfer) Few steps from EOB to chair (P)   -       Row Name 10/24/24 1548          Sit-Stand Transfer    Sit-Stand [place] (Transfers) moderate assist (50% patient effort);2 person assist (P)   -     Assistive Device (Sit-Stand Transfers) other (see comments) (P)   BUE support  -       Row Name 10/24/24 1548          Stand-Sit Transfer    Stand-Sit [place] (Transfers) moderate assist (50% patient effort);2 person assist (P)   -     Assistive Device (Stand-Sit Transfers) other (see comments) (P)   BUE support  -       Row Name 10/24/24 1548          Functional Mobility    Patient was able to Ambulate yes (P)   -[facility] Name 10/24/24 1548          Activities of Daily Living    BADL Assessment/Intervention grooming;lower body dressing (P)   -BH       Row Name 10/24/24 1548          Grooming Assessment/Training    [place] Level (Grooming) hair care, combing/brushing;set up (P)   -     Position (Grooming) supported sitting (P)   -       Row Name 10/24/24 1548          Lower Body Dressing Assessment/Training    [place] Level (Lower Body Dressing) don;socks;dependent (less than 25% patient effort) (P)   -               User Key  (r) = Recorded By, (t) = Taken By, (c) = Cosigned By      Initials Name Provider Type     Julia Mcrae OT Student OT Student                   Obj/Interventions       [facility] Name 10/24/24 1550          Sensory Assessment (Somatosensory)    Sensory Assessment (Somatosensory) sensation intact (P)   -BH       Row Name 10/24/24 1550          Vision Assessment/Intervention    Visual Impairment/Limitations WFL (P)   -BH       Row Name 10/24/24 1550          Range of Motion Comprehensive    General Range of Motion bilateral upper extremity ROM WFL (P)   -        Row Name 10/24/24 1551          Strength Comprehensive (MMT)    General Manual Muscle Testing (MMT) Assessment upper extremity strength deficits identified (P)   -     Comment, General Manual Muscle Testing (MMT) Assessment BUE MMT 4-/5 (P)   -       Row Name 10/24/24 3565          Balance    Balance Assessment sitting static balance;sitting dynamic balance;sit to stand dynamic balance;standing static balance;standing dynamic balance (P)   -     Static Sitting Balance contact guard (P)   -     Dynamic Sitting Balance contact guard (P)   -     Position, Sitting Balance unsupported;sitting edge of bed (P)   -     Sit to Stand Dynamic Balance moderate assist;2-person assist (P)   -     Static Standing Balance moderate assist (P)   -     Dynamic Standing Balance moderate assist (P)   -     Position/Device Used, Standing Balance supported;other (see comments) (P)   BUE support  -     Balance Interventions sitting;standing;sit to stand;supported;static;dynamic;occupation based/functional task (P)   -     Comment, Balance No overt LOB (P)   -               User Key  (r) = Recorded By, (t) = Taken By, (c) = Cosigned By      Initials Name Provider Type     Julia Mcrae, OT Student OT Student                   Goals/Plan       [facility] Name 10/24/24 3615          Bed Mobility Goal 1 (OT)    Activity/Assistive Device (Bed Mobility Goal 1, OT) sit to supine/supine to sit (P)   -     [place] Level/Cues Needed (Bed Mobility Goal 1, OT) modified independence (P)   -     Time Frame (Bed Mobility Goal 1, OT) short term goal (STG);3 days (P)   -     Progress/Outcomes (Bed Mobility Goal 1, OT) new goal (P)   -[facility] Name 10/24/24 7735          Transfer Goal 1 (OT)    Activity/Assistive Device (Transfer Goal 1, OT) bed-to-chair/chair-to-bed;toilet (P)   -     [place] Level/Cues Needed (Transfer Goal 1, OT) minimum assist (75% or more patient effort) (P)   -     Time Frame (Transfer  Goal 1, OT) short term goal (STG);3 days (P)   -     Progress/Outcome (Transfer Goal 1, OT) new goal (P)   -       Row Name 10/24/24 5304          Dressing Goal 1 (OT)    Activity/Device (Dressing Goal 1, OT) lower body dressing (P)   -     [place]/Cues Needed (Dressing Goal 1, OT) minimum assist (75% or more patient effort) (P)   -     Time Frame (Dressing Goal 1, OT) long term goal (LTG);5 days (P)   -     Progress/Outcome (Dressing Goal 1, OT) new goal (P)   -       Row Name 10/24/24 2050          Therapy Assessment/Plan (OT)    Planned Therapy Interventions (OT) activity tolerance training;adaptive equipment training;BADL retraining;functional balance retraining;occupation/activity based interventions;patient/caregiver education/training;ROM/therapeutic exercise;strengthening exercise;transfer/mobility retraining (P)   -               User Key  (r) = Recorded By, (t) = Taken By, (c) = Cosigned By      Initials Name Provider Type     Julia Mcrae, OT Student OT Student                   Clinical Impression       Row Name 10/24/24 8981          Pain Assessment    Pretreatment Pain Rating 0/10 - no pain (P)   -     Posttreatment Pain Rating 0/10 - no pain (P)   -       Row Name 10/24/24 2971          Plan of Care Review    Plan of Care Reviewed With patient (P)   -     Progress no change (P)   -     Outcome Evaluation OT eval complete. Pt presents w/ generalized weakness, balance and cognitive deficits, and decreased activity tolerane impacting ADL performance. Pt was Mod A x2 w/ BUE support for STS from EOB to chair. Continue IP OT to address current deficits. Rec SNF at d/c for best functional outcome. (P)   -       Row Name 10/24/24 4762          Therapy Assessment/Plan (OT)    Patient/Family Therapy Goal Statement (OT) Return to PLOF (P)   -     Rehab Potential (OT) fair (P)   -     Criteria for Skilled Therapeutic Interventions Met (OT) yes;meets criteria;skilled  treatment is necessary (P)   -     Therapy Frequency (OT) daily (P)   -     Predicted Duration of Therapy Intervention (OT) 5 days (P)   -       Row Name 10/24/24 1541          Therapy Plan Review/Discharge Plan (OT)    Anticipated Discharge Disposition (OT) skilled nursing facility (P)   -       Row Name 10/24/24 1551          Vital Signs    Pre Systolic BP Rehab 182 (P)   -BH     Pre Treatment Diastolic BP 70 (P)   -BH     Post Systolic BP Rehab 187 (P)   -BH     Post Treatment Diastolic  (P)   -BH     Pretreatment Heart Rate (beats/min) 84 (P)   -BH     Posttreatment Heart Rate (beats/min) 89 (P)   -BH     Pre SpO2 (%) 99 (P)   -     O2 Delivery Pre Treatment room air (P)   -     O2 Delivery Intra Treatment room air (P)   -     O2 Delivery Post Treatment room air (P)   -     Pre Patient Position Supine (P)   -     Intra Patient Position Standing (P)   -     Post Patient Position Sitting (P)   -       Row Name 10/24/24 1061          Positioning and Restraints    Pre-Treatment Position in bed (P)   -     Post Treatment Position chair (P)   -     In Chair notified nsg;reclined;call light within reach;encouraged to call for assist;exit alarm on;with nsg;waffle cushion;on mechanical lift sling;legs elevated (P)   -               User Key  (r) = Recorded By, (t) = Taken By, (c) = Cosigned By      Initials Name Provider Type     Julia Mcrae, OT Student OT Student                   Outcome Measures       Row Name 10/24/24 2789          How much help from another is currently needed...    Putting on and taking off regular lower body clothing? 1 (P)   -     Bathing (including washing, rinsing, and drying) 1 (P)   -     Toileting (which includes using toilet bed pan or urinal) 2 (P)   -     Putting on and taking off regular upper body clothing 2 (P)   -     Taking care of personal grooming (such as brushing teeth) 3 (P)   -BH     Eating meals 3 (P)   -BH     AM-PAC 6 Clicks  Score (OT) 12 (P)   -       Row Name 10/24/24 1200 10/24/24 0800       How much help from another person do you currently need...    Turning from your back to your side while in flat bed without using bedrails? 4  -OB 3  -OB    Moving from lying on back to sitting on the side of a flat bed without bedrails? 4  -OB 3  -OB    Moving to and from a bed to a chair (including a wheelchair)? 1  -OB 2  -OB    Standing up from a chair using your arms (e.g., wheelchair, bedside chair)? 1  -OB 1  -OB    Climbing 3-5 steps with a railing? 1  -OB 1  -OB    To walk in hospital room? 1  -OB 1  -OB    AM-PAC 6 Clicks Score (PT) 12  -OB 11  -OB      Row Name 10/24/24 1554          Functional Assessment    Outcome Measure Options AM-PAC 6 Clicks Daily Activity (OT) (P)   -               User Key  (r) = Recorded By, (t) = Taken By, (c) = Cosigned By      Initials Name Provider Type    OB Srihdar Parekh, RN Registered Nurse     Julia Mcrae, OT Student OT Student                    Occupational Therapy Education       Title: PT OT SLP Therapies (In Progress)       Topic: Occupational Therapy (In Progress)       Point: ADL training (Done)       Description:   Instruct learner(s) on proper safety adaptation and remediation techniques during self care or transfers.   Instruct in proper use of assistive devices.                  Learning Progress Summary            Patient Acceptance, E, VU,NR by  at 10/24/2024 1555                      Point: Home exercise program (Not Started)       Description:   Instruct learner(s) on appropriate technique for monitoring, assisting and/or progressing therapeutic exercises/activities.                  Learner Progress:  Not documented in this visit.              Point: Precautions (Done)       Description:   Instruct learner(s) on prescribed precautions during self-care and functional transfers.                  Learning Progress Summary            Patient Acceptance, E, VU,NR by  at  10/24/2024 7075                      Point: Body mechanics (Done)       Description:   Instruct learner(s) on proper positioning and spine alignment during self-care, functional mobility activities and/or exercises.                  Learning Progress Summary            Patient Acceptance, E, VU,NR by  at 10/24/2024 8093                                      User Key       Initials Effective Dates Name Provider Type Discipline     08/08/24 -  Julia Mcrae, OT Student OT Student OT                  OT Recommendation and Plan  Planned Therapy Interventions (OT): (P) activity tolerance training, adaptive equipment training, BADL retraining, functional balance retraining, occupation/activity based interventions, patient/caregiver education/training, ROM/therapeutic exercise, strengthening exercise, transfer/mobility retraining  Therapy Frequency (OT): (P) daily  Plan of Care Review  Plan of Care Reviewed With: (P) patient  Progress: (P) no change  Outcome Evaluation: (P) OT eval complete. Pt presents w/ generalized weakness, balance and cognitive deficits, and decreased activity tolerane impacting ADL performance. Pt was Mod A x2 w/ BUE support for STS from EOB to chair. Continue IP OT to address current deficits. Rec SNF at d/c for best functional outcome.     Time Calculation:   Evaluation Complexity (OT)  Review Occupational Profile/Medical/Therapy History Complexity: (P) brief/low complexity  Assessment, Occupational Performance/Identification of Deficit Complexity: (P) 3-5 performance deficits  Clinical Decision Making Complexity (OT): (P) problem focused assessment/low complexity  Overall Complexity of Evaluation (OT): (P) low complexity     Time Calculation- OT       Row Name 10/24/24 2416             Time Calculation-     OT Start Time 1519 (P)   -      OT Received On 10/24/24 (P)   -      OT Goal Re-Cert Due Date 11/03/24 (P)   -         Untimed Charges    OT Eval/Re-eval Minutes 50 (P)   -          Total Minutes    Untimed Charges Total Minutes 50 (P)   -       Total Minutes 50 (P)   -                User Key  (r) = Recorded By, (t) = Taken By, (c) = Cosigned By      Initials Name Provider Type     Julia Mcrae, OT Student OT Student                  Therapy Charges for Today       Code Description Service Date Service Provider Modifiers Qty    [id number] HC OT EVAL LOW COMPLEXITY 4 10/24/2024 Julai Mcrae OT [place] GO 1                 GUILHERME Zepeda  10/24/2024

## 2024-10-25 PROBLEM — N39.0 UTI (URINARY TRACT INFECTION): Status: ACTIVE | Noted: 2024-10-25

## 2024-10-25 LAB
AMMONIA BLD-SCNC: <10 UMOL/L (ref 11–51)
AMPHET+METHAMPHET UR QL: NEGATIVE
AMPHETAMINES UR QL: NEGATIVE
BACTERIA SPEC AEROBE CULT: NORMAL
BARBITURATES UR QL SCN: NEGATIVE
BASOPHILS # BLD AUTO: 0.05 10*3/MM3 (ref 0–0.2)
BASOPHILS NFR BLD AUTO: 0.5 % (ref 0–1.5)
BENZODIAZ UR QL SCN: NEGATIVE
BUPRENORPHINE SERPL-MCNC: NEGATIVE NG/ML
CANNABINOIDS SERPL QL: NEGATIVE
COCAINE UR QL: NEGATIVE
DEPRECATED RDW RBC AUTO: 54.4 FL (ref 37–54)
EOSINOPHIL # BLD AUTO: 0.1 10*3/MM3 (ref 0–0.4)
EOSINOPHIL NFR BLD AUTO: 0.9 % (ref 0.3–6.2)
ERYTHROCYTE [DISTWIDTH] IN BLOOD BY AUTOMATED COUNT: 16.8 % (ref 12.3–15.4)
FENTANYL UR-MCNC: NEGATIVE NG/ML
GLUCOSE BLDC GLUCOMTR-MCNC: 108 MG/DL (ref 70–130)
GLUCOSE BLDC GLUCOMTR-MCNC: 118 MG/DL (ref 70–130)
GLUCOSE BLDC GLUCOMTR-MCNC: 123 MG/DL (ref 70–130)
GLUCOSE BLDC GLUCOMTR-MCNC: 171 MG/DL (ref 70–130)
HCT VFR BLD AUTO: 39.7 % (ref 34–46.6)
HGB BLD-MCNC: 12.8 G/DL (ref 12–15.9)
IMM GRANULOCYTES # BLD AUTO: 0.04 10*3/MM3 (ref 0–0.05)
IMM GRANULOCYTES NFR BLD AUTO: 0.4 % (ref 0–0.5)
LYMPHOCYTES # BLD AUTO: 1.88 10*3/MM3 (ref 0.7–3.1)
LYMPHOCYTES NFR BLD AUTO: 17.8 % (ref 19.6–45.3)
MCH RBC QN AUTO: 28.6 PG (ref 26.6–33)
MCHC RBC AUTO-ENTMCNC: 32.2 G/DL (ref 31.5–35.7)
MCV RBC AUTO: 88.8 FL (ref 79–97)
METHADONE UR QL SCN: NEGATIVE
MONOCYTES # BLD AUTO: 1.08 10*3/MM3 (ref 0.1–0.9)
MONOCYTES NFR BLD AUTO: 10.2 % (ref 5–12)
NEUTROPHILS NFR BLD AUTO: 7.4 10*3/MM3 (ref 1.7–7)
NEUTROPHILS NFR BLD AUTO: 70.2 % (ref 42.7–76)
NRBC BLD AUTO-RTO: 0 /100 WBC (ref 0–0.2)
OPIATES UR QL: NEGATIVE
OXYCODONE UR QL SCN: NEGATIVE
PCP UR QL SCN: NEGATIVE
PLATELET # BLD AUTO: 384 10*3/MM3 (ref 140–450)
PMV BLD AUTO: 10.8 FL (ref 6–12)
RBC # BLD AUTO: 4.47 10*6/MM3 (ref 3.77–5.28)
TRICYCLICS UR QL SCN: POSITIVE
WBC NRBC COR # BLD AUTO: 10.55 10*3/MM3 (ref 3.4–10.8)

## 2024-10-25 PROCEDURE — 85025 COMPLETE CBC W/AUTO DIFF WBC: CPT | Performed by: STUDENT IN AN ORGANIZED HEALTH CARE EDUCATION/TRAINING PROGRAM

## 2024-10-25 PROCEDURE — 80307 DRUG TEST PRSMV CHEM ANLYZR: CPT | Performed by: STUDENT IN AN ORGANIZED HEALTH CARE EDUCATION/TRAINING PROGRAM

## 2024-10-25 PROCEDURE — 82140 ASSAY OF AMMONIA: CPT | Performed by: STUDENT IN AN ORGANIZED HEALTH CARE EDUCATION/TRAINING PROGRAM

## 2024-10-25 PROCEDURE — 92507 TX SP LANG VOICE COMM INDIV: CPT

## 2024-10-25 PROCEDURE — 63710000001 INSULIN LISPRO (HUMAN) PER 5 UNITS: Performed by: STUDENT IN AN ORGANIZED HEALTH CARE EDUCATION/TRAINING PROGRAM

## 2024-10-25 PROCEDURE — 99232 SBSQ HOSP IP/OBS MODERATE 35: CPT | Performed by: STUDENT IN AN ORGANIZED HEALTH CARE EDUCATION/TRAINING PROGRAM

## 2024-10-25 PROCEDURE — 25010000002 CEFTRIAXONE PER 250 MG: Performed by: STUDENT IN AN ORGANIZED HEALTH CARE EDUCATION/TRAINING PROGRAM

## 2024-10-25 PROCEDURE — 82948 REAGENT STRIP/BLOOD GLUCOSE: CPT

## 2024-10-25 PROCEDURE — 99232 SBSQ HOSP IP/OBS MODERATE 35: CPT | Performed by: NURSE PRACTITIONER

## 2024-10-25 RX ORDER — LOSARTAN POTASSIUM 50 MG/1
50 TABLET ORAL DAILY
Status: DISCONTINUED | OUTPATIENT
Start: 2024-10-25 | End: 2024-10-26

## 2024-10-25 RX ADMIN — ATORVASTATIN CALCIUM 80 MG: 40 TABLET, FILM COATED ORAL at 21:15

## 2024-10-25 RX ADMIN — NYSTATIN: 100000 POWDER TOPICAL at 08:40

## 2024-10-25 RX ADMIN — Medication 1000 UNITS: at 08:40

## 2024-10-25 RX ADMIN — TICAGRELOR 90 MG: 90 TABLET ORAL at 08:40

## 2024-10-25 RX ADMIN — LOSARTAN POTASSIUM 50 MG: 50 TABLET, FILM COATED ORAL at 15:28

## 2024-10-25 RX ADMIN — QUETIAPINE FUMARATE 50 MG: 25 TABLET ORAL at 08:39

## 2024-10-25 RX ADMIN — QUETIAPINE FUMARATE 50 MG: 25 TABLET ORAL at 21:15

## 2024-10-25 RX ADMIN — INSULIN LISPRO 2 UNITS: 100 INJECTION, SOLUTION INTRAVENOUS; SUBCUTANEOUS at 12:13

## 2024-10-25 RX ADMIN — SODIUM CHLORIDE 2000 MG: 900 INJECTION INTRAVENOUS at 21:15

## 2024-10-25 RX ADMIN — TICAGRELOR 90 MG: 90 TABLET ORAL at 21:15

## 2024-10-25 RX ADMIN — PANTOPRAZOLE SODIUM 40 MG: 40 TABLET, DELAYED RELEASE ORAL at 05:01

## 2024-10-25 RX ADMIN — Medication 1 CAPSULE: at 08:39

## 2024-10-25 RX ADMIN — NYSTATIN: 100000 POWDER TOPICAL at 21:16

## 2024-10-25 RX ADMIN — ASPIRIN 81 MG 81 MG: 81 TABLET ORAL at 08:39

## 2024-10-25 RX ADMIN — VENLAFAXINE HYDROCHLORIDE 150 MG: 75 CAPSULE, EXTENDED RELEASE ORAL at 08:39

## 2024-10-25 NOTE — CONSULTS
"Diabetes Education    Patient Name:  Olga Sandoval  YOB: 1955  MRN: [id number]  Admit Date:  10/23/2024        Total time spent reviewing chart, preparing education/materials, providing education at bedside, and coordinating care approx 20 minutes.    Consult for diabetes education received per stroke protocol. Chart reviewed. Pt was seen at bedside today. Permission given for visit. She states her daughter is working and she prefers I not call her at this time to discuss her diabetes, but is agreeable to receiving written materials at her home address.     Discussed and taught Ms. Sandoval about type 2 diabetes self-management, risk factors, and importance of blood glucose control to reduce complications. Target blood glucose readings and A1c goals per ADA were reviewed. Reviewed with patient current A1c 5.4 and discussed its significance. Signs, symptoms, and treatment of hyperglycemia and hypoglycemia were discussed. Pt denies frequent episodes of hypoglycemia.     Confirms she takes metformin and ozempic at home.     Discussed importance of consistent, healthy eating pattern to help manage diabetes and maintain stable blood glucose levels. Pt states prior to this hospital stay, she and her daughter shared cooking responsibilities at home.     Encouraged pt to monitor blood sugar at home 2-3  times per week and to call PCP if blood sugar is trending high. Encouraged to keep record of blood glucose readings to take to follow up appointment with PCP. Provided patient with copy of [employer]'s \"What is Diabetes\" handout, \"Blood Glucose Goals\" handout, and \"What is A1c\" handout.     Thank you for this consult.     Patient does not qualify for the follow up stroke class based on the exclusion criteria of  plan for SNF at discharge, CT - for acute changes. Written  educational materials have been sent to  her home address, which include information about healthy eating and lifestyle changes. "     Electronically signed by:  Melissa Enrique RN  10/25/24 14:07 EDT

## 2024-10-25 NOTE — PROGRESS NOTES
Nutrition Services    Patient Name:  Olga Sandoval  YOB: 1955  MRN: [id number]  Admit Date:  10/23/2024    Patient screened for possible pressure injury. Chart reviewed. No pressure injury stage 2 or greater noted per documentation at this time. No nutrition risks identified currently. RDN following per protocol. Available via consult.     Electronically signed by:  Audrey Harris MS,MARTIN,RONNY  10/25/24 09:33 EDT

## 2024-10-25 NOTE — PROGRESS NOTES
Stroke Progress Note       Chief Complaint: Confusion, weakness    Subjective    Subjective     Subjective:  No adverse events overnight.  Daughters are present at bedside.  Confusion has improved however her daughter feels that she is not completely back to baseline.  Patient has had expressive aphasia since her prior stroke.  Daughter does note that she does seem to have a little bit more difficulty with word finding than is normal for her.  No new focal deficits    Review of Systems   Constitutional:  Negative for fever.   Eyes:  Negative for visual disturbance.   Respiratory:  Negative for cough and shortness of breath.    Cardiovascular:  Positive for leg swelling. Negative for chest pain and palpitations.   Gastrointestinal:  Negative for nausea and vomiting.   Musculoskeletal: Negative.         Wheelchair-bound at baseline   Skin: Negative.    Neurological:  Positive for speech difficulty. Negative for facial asymmetry, weakness, numbness and headaches.   Psychiatric/Behavioral: Negative.  Positive for confusion.             Objective    Objective      Temp:  [97.9 °F (36.6 °C)-98.1 °F (36.7 °C)] 98.1 °F (36.7 °C)  Heart Rate:  [74-91] 90  Resp:  [18] 18  BP: (116-182)/(67-91) 181/91        Neurological Exam  Mental Status  Alert. Oriented only to person and place. Unable to state her age, the month, or place of birth. Speech is normal. Expressive aphasia present.    Cranial Nerves  CN II: Visual fields full to confrontation.  CN III, IV, VI: Extraocular movements intact bilaterally. Left ptosis. Pupils equal round and reactive to light bilaterally.  CN V: Facial sensation is normal.  CN VII:  Left: There is central facial weakness. Baseline from prior.  CN XI: Shoulder shrug strength is normal.  CN XII: Tongue midline without atrophy or fasciculations.    Motor  Normal muscle bulk throughout. No fasciculations present. Normal muscle tone. No abnormal involuntary movements. Strength is 5/5 in all four  extremities except as noted.  BUE strength 4/5, BLE strength 3/5.    Sensory  Light touch is normal in upper and lower extremities.     Coordination    No dysmetria out of proportion to weakness.    Gait    Not tested.      Physical Exam  Vitals reviewed.   HENT:      Head: Normocephalic and atraumatic.   Eyes:      Extraocular Movements: Extraocular movements intact.      Pupils: Pupils are equal, round, and reactive to light.   Cardiovascular:      Rate and Rhythm: Normal rate.   Pulmonary:      Effort: Pulmonary effort is normal. No respiratory distress.   Musculoskeletal:      Cervical back: Normal range of motion.      Right lower leg: Edema present.      Left lower leg: Edema present.   Skin:     General: Skin is warm and dry.   Neurological:      Mental Status: She is alert. She is disoriented.      Cranial Nerves: Cranial nerve deficit present.      Sensory: No sensory deficit.      Motor: Weakness present.   Psychiatric:         Mood and Affect: Mood normal.         Speech: Speech normal.         Behavior: Behavior normal.         Results Review:    I reviewed the patient's new clinical results.  WBC   Date Value Ref Range Status   10/24/2024 10.96 (H) 3.40 - 10.80 10*3/mm3 Final     RBC   Date Value Ref Range Status   10/24/2024 4.03 3.77 - 5.28 10*6/mm3 Final     Hemoglobin   Date Value Ref Range Status   10/24/2024 11.6 (L) 12.0 - 15.9 g/dL Final     Hematocrit   Date Value Ref Range Status   10/24/2024 35.3 34.0 - 46.6 % Final     MCV   Date Value Ref Range Status   10/24/2024 87.6 79.0 - 97.0 fL Final     MCH   Date Value Ref Range Status   10/24/2024 28.8 26.6 - 33.0 pg Final     MCHC   Date Value Ref Range Status   10/24/2024 32.9 31.5 - 35.7 g/dL Final     RDW   Date Value Ref Range Status   10/24/2024 16.7 (H) 12.3 - 15.4 % Final     RDW-SD   Date Value Ref Range Status   10/24/2024 53.3 37.0 - 54.0 fl Final     MPV   Date Value Ref Range Status   10/24/2024 10.6 6.0 - 12.0 fL Final     Platelets    Date Value Ref Range Status   10/24/2024 315 140 - 450 10*3/mm3 Final     Neutrophil %   Date Value Ref Range Status   10/24/2024 74.9 42.7 - 76.0 % Final     Lymphocyte %   Date Value Ref Range Status   10/24/2024 12.4 (L) 19.6 - 45.3 % Final     Monocyte %   Date Value Ref Range Status   10/24/2024 12.0 5.0 - 12.0 % Final     Eosinophil %   Date Value Ref Range Status   10/24/2024 0.1 (L) 0.3 - 6.2 % Final     Basophil %   Date Value Ref Range Status   10/24/2024 0.2 0.0 - 1.5 % Final     Immature Grans %   Date Value Ref Range Status   10/24/2024 0.4 0.0 - 0.5 % Final     Neutrophils, Absolute   Date Value Ref Range Status   10/24/2024 8.21 (H) 1.70 - 7.00 10*3/mm3 Final     Lymphocytes, Absolute   Date Value Ref Range Status   10/24/2024 1.36 0.70 - 3.10 10*3/mm3 Final     Monocytes, Absolute   Date Value Ref Range Status   10/24/2024 1.32 (H) 0.10 - 0.90 10*3/mm3 Final     Eosinophils, Absolute   Date Value Ref Range Status   10/24/2024 0.01 0.00 - 0.40 10*3/mm3 Final     Basophils, Absolute   Date Value Ref Range Status   10/24/2024 0.02 0.00 - 0.20 10*3/mm3 Final     Immature Grans, Absolute   Date Value Ref Range Status   10/24/2024 0.04 0.00 - 0.05 10*3/mm3 Final     nRBC   Date Value Ref Range Status   10/24/2024 0.0 0.0 - 0.2 /100 WBC Final     Lab Results   Component Value Date    GLUCOSE 100 (H) 10/24/2024    BUN 25 (H) 10/24/2024    CREATININE 1.17 (H) 10/24/2024     10/24/2024    K 3.8 10/24/2024     10/24/2024    CALCIUM 9.8 10/24/2024    PROTEINTOT 7.6 10/23/2024    ALBUMIN 4.5 10/23/2024    ALT 9 10/23/2024    ALT 9 10/23/2024    AST 18 10/23/2024    AST 18 10/23/2024    ALKPHOS 104 10/23/2024    BILITOT 0.6 10/23/2024    GLOB 3.1 10/23/2024    AGRATIO 1.5 10/23/2024    BCR 21.4 10/24/2024    ANIONGAP 13.0 10/24/2024    EGFR 50.0 (L) 10/24/2024     A1c 5.4  LDL 54    MRI Brain Without Contrast    Result Date: 10/25/2024  Impression: Motion limited study demonstrates multiple chronic  cortical infarcts, advanced chronic small vessel ischemic change and a chronic lacunar infarct in the right thalamus. No definite acute findings Electronically Signed: Vick Kathleen MD  10/25/2024 4:31 AM EDT  Workstation ID: [id number]    EEG    Result Date: 10/24/2024  Diffuse cerebral dysfunction moderate degree but nonspecific No evidence for epilepsy is present This report is transcribed using the Dragon dictation system.      XR Abdomen KUB    Result Date: 10/24/2024  Impression: 1. No radiodense foreign body overlying the abdomen or pelvis or findings to prevent MRI. 2. Mildly dilated air-filled bowel loops overlying the left mid abdomen may relate to ileus, nonspecific. Electronically Signed: Rahat Stokes MD  10/24/2024 9:20 AM EDT  Workstation ID: [id number]    CT Angiogram Head w AI Analysis of LVO    Result Date: 10/23/2024  Impression: No acute arterial abnormality of the head and neck. Chronic moderate stenoses noted within the bilateral cervical internal carotid arteries secondary to calcified and noncalcified plaques. Please note that the stenosis may be exaggerated due to motion artifact in this region. Additionally there is at least moderate stenoses noted within the bilateral PCAs distally. 1.0 cm aneurysm arising from the left cavernous segment ICA. Electronically Signed: Derek Rich DO  10/23/2024 6:00 PM EDT  Workstation ID: [id number]    CT Angiogram Neck    Result Date: 10/23/2024  Impression: No acute arterial abnormality of the head and neck. Chronic moderate stenoses noted within the bilateral cervical internal carotid arteries secondary to calcified and noncalcified plaques. Please note that the stenosis may be exaggerated due to motion artifact in this region. Additionally there is at least moderate stenoses noted within the bilateral PCAs distally. 1.0 cm aneurysm arising from the left cavernous segment ICA. Electronically Signed: Derek Rich DO  10/23/2024 6:00 PM EDT   Workstation ID: [id number]      CT Head Without Contrast Stroke Protocol    Result Date: 10/23/2024  Impression: No acute intracranial abnormality. Specifically no acute intracranial hemorrhage. Electronically Signed: Derek Rich,   10/23/2024 5:24 PM EDT  Workstation ID: [id number]     Results for orders placed during the hospital encounter of 10/23/24    Adult Transthoracic Echo Complete W/ Cont if Necessary Per Protocol (With Agitated Saline)    Interpretation Summary    Left ventricular ejection fraction appears to be 51 - 55%.    Unable to fully assess valvular function due to suboptimal windows.            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  Stroke neurology was consulted for further evaluation.  CT head 10/23 did not reveal intracranial abnormality.  CTA head and neck 10/23 revealed moderate stenoses bilateral internal carotid arteries and bilateral PCAs, as well as 1 cm left ICA aneurysm.  On exam, the patient is confused and aphasic however this is improving.  TTE showed EF 55%, normal left atrium.  MRI brain negative for acute stroke, evidence of multiple chronic infarcts     # Encephalopathy  # Speech deficits per daughter this is her baseline since her stent placement.   Suspect patient's speech difficulties were secondary to UTI     -Continue aspirin 81 mg daily and ticagrelor 90 mg BID  -Continue atorvastatin 80 mg daily (LDL 54)  -Continue PT/OT, therapy recommending SNF at  discharge for best outcome  -Neuro-checks per unit protocol while inpatient  -Ok to normalize bp  -DVT prophylaxis: SCD  -No additional stroke workup needed     # 1 cm left ICA aneurysm  -Patient follows with neurosurgery at , her neurosurgeon had requested outpatient MRA/MRI before her next follow-up.  I gave her daughter instructions to obtain a disc from medical records that she can bring with her to her next appointment.  -discussed bleed risk with patient daughter           Plan of care was discussed with patient and her daughters at the bedside and Dr. Huertas.  No further stroke workup is needed at this time.  Patient will need to follow-up with her neurosurgeon at  as scheduled.      Toña Phelps, APRN  10/25/24  09:41 EDT

## 2024-10-25 NOTE — CASE MANAGEMENT/SOCIAL WORK
Continued Stay Note  [facility]     Patient Name: Olga Sandoval  MRN: [id number]  Today's Date: 10/25/2024    Admit Date: 10/23/2024    Plan: Ongoing   Discharge Plan       Row Name 10/25/24 1241       Plan    Plan Comments MSW spoke with pt and pt' s daughter at bedside. Therapy services recommended SNF. MSW discussed this with pt and pt's daughter. Pt reports she would prefer to discharge home with her daughter but will consider rehab and is agreeable to referrals. MSW discussed list of facilities in [place]. Pt agreeable to referral to [facility] in [place]. MSW called a referral to [facility] with the [facility].    Final Discharge Disposition Code 03 - skilled nursing facility (SNF)                   Discharge Codes    No documentation.                 Expected Discharge Date and Time       Expected Discharge Date Expected Discharge Time    Oct 26, 2024               ANDRESSA Pierce     General

## 2024-10-25 NOTE — PLAN OF CARE
Problem: Adult Inpatient Plan of Care  Goal: Plan of Care Review  Outcome: Progressing  Goal: Patient-Specific Goal (Individualized)  Outcome: Progressing  Goal: Absence of Hospital-Acquired Illness or Injury  Outcome: Progressing  Intervention: Identify and Manage Fall Risk  Recent Flowsheet Documentation  Taken 10/25/2024 0400 by Aurora Ordoñez RN  Safety Promotion/Fall Prevention:   activity supervised   assistive device/personal items within reach   clutter free environment maintained   fall prevention program maintained   lighting adjusted   nonskid shoes/slippers when out of bed   room organization consistent   safety round/check completed  Taken 10/25/2024 0200 by Aurora Ordoñez RN  Safety Promotion/Fall Prevention:   activity supervised   assistive device/personal items within reach   clutter free environment maintained   fall prevention program maintained   lighting adjusted   nonskid shoes/slippers when out of bed   room organization consistent   safety round/check completed  Taken 10/25/2024 0000 by Aurora Ordoñez RN  Safety Promotion/Fall Prevention:   activity supervised   assistive device/personal items within reach   clutter free environment maintained   fall prevention program maintained   lighting adjusted   nonskid shoes/slippers when out of bed   room organization consistent   safety round/check completed  Taken 10/24/2024 2200 by Aurora Ordoñez RN  Safety Promotion/Fall Prevention:   activity supervised   assistive device/personal items within reach   clutter free environment maintained   fall prevention program maintained   lighting adjusted   nonskid shoes/slippers when out of bed   room organization consistent   safety round/check completed  Taken 10/24/2024 2000 by Aurora Ordoñez RN  Safety Promotion/Fall Prevention:   activity supervised   assistive device/personal items within reach   clutter free environment maintained   fall prevention program maintained   lighting  adjusted   nonskid shoes/slippers when out of bed   safety round/check completed   room organization consistent  Intervention: Prevent Skin Injury  Recent Flowsheet Documentation  Taken 10/25/2024 0400 by Aurora Ordoñez RN  Body Position: supine  Skin Protection:   incontinence pads utilized   transparent dressing maintained  Taken 10/25/2024 0200 by Aurora Ordoñez RN  Body Position:   turned   left  Skin Protection:   incontinence pads utilized   transparent dressing maintained  Taken 10/25/2024 0000 by Aurora Ordoñez RN  Body Position: sitting up in bed  Skin Protection:   incontinence pads utilized   transparent dressing maintained  Taken 10/24/2024 2200 by Aurora Ordoñez RN  Body Position:   turned   left  Skin Protection:   incontinence pads utilized   transparent dressing maintained  Taken 10/24/2024 2000 by Aurora Ordoñez RN  Body Position:   turned   right  Skin Protection:   incontinence pads utilized   transparent dressing maintained   pulse oximeter probe site changed   drying agents applied  Intervention: Prevent and Manage VTE (Venous Thromboembolism) Risk  Recent Flowsheet Documentation  Taken 10/24/2024 2000 by Aurora Ordoñez RN  VTE Prevention/Management: (pt got agitated-did not tolerate scds well)   bilateral   SCDs (sequential compression devices) off  Intervention: Prevent Infection  Recent Flowsheet Documentation  Taken 10/25/2024 0400 by Aurora Ordoñze RN  Infection Prevention:   cohorting utilized   environmental surveillance performed   equipment surfaces disinfected   hand hygiene promoted   rest/sleep promoted  Taken 10/25/2024 0200 by Aurora Ordoñez RN  Infection Prevention:   cohorting utilized   environmental surveillance performed   equipment surfaces disinfected   hand hygiene promoted   rest/sleep promoted  Taken 10/25/2024 0000 by Aurora Ordoñez RN  Infection Prevention:   cohorting utilized   environmental surveillance performed   equipment surfaces  disinfected   hand hygiene promoted   rest/sleep promoted  Taken 10/24/2024 2200 by Aurora Ordoñez RN  Infection Prevention:   cohorting utilized   environmental surveillance performed   equipment surfaces disinfected   hand hygiene promoted   rest/sleep promoted  Taken 10/24/2024 2000 by Aurora Ordoñez RN  Infection Prevention:   cohorting utilized   environmental surveillance performed   equipment surfaces disinfected   hand hygiene promoted   rest/sleep promoted  Goal: Optimal Comfort and Wellbeing  Outcome: Progressing  Intervention: Provide Person-Centered Care  Recent Flowsheet Documentation  Taken 10/24/2024 2000 by Aurora Ordoñez RN  Trust Relationship/Rapport:   care explained   emotional support provided   choices provided   empathic listening provided   questions encouraged   reassurance provided   thoughts/feelings acknowledged  Goal: Readiness for Transition of Care  Outcome: Progressing     Problem: Fall Injury Risk  Goal: Absence of Fall and Fall-Related Injury  Outcome: Progressing  Intervention: Identify and Manage Contributors  Recent Flowsheet Documentation  Taken 10/25/2024 0400 by Aurora Ordoñez RN  Self-Care Promotion: independence encouraged  Taken 10/25/2024 0200 by Aurora Ordoñez RN  Self-Care Promotion: independence encouraged  Taken 10/25/2024 0000 by Aurora Ordoñez RN  Self-Care Promotion: independence encouraged  Taken 10/24/2024 2200 by Aurora Ordoñez RN  Self-Care Promotion: independence encouraged  Taken 10/24/2024 2000 by Aurora Ordoñez RN  Medication Review/Management: medications reviewed  Self-Care Promotion: independence encouraged  Intervention: Promote Injury-Free Environment  Recent Flowsheet Documentation  Taken 10/25/2024 0400 by Aurora Ordoñez RN  Safety Promotion/Fall Prevention:   activity supervised   assistive device/personal items within reach   clutter free environment maintained   fall prevention program maintained   lighting adjusted    nonskid shoes/slippers when out of bed   room organization consistent   safety round/check completed  Taken 10/25/2024 0200 by Aurora Ordoñez, RN  Safety Promotion/Fall Prevention:   activity supervised   assistive device/personal items within reach   clutter free environment maintained   fall prevention program maintained   lighting adjusted   nonskid shoes/slippers when out of bed   room organization consistent   safety round/check completed  Taken 10/25/2024 0000 by Aurora Ordoñez, RN  Safety Promotion/Fall Prevention:   activity supervised   assistive device/personal items within reach   clutter free environment maintained   fall prevention program maintained   lighting adjusted   nonskid shoes/slippers when out of bed   room organization consistent   safety round/check completed  Taken 10/24/2024 2200 by Aurora Ordoñez, RN  Safety Promotion/Fall Prevention:   activity supervised   assistive device/personal items within reach   clutter free environment maintained   fall prevention program maintained   lighting adjusted   nonskid shoes/slippers when out of bed   room organization consistent   safety round/check completed  Taken 10/24/2024 2000 by Aurora Ordoñez, RN  Safety Promotion/Fall Prevention:   activity supervised   assistive device/personal items within reach   clutter free environment maintained   fall prevention program maintained   lighting adjusted   nonskid shoes/slippers when out of bed   safety round/check completed   room organization consistent     Problem: Skin Injury Risk Increased  Goal: Skin Health and Integrity  Outcome: Progressing  Intervention: Optimize Skin Protection  Recent Flowsheet Documentation  Taken 10/25/2024 0400 by Aurora Ordoñez, RN  Activity Management: activity encouraged  Pressure Reduction Techniques:   frequent weight shift encouraged   weight shift assistance provided   pressure points protected  Head of Bed (HOB) Positioning: HOB elevated  Pressure Reduction  Devices:   pressure-redistributing mattress utilized   positioning supports utilized  Skin Protection:   incontinence pads utilized   transparent dressing maintained  Taken 10/25/2024 0200 by Aurora Ordoñez RN  Activity Management: activity encouraged  Pressure Reduction Techniques:   frequent weight shift encouraged   weight shift assistance provided   pressure points protected  Head of Bed (HOB) Positioning: HOB elevated  Pressure Reduction Devices:   pressure-redistributing mattress utilized   positioning supports utilized  Skin Protection:   incontinence pads utilized   transparent dressing maintained  Taken 10/25/2024 0000 by Aurora Ordoñez RN  Activity Management: activity encouraged  Pressure Reduction Techniques:   frequent weight shift encouraged   weight shift assistance provided   pressure points protected  Head of Bed (HOB) Positioning: HOB elevated  Pressure Reduction Devices:   pressure-redistributing mattress utilized   positioning supports utilized  Skin Protection:   incontinence pads utilized   transparent dressing maintained  Taken 10/24/2024 2200 by Aurora Ordoñez RN  Activity Management: activity encouraged  Pressure Reduction Techniques:   frequent weight shift encouraged   weight shift assistance provided   pressure points protected  Head of Bed (HOB) Positioning: HOB elevated  Pressure Reduction Devices:   pressure-redistributing mattress utilized   positioning supports utilized  Skin Protection:   incontinence pads utilized   transparent dressing maintained  Taken 10/24/2024 2000 by Aurora Ordoñez RN  Activity Management: activity encouraged  Pressure Reduction Techniques:   frequent weight shift encouraged   weight shift assistance provided   pressure points protected  Head of Bed (HOB) Positioning: HOB elevated  Pressure Reduction Devices:   pressure-redistributing mattress utilized   positioning supports utilized  Skin Protection:   incontinence pads utilized   transparent  dressing maintained   pulse oximeter probe site changed   drying agents applied     Problem: Comorbidity Management  Goal: Maintenance of Behavioral Health Symptom Control  Outcome: Progressing  Intervention: Maintain Behavioral Health Symptom Control  Recent Flowsheet Documentation  Taken 10/24/2024 2000 by Aurora Ordoñez, RN  Medication Review/Management: medications reviewed  Goal: Blood Pressure in Desired Range  Outcome: Progressing  Intervention: Maintain Blood Pressure Management  Recent Flowsheet Documentation  Taken 10/24/2024 2000 by Aurora Ordoñez RN  Medication Review/Management: medications reviewed   Goal Outcome Evaluation:            10/24/24- Pt remains AOX2. RA. NSR to sinus tach when moving to [facility] [place] [place]. Up x2/3. Pt's daughter called beginning of shift asking for update, plan is for daughter to visit tomorrow (10/25). NIH ranged 6-7. Pt remained pleasantly confused throughout shift, but did try to get up a few times (responded well with reorientation). MRI done still pending. Plan of care ongoing.

## 2024-10-25 NOTE — PAYOR COMM NOTE
"Olga Adamson (69 y.o. Female)       Date of Birth   1955    Social Security Number       Address   [address] [place] [address]    Home Phone   [phone]    MRN   [id number]       Caodaism   Muslim of [employer]    Marital Status                               Admission Date   10/23/24    Admission Type   Emergency    Admitting Provider   Aretha Ziegler DO    Attending Provider   Aretha Ziegler DO    Department, Room/Bed   [facility] 3E, S332/1       Discharge Date       Discharge Disposition       Discharge Destination                                 Attending Provider: Aretha Ziegler DO    Allergies: Codeine    Isolation: None   Infection: None   Code Status: CPR    Ht: 157.5 cm (62\")   Wt: 109 kg (241 lb)    Admission Cmt: None   Principal Problem: Dysarthria [R47.1]                   Active Insurance as of 10/23/2024       Primary Coverage       Payor Plan Insurance Group Employer/Plan Group    ANTHEM MEDICARE REPLACEMENT ANTHEM MEDICARE ADVANTAGE [id number]       Payor Plan Address Payor Plan Phone Number Payor Plan Fax Number Effective Dates    [address] [phone]  2022 - None Entered    [facility] [id number]         Subscriber Name Subscriber Birth Date Member ID       OLGA ADAMSON 1955 [id number]                     Emergency Contacts        (Rel.) Home Phone Work Phone Mobile Phone    UMER HARTLEY (Daughter) [phone] -- [phone]                 History & Physical        Jen Andrade MD at 10/23/24 2000              [facility] Medicine Services  HISTORY AND PHYSICAL    Patient Name: Olga Adamson  : 1955  MRN: [id number]  Primary Care Physician: Ileana Kerr MD  Date of admission: 10/23/2024    Subjective  Subjective     Chief Complaint:  Altered mental status    HPI:  Olga Adamson is a 69 y.o. female with history of COPD, frequent falls, HLD, HTN, prior Quintanilla's " palsy, bipolar disorder, prior tobacco use disorder, multiple CVAs, morbid obesity, loop recorder, carotid artery aneurysm status post pipeline embolization, who presented for evaluation of worsening confusion, slurred speech, vomiting.  She was found on the floor by family member. Patient's daughter was reportedly bedside in the ER, but then left and is not answering the phone at the number listed in the chart. History taken by chart review, as patient unable to provide history.     In the ER, patient was afebrile, heart rate 88, blood pressure 176/88, satting 96% on room air.  Labs notable for UA with leuk esterase, too numerous to count WBCs, 2+ bacteria, 21-30 squamous cells, WBCs 16, lactic 1.8. CXR with no acute abnormality, CTA head/neck with w chronic moderate stenoses with b/l cervical internal carotid arteries, moderate stenoses within the b/l PCAs, 1cm aneurysm from left cavernous segment ICA. She was given haldol, NS bolus, CTX. Seen by stroke service.      Review of Systems   As above          Personal History     Past Medical History:   Diagnosis Date    Aneurysm     left carotid    Bipolar 2 disorder     Diabetes mellitus     H/O Bell's palsy     Left sided facial droop    History of loop recorder     Hyperlipidemia     Hypertension     Stroke     x 4             Past Surgical History:   Procedure Laterality Date    CARDIAC CATHETERIZATION      CATARACT EXTRACTION Bilateral      SECTION         Family History: family history includes Cancer in her father; Cataracts in her mother; Heart attack in her brother; Hypertension in her father and mother; Lung cancer in her mother; Stroke in her brother; Transient ischemic attack in her father.     Social History:  reports that she quit smoking about 7 years ago. Her smoking use included cigarettes. She started smoking about 12 years ago. She has a 3.8 pack-year smoking history. She has never used smokeless tobacco. She reports that she does not drink  alcohol and does not use drugs.  Social History     Social History Narrative    Not on file       Medications:  Magnesium Oxide, QUEtiapine, Semaglutide (1 MG/DOSE), aspirin, atorvastatin, cholecalciferol, lansoprazole, losartan, metFORMIN ER, ticagrelor, and venlafaxine XR    Allergies   Allergen Reactions    Codeine Palpitations and Unknown (See Comments)     Other reaction(s): Unknown       Objective  Objective     Vital Signs:   Temp:  [98.1 °F (36.7 °C)] 98.1 °F (36.7 °C)  Heart Rate:  [81-88] 81  Resp:  [18] 18  BP: (169-176)/(66-91) 169/66    Physical Exam   Constitutional: No acute distress, awake, alert, obese, laying in bed  HENT: NCAT, mucous membranes moist  Respiratory: Clear to auscultation bilaterally, respiratory effort normal   Cardiovascular: RRR, no murmurs  Gastrointestinal: Positive bowel sounds, soft, nontender, nondistended  Musculoskeletal: bilateral ankle edema  Psychiatric: agitated, picking at her lines/tubes, intermittently cooperative, trying to pull herself across the bed  Neurologic: self, [facility], unable to tell me year or why she is here. Follows simple commands.   Skin: Erythema under breasts and in groin with satellite lesions      Result Review:  I have personally reviewed the results from the time of this admission to 10/23/2024 21:39 EDT and agree with these findings:  []  Laboratory list / accordion  []  Microbiology  []  Radiology  []  EKG/Telemetry   []  Cardiology/Vascular   []  Pathology  []  Old records  []  Other:  Most notable findings include:     LAB RESULTS:      Lab 10/23/24  1732 10/23/24  1726   WBC 16.23*  --    HEMOGLOBIN 13.8  --    HEMOGLOBIN, POC  --  15.0   HEMATOCRIT 43.1  --    HEMATOCRIT POC  --  44   PLATELETS 385  --    NEUTROS ABS 14.33*  --    IMMATURE GRANS (ABS) 0.04  --    LYMPHS ABS 0.77  --    MONOS ABS 0.87  --    EOS ABS 0.17  --    MCV 88.9  --    LACTATE 1.8  --    APTT 24.9  --          Lab 10/23/24  1732 10/23/24  1731  10/23/24  1726   SODIUM 141  --   --    POTASSIUM 4.4  --   --    CHLORIDE 102  --   --    CO2 21.0*  --   --    ANION GAP 18.0*  --   --    BUN 25*  --   --    CREATININE 1.32* 1.40* 1.40*   EGFR 43.3*  --  40.3*   GLUCOSE 125*  --   --    CALCIUM 10.1  --   --    TSH 3.950  --   --          Lab 10/23/24  1732   TOTAL PROTEIN 7.6   ALBUMIN 4.5   GLOBULIN 3.1   ALT (SGPT) 9  9   AST (SGOT) 18  18   BILIRUBIN 0.6   ALK PHOS 104         Lab 10/23/24  1732   HSTROP T 25*                 Brief Urine Lab Results  (Last result in the past 365 days)        Color   Clarity   Blood   Leuk Est   Nitrite   Protein   CREAT   Urine HCG        10/23/24 1937 Yellow   Turbid   Trace   Moderate (2+)   Negative   30 mg/dL (1+)                 Microbiology Results (last 10 days)       Procedure Component Value - Date/Time    COVID PRE-OP / PRE-PROCEDURE SCREENING ORDER (NO ISOLATION) - Swab, Nasopharynx [[id number]]  (Normal) Collected: 10/23/24 1938    Lab Status: Final result Specimen: Swab from Nasopharynx Updated: 10/23/24 2007    Narrative:      The following orders were created for panel order COVID PRE-OP / PRE-PROCEDURE SCREENING ORDER (NO ISOLATION) - Swab, Nasopharynx.  Procedure                               Abnormality         Status                     ---------                               -----------         ------                     COVID-19 and FLU A/B PCR...[[id number]]  Normal              Final result                 Please view results for these tests on the individual orders.    COVID-19 and FLU A/B PCR, 1 HR TAT - Swab, Nasopharynx [[id number]]  (Normal) Collected: 10/23/24 1938    Lab Status: Final result Specimen: Swab from Nasopharynx Updated: 10/23/24 2007     COVID19 Not Detected     Influenza A PCR Not Detected     Influenza B PCR Not Detected    Narrative:      Fact sheet for providers: https://www.fda.gov/media/983023/download    Fact sheet for patients: https://www.fda.gov/media/685720/download    Test  performed by PCR.            CT Angiogram Head w AI Analysis of LVO    Result Date: 10/23/2024  CT ANGIOGRAM HEAD W AI ANALYSIS OF LVO, CT ANGIOGRAM NECK Date of Exam: 10/23/2024 5:25 PM EDT Indication: Neuro Deficit, acute, Stroke suspected Neuro deficit, acute stroke suspected. Comparison: 9/5/2023 Technique: CTA of the head was performed after the uneventful intravenous administration of 80 cc Isovue-370. Reconstructed coronal and sagittal images were also obtained. In addition, a 3-D volume rendered image was created for interpretation. Automated  exposure control and iterative reconstruction methods were used. Findings: Contrast opacification: Excellent Aortic Arch: Unremarkable Right: Innominate: Patent Subclavian: Mild to moderate stenosis at the origin secondary to calcified plaques. Otherwise abnormal Common Carotid: Patent External Carotid: Patent Internal Carotid: Evidence of at least 60 to 70% stenosis noted within the proximal cervical internal carotid artery. Otherwise unremarkable. Please note that evaluation is limited due to motion artifact. Intracranial ICA: Patient is status post stenting of the right ICA. Otherwise unremarkable MCA:Patent MARISELA: Patent PCA: Moderate to severe stenosis noted within the distal P2 segment. Distally the vessels are patent. Vertebral: Mild stenosis within the V4 segment secondary to calcified plaques. Otherwise unremarkable Left: Subclavian: Mild stenosis at the origin secondary to calcified plaques. Otherwise unremarkable Common Carotid: Patent External Carotid:Patent Internal Carotid: Approximately 60% stenosis noted within the proximal cervical internal carotid artery secondary to calcified and noncalcified plaques. Intracranial ICA: Laterally directed aneurysm measuring 1.0 x 0.9 cm arising from the cavernous segment ICA. Otherwise unremarkable. MCA:Patent MARISELA: Patent PCA: Moderate stenosis noted within the distal segments. Otherwise unremarkable Vertebral: Patent  Basilar: Patent Soft Tissue: Unremarkable Lungs: Unremarkable Bones: No acute osseous abnormality.     Impression: Impression: No acute arterial abnormality of the head and neck. Chronic moderate stenoses noted within the bilateral cervical internal carotid arteries secondary to calcified and noncalcified plaques. Please note that the stenosis may be exaggerated due to motion artifact in this region. Additionally there is at least moderate stenoses noted within the bilateral PCAs distally. 1.0 cm aneurysm arising from the left cavernous segment ICA. Electronically Signed: Derek Rich DO  10/23/2024 6:00 PM EDT  Workstation ID: [id number]    CT Angiogram Neck    Result Date: 10/23/2024  CT ANGIOGRAM HEAD W AI ANALYSIS OF LVO, CT ANGIOGRAM NECK Date of Exam: 10/23/2024 5:25 PM EDT Indication: Neuro Deficit, acute, Stroke suspected Neuro deficit, acute stroke suspected. Comparison: 9/5/2023 Technique: CTA of the head was performed after the uneventful intravenous administration of 80 cc Isovue-370. Reconstructed coronal and sagittal images were also obtained. In addition, a 3-D volume rendered image was created for interpretation. Automated  exposure control and iterative reconstruction methods were used. Findings: Contrast opacification: Excellent Aortic Arch: Unremarkable Right: Innominate: Patent Subclavian: Mild to moderate stenosis at the origin secondary to calcified plaques. Otherwise abnormal Common Carotid: Patent External Carotid: Patent Internal Carotid: Evidence of at least 60 to 70% stenosis noted within the proximal cervical internal carotid artery. Otherwise unremarkable. Please note that evaluation is limited due to motion artifact. Intracranial ICA: Patient is status post stenting of the right ICA. Otherwise unremarkable MCA:Patent MARISELA: Patent PCA: Moderate to severe stenosis noted within the distal P2 segment. Distally the vessels are patent. Vertebral: Mild stenosis within the V4 segment  secondary to calcified plaques. Otherwise unremarkable Left: Subclavian: Mild stenosis at the origin secondary to calcified plaques. Otherwise unremarkable Common Carotid: Patent External Carotid:Patent Internal Carotid: Approximately 60% stenosis noted within the proximal cervical internal carotid artery secondary to calcified and noncalcified plaques. Intracranial ICA: Laterally directed aneurysm measuring 1.0 x 0.9 cm arising from the cavernous segment ICA. Otherwise unremarkable. MCA:Patent MARISELA: Patent PCA: Moderate stenosis noted within the distal segments. Otherwise unremarkable Vertebral: Patent Basilar: Patent Soft Tissue: Unremarkable Lungs: Unremarkable Bones: No acute osseous abnormality.     Impression: Impression: No acute arterial abnormality of the head and neck. Chronic moderate stenoses noted within the bilateral cervical internal carotid arteries secondary to calcified and noncalcified plaques. Please note that the stenosis may be exaggerated due to motion artifact in this region. Additionally there is at least moderate stenoses noted within the bilateral PCAs distally. 1.0 cm aneurysm arising from the left cavernous segment ICA. Electronically Signed: Derek Rich DO  10/23/2024 6:00 PM EDT  Workstation ID: [id number]    XR Chest 1 View    Result Date: 10/23/2024  XR CHEST 1 VW Date of Exam: 10/23/2024 5:22 PM EDT Indication: Acute Stroke Protocol (onset < 12 hrs) Comparison: 9/5/2023 Findings: Unchanged enlarged cardiac silhouette. Cardiac device overlies the left chest. Lower lung volumes. No definite focal airspace consolidation. No pleural effusion or pneumothorax. No acute bone abnormality.     Impression: Impression: No acute cardiopulmonary abnormality. Electronically Signed: Vick Wild MD  10/23/2024 5:52 PM EDT  Workstation ID: [id number]    CT Head Without Contrast Stroke Protocol    Result Date: 10/23/2024  CT HEAD WO CONTRAST STROKE PROTOCOL Date of Exam: 10/23/2024 5:18 PM EDT  Indication: Neuro deficit, acute, stroke suspected Neuro Deficit, acute, Stroke suspected. Comparison: 9/5/2023 Technique: Axial CT images were obtained of the head without contrast administration.  Reconstructed coronal images were also obtained. Automated exposure control and iterative construction methods were used. Scan Time: 5:04 p.m. Results discussed with stroke team at 5:24 p.m. Findings: No large territory infarct. Old right frontal and left occipital infarcts. There is no evidence of hemorrhage. No mass effect, edema or midline shift Severe subcortical and periventricular white matter hypodensities, nonspecific but most likely represents chronic small vessel ischemic changes. No extra-axial fluid collection. Prominent ventricular system secondary to chronic parenchymal volume loss. Status post bilateral lens extraction. Otherwise the visualized orbits are intact. Trace bilateral mastoid effusions. Otherwise the paranasal sinuses are clear. The visualized soft tissues are unremarkable. No acute osseous abnormality.     Impression: Impression: No acute intracranial abnormality. Specifically no acute intracranial hemorrhage. Electronically Signed: Derek Rich DO  10/23/2024 5:24 PM EDT  Workstation ID: [id number]     Results for orders placed during the hospital encounter of 09/05/23    Adult Transthoracic Echo Complete W/ Cont if Necessary Per Protocol (With Agitated Saline)    Interpretation Summary    Left ventricular ejection fraction appears to be 56 - 60%.    Saline test results are negative.    Mild mitral valve stenosis is present. The mitral valve peak gradient is 10 mmHg. The mitral valve mean gradient is 4 mmHg. The mitral valve area (PHT) is 2.4 cm2.    Estimated right ventricular systolic pressure from tricuspid regurgitation is normal (<35 mmHg).    There is a trivial pericardial effusion.      Assessment & Plan  Assessment & Plan       Dysarthria      69 y.o. female with history of COPD,  frequent falls, HLD, HTN, prior Bell's palsy, bipolar disorder, prior tobacco use disorder, multiple CVAs, morbid obesity, loop recorder, carotid artery aneurysm status post pipeline embolization, who presented for evaluation of worsening confusion, slurred speech, vomiting.  She was found on the floor by family member. Patient's daughter was reportedly bedside in the ER, but then left and is not answering the phone at the number listed in the chart. History taken by chart review, as patient unable to provide history.     In the ER, patient was afebrile, heart rate 88, blood pressure 176/88, satting 96% on room air.  Labs notable for UA with leuk esterase, too numerous to count WBCs, 2+ bacteria, 21-30 squamous cells, WBCs 16, lactic 1.8. CXR with no acute abnormality, CTA head/neck with w chronic moderate stenoses with b/l cervical internal carotid arteries, moderate stenoses within the b/l PCAs, 1cm aneurysm from left cavernous segment ICA. She was given haldol, NS bolus, CTX. Seen by stroke service.    AMS  Slurred speech  Prior CVAs  HLD  HTN  Bipolar disorder  -CVA vs TIA vs infection-related  -Bedside dysphagia screening  -MRI brain pending  -A1c, lipid panel in the morning  -PT/OT/speech consults  -Continue home aspirin, Brilinta, Lipitor per stroke service  -Permissive HTN for now  -Stroke service consulted  -Neurochecks per protocol  -Seroquel resumed at lower dosing given unable to verify home dosing   -Geodon PRN given patient had no response to 4mg haldol  -Sitter ordered    1cm aneurysm left cavernous segment ICA  -Management per neuro    Concern for UTI  -Follow-up urine culture  -Continue ceftriaxone once daily  -Urinary retention protocol    Candida dermatitis  -Nystatin     DM2 - A1C in the AM; SSI + glucose checks + hypoglycemia protocol  COPD  Morbid obesity    Called the patient's daughter at 9:23pm at number listed in the chart and it rang several times with no answer. Called again at 9:25pm  with no answer.     Will need to verify home med list in the AM. Pharmacy consult placed.     Will need to verify code status in the AM. Patient without decision-making capacity and next of kin unreachable on admission x2.     DVT prophylaxis:  SCDs    Expected Discharge  Expected Discharge Date: 10/26/2024; Expected Discharge Time:       This note has been completed as part of a split-shared workflow.     Signature: Electronically signed by Jen Andrade MD, 10/23/24, 9:31 PM EDT                Electronically signed by Jen Andrade MD at 10/23/24 2895          Physician Progress Notes (all)        Aretha Ziegler DO at 10/25/24 1458              [facility] Medicine Services  PROGRESS NOTE    Patient Name: Olga Sandoval  : 1955  MRN: [id number]    Date of Admission: 10/23/2024  Primary Care Physician: Ileana Kerr MD    Subjective   Subjective     CC:  Altered mental status    HPI:  Per daughter, patient at least 60% improved today. Patient reporting that she feels better as well.      Objective   Objective     Vital Signs:   Temp:  [97.9 °F (36.6 °C)-98.6 °F (37 °C)] 98.6 °F (37 °C)  Heart Rate:  [74-91] 90  Resp:  [18] 18  BP: (116-184)/(67-91) 184/85     Physical Exam:  Constitutional: Awake, alert, resting comfortably  HENT: NCAT, mucous membranes moist  Respiratory: Clear to auscultation bilaterally, respiratory effort normal   Cardiovascular: Regular rate and rhythm  Gastrointestinal: soft, nontender, nondistended  Musculoskeletal: No bilateral ankle edema  Psychiatric: Appropriate affect, cooperative  Neurologic: Alert, oriented x2, mild left facial droop present, chronic aphasia present  Skin: No rashes      Results Reviewed:  LAB RESULTS:      Lab 10/25/24  0951 10/24/24  1633 10/23/24  1732 10/23/24  1726   WBC 10.55 10.96* 16.23*  --    HEMOGLOBIN 12.8 11.6* 13.8  --    HEMOGLOBIN, POC  --   --   --  15.0   HEMATOCRIT 39.7 35.3 43.1  --    HEMATOCRIT POC  --    --   --  44   PLATELETS 384 315 385  --    NEUTROS ABS 7.40* 8.21* 14.33*  --    IMMATURE GRANS (ABS) 0.04 0.04 0.04  --    LYMPHS ABS 1.88 1.36 0.77  --    MONOS ABS 1.08* 1.32* 0.87  --    EOS ABS 0.10 0.01 0.17  --    MCV 88.8 87.6 88.9  --    LACTATE  --   --  1.8  --    APTT  --   --  24.9  --    HSTROP T  --   --  25*  --          Lab 10/24/24  1633 10/23/24  1732 10/23/24  1731 10/23/24  1726   SODIUM 143 141  --   --    POTASSIUM 3.8 4.4  --   --    CHLORIDE 105 102  --   --    CO2 25.0 21.0*  --   --    ANION GAP 13.0 18.0*  --   --    BUN 25* 25*  --   --    CREATININE 1.17* 1.32* 1.40* 1.40*   EGFR 50.0* 43.3*  --  40.3*   GLUCOSE 100* 125*  --   --    CALCIUM 9.8 10.1  --   --    HEMOGLOBIN A1C 5.40  --   --   --    TSH  --  3.950  --   --          Lab 10/23/24  1732   TOTAL PROTEIN 7.6   ALBUMIN 4.5   GLOBULIN 3.1   ALT (SGPT) 9  9   AST (SGOT) 18  18   BILIRUBIN 0.6   ALK PHOS 104         Lab 10/23/24  1732   HSTROP T 25*         Lab 10/24/24  1633   CHOLESTEROL 163   LDL CHOL 87   HDL CHOL 60   TRIGLYCERIDES 83         Lab 10/23/24  1732   VITAMIN B 12 294         Brief Urine Lab Results  (Last result in the past 365 days)        Color   Clarity   Blood   Leuk Est   Nitrite   Protein   CREAT   Urine HCG        10/23/24 1937 Yellow   Turbid   Trace   Moderate (2+)   Negative   30 mg/dL (1+)                   Microbiology Results Abnormal       Procedure Component Value - Date/Time    Urine Culture - Urine, Straight Cath [[id number]] Collected: 10/23/24 1937    Lab Status: Final result Specimen: Urine from Straight Cath Updated: 10/25/24 1355     Urine Culture >100,000 CFU/mL Mixed Betty Isolated    Narrative:      Specimen contains mixed organisms of questionable pathogenicity suggestive of contamination. If symptoms persist, suggest recollection.  Colonization of the urinary tract without infection is common. Treatment is discouraged unless the patient is symptomatic, pregnant, or undergoing an  invasive urologic procedure.    Blood Culture - Blood, Arm, Right [[id number]]  (Normal) Collected: 10/23/24 2122    Lab Status: Preliminary result Specimen: Blood from Arm, Right Updated: 10/24/24 2132     Blood Culture No growth at 24 hours    Narrative:      Less than seven (7) mL's of blood was collected.  Insufficient quantity may yield false negative results.    Blood Culture - Blood, Arm, Right [[id number]]  (Normal) Collected: 10/23/24 2107    Lab Status: Preliminary result Specimen: Blood from Arm, Right Updated: 10/24/24 2131     Blood Culture No growth at 24 hours    Narrative:      Less than seven (7) mL's of blood was collected.  Insufficient quantity may yield false negative results.    Respiratory Panel PCR w/COVID-19(SARS-CoV-2) LEONA/PRIYANKA/GABY/PAD/COR/MILDRED In-House, NP Swab in UTM/VTM, 2 HR TAT - Swab, Nasopharynx [[id number]]  (Normal) Collected: 10/24/24 1504    Lab Status: Final result Specimen: Swab from Nasopharynx Updated: 10/24/24 1556     ADENOVIRUS, PCR Not Detected     Coronavirus 229E Not Detected     Coronavirus HKU1 Not Detected     Coronavirus NL63 Not Detected     Coronavirus OC43 Not Detected     COVID19 Not Detected     Human Metapneumovirus Not Detected     Human Rhinovirus/Enterovirus Not Detected     Influenza A PCR Not Detected     Influenza B PCR Not Detected     Parainfluenza Virus 1 Not Detected     Parainfluenza Virus 2 Not Detected     Parainfluenza Virus 3 Not Detected     Parainfluenza Virus 4 Not Detected     RSV, PCR Not Detected     Bordetella pertussis pcr Not Detected     Bordetella parapertussis PCR Not Detected     Chlamydophila pneumoniae PCR Not Detected     Mycoplasma pneumo by PCR Not Detected    Narrative:      In the setting of a positive respiratory panel with a viral infection PLUS a negative procalcitonin without other underlying concern for bacterial infection, consider observing off antibiotics or discontinuation of antibiotics and continue supportive care. If  the respiratory panel is positive for atypical bacterial infection (Bordetella pertussis, Chlamydophila pneumoniae, or Mycoplasma pneumoniae), consider antibiotic de-escalation to target atypical bacterial infection.    COVID PRE-OP / PRE-PROCEDURE SCREENING ORDER (NO ISOLATION) - Swab, Nasopharynx [[id number]]  (Normal) Collected: 10/23/24 1938    Lab Status: Final result Specimen: Swab from Nasopharynx Updated: 10/23/24 2007    Narrative:      The following orders were created for panel order COVID PRE-OP / PRE-PROCEDURE SCREENING ORDER (NO ISOLATION) - Swab, Nasopharynx.  Procedure                               Abnormality         Status                     ---------                               -----------         ------                     COVID-19 and FLU A/B PCR...[[id number]]  Normal              Final result                 Please view results for these tests on the individual orders.    COVID-19 and FLU A/B PCR, 1 HR TAT - Swab, Nasopharynx [[id number]]  (Normal) Collected: 10/23/24 1938    Lab Status: Final result Specimen: Swab from Nasopharynx Updated: 10/23/24 2007     COVID19 Not Detected     Influenza A PCR Not Detected     Influenza B PCR Not Detected    Narrative:      Fact sheet for providers: https://www.fda.gov/media/786511/download    Fact sheet for patients: https://www.fda.gov/media/711634/download    Test performed by PCR.            MRI Brain Without Contrast    Result Date: 10/25/2024  MRI BRAIN WO CONTRAST Date of Exam: 10/24/2024 11:59 PM EDT Indication: stroke r/o.  Comparison: CT head with angiography 10/23/2024. Technique:  Routine multiplanar/multisequence sequence images of the brain were obtained without contrast administration. Findings: Study is limited by motion despite motion mitigating technique. Patient refused SWI sequence. There is no diffusion restriction to suggest acute infarct. There are confluent regions of FLAIR hyperintense signal within the cerebral white matter.  There is a chronic cortical infarct in the posterior right frontal lobe. There is a chronic infarct in the left temporal occipital region. Cerebellum is unremarkable. There are patchy FLAIR hyperintensities in the central tc. Midline structures appear intact. Calvarial and superficial soft tissue signal is within normal limits. Flow voids not assessed. No mass effect, midline shift or abnormal extra-axial collection. There is a chronic lacunar infarction in the right thalamus. There is generalized parenchymal  atrophy. There is a left mastoid effusion.     Impression: Impression: Motion limited study demonstrates multiple chronic cortical infarcts, advanced chronic small vessel ischemic change and a chronic lacunar infarct in the right thalamus. No definite acute findings Electronically Signed: Vick Kathleen MD  10/25/2024 4:31 AM EDT  Workstation ID: [id number]    Adult Transthoracic Echo Complete W/ Cont if Necessary Per Protocol (With Agitated Saline)    Result Date: 10/24/2024    Left ventricular ejection fraction appears to be 51 - 55%.   Unable to fully assess valvular function due to suboptimal windows.     FL Video Swallow With Speech Single Contrast    Result Date: 10/24/2024  FL VIDEO SWALLOW W SPEECH SINGLE-CONTRAST Date of Exam: 10/24/2024 11:09 AM EDT Indication: dysphagia.   Comparison: None available. Technique:   The speech pathologist administered food and/or liquid mixed with barium to the patient with cine/video imaging.  Imaging assistance was provided to the speech pathologist and an image was saved. Fluoroscopic Time: 30 seconds Number of Images: 7 associated fluoroscopic loops were saved Findings: No aspiration was seen during fluoroscopic guided modified barium swallowing series. Please see speech therapy report for full details and recommendations.     Impression: Impression: Fluoroscopy provided for a modified barium swallow. No aspiration was seen during swallowing evaluation. Please see  speech therapy report for full details and recommendations. Report dictated by: Abigail Hernández PA-c  I have personally reviewed this case and agree with the findings above: Electronically Signed: Orlando Tidwell MD  10/24/2024 5:01 PM EDT  Workstation ID: [id number]    EEG    Result Date: 10/24/2024  Reason for referral: 69 y.o.female with altered mental status Technical Summary:  A 19 channel digital EEG was performed using the international 10-20 placement system, including eye leads and EKG leads. Duration: 17 minutes Findings: The patient is awake.  Diffuse medium amplitude 2-5 Hz intermixed delta and theta activity is present symmetrically over both hemispheres.  EMG and movement artifact are variably prominent.  A clear posterior rhythm is not seen.  As a study proceeds, at times faster 5 to 6 Hz theta frequencies become more prominent.  No focal features or epileptiform activity are seen.  Photic stimulation does not change the background.  Hyperventilation is not performed. Video: Available Technical quality: Good EKG: Regular, 80 bpm SUMMARY: Moderate generalized slow No focal features or epileptiform activity are seen     Impression: Diffuse cerebral dysfunction moderate degree but nonspecific No evidence for epilepsy is present This report is transcribed using the Dragon dictation system.      XR Abdomen KUB    Result Date: 10/24/2024  XR ABDOMEN KUB Date of Exam: 10/24/2024 8:42 AM EDT Indication: MRI clearance Comparison: None available. Findings: There is no radiodense foreign body overlying the abdomen or pelvis. Contrast in the bladder related to recent CT exam. Air-filled bowel loops overlying the left mid abdomen are mildly dilated up to 4.2 cm which could relate to ileus, nonspecific. The lung bases are clear. No evidence of pneumoperitoneum within limits of supine technique.     Impression: Impression: 1. No radiodense foreign body overlying the abdomen or pelvis or findings to prevent MRI. 2.  Mildly dilated air-filled bowel loops overlying the left mid abdomen may relate to ileus, nonspecific. Electronically Signed: Rahat Stokes MD  10/24/2024 9:20 AM EDT  Workstation ID: [id number]    CT Angiogram Head w AI Analysis of LVO    Result Date: 10/23/2024  CT ANGIOGRAM HEAD W AI ANALYSIS OF LVO, CT ANGIOGRAM NECK Date of Exam: 10/23/2024 5:25 PM EDT Indication: Neuro Deficit, acute, Stroke suspected Neuro deficit, acute stroke suspected. Comparison: 9/5/2023 Technique: CTA of the head was performed after the uneventful intravenous administration of 80 cc Isovue-370. Reconstructed coronal and sagittal images were also obtained. In addition, a 3-D volume rendered image was created for interpretation. Automated  exposure control and iterative reconstruction methods were used. Findings: Contrast opacification: Excellent Aortic Arch: Unremarkable Right: Innominate: Patent Subclavian: Mild to moderate stenosis at the origin secondary to calcified plaques. Otherwise abnormal Common Carotid: Patent External Carotid: Patent Internal Carotid: Evidence of at least 60 to 70% stenosis noted within the proximal cervical internal carotid artery. Otherwise unremarkable. Please note that evaluation is limited due to motion artifact. Intracranial ICA: Patient is status post stenting of the right ICA. Otherwise unremarkable MCA:Patent MARISELA: Patent PCA: Moderate to severe stenosis noted within the distal P2 segment. Distally the vessels are patent. Vertebral: Mild stenosis within the V4 segment secondary to calcified plaques. Otherwise unremarkable Left: Subclavian: Mild stenosis at the origin secondary to calcified plaques. Otherwise unremarkable Common Carotid: Patent External Carotid:Patent Internal Carotid: Approximately 60% stenosis noted within the proximal cervical internal carotid artery secondary to calcified and noncalcified plaques. Intracranial ICA: Laterally directed aneurysm measuring 1.0 x 0.9 cm arising from the  cavernous segment ICA. Otherwise unremarkable. MCA:Patent MARISELA: Patent PCA: Moderate stenosis noted within the distal segments. Otherwise unremarkable Vertebral: Patent Basilar: Patent Soft Tissue: Unremarkable Lungs: Unremarkable Bones: No acute osseous abnormality.     Impression: Impression: No acute arterial abnormality of the head and neck. Chronic moderate stenoses noted within the bilateral cervical internal carotid arteries secondary to calcified and noncalcified plaques. Please note that the stenosis may be exaggerated due to motion artifact in this region. Additionally there is at least moderate stenoses noted within the bilateral PCAs distally. 1.0 cm aneurysm arising from the left cavernous segment ICA. Electronically Signed: Derek Rich DO  10/23/2024 6:00 PM EDT  Workstation ID: [id number]    CT Angiogram Neck    Result Date: 10/23/2024  CT ANGIOGRAM HEAD W AI ANALYSIS OF LVO, CT ANGIOGRAM NECK Date of Exam: 10/23/2024 5:25 PM EDT Indication: Neuro Deficit, acute, Stroke suspected Neuro deficit, acute stroke suspected. Comparison: 9/5/2023 Technique: CTA of the head was performed after the uneventful intravenous administration of 80 cc Isovue-370. Reconstructed coronal and sagittal images were also obtained. In addition, a 3-D volume rendered image was created for interpretation. Automated  exposure control and iterative reconstruction methods were used. Findings: Contrast opacification: Excellent Aortic Arch: Unremarkable Right: Innominate: Patent Subclavian: Mild to moderate stenosis at the origin secondary to calcified plaques. Otherwise abnormal Common Carotid: Patent External Carotid: Patent Internal Carotid: Evidence of at least 60 to 70% stenosis noted within the proximal cervical internal carotid artery. Otherwise unremarkable. Please note that evaluation is limited due to motion artifact. Intracranial ICA: Patient is status post stenting of the right ICA. Otherwise unremarkable MCA:Patent  MARISELA: Patent PCA: Moderate to severe stenosis noted within the distal P2 segment. Distally the vessels are patent. Vertebral: Mild stenosis within the V4 segment secondary to calcified plaques. Otherwise unremarkable Left: Subclavian: Mild stenosis at the origin secondary to calcified plaques. Otherwise unremarkable Common Carotid: Patent External Carotid:Patent Internal Carotid: Approximately 60% stenosis noted within the proximal cervical internal carotid artery secondary to calcified and noncalcified plaques. Intracranial ICA: Laterally directed aneurysm measuring 1.0 x 0.9 cm arising from the cavernous segment ICA. Otherwise unremarkable. MCA:Patent MARISELA: Patent PCA: Moderate stenosis noted within the distal segments. Otherwise unremarkable Vertebral: Patent Basilar: Patent Soft Tissue: Unremarkable Lungs: Unremarkable Bones: No acute osseous abnormality.     Impression: Impression: No acute arterial abnormality of the head and neck. Chronic moderate stenoses noted within the bilateral cervical internal carotid arteries secondary to calcified and noncalcified plaques. Please note that the stenosis may be exaggerated due to motion artifact in this region. Additionally there is at least moderate stenoses noted within the bilateral PCAs distally. 1.0 cm aneurysm arising from the left cavernous segment ICA. Electronically Signed: Derek Rich DO  10/23/2024 6:00 PM EDT  Workstation ID: [id number]    XR Chest 1 View    Result Date: 10/23/2024  XR CHEST 1 VW Date of Exam: 10/23/2024 5:22 PM EDT Indication: Acute Stroke Protocol (onset < 12 hrs) Comparison: 9/5/2023 Findings: Unchanged enlarged cardiac silhouette. Cardiac device overlies the left chest. Lower lung volumes. No definite focal airspace consolidation. No pleural effusion or pneumothorax. No acute bone abnormality.     Impression: Impression: No acute cardiopulmonary abnormality. Electronically Signed: Vick Wild MD  10/23/2024 5:52 PM EDT  Workstation  ID: [id number]    CT Head Without Contrast Stroke Protocol    Result Date: 10/23/2024  CT HEAD WO CONTRAST STROKE PROTOCOL Date of Exam: 10/23/2024 5:18 PM EDT Indication: Neuro deficit, acute, stroke suspected Neuro Deficit, acute, Stroke suspected. Comparison: 9/5/2023 Technique: Axial CT images were obtained of the head without contrast administration.  Reconstructed coronal images were also obtained. Automated exposure control and iterative construction methods were used. Scan Time: 5:04 p.m. Results discussed with stroke team at 5:24 p.m. Findings: No large territory infarct. Old right frontal and left occipital infarcts. There is no evidence of hemorrhage. No mass effect, edema or midline shift Severe subcortical and periventricular white matter hypodensities, nonspecific but most likely represents chronic small vessel ischemic changes. No extra-axial fluid collection. Prominent ventricular system secondary to chronic parenchymal volume loss. Status post bilateral lens extraction. Otherwise the visualized orbits are intact. Trace bilateral mastoid effusions. Otherwise the paranasal sinuses are clear. The visualized soft tissues are unremarkable. No acute osseous abnormality.     Impression: Impression: No acute intracranial abnormality. Specifically no acute intracranial hemorrhage. Electronically Signed: Derek Rich DO  10/23/2024 5:24 PM EDT  Workstation ID: [id number]     Results for orders placed during the hospital encounter of 10/23/24    Adult Transthoracic Echo Complete W/ Cont if Necessary Per Protocol (With Agitated Saline)    Interpretation Summary    Left ventricular ejection fraction appears to be 51 - 55%.    Unable to fully assess valvular function due to suboptimal windows.      Current medications:  Scheduled Meds:aspirin, 81 mg, Oral, Daily  atorvastatin, 80 mg, Oral, Nightly  cefTRIAXone, 2,000 mg, Intravenous, Q24H  cholecalciferol, 1,000 Units, Oral, Daily  insulin lispro, 2-7 Units,  Subcutaneous, 4x Daily AC & at Bedtime  lactobacillus acidophilus, 1 capsule, Oral, Daily  [Held by provider] losartan, 50 mg, Oral, Daily  nystatin, , Topical, Q12H  pantoprazole, 40 mg, Oral, Q AM  QUEtiapine, 50 mg, Oral, Q12H  ticagrelor, 90 mg, Oral, BID  venlafaxine XR, 150 mg, Oral, Daily      Continuous Infusions:   PRN Meds:.  acetaminophen    dextrose    dextrose    glucagon (human recombinant)    sodium chloride    ziprasidone    Assessment & Plan   Assessment & Plan     Active Hospital Problems    Diagnosis  POA    **Dysarthria [R47.1]  Yes      Resolved Hospital Problems   No resolved problems to display.        Brief Hospital Course to date:  Olga Sandoval is a 69 y.o. 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 She was found on the floor by family member.     In the ER, patient was afebrile, heart rate 88, blood pressure 176/88, satting 96% on room air.  Labs notable for UA with leuk esterase, too numerous to count WBCs, 2+ bacteria, 21-30 squamous cells, WBCs 16, lactic 1.8. CXR with no acute abnormality, CTA head/neck with w chronic moderate stenoses with b/l cervical internal carotid arteries, moderate stenoses within the b/l PCAs, 1cm aneurysm from left cavernous segment ICA. She was given haldol, NS bolus, CTX. Seen by stroke service.     This patient's problems and plans were partially entered by my partner and updated as appropriate by me 10/25/24.    Altered mental status  -suspect infectious vs metabolic etiology, stroke work-up also underway  -LFTs normal, TSH normal, B12 normal, ammonia low, resp PCR negative  -MRI brain showed multiple chronic cortical infarcts, chronic lacunar infarct in right thalamus  -Stroke Neurology following,  recommended aspirin 81 mg daily, Brilinta 90 mg twice daily, atorvastatin 80 mg daily.   -Continue treatment of suspected UTI. Continue reduced dose Seroquel for now. PT/OT recommending SNF on discharge.    Acute UTI  Leukocytosis with neutrophilia  -Urine culture growing >100k mixed tavo  -Continue IV ceftriaxone. Follow final urine and blood cultures. Will need to ask [facility] lab to speciate urine culture further as patient is improving on IV ceftriaxone so patient appears to have true UTI.   -Urinary retention protocol.    FLASH on suspected CKD  -Cr improved with fluids on admission.    1cm aneurysm left cavernous segment ICA  -Patient follows with Neurosurgery at , her neurosurgeon had requested outpatient MRA/MRI before her next follow-up.   -Stroke neurology discussed bleeding risk with patient's daughter.      Candida dermatitis  -Nystatin powder BID.     Hypertension- Resumed home losartan.   Hyperlipidemia- continue atorvastatin.   DM2 - A1c 5.4%; SSI + glucose checks + hypoglycemia protocol  Anxiety- continue home venlafaxine.   COPD  Morbid obesity    Expected Discharge Location and Transportation: TBD  Expected Discharge   Expected Discharge Date: 10/26/2024; Expected Discharge Time:      VTE Prophylaxis:  Mechanical VTE prophylaxis orders are present.         AM-PAC 6 Clicks Score (PT): 12 (10/24/24 2000)    CODE STATUS:   Code Status and Medical Interventions: CPR (Attempt to Resuscitate); Full Support   Ordered at: 10/24/24 8517     Level Of Support Discussed With:    Patient     Code Status (Patient has no pulse and is not breathing):    CPR (Attempt to Resuscitate)     Medical Interventions (Patient has pulse or is breathing):    Full Support       Aretha Ziegler DO  10/25/24      Electronically signed by Aretha Ziegler DO at 10/25/24 [id number]       Toña Phelps APRN at 10/25/24 [id number]          Stroke Progress Note       Chief Complaint: Confusion, weakness    Subjective     Subjective     Subjective:  No adverse events overnight.  Daughters are present at bedside.  Confusion has improved however her daughter feels that she is not completely back to baseline.  Patient has had expressive aphasia since her prior stroke.  Daughter does note that she does seem to have a little bit more difficulty with word finding than is normal for her.  No new focal deficits    Review of Systems   Constitutional:  Negative for fever.   Eyes:  Negative for visual disturbance.   Respiratory:  Negative for cough and shortness of breath.    Cardiovascular:  Positive for leg swelling. Negative for chest pain and palpitations.   Gastrointestinal:  Negative for nausea and vomiting.   Musculoskeletal: Negative.         Wheelchair-bound at baseline   Skin: Negative.    Neurological:  Positive for speech difficulty. Negative for facial asymmetry, weakness, numbness and headaches.   Psychiatric/Behavioral: Negative.  Positive for confusion.            Objective    Objective      Temp:  [97.9 °F (36.6 °C)-98.1 °F (36.7 °C)] 98.1 °F (36.7 °C)  Heart Rate:  [74-91] 90  Resp:  [18] 18  BP: (116-182)/(67-91) 181/91        Neurological Exam  Mental Status  Alert. Oriented only to person and place. Unable to state her age, the month, or place of birth. Speech is normal. Expressive aphasia present.    Cranial Nerves  CN II: Visual fields full to confrontation.  CN III, IV, VI: Extraocular movements intact bilaterally. Left ptosis. Pupils equal round and reactive to light bilaterally.  CN V: Facial sensation is normal.  CN VII:  Left: There is central facial weakness. Baseline from prior.  CN XI: Shoulder shrug strength is normal.  CN XII: Tongue midline without atrophy or fasciculations.    Motor  Normal muscle bulk throughout. No fasciculations present. Normal muscle tone. No abnormal involuntary movements. Strength is 5/5 in all four extremities except as noted.  BUE strength 4/5, BLE strength 3/5.    Sensory  Light  touch is normal in upper and lower extremities.     Coordination    No dysmetria out of proportion to weakness.    Gait    Not tested.      Physical Exam  Vitals reviewed.   HENT:      Head: Normocephalic and atraumatic.   Eyes:      Extraocular Movements: Extraocular movements intact.      Pupils: Pupils are equal, round, and reactive to light.   Cardiovascular:      Rate and Rhythm: Normal rate.   Pulmonary:      Effort: Pulmonary effort is normal. No respiratory distress.   Musculoskeletal:      Cervical back: Normal range of motion.      Right lower leg: Edema present.      Left lower leg: Edema present.   Skin:     General: Skin is warm and dry.   Neurological:      Mental Status: She is alert. She is disoriented.      Cranial Nerves: Cranial nerve deficit present.      Sensory: No sensory deficit.      Motor: Weakness present.   Psychiatric:         Mood and Affect: Mood normal.         Speech: Speech normal.         Behavior: Behavior normal.         Results Review:    I reviewed the patient's new clinical results.  WBC   Date Value Ref Range Status   10/24/2024 10.96 (H) 3.40 - 10.80 10*3/mm3 Final     RBC   Date Value Ref Range Status   10/24/2024 4.03 3.77 - 5.28 10*6/mm3 Final     Hemoglobin   Date Value Ref Range Status   10/24/2024 11.6 (L) 12.0 - 15.9 g/dL Final     Hematocrit   Date Value Ref Range Status   10/24/2024 35.3 34.0 - 46.6 % Final     MCV   Date Value Ref Range Status   10/24/2024 87.6 79.0 - 97.0 fL Final     MCH   Date Value Ref Range Status   10/24/2024 28.8 26.6 - 33.0 pg Final     MCHC   Date Value Ref Range Status   10/24/2024 32.9 31.5 - 35.7 g/dL Final     RDW   Date Value Ref Range Status   10/24/2024 16.7 (H) 12.3 - 15.4 % Final     RDW-SD   Date Value Ref Range Status   10/24/2024 53.3 37.0 - 54.0 fl Final     MPV   Date Value Ref Range Status   10/24/2024 10.6 6.0 - 12.0 fL Final     Platelets   Date Value Ref Range Status   10/24/2024 315 140 - 450 10*3/mm3 Final      Neutrophil %   Date Value Ref Range Status   10/24/2024 74.9 42.7 - 76.0 % Final     Lymphocyte %   Date Value Ref Range Status   10/24/2024 12.4 (L) 19.6 - 45.3 % Final     Monocyte %   Date Value Ref Range Status   10/24/2024 12.0 5.0 - 12.0 % Final     Eosinophil %   Date Value Ref Range Status   10/24/2024 0.1 (L) 0.3 - 6.2 % Final     Basophil %   Date Value Ref Range Status   10/24/2024 0.2 0.0 - 1.5 % Final     Immature Grans %   Date Value Ref Range Status   10/24/2024 0.4 0.0 - 0.5 % Final     Neutrophils, Absolute   Date Value Ref Range Status   10/24/2024 8.21 (H) 1.70 - 7.00 10*3/mm3 Final     Lymphocytes, Absolute   Date Value Ref Range Status   10/24/2024 1.36 0.70 - 3.10 10*3/mm3 Final     Monocytes, Absolute   Date Value Ref Range Status   10/24/2024 1.32 (H) 0.10 - 0.90 10*3/mm3 Final     Eosinophils, Absolute   Date Value Ref Range Status   10/24/2024 0.01 0.00 - 0.40 10*3/mm3 Final     Basophils, Absolute   Date Value Ref Range Status   10/24/2024 0.02 0.00 - 0.20 10*3/mm3 Final     Immature Grans, Absolute   Date Value Ref Range Status   10/24/2024 0.04 0.00 - 0.05 10*3/mm3 Final     nRBC   Date Value Ref Range Status   10/24/2024 0.0 0.0 - 0.2 /100 WBC Final     Lab Results   Component Value Date    GLUCOSE 100 (H) 10/24/2024    BUN 25 (H) 10/24/2024    CREATININE 1.17 (H) 10/24/2024     10/24/2024    K 3.8 10/24/2024     10/24/2024    CALCIUM 9.8 10/24/2024    PROTEINTOT 7.6 10/23/2024    ALBUMIN 4.5 10/23/2024    ALT 9 10/23/2024    ALT 9 10/23/2024    AST 18 10/23/2024    AST 18 10/23/2024    ALKPHOS 104 10/23/2024    BILITOT 0.6 10/23/2024    GLOB 3.1 10/23/2024    AGRATIO 1.5 10/23/2024    BCR 21.4 10/24/2024    ANIONGAP 13.0 10/24/2024    EGFR 50.0 (L) 10/24/2024     A1c 5.4  LDL 54    MRI Brain Without Contrast    Result Date: 10/25/2024  Impression: Motion limited study demonstrates multiple chronic cortical infarcts, advanced chronic small vessel ischemic change and a  chronic lacunar infarct in the right thalamus. No definite acute findings Electronically Signed: Vick Kathleen MD  10/25/2024 4:31 AM EDT  Workstation ID: [id number]    EEG    Result Date: 10/24/2024  Diffuse cerebral dysfunction moderate degree but nonspecific No evidence for epilepsy is present This report is transcribed using the Dragon dictation system.      XR Abdomen KUB    Result Date: 10/24/2024  Impression: 1. No radiodense foreign body overlying the abdomen or pelvis or findings to prevent MRI. 2. Mildly dilated air-filled bowel loops overlying the left mid abdomen may relate to ileus, nonspecific. Electronically Signed: Rahat Stokes MD  10/24/2024 9:20 AM EDT  Workstation ID: [id number]    CT Angiogram Head w AI Analysis of LVO    Result Date: 10/23/2024  Impression: No acute arterial abnormality of the head and neck. Chronic moderate stenoses noted within the bilateral cervical internal carotid arteries secondary to calcified and noncalcified plaques. Please note that the stenosis may be exaggerated due to motion artifact in this region. Additionally there is at least moderate stenoses noted within the bilateral PCAs distally. 1.0 cm aneurysm arising from the left cavernous segment ICA. Electronically Signed: Derek Rich DO  10/23/2024 6:00 PM EDT  Workstation ID: [id number]    CT Angiogram Neck    Result Date: 10/23/2024  Impression: No acute arterial abnormality of the head and neck. Chronic moderate stenoses noted within the bilateral cervical internal carotid arteries secondary to calcified and noncalcified plaques. Please note that the stenosis may be exaggerated due to motion artifact in this region. Additionally there is at least moderate stenoses noted within the bilateral PCAs distally. 1.0 cm aneurysm arising from the left cavernous segment ICA. Electronically Signed: Derek Rich DO  10/23/2024 6:00 PM EDT  Workstation ID: [id number]      CT Head Without Contrast Stroke  Protocol    Result Date: 10/23/2024  Impression: No acute intracranial abnormality. Specifically no acute intracranial hemorrhage. Electronically Signed: Derek Rich,   10/23/2024 5:24 PM EDT  Workstation ID: [id number]     Results for orders placed during the hospital encounter of 10/23/24    Adult Transthoracic Echo Complete W/ Cont if Necessary Per Protocol (With Agitated Saline)    Interpretation Summary    Left ventricular ejection fraction appears to be 51 - 55%.    Unable to fully assess valvular function due to suboptimal windows.           Assessment/Plan     Assessment/Plan:  Olga Sandoval is a 69-year-old female with a past medical history significant for carotid artery aneurysm status post pipeline embolization (6/27/2024, [facility]), COPD, frequent falls, hyperlipidemia, hypertension, remote Bell's palsy, bipolar disorder, prior tobacco abuse, chronic right frontal and left occipital infarcts, BMI 42, and implantable loop recorder (2019) who presented to [facility] emergency department via EMS with complaints of worsening confusion, slurred speech, and vomiting.  Stroke neurology was consulted for further evaluation.  CT head 10/23 did not reveal intracranial abnormality.  CTA head and neck 10/23 revealed moderate stenoses bilateral internal carotid arteries and bilateral PCAs, as well as 1 cm left ICA aneurysm.  On exam, the patient is confused and aphasic however this is improving.  TTE showed EF 55%, normal left atrium.  MRI brain negative for acute stroke, evidence of multiple chronic infarcts     # Encephalopathy  # Speech deficits per daughter this is her baseline since her stent placement.   Suspect patient's speech difficulties were secondary to UTI     -Continue aspirin 81 mg daily and ticagrelor 90 mg BID  -Continue atorvastatin 80 mg daily (LDL 54)  -Continue PT/OT, therapy recommending SNF at discharge for best outcome  -Neuro-checks per unit protocol while  inpatient  -Ok to normalize bp  -DVT prophylaxis: SCD  -No additional stroke workup needed     # 1 cm left ICA aneurysm  -Patient follows with neurosurgery at , her neurosurgeon had requested outpatient MRA/MRI before her next follow-up.  I gave her daughter instructions to obtain a disc from medical records that she can bring with her to her next appointment.  -discussed bleed risk with patient daughter           Plan of care was discussed with patient and her daughters at the bedside and Dr. Huertas.  No further stroke workup is needed at this time.  Patient will need to follow-up with her neurosurgeon at  as scheduled.      ANGELIKA Sen  10/25/24  09:41 EDT      Electronically signed by Toña Phelps APRN at 10/25/24 1121       Dimple Huertas MD at 10/24/24 8910          Stroke Neurology Progress Note     Subjective     This patient was seen in follow-up for: encephalopathy   Present for the encounter were: self, patient     Subjective:  My first encounter with the patient.  The patient was admitted overnight. She appears confused and is disoriented.  If she is unable to describe the stroke picture or name.  She is able to repeat.  Strength 3/5 throughout.  Neck supple.  Will attempt to call family to obtain additional history.    Objective      Temp:  [98.1 °F (36.7 °C)-100.5 °F (38.1 °C)] 98.6 °F (37 °C)  Heart Rate:  [] 105  Resp:  [18-20] 20  BP: (136-199)/() 189/104            Objective    Physical Exam:  General Appearance: Alert, confused appearing  HEENT: anicteric sclera, no scleral injection.  Neck is supple.  Lungs: respirations appear comfortable, no obvious increased work of breathing  Extremities: No cyanosis or fingernail clubbing   Skin: No rashes in exposed skin areas     Neurological Examination:   Mental status: Alert.  Oriented to person only. No dysarthria.  Unable to describe the stroke picture.  Unable to name.  Able to repeat.  Cranial Nerves: Visual  "fields intact. Extraocular movements intact with no nystagmus. Midline gaze. Face symmetric.    Sensory: Response to light touch in all extremities.    Motor: Normal tone. Absent pronator drift.  Strength: 3/5 in all extremities with encouragement      Labs:    Lab Results   Component Value Date    HGBA1C 5.4 09/16/2024      Lab Results   Component Value Date    CHOL 133 09/16/2024    CHLPL 181 03/24/2021    TRIG 126 09/16/2024    HDL 57 09/16/2024    LDL 54 09/16/2024       Lab Results   Component Value Date    WBC 16.23 (H) 10/23/2024    HGB 13.8 10/23/2024    HCT 43.1 10/23/2024    MCV 88.9 10/23/2024     10/23/2024     Lab Results   Component Value Date    GLUCOSE 125 (H) 10/23/2024    BUN 25 (H) 10/23/2024    CREATININE 1.32 (H) 10/23/2024    EGFRIFNONA 66 03/24/2021    EGFRIFAFRI 77 03/24/2021    BCR 18.9 10/23/2024    CO2 21.0 (L) 10/23/2024    CALCIUM 10.1 10/23/2024    ALBUMIN 4.5 10/23/2024    AST 18 10/23/2024    AST 18 10/23/2024    ALT 9 10/23/2024    ALT 9 10/23/2024       Results from last 7 days   Lab Units 10/23/24  1732 10/23/24  1731 10/23/24  1726   SODIUM mmol/L 141  --   --    POTASSIUM mmol/L 4.4  --   --    CHLORIDE mmol/L 102  --   --    CO2 mmol/L 21.0*  --   --    BUN mg/dL 25*  --   --    CREATININE mg/dL 1.32* 1.40* 1.40*   CALCIUM mg/dL 10.1  --   --    BILIRUBIN mg/dL 0.6  --   --    ALK PHOS U/L 104  --   --    ALT (SGPT) U/L 9  9  --   --    AST (SGOT) U/L 18  18  --   --    GLUCOSE mg/dL 125*  --   --        No results found for: \"BLOODCX\"  UA          10/23/2024    19:37   Urinalysis   Squamous Epithelial Cells, UA 21-30    Specific Gravity, UA 1.093    Ketones, UA 15 mg/dL (1+)    Blood, UA Trace    Leukocytes, UA Moderate (2+)    Nitrite, UA Negative    RBC, UA 3-5    WBC, UA Too Numerous to Count    Bacteria, UA 2+      Lab Results   Component Value Date    URINECX No growth 10/23/2024       Results Review:      All brain images and reports were personally reviewed and " I agree with the interpretations except as noted below.    CT Angiogram Head and Neck 10/23/2024  Impression: No acute arterial abnormality of the head and neck. Chronic moderate stenoses noted within the bilateral cervical internal carotid arteries secondary to calcified and noncalcified plaques. Please note that the stenosis may be exaggerated due to motion artifact in this region. Additionally there is at least moderate stenoses noted within the bilateral PCAs distally. 1.0 cm aneurysm arising from the left cavernous segment ICA.     CT Head Without Contrast Stroke Protocol 10/23/2024  Impression: No acute intracranial abnormality. Specifically no acute intracranial hemorrhage.         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  Stroke neurology was consulted for further evaluation.  CT head 10/23 did not reveal intracranial abnormality.  CTA head and neck 10/23 revealed moderate stenoses bilateral internal carotid arteries and bilateral PCAs, as well as 1 cm left ICA aneurysm.  On exam, the patient is confused and aphasic.    # Encephalopathy  # Speech deficits per daughter this is her baseline since her stent placement.   Urinalysis reveals urinary tract infection with too numerous to count WBC, 2+ bacteria, moderate leukocytes.  I suspect this is the cause of the patient's encephalopathy and speech deficits.      -Continue aspirin 81 mg daily and ticagrelor 90 mg BID  -Continue atorvastatin 80 mg  daily (LDL 54)  -MRI pending  -TTE pending  -PT OT pending  -Neuro-checks per unit protocol while inpatient  -Blood pressure goal: permissive  -DVT prophylaxis: SCD    # 1 cm left ICA aneurysm  -Follow-up in neurosurgery clinic in 1 year for repeat CTA head and neck   -discussed bleed risk with patient daughter         Stroke neurology will follow results of above workup.  Please call with questions.     Dimple Huertas MD  [facility] [place] STROKE NEURO  10/24/24  14:01 EDT        Electronically signed by Dimple Huertas MD at 10/24/24 1421       Aretha Ziegler DO at 10/24/24 1354              [facility] Medicine Services  PROGRESS NOTE    Patient Name: Olga Sandoval  : 1955  MRN: [id number]    Date of Admission: 10/23/2024  Primary Care Physician: Ileana Kerr MD    Subjective   Subjective     CC:  Altered mental status    HPI:  Patient still very confused this morning, only able to follow some commands, redirectable. Unable to provide any history whatsoever.      Objective   Objective     Vital Signs:   Temp:  [98.1 °F (36.7 °C)-100.5 °F (38.1 °C)] 98.6 °F (37 °C)  Heart Rate:  [] 105  Resp:  [18-20] 20  BP: (136-199)/() 189/104     Physical Exam:  Constitutional: Awake, alert, resting comfortably  HENT: NCAT, mucous membranes moist  Respiratory: Clear to auscultation bilaterally, respiratory effort normal   Cardiovascular: Mildly tachycardic  Gastrointestinal: soft, nontender, nondistended  Musculoskeletal: No bilateral ankle edema  Psychiatric: Appropriate affect, cooperative  Neurologic: Alert, only oriented to self, no focal deficits  Skin: No rashes      Results Reviewed:  LAB RESULTS:      Lab 10/23/24  1732 10/23/24  1726   WBC 16.23*  --    HEMOGLOBIN 13.8  --    HEMOGLOBIN, POC  --  15.0   HEMATOCRIT 43.1  --    HEMATOCRIT POC  --  44   PLATELETS 385  --    NEUTROS ABS 14.33*  --    IMMATURE GRANS (ABS) 0.04  --    LYMPHS ABS 0.77  --    MONOS ABS 0.87   --    EOS ABS 0.17  --    MCV 88.9  --    LACTATE 1.8  --    APTT 24.9  --    HSTROP T 25*  --          Lab 10/23/24  1732 10/23/24  1731 10/23/24  1726   SODIUM 141  --   --    POTASSIUM 4.4  --   --    CHLORIDE 102  --   --    CO2 21.0*  --   --    ANION GAP 18.0*  --   --    BUN 25*  --   --    CREATININE 1.32* 1.40* 1.40*   EGFR 43.3*  --  40.3*   GLUCOSE 125*  --   --    CALCIUM 10.1  --   --    TSH 3.950  --   --          Lab 10/23/24  1732   TOTAL PROTEIN 7.6   ALBUMIN 4.5   GLOBULIN 3.1   ALT (SGPT) 9  9   AST (SGOT) 18  18   BILIRUBIN 0.6   ALK PHOS 104         Lab 10/23/24  1732   HSTROP T 25*             Lab 10/23/24  1732   VITAMIN B 12 294         Brief Urine Lab Results  (Last result in the past 365 days)        Color   Clarity   Blood   Leuk Est   Nitrite   Protein   CREAT   Urine HCG        10/23/24 1937 Yellow   Turbid   Trace   Moderate (2+)   Negative   30 mg/dL (1+)                   Microbiology Results Abnormal       Procedure Component Value - Date/Time    Urine Culture - Urine, Straight Cath [[id number]]  (Normal) Collected: 10/23/24 1937    Lab Status: Preliminary result Specimen: Urine from Straight Cath Updated: 10/24/24 1021     Urine Culture No growth    COVID PRE-OP / PRE-PROCEDURE SCREENING ORDER (NO ISOLATION) - Swab, Nasopharynx [[id number]]  (Normal) Collected: 10/23/24 1938    Lab Status: Final result Specimen: Swab from Nasopharynx Updated: 10/23/24 2007    Narrative:      The following orders were created for panel order COVID PRE-OP / PRE-PROCEDURE SCREENING ORDER (NO ISOLATION) - Swab, Nasopharynx.  Procedure                               Abnormality         Status                     ---------                               -----------         ------                     COVID-19 and FLU A/B PCR...[[id number]]  Normal              Final result                 Please view results for these tests on the individual orders.    COVID-19 and FLU A/B PCR, 1 HR TAT - Swab, Nasopharynx  [[id number]]  (Normal) Collected: 10/23/24 1938    Lab Status: Final result Specimen: Swab from Nasopharynx Updated: 10/23/24 2007     COVID19 Not Detected     Influenza A PCR Not Detected     Influenza B PCR Not Detected    Narrative:      Fact sheet for providers: https://www.fda.gov/media/159925/download    Fact sheet for patients: https://www.fda.gov/media/658798/download    Test performed by PCR.            XR Abdomen KUB    Result Date: 10/24/2024  XR ABDOMEN KUB Date of Exam: 10/24/2024 8:42 AM EDT Indication: MRI clearance Comparison: None available. Findings: There is no radiodense foreign body overlying the abdomen or pelvis. Contrast in the bladder related to recent CT exam. Air-filled bowel loops overlying the left mid abdomen are mildly dilated up to 4.2 cm which could relate to ileus, nonspecific. The lung bases are clear. No evidence of pneumoperitoneum within limits of supine technique.     Impression: Impression: 1. No radiodense foreign body overlying the abdomen or pelvis or findings to prevent MRI. 2. Mildly dilated air-filled bowel loops overlying the left mid abdomen may relate to ileus, nonspecific. Electronically Signed: Rahat Stokes MD  10/24/2024 9:20 AM EDT  Workstation ID: [id number]    CT Angiogram Head w AI Analysis of LVO    Result Date: 10/23/2024  CT ANGIOGRAM HEAD W AI ANALYSIS OF LVO, CT ANGIOGRAM NECK Date of Exam: 10/23/2024 5:25 PM EDT Indication: Neuro Deficit, acute, Stroke suspected Neuro deficit, acute stroke suspected. Comparison: 9/5/2023 Technique: CTA of the head was performed after the uneventful intravenous administration of 80 cc Isovue-370. Reconstructed coronal and sagittal images were also obtained. In addition, a 3-D volume rendered image was created for interpretation. Automated  exposure control and iterative reconstruction methods were used. Findings: Contrast opacification: Excellent Aortic Arch: Unremarkable Right: Innominate: Patent Subclavian: Mild to moderate  stenosis at the origin secondary to calcified plaques. Otherwise abnormal Common Carotid: Patent External Carotid: Patent Internal Carotid: Evidence of at least 60 to 70% stenosis noted within the proximal cervical internal carotid artery. Otherwise unremarkable. Please note that evaluation is limited due to motion artifact. Intracranial ICA: Patient is status post stenting of the right ICA. Otherwise unremarkable MCA:Patent MARISELA: Patent PCA: Moderate to severe stenosis noted within the distal P2 segment. Distally the vessels are patent. Vertebral: Mild stenosis within the V4 segment secondary to calcified plaques. Otherwise unremarkable Left: Subclavian: Mild stenosis at the origin secondary to calcified plaques. Otherwise unremarkable Common Carotid: Patent External Carotid:Patent Internal Carotid: Approximately 60% stenosis noted within the proximal cervical internal carotid artery secondary to calcified and noncalcified plaques. Intracranial ICA: Laterally directed aneurysm measuring 1.0 x 0.9 cm arising from the cavernous segment ICA. Otherwise unremarkable. MCA:Patent MARISELA: Patent PCA: Moderate stenosis noted within the distal segments. Otherwise unremarkable Vertebral: Patent Basilar: Patent Soft Tissue: Unremarkable Lungs: Unremarkable Bones: No acute osseous abnormality.     Impression: Impression: No acute arterial abnormality of the head and neck. Chronic moderate stenoses noted within the bilateral cervical internal carotid arteries secondary to calcified and noncalcified plaques. Please note that the stenosis may be exaggerated due to motion artifact in this region. Additionally there is at least moderate stenoses noted within the bilateral PCAs distally. 1.0 cm aneurysm arising from the left cavernous segment ICA. Electronically Signed: Derek Rich DO  10/23/2024 6:00 PM EDT  Workstation ID: [id number]    CT Angiogram Neck    Result Date: 10/23/2024  CT ANGIOGRAM HEAD W AI ANALYSIS OF LVO, CT  ANGIOGRAM NECK Date of Exam: 10/23/2024 5:25 PM EDT Indication: Neuro Deficit, acute, Stroke suspected Neuro deficit, acute stroke suspected. Comparison: 9/5/2023 Technique: CTA of the head was performed after the uneventful intravenous administration of 80 cc Isovue-370. Reconstructed coronal and sagittal images were also obtained. In addition, a 3-D volume rendered image was created for interpretation. Automated  exposure control and iterative reconstruction methods were used. Findings: Contrast opacification: Excellent Aortic Arch: Unremarkable Right: Innominate: Patent Subclavian: Mild to moderate stenosis at the origin secondary to calcified plaques. Otherwise abnormal Common Carotid: Patent External Carotid: Patent Internal Carotid: Evidence of at least 60 to 70% stenosis noted within the proximal cervical internal carotid artery. Otherwise unremarkable. Please note that evaluation is limited due to motion artifact. Intracranial ICA: Patient is status post stenting of the right ICA. Otherwise unremarkable MCA:Patent MARISELA: Patent PCA: Moderate to severe stenosis noted within the distal P2 segment. Distally the vessels are patent. Vertebral: Mild stenosis within the V4 segment secondary to calcified plaques. Otherwise unremarkable Left: Subclavian: Mild stenosis at the origin secondary to calcified plaques. Otherwise unremarkable Common Carotid: Patent External Carotid:Patent Internal Carotid: Approximately 60% stenosis noted within the proximal cervical internal carotid artery secondary to calcified and noncalcified plaques. Intracranial ICA: Laterally directed aneurysm measuring 1.0 x 0.9 cm arising from the cavernous segment ICA. Otherwise unremarkable. MCA:Patent MARISELA: Patent PCA: Moderate stenosis noted within the distal segments. Otherwise unremarkable Vertebral: Patent Basilar: Patent Soft Tissue: Unremarkable Lungs: Unremarkable Bones: No acute osseous abnormality.     Impression: Impression: No acute  arterial abnormality of the head and neck. Chronic moderate stenoses noted within the bilateral cervical internal carotid arteries secondary to calcified and noncalcified plaques. Please note that the stenosis may be exaggerated due to motion artifact in this region. Additionally there is at least moderate stenoses noted within the bilateral PCAs distally. 1.0 cm aneurysm arising from the left cavernous segment ICA. Electronically Signed: Derek Rich DO  10/23/2024 6:00 PM EDT  Workstation ID: [id number]    XR Chest 1 View    Result Date: 10/23/2024  XR CHEST 1 VW Date of Exam: 10/23/2024 5:22 PM EDT Indication: Acute Stroke Protocol (onset < 12 hrs) Comparison: 9/5/2023 Findings: Unchanged enlarged cardiac silhouette. Cardiac device overlies the left chest. Lower lung volumes. No definite focal airspace consolidation. No pleural effusion or pneumothorax. No acute bone abnormality.     Impression: Impression: No acute cardiopulmonary abnormality. Electronically Signed: Vick Wild MD  10/23/2024 5:52 PM EDT  Workstation ID: [id number]    CT Head Without Contrast Stroke Protocol    Result Date: 10/23/2024  CT HEAD WO CONTRAST STROKE PROTOCOL Date of Exam: 10/23/2024 5:18 PM EDT Indication: Neuro deficit, acute, stroke suspected Neuro Deficit, acute, Stroke suspected. Comparison: 9/5/2023 Technique: Axial CT images were obtained of the head without contrast administration.  Reconstructed coronal images were also obtained. Automated exposure control and iterative construction methods were used. Scan Time: 5:04 p.m. Results discussed with stroke team at 5:24 p.m. Findings: No large territory infarct. Old right frontal and left occipital infarcts. There is no evidence of hemorrhage. No mass effect, edema or midline shift Severe subcortical and periventricular white matter hypodensities, nonspecific but most likely represents chronic small vessel ischemic changes. No extra-axial fluid collection. Prominent ventricular  system secondary to chronic parenchymal volume loss. Status post bilateral lens extraction. Otherwise the visualized orbits are intact. Trace bilateral mastoid effusions. Otherwise the paranasal sinuses are clear. The visualized soft tissues are unremarkable. No acute osseous abnormality.     Impression: Impression: No acute intracranial abnormality. Specifically no acute intracranial hemorrhage. Electronically Signed: Dereknaastasia Rich DO  10/23/2024 5:24 PM EDT  Workstation ID: [id number]     Results for orders placed during the hospital encounter of 09/05/23    Adult Transthoracic Echo Complete W/ Cont if Necessary Per Protocol (With Agitated Saline)    Interpretation Summary    Left ventricular ejection fraction appears to be 56 - 60%.    Saline test results are negative.    Mild mitral valve stenosis is present. The mitral valve peak gradient is 10 mmHg. The mitral valve mean gradient is 4 mmHg. The mitral valve area (PHT) is 2.4 cm2.    Estimated right ventricular systolic pressure from tricuspid regurgitation is normal (<35 mmHg).    There is a trivial pericardial effusion.      Current medications:  Scheduled Meds:aspirin, 81 mg, Oral, Daily  atorvastatin, 80 mg, Oral, Nightly  cefTRIAXone, 2,000 mg, Intravenous, Q24H  cholecalciferol, 1,000 Units, Oral, Daily  insulin lispro, 2-7 Units, Subcutaneous, 4x Daily AC & at Bedtime  lactobacillus acidophilus, 1 capsule, Oral, Daily  [Held by provider] losartan, 50 mg, Oral, Daily  pharmacy consult - MTM, , Does not apply, Daily  nystatin, , Topical, Q12H  pantoprazole, 40 mg, Oral, Q AM  QUEtiapine, 50 mg, Oral, Q12H  ticagrelor, 90 mg, Oral, BID  venlafaxine XR, 150 mg, Oral, Daily      Continuous Infusions:   PRN Meds:.  acetaminophen    dextrose    dextrose    glucagon (human recombinant)    sodium chloride    ziprasidone    Assessment & Plan   Assessment & Plan     Active Hospital Problems    Diagnosis  POA    **Dysarthria [R47.1]  Yes      Resolved Hospital  Problems   No resolved problems to display.        Brief Hospital Course to date:  Olga Sandoval is a 69 y.o. 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 She was found on the floor by family member.     In the ER, patient was afebrile, heart rate 88, blood pressure 176/88, satting 96% on room air.  Labs notable for UA with leuk esterase, too numerous to count WBCs, 2+ bacteria, 21-30 squamous cells, WBCs 16, lactic 1.8. CXR with no acute abnormality, CTA head/neck with w chronic moderate stenoses with b/l cervical internal carotid arteries, moderate stenoses within the b/l PCAs, 1cm aneurysm from left cavernous segment ICA. She was given haldol, NS bolus, CTX. Seen by stroke service.     This patient's problems and plans were partially entered by my partner and updated as appropriate by me 10/24/24.    Altered mental status  -suspect infectious vs metabolic etiology, stroke work-up also underway  -LFTs normal, TSH normal, B12 normal  -Stroke Neurology following, MRI brain pending. Neurochecks per protocol. PT/OT/SLP to see.   -Continue treatment of suspected UTI. Will check resp PCR for completeness. Continue reduced dose Seroquel for now.     Leukocytosis with neutrophilia  Concern for UTI  -Continue IV ceftriaxone. Follow urine and blood cultures.   -Urinary retention protocol    1cm aneurysm left cavernous segment ICA  -Management per Neurology.    Code Status- unverified  -remains unverified, attempted to call daughter x2 but went straight to voicemail. Unable to provide update or verify Code Status on 10/24.      Candida dermatitis  -Nystatin powder BID.     Hypertension- BP goals per Neurology, permissive HTN for now, holding home losartan for  now.  Hyperlipidemia- continue atorvastatin.   DM2 - A1c pending; SSI + glucose checks + hypoglycemia protocol  Anxiety- continue home venlafaxine.   COPD  Morbid obesity    Expected Discharge Location and Transportation: TBD  Expected Discharge   Expected Discharge Date: 10/26/2024; Expected Discharge Time:      VTE Prophylaxis:  Mechanical VTE prophylaxis orders are present.         AM-PAC 6 Clicks Score (PT): 12 (10/24/24 1200)    CODE STATUS:   There are no questions and answers to display.       Aretha Ziegler DO  10/24/24        Electronically signed by Aretha Ziegler DO at 10/25/24 0917

## 2024-10-25 NOTE — PLAN OF CARE
Goal Outcome Evaluation:              Outcome Evaluation: Pt with baseline aphasia with reported improved function today.  Improved communication- receptive and expressive with functional, meaningful questions.  SLP to continue to follow given decline in baseline status despite no acute infarct per imaging.    Anticipated Discharge Disposition (SLP): home with home health, skilled nursing facility, anticipate therapy at next level of care             Treatment Assessment (SLP): continued, communication disorder (10/25/24 1345)  Treatment Assessment Comments (SLP): Participates in therapy. Improved function with relevant questions/information.  With nonfunctional questions, pt with yes dominance and gernerally no response for no.  She is inconsistent with sentence completion. She is unable to name object to description.  With relevant question, she exhibits impaired expressive aphasia, however she is able to more accurately answer y/n questions and provide single to phrase level responses.  No acute CVA per imaging, however notes endorse decline in communication. SLP to continue to treat. (10/25/24 1345)  Plan for Continued Treatment (SLP): goals adjusted to reflect functional improvements demonstrated (10/25/24 1345)

## 2024-10-25 NOTE — PROGRESS NOTES
[facility] Medicine Services  PROGRESS NOTE    Patient Name: Olga Sandoval  : 1955  MRN: [id number]    Date of Admission: 10/23/2024  Primary Care Physician: Ileana Kerr MD    Subjective   Subjective     CC:  Altered mental status    HPI:  Per daughter, patient at least 60% improved today. Patient reporting that she feels better as well.      Objective   Objective     Vital Signs:   Temp:  [97.9 °F (36.6 °C)-98.6 °F (37 °C)] 98.6 °F (37 °C)  Heart Rate:  [74-91] 90  Resp:  [18] 18  BP: (116-184)/(67-91) 184/85     Physical Exam:  Constitutional: Awake, alert, resting comfortably  HENT: NCAT, mucous membranes moist  Respiratory: Clear to auscultation bilaterally, respiratory effort normal   Cardiovascular: Regular rate and rhythm  Gastrointestinal: soft, nontender, nondistended  Musculoskeletal: No bilateral ankle edema  Psychiatric: Appropriate affect, cooperative  Neurologic: Alert, oriented x2, mild left facial droop present, chronic aphasia present  Skin: No rashes      Results Reviewed:  LAB RESULTS:      Lab 10/25/24  0951 10/24/24  1633 10/23/24  1732 10/23/24  1726   WBC 10.55 10.96* 16.23*  --    HEMOGLOBIN 12.8 11.6* 13.8  --    HEMOGLOBIN, POC  --   --   --  15.0   HEMATOCRIT 39.7 35.3 43.1  --    HEMATOCRIT POC  --   --   --  44   PLATELETS 384 315 385  --    NEUTROS ABS 7.40* 8.21* 14.33*  --    IMMATURE GRANS (ABS) 0.04 0.04 0.04  --    LYMPHS ABS 1.88 1.36 0.77  --    MONOS ABS 1.08* 1.32* 0.87  --    EOS ABS 0.10 0.01 0.17  --    MCV 88.8 87.6 88.9  --    LACTATE  --   --  1.8  --    APTT  --   --  24.9  --    HSTROP T  --   --  25*  --          Lab 10/24/24  1633 10/23/24  1732 10/23/24  1731 10/23/24  1726   SODIUM 143 141  --   --    POTASSIUM 3.8 4.4  --   --    CHLORIDE 105 102  --   --    CO2 25.0 21.0*  --   --    ANION GAP 13.0 18.0*  --   --    BUN 25* 25*  --   --    CREATININE 1.17* 1.32* 1.40* 1.40*   EGFR 50.0* 43.3*  --  40.3*   GLUCOSE 100* 125*   --   --    CALCIUM 9.8 10.1  --   --    HEMOGLOBIN A1C 5.40  --   --   --    TSH  --  3.950  --   --          Lab 10/23/24  1732   TOTAL PROTEIN 7.6   ALBUMIN 4.5   GLOBULIN 3.1   ALT (SGPT) 9  9   AST (SGOT) 18  18   BILIRUBIN 0.6   ALK PHOS 104         Lab 10/23/24  1732   HSTROP T 25*         Lab 10/24/24  1633   CHOLESTEROL 163   LDL CHOL 87   HDL CHOL 60   TRIGLYCERIDES 83         Lab 10/23/24  1732   VITAMIN B 12 294         Brief Urine Lab Results  (Last result in the past 365 days)        Color   Clarity   Blood   Leuk Est   Nitrite   Protein   CREAT   Urine HCG        10/23/24 1937 Yellow   Turbid   Trace   Moderate (2+)   Negative   30 mg/dL (1+)                   Microbiology Results Abnormal       Procedure Component Value - Date/Time    Urine Culture - Urine, Straight Cath [[id number]] Collected: 10/23/24 1937    Lab Status: Final result Specimen: Urine from Straight Cath Updated: 10/25/24 1355     Urine Culture >100,000 CFU/mL Mixed Betty Isolated    Narrative:      Specimen contains mixed organisms of questionable pathogenicity suggestive of contamination. If symptoms persist, suggest recollection.  Colonization of the urinary tract without infection is common. Treatment is discouraged unless the patient is symptomatic, pregnant, or undergoing an invasive urologic procedure.    Blood Culture - Blood, Arm, Right [[id number]]  (Normal) Collected: 10/23/24 2122    Lab Status: Preliminary result Specimen: Blood from Arm, Right Updated: 10/24/24 2132     Blood Culture No growth at 24 hours    Narrative:      Less than seven (7) mL's of blood was collected.  Insufficient quantity may yield false negative results.    Blood Culture - Blood, Arm, Right [[id number]]  (Normal) Collected: 10/23/24 2107    Lab Status: Preliminary result Specimen: Blood from Arm, Right Updated: 10/24/24 2131     Blood Culture No growth at 24 hours    Narrative:      Less than seven (7) mL's of blood was collected.  Insufficient  quantity may yield false negative results.    Respiratory Panel PCR w/COVID-19(SARS-CoV-2) LEONA/PRIYANKA/GABY/PAD/COR/MILDRED In-House, NP Swab in UTM/VTM, 2 HR TAT - Swab, Nasopharynx [[id number]]  (Normal) Collected: 10/24/24 1504    Lab Status: Final result Specimen: Swab from Nasopharynx Updated: 10/24/24 1556     ADENOVIRUS, PCR Not Detected     Coronavirus 229E Not Detected     Coronavirus HKU1 Not Detected     Coronavirus NL63 Not Detected     Coronavirus OC43 Not Detected     COVID19 Not Detected     Human Metapneumovirus Not Detected     Human Rhinovirus/Enterovirus Not Detected     Influenza A PCR Not Detected     Influenza B PCR Not Detected     Parainfluenza Virus 1 Not Detected     Parainfluenza Virus 2 Not Detected     Parainfluenza Virus 3 Not Detected     Parainfluenza Virus 4 Not Detected     RSV, PCR Not Detected     Bordetella pertussis pcr Not Detected     Bordetella parapertussis PCR Not Detected     Chlamydophila pneumoniae PCR Not Detected     Mycoplasma pneumo by PCR Not Detected    Narrative:      In the setting of a positive respiratory panel with a viral infection PLUS a negative procalcitonin without other underlying concern for bacterial infection, consider observing off antibiotics or discontinuation of antibiotics and continue supportive care. If the respiratory panel is positive for atypical bacterial infection (Bordetella pertussis, Chlamydophila pneumoniae, or Mycoplasma pneumoniae), consider antibiotic de-escalation to target atypical bacterial infection.    COVID PRE-OP / PRE-PROCEDURE SCREENING ORDER (NO ISOLATION) - Swab, Nasopharynx [[id number]]  (Normal) Collected: 10/23/24 1938    Lab Status: Final result Specimen: Swab from Nasopharynx Updated: 10/23/24 2007    Narrative:      The following orders were created for panel order COVID PRE-OP / PRE-PROCEDURE SCREENING ORDER (NO ISOLATION) - Swab, Nasopharynx.  Procedure                               Abnormality         Status                      ---------                               -----------         ------                     COVID-19 and FLU A/B PCR...[[id number]]  Normal              Final result                 Please view results for these tests on the individual orders.    COVID-19 and FLU A/B PCR, 1 HR TAT - Swab, Nasopharynx [[id number]]  (Normal) Collected: 10/23/24 1938    Lab Status: Final result Specimen: Swab from Nasopharynx Updated: 10/23/24 2007     COVID19 Not Detected     Influenza A PCR Not Detected     Influenza B PCR Not Detected    Narrative:      Fact sheet for providers: https://www.fda.gov/media/526855/download    Fact sheet for patients: https://www.fda.gov/media/152630/download    Test performed by PCR.            MRI Brain Without Contrast    Result Date: 10/25/2024  MRI BRAIN WO CONTRAST Date of Exam: 10/24/2024 11:59 PM EDT Indication: stroke r/o.  Comparison: CT head with angiography 10/23/2024. Technique:  Routine multiplanar/multisequence sequence images of the brain were obtained without contrast administration. Findings: Study is limited by motion despite motion mitigating technique. Patient refused SWI sequence. There is no diffusion restriction to suggest acute infarct. There are confluent regions of FLAIR hyperintense signal within the cerebral white matter. There is a chronic cortical infarct in the posterior right frontal lobe. There is a chronic infarct in the left temporal occipital region. Cerebellum is unremarkable. There are patchy FLAIR hyperintensities in the central tc. Midline structures appear intact. Calvarial and superficial soft tissue signal is within normal limits. Flow voids not assessed. No mass effect, midline shift or abnormal extra-axial collection. There is a chronic lacunar infarction in the right thalamus. There is generalized parenchymal  atrophy. There is a left mastoid effusion.     Impression: Impression: Motion limited study demonstrates multiple chronic cortical infarcts, advanced  chronic small vessel ischemic change and a chronic lacunar infarct in the right thalamus. No definite acute findings Electronically Signed: Vick Kathleen MD  10/25/2024 4:31 AM EDT  Workstation ID: [id number]    Adult Transthoracic Echo Complete W/ Cont if Necessary Per Protocol (With Agitated Saline)    Result Date: 10/24/2024    Left ventricular ejection fraction appears to be 51 - 55%.   Unable to fully assess valvular function due to suboptimal windows.     FL Video Swallow With Speech Single Contrast    Result Date: 10/24/2024  FL VIDEO SWALLOW W SPEECH SINGLE-CONTRAST Date of Exam: 10/24/2024 11:09 AM EDT Indication: dysphagia.   Comparison: None available. Technique:   The speech pathologist administered food and/or liquid mixed with barium to the patient with cine/video imaging.  Imaging assistance was provided to the speech pathologist and an image was saved. Fluoroscopic Time: 30 seconds Number of Images: 7 associated fluoroscopic loops were saved Findings: No aspiration was seen during fluoroscopic guided modified barium swallowing series. Please see speech therapy report for full details and recommendations.     Impression: Impression: Fluoroscopy provided for a modified barium swallow. No aspiration was seen during swallowing evaluation. Please see speech therapy report for full details and recommendations. Report dictated by: Abigail Hernández PA-c  I have personally reviewed this case and agree with the findings above: Electronically Signed: Orlando Tidwell MD  10/24/2024 5:01 PM EDT  Workstation ID: [id number]    EEG    Result Date: 10/24/2024  Reason for referral: 69 y.o.female with altered mental status Technical Summary:  A 19 channel digital EEG was performed using the international 10-20 placement system, including eye leads and EKG leads. Duration: 17 minutes Findings: The patient is awake.  Diffuse medium amplitude 2-5 Hz intermixed delta and theta activity is present symmetrically over both  hemispheres.  EMG and movement artifact are variably prominent.  A clear posterior rhythm is not seen.  As a study proceeds, at times faster 5 to 6 Hz theta frequencies become more prominent.  No focal features or epileptiform activity are seen.  Photic stimulation does not change the background.  Hyperventilation is not performed. Video: Available Technical quality: Good EKG: Regular, 80 bpm SUMMARY: Moderate generalized slow No focal features or epileptiform activity are seen     Impression: Diffuse cerebral dysfunction moderate degree but nonspecific No evidence for epilepsy is present This report is transcribed using the Dragon dictation system.      XR Abdomen KUB    Result Date: 10/24/2024  XR ABDOMEN KUB Date of Exam: 10/24/2024 8:42 AM EDT Indication: MRI clearance Comparison: None available. Findings: There is no radiodense foreign body overlying the abdomen or pelvis. Contrast in the bladder related to recent CT exam. Air-filled bowel loops overlying the left mid abdomen are mildly dilated up to 4.2 cm which could relate to ileus, nonspecific. The lung bases are clear. No evidence of pneumoperitoneum within limits of supine technique.     Impression: Impression: 1. No radiodense foreign body overlying the abdomen or pelvis or findings to prevent MRI. 2. Mildly dilated air-filled bowel loops overlying the left mid abdomen may relate to ileus, nonspecific. Electronically Signed: Rahat Stokes MD  10/24/2024 9:20 AM EDT  Workstation ID: [id number]    CT Angiogram Head w AI Analysis of LVO    Result Date: 10/23/2024  CT ANGIOGRAM HEAD W AI ANALYSIS OF LVO, CT ANGIOGRAM NECK Date of Exam: 10/23/2024 5:25 PM EDT Indication: Neuro Deficit, acute, Stroke suspected Neuro deficit, acute stroke suspected. Comparison: 9/5/2023 Technique: CTA of the head was performed after the uneventful intravenous administration of 80 cc Isovue-370. Reconstructed coronal and sagittal images were also obtained. In addition, a 3-D volume  rendered image was created for interpretation. Automated  exposure control and iterative reconstruction methods were used. Findings: Contrast opacification: Excellent Aortic Arch: Unremarkable Right: Innominate: Patent Subclavian: Mild to moderate stenosis at the origin secondary to calcified plaques. Otherwise abnormal Common Carotid: Patent External Carotid: Patent Internal Carotid: Evidence of at least 60 to 70% stenosis noted within the proximal cervical internal carotid artery. Otherwise unremarkable. Please note that evaluation is limited due to motion artifact. Intracranial ICA: Patient is status post stenting of the right ICA. Otherwise unremarkable MCA:Patent MARISELA: Patent PCA: Moderate to severe stenosis noted within the distal P2 segment. Distally the vessels are patent. Vertebral: Mild stenosis within the V4 segment secondary to calcified plaques. Otherwise unremarkable Left: Subclavian: Mild stenosis at the origin secondary to calcified plaques. Otherwise unremarkable Common Carotid: Patent External Carotid:Patent Internal Carotid: Approximately 60% stenosis noted within the proximal cervical internal carotid artery secondary to calcified and noncalcified plaques. Intracranial ICA: Laterally directed aneurysm measuring 1.0 x 0.9 cm arising from the cavernous segment ICA. Otherwise unremarkable. MCA:Patent MARISELA: Patent PCA: Moderate stenosis noted within the distal segments. Otherwise unremarkable Vertebral: Patent Basilar: Patent Soft Tissue: Unremarkable Lungs: Unremarkable Bones: No acute osseous abnormality.     Impression: Impression: No acute arterial abnormality of the head and neck. Chronic moderate stenoses noted within the bilateral cervical internal carotid arteries secondary to calcified and noncalcified plaques. Please note that the stenosis may be exaggerated due to motion artifact in this region. Additionally there is at least moderate stenoses noted within the bilateral PCAs distally. 1.0 cm  aneurysm arising from the left cavernous segment ICA. Electronically Signed: Derek DO Hilario  10/23/2024 6:00 PM EDT  Workstation ID: [id number]    CT Angiogram Neck    Result Date: 10/23/2024  CT ANGIOGRAM HEAD W AI ANALYSIS OF LVO, CT ANGIOGRAM NECK Date of Exam: 10/23/2024 5:25 PM EDT Indication: Neuro Deficit, acute, Stroke suspected Neuro deficit, acute stroke suspected. Comparison: 9/5/2023 Technique: CTA of the head was performed after the uneventful intravenous administration of 80 cc Isovue-370. Reconstructed coronal and sagittal images were also obtained. In addition, a 3-D volume rendered image was created for interpretation. Automated  exposure control and iterative reconstruction methods were used. Findings: Contrast opacification: Excellent Aortic Arch: Unremarkable Right: Innominate: Patent Subclavian: Mild to moderate stenosis at the origin secondary to calcified plaques. Otherwise abnormal Common Carotid: Patent External Carotid: Patent Internal Carotid: Evidence of at least 60 to 70% stenosis noted within the proximal cervical internal carotid artery. Otherwise unremarkable. Please note that evaluation is limited due to motion artifact. Intracranial ICA: Patient is status post stenting of the right ICA. Otherwise unremarkable MCA:Patent MARISELA: Patent PCA: Moderate to severe stenosis noted within the distal P2 segment. Distally the vessels are patent. Vertebral: Mild stenosis within the V4 segment secondary to calcified plaques. Otherwise unremarkable Left: Subclavian: Mild stenosis at the origin secondary to calcified plaques. Otherwise unremarkable Common Carotid: Patent External Carotid:Patent Internal Carotid: Approximately 60% stenosis noted within the proximal cervical internal carotid artery secondary to calcified and noncalcified plaques. Intracranial ICA: Laterally directed aneurysm measuring 1.0 x 0.9 cm arising from the cavernous segment ICA. Otherwise unremarkable. MCA:Patent MARISELA:  Patent PCA: Moderate stenosis noted within the distal segments. Otherwise unremarkable Vertebral: Patent Basilar: Patent Soft Tissue: Unremarkable Lungs: Unremarkable Bones: No acute osseous abnormality.     Impression: Impression: No acute arterial abnormality of the head and neck. Chronic moderate stenoses noted within the bilateral cervical internal carotid arteries secondary to calcified and noncalcified plaques. Please note that the stenosis may be exaggerated due to motion artifact in this region. Additionally there is at least moderate stenoses noted within the bilateral PCAs distally. 1.0 cm aneurysm arising from the left cavernous segment ICA. Electronically Signed: Derek Rich DO  10/23/2024 6:00 PM EDT  Workstation ID: [id number]    XR Chest 1 View    Result Date: 10/23/2024  XR CHEST 1 VW Date of Exam: 10/23/2024 5:22 PM EDT Indication: Acute Stroke Protocol (onset < 12 hrs) Comparison: 9/5/2023 Findings: Unchanged enlarged cardiac silhouette. Cardiac device overlies the left chest. Lower lung volumes. No definite focal airspace consolidation. No pleural effusion or pneumothorax. No acute bone abnormality.     Impression: Impression: No acute cardiopulmonary abnormality. Electronically Signed: Vick Wild MD  10/23/2024 5:52 PM EDT  Workstation ID: [id number]    CT Head Without Contrast Stroke Protocol    Result Date: 10/23/2024  CT HEAD WO CONTRAST STROKE PROTOCOL Date of Exam: 10/23/2024 5:18 PM EDT Indication: Neuro deficit, acute, stroke suspected Neuro Deficit, acute, Stroke suspected. Comparison: 9/5/2023 Technique: Axial CT images were obtained of the head without contrast administration.  Reconstructed coronal images were also obtained. Automated exposure control and iterative construction methods were used. Scan Time: 5:04 p.m. Results discussed with stroke team at 5:24 p.m. Findings: No large territory infarct. Old right frontal and left occipital infarcts. There is no evidence of  hemorrhage. No mass effect, edema or midline shift Severe subcortical and periventricular white matter hypodensities, nonspecific but most likely represents chronic small vessel ischemic changes. No extra-axial fluid collection. Prominent ventricular system secondary to chronic parenchymal volume loss. Status post bilateral lens extraction. Otherwise the visualized orbits are intact. Trace bilateral mastoid effusions. Otherwise the paranasal sinuses are clear. The visualized soft tissues are unremarkable. No acute osseous abnormality.     Impression: Impression: No acute intracranial abnormality. Specifically no acute intracranial hemorrhage. Electronically Signed: Derek Rich DO  10/23/2024 5:24 PM EDT  Workstation ID: [id number]     Results for orders placed during the hospital encounter of 10/23/24    Adult Transthoracic Echo Complete W/ Cont if Necessary Per Protocol (With Agitated Saline)    Interpretation Summary    Left ventricular ejection fraction appears to be 51 - 55%.    Unable to fully assess valvular function due to suboptimal windows.      Current medications:  Scheduled Meds:aspirin, 81 mg, Oral, Daily  atorvastatin, 80 mg, Oral, Nightly  cefTRIAXone, 2,000 mg, Intravenous, Q24H  cholecalciferol, 1,000 Units, Oral, Daily  insulin lispro, 2-7 Units, Subcutaneous, 4x Daily AC & at Bedtime  lactobacillus acidophilus, 1 capsule, Oral, Daily  [Held by provider] losartan, 50 mg, Oral, Daily  nystatin, , Topical, Q12H  pantoprazole, 40 mg, Oral, Q AM  QUEtiapine, 50 mg, Oral, Q12H  ticagrelor, 90 mg, Oral, BID  venlafaxine XR, 150 mg, Oral, Daily      Continuous Infusions:   PRN Meds:.  acetaminophen    dextrose    dextrose    glucagon (human recombinant)    sodium chloride    ziprasidone    Assessment & Plan   Assessment & Plan     Active Hospital Problems    Diagnosis  POA    **Dysarthria [R47.1]  Yes      Resolved Hospital Problems   No resolved problems to display.        Brief Hospital Course to  date:  Olga Sandoval is a 69 y.o. 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 She was found on the floor by family member.     In the ER, patient was afebrile, heart rate 88, blood pressure 176/88, satting 96% on room air.  Labs notable for UA with leuk esterase, too numerous to count WBCs, 2+ bacteria, 21-30 squamous cells, WBCs 16, lactic 1.8. CXR with no acute abnormality, CTA head/neck with w chronic moderate stenoses with b/l cervical internal carotid arteries, moderate stenoses within the b/l PCAs, 1cm aneurysm from left cavernous segment ICA. She was given haldol, NS bolus, CTX. Seen by stroke service.     This patient's problems and plans were partially entered by my partner and updated as appropriate by me 10/25/24.    Altered mental status  -suspect infectious vs metabolic etiology, stroke work-up also underway  -LFTs normal, TSH normal, B12 normal, ammonia low, resp PCR negative  -MRI brain showed multiple chronic cortical infarcts, chronic lacunar infarct in right thalamus  -Stroke Neurology following, recommended aspirin 81 mg daily, Brilinta 90 mg twice daily, atorvastatin 80 mg daily.   -Continue treatment of suspected UTI. Continue reduced dose Seroquel for now. PT/OT recommending SNF on discharge.    Acute UTI  Leukocytosis with neutrophilia  -Urine culture growing >100k mixed tavo  -Continue IV ceftriaxone. Follow final urine and blood cultures. Will need to ask [facility] to speciate urine culture further as patient is improving on IV ceftriaxone so patient appears to have true UTI.   -Urinary retention protocol.    FLASH on suspected CKD  -Cr improved with fluids on admission.    1cm aneurysm left cavernous segment ICA  -Patient follows  with Neurosurgery at , her neurosurgeon had requested outpatient MRA/MRI before her next follow-up.   -Stroke neurology discussed bleeding risk with patient's daughter.      Candida dermatitis  -Nystatin powder BID.     Hypertension- Resumed home losartan.   Hyperlipidemia- continue atorvastatin.   DM2 - A1c 5.4%; SSI + glucose checks + hypoglycemia protocol  Anxiety- continue home venlafaxine.   COPD  Morbid obesity    Expected Discharge Location and Transportation: TBD  Expected Discharge   Expected Discharge Date: 10/26/2024; Expected Discharge Time:      VTE Prophylaxis:  Mechanical VTE prophylaxis orders are present.         AM-PAC 6 Clicks Score (PT): 12 (10/24/24 2000)    CODE STATUS:   Code Status and Medical Interventions: CPR (Attempt to Resuscitate); Full Support   Ordered at: 10/24/24 9280     Level Of Support Discussed With:    Patient     Code Status (Patient has no pulse and is not breathing):    CPR (Attempt to Resuscitate)     Medical Interventions (Patient has pulse or is breathing):    Full Support       Aretha Ziegler, DO  10/25/24

## 2024-10-25 NOTE — THERAPY TREATMENT NOTE
Acute Care - Speech Language Pathology Treatment Note  [facility]     Patient Name: Olga Sandoval  : 1955  MRN: [id number]  Today's Date: 10/25/2024               Admit Date: 10/23/2024     Visit Dx:    ICD-10-CM ICD-9-CM   1. Left-sided weakness  R53.1 728.87   2. Slurred speech  R47.81 784.59   3. Acute dehydration  E86.0 276.51   4. Acute urinary tract infection  N39.0 599.0   5. Acute encephalopathy  G93.40 348.30   6. Oral phase dysphagia  R13.11 787.21   7. Cognitive communication deficit  R41.841 799.52   8. Aphasia  R47.01 784.3     Patient Active Problem List   Diagnosis    Aphasia    Bipolar 2 disorder    Type 2 diabetes mellitus    Essential hypertension    Hyperlipidemia    Carotid stenosis, right    Obesity, Class III, BMI 40-49.9 (morbid obesity)    History of CVA (cerebrovascular accident)    Hypertension    Gastroesophageal reflux disease    Encounter for screening for colorectal cancer in high risk patient    Immunization due    Medicare annual wellness visit, subsequent    Lymphedema    Bleeding from right ear    Healthcare maintenance    Post-menopausal    Dysarthria     Past Medical History:   Diagnosis Date    Aneurysm     left carotid    Bipolar 2 disorder     Diabetes mellitus     H/O Bell's palsy     Left sided facial droop    History of loop recorder     Hyperlipidemia     Hypertension     Stroke     x 4     Past Surgical History:   Procedure Laterality Date    CARDIAC CATHETERIZATION      CATARACT EXTRACTION Bilateral      SECTION         SLP Recommendation and Plan                    Anticipated Discharge Disposition (SLP): home with home health, skilled nursing facility, anticipate therapy at next level of care (10/25/24 134)        Therapy Frequency (SLP SLC): 3 days per week (10/25/24 1345)  Predicted Duration Therapy Intervention (Days): 1 week (10/25/24 134)        Daily Summary of Progress (SLP): progress towards functional goals is fair (Nursing staff reports pt  is significantly improvd today compared to yesterday.) (10/25/24 1345)           Treatment Assessment (SLP): continued, communication disorder (10/25/24 1345)  Treatment Assessment Comments (SLP): Participates in therapy. Improved function with relevant questions/information.  With nonfunctional questions, pt with yes dominance and gernerally no response for no.  She is inconsistent with sentence completion. She is unable to name object to description.  With relevant question, she exhibits impaired expressive aphasia, however she is able to more accurately answer y/n questions and provide single to phrase level responses.  No acute CVA per imaging, however notes endorse decline in communication. SLP to continue to treat. (10/25/24 1345)  Plan for Continued Treatment (SLP): goals adjusted to reflect functional improvements demonstrated (10/25/24 1345)  Outcome Evaluation: Pt with baseline aphasia with reported improved function today.  Improved communication- receptive and expressive with functional, meaningful questions.  SLP to continue to follow given decline in baseline status despite no acute infarct per imaging. (10/25/24 1444)      SLP EVALUATION (Last 72 Hours)       SLP SLC Evaluation       Row Name 10/25/24 1345 10/24/24 0896                Communication Assessment/Intervention    Document Type therapy note (daily note)  -ML --       Subjective Information no complaints  -ML --       Patient Observations alert;cooperative;agree to therapy  -ML --       Patient/Family/Caregiver Comments/Observations No family present- per pt, dtr is at work.  -ML --       Patient Effort adequate  -ML --       Comment Pt endorses impaired communication baseline.  EMR suggests baseline aphasia, however current state is below baseline.  -ML --       Symptoms Noted During/After Treatment none  -ML --          General Information    Patient Profile Reviewed yes  -ML --       Pertinent History Of Current Problem See ST hx- imaging  reveals no acute events- hx of multiple chronic infarcts- cortical. Expressive aphasia baseline but now with new word finding difficulty which is worse.  -ML --       Prior Level of Function-Communication expressive language impairment;other (see comments)  No information re: reading/writing/comprehension but suspect impaired  -ML --       Plans/Goals Discussed with patient;agreed upon  -ML --       Barriers to Rehab previous functional deficit  -ML --       Patient's Goals for Discharge patient did not state  -ML --          Pain    Pretreatment Pain Rating 0/10 - no pain  -ML --       Posttreatment Pain Rating 0/10 - no pain  -ML --          Comprehension Assessment/Intervention    Comprehension Assessment/Intervention -- Auditory Comprehension  -          Auditory Comprehension Assessment/Intervention    Answers Questions (Communication) -- yes/no;wh questions;personal;simple;moderate impairment  -       Able to Follow Commands (Communication) -- 1-step;moderate impairment  -       Narrative Discourse -- conversational level;severe impairment  -       Successful Auditory Strategies (Communication) -- repetition;visual cues;other (see comments)  -       Auditory Comprehension Communication, Comment -- best with closed-ended Y/N  -SM          Expression Assessment/Intervention    Expression Assessment/Intervention -- verbal expression  -          Verbal Expression Assessment/Intervention    Spontaneous/Functional Words -- simple;moderate impairment  -       Conversational Discourse/Fluency -- severe impairment  -       Verbal Expression, Comment -- Decreased word retrieval and thought formation. Needs prompts and cues to assist  -          Motor Speech Assessment/Intervention    Motor Speech Function -- mild impairment  -       Characteristics Consistent with Dysarthria -- decreased intensity;decreased articulation  -       Speech intelligibility -- 90%;in quiet environment;with unfamiliar  listener  -          Cursory Voice Assessment/Intervention    Quality and Resonance (Voice) -- WFL  -          Cognitive Assessment Intervention- SLP    Orientation Status (Cognition) -- person;other (see comments)  to first name only.  -       Attention (Cognitive) -- selective;mild impairment;sustained;moderate impairment  -       Cognition, Comment -- Difficult to further assess 2' limited output/response  -          SLP Evaluation Clinical Impressions    SLP Diagnosis -- moderate;cognitive-linguistic disorder  -       Rehab Potential/Prognosis -- good  -[facility] Criteria for Skilled Therapy Interventions Met -- yes  -       Functional Impact -- difficulty communicating wants, needs;unable to make medical decisions  -          SLP Treatment Clinical Impressions    Treatment Assessment (SLP) continued;communication disorder  -ML --       Treatment Assessment Comments (SLP) Participates in therapy. Improved function with relevant questions/information.  With nonfunctional questions, pt with yes dominance and gernerally no response for no.  She is inconsistent with sentence completion. She is unable to name object to description.  With relevant question, she exhibits impaired expressive aphasia, however she is able to more accurately answer y/n questions and provide single to phrase level responses.  No acute CVA per imaging, however notes endorse decline in communication. SLP to continue to treat.  -ML --       Daily Summary of Progress (SLP) progress towards functional goals is fair  Nursing staff reports pt is significantly improvd today compared to yesterday.  -ML --       Barriers to Overall Progress (SLP) Baseline deficits  -ML --       Plan for Continued Treatment (SLP) goals adjusted to reflect functional improvements demonstrated  -ML --       Care Plan Review care plan/treatment goals reviewed;patient/other agree to care plan  -ML --          Recommendations    Therapy Frequency (SLP  SLC) 3 days per week  -ML 5 days per week  -SM       Predicted Duration Therapy Intervention (Days) 1 week  -ML 1 week  -SM       Anticipated Discharge Disposition (SLP) home with home health;skilled nursing facility;anticipate therapy at next level of care  -ML inpatient rehabilitation facility  -                 User Key  (r) = Recorded By, (t) = Taken By, (c) = Cosigned By      Initials Name Effective Dates    SM Maria G Hernández, MS CCC-SLP 02/03/23 -     ML Yessenia Pedraza, MS CCC-SLP 08/30/24 -                        EDUCATION  The patient has been educated in the following areas:     Communication Impairment.           SLP GOALS       Row Name 10/25/24 1345 10/24/24 1050 10/24/24 0815       (LTG) Patient will demonstrate functional swallow for    Diet Texture (Demonstrate functional swallow) regular textures  -ML regular textures  -SM --    Liquid viscosity (Demonstrate functional swallow) thin liquids  -ML thin liquids  -SM --    [place] (Demonstrate functional swallow) independently (over 90% accuracy)  -ML independently (over 90% accuracy)  -SM --    Time Frame (Demonstrate functional swallow) 1 week  -ML 1 week  -SM --    Progress/Outcomes (Demonstrate functional swallow) goal ongoing  -ML -- --    Comment (Demonstrate functional swallow) Pt is not hungry- RN endorses no difficulty with swallowing.  -ML -- --       (STG) Patient will tolerate trials of    Consistencies Trialed (Tolerate trials) soft to chew (chopped) textures;thin liquids  -ML soft to chew (chopped) textures;thin liquids  -SM --    Desired Outcome (Tolerate trials) with adequate oral prep/transit/clearance;without signs/symptoms of aspiration  -ML with adequate oral prep/transit/clearance;without signs/symptoms of aspiration  -SM --    [place] (Tolerate trials) independently (over 90% accuracy)  -ML independently (over 90% accuracy)  -SM --    Time Frame (Tolerate trials) 1 week  -ML 1 week  -SM --     Progress/Outcomes (Tolerate trials) goal ongoing  -ML -- --       (STG) Patient will tolerate therapeutic trials of    Consistencies Trialed (Tolerate therapeutic trials) regular textures  -ML regular textures  -SM --    Desired Outcome (Tolerate therapeutic trials) with adequate oral prep/transit/clearance  -ML with adequate oral prep/transit/clearance  -SM --    [place] (Tolerate therapeutic trials) independently (over 90% accuracy)  -ML independently (over 90% accuracy)  -SM --    Time Frame (Tolerate therapeutic trials) 1 week  -ML 1 week  -SM --    Progress/Outcomes (Tolerate therapeutic trials) goal ongoing  -ML -- --       Patient will demonstrate functional cognitive-linguistic skills for return to discharge environment    [place] -- -- with minimal cues  -SM    Time frame -- -- 1 week  -SM       SLP Diagnostic Treatment     Patient will participate in further assessment in the following areas reading comprehension;graphic expression  -ML -- reading comprehension;graphic expression  -SM    Time Frame (Diagnostic) short term goal (STG);1 week  -ML -- 1 week  -SM    Progress/Outcomes (Additional Goal 1, SLP) goal met  -ML -- --    Comment (Diagnostic) Able to recognize signs but not read/recognize words/sentences today. She endorses decreased reading but she is hopeful reading will improve. Pt declined writing assessment. Baseline unknown.  -ML -- --       Comprehend Questions Goal 1 (SLP)    Improve Ability to Comprehend Questions Goal 1 (SLP) simple yes/no questions;simple wh questions;90%;with minimal cues (75-90%)  -ML -- simple yes/no questions;simple wh questions;90%;with minimal cues (75-90%)  -SM    Time Frame (Comprehend Questions Goal 1, SLP) short term goal (STG);1 week  -ML -- 1 week  -SM    Barriers (Comprehend Questions Goal 1, SLP) Baseline deficits  -ML -- --    Progress (Ability to Comprehend Questions Goal 1, SLP) 30%;with moderate cues (50-74%)  -ML -- --    Progress/Outcomes  (Comprehend Questions Goal 1, SLP) goal ongoing  -ML -- --    Comment (Comprehend Questions Goal 1, SLP) Improved success with functional y/n questions. Poor response level and yes dominance with nonmeaningful questions.  -ML -- --       Follow Directions Goal 2 (SLP)    Improve Ability to Follow Directions Goal 1 (SLP) 1 step direction without objects;100%;with minimal cues (75-90%)  -ML -- 1 step direction without objects;100%;with minimal cues (75-90%)  -SM    Time Frame (Follow Directions Goal 1, SLP) short term goal (STG);1 week  -ML -- 1 week  -SM    Progress (Ability to Follow Directions Goal 1, SLP) with maximum cues (25-49%);30%  -ML -- --    Progress/Outcomes (Follow Directions Goal 1, SLP) goal ongoing  -ML -- --    Comment (Follow Directions Goal 1, SLP) Unable to follow commands except with model and hand over hand assist.  -ML -- --       Word Retrieval Skills Goal 1 (SLP)    Improve Word Retrieval Skills By Goal 1 (SLP) completing open ended structured sentence;answer WH question with one word;100%;with minimal cues (75-90%)  -ML -- completing open ended structured sentence;answer WH question with one word;100%;with minimal cues (75-90%)  -SM    Time Frame (Word Retrieval Goal 1, SLP) short term goal (STG);1 week  -ML -- 1 week  -SM    Barriers (Word Retrieval Goal 1, SLP) Baseline deficits  -ML -- --    Progress (Word Retrieval Skills Goal 1, SLP) 40%  -ML -- --    Progress/Outcomes (Word Retrieval Goal 1, SLP) goal ongoing  -ML -- --    Comment (Word Retrieval Goal 1, SLP) Slow to respond/initiate a response. Only able to complete very simple sentence completion tasks.  -ML -- --       Phonation Goal 1 (SLP)    Improve Phonation By Goal 1 (SLP) using loud speech;90%;with minimal cues (75-90%)  -ML -- using loud speech;90%;with minimal cues (75-90%)  -SM    Time Frame (Phonation Goal 1, SLP) short term goal (STG);1 week  -ML -- 1 week  -SM    Progress/Outcomes (Phonation Goal 1, SLP) goal no longer  appropriate  -ML -- --    Comment (Phonation Goal 1, SLP) Audible and intelligible speech- goal no longer appropriate.  -ML -- --       Attention Goal 1 (SLP)    Improve Attention by Goal 1 (SLP) attending to task;100%;with minimal cues (75-90%)  -ML -- attending to task;100%;with minimal cues (75-90%)  -SM    Time Frame (Attention Goal 1, SLP) short term goal (STG);1 week  -ML -- 1 week  -SM    Progress/Outcomes (Attention Goal 1, SLP) goal met;goal no longer appropriate  -ML -- --    Comment (Attention Goal 1, SLP) No difficulty attending to tasks/therapy today.  -ML -- --       Orientation Goal 1 (SLP)    Improve Orientation Through Goal 1 (SLP) demonstrating orientation to month;demonstrating orientation to year;demonstrating orientation to place;demonstrating orientation to disease/impairment;100%;with moderate cues (50-74%)  -ML -- demonstrating orientation to month;demonstrating orientation to year;demonstrating orientation to place;demonstrating orientation to disease/impairment;100%;with moderate cues (50-74%)  -SM    Time Frame (Orientation Goal 1, SLP) short term goal (STG);1 week  -ML -- 1 week  -SM    Progress (Orientation Goal 1, SLP) 40%  -ML -- --    Progress/Outcomes (Orientation Goal 1, SLP) goal ongoing  -ML -- --    Comment (Orientation Goal 1, SLP) Oriented to self, month but not year/day/hospital.  -ML -- --              User Key  (r) = Recorded By, (t) = Taken By, (c) = Cosigned By      Initials Name Provider Type    Maria G Randolph, MS CCC-SLP Speech and Language Pathologist    Yessenia Griffin, MS CCC-SLP Speech and Language Pathologist                              Time Calculation:      Time Calculation- SLP       Row Name 10/25/24 1447             Time Calculation- SLP    SLP Start Time 1345  -ML      SLP Received On 10/25/24  -ML                User Key  (r) = Recorded By, (t) = Taken By, (c) = Cosigned By      Initials Name Provider Type     Yessenia Pedraza, MS  CCC-SLP Speech and Language Pathologist                    Therapy Charges for Today       Code Description Service Date Service Provider Modifiers Qty    [id number] [facility] TREATMENT SPEECH 4 10/25/2024 Yessenia Pedraza, MS CCC-SLP GN 1                       Yessenia Pedraza MS CCC-SLP  10/25/2024

## 2024-10-26 LAB
ANION GAP SERPL CALCULATED.3IONS-SCNC: 13 MMOL/L (ref 5–15)
BASOPHILS # BLD AUTO: 0.05 10*3/MM3 (ref 0–0.2)
BASOPHILS NFR BLD AUTO: 0.7 % (ref 0–1.5)
BUN SERPL-MCNC: 19 MG/DL (ref 8–23)
BUN/CREAT SERPL: 21.1 (ref 7–25)
CALCIUM SPEC-SCNC: 9 MG/DL (ref 8.6–10.5)
CHLORIDE SERPL-SCNC: 108 MMOL/L (ref 98–107)
CO2 SERPL-SCNC: 22 MMOL/L (ref 22–29)
CREAT SERPL-MCNC: 0.9 MG/DL (ref 0.57–1)
DEPRECATED RDW RBC AUTO: 54.6 FL (ref 37–54)
EGFRCR SERPLBLD CKD-EPI 2021: 68.5 ML/MIN/1.73
EOSINOPHIL # BLD AUTO: 0.37 10*3/MM3 (ref 0–0.4)
EOSINOPHIL NFR BLD AUTO: 5.3 % (ref 0.3–6.2)
ERYTHROCYTE [DISTWIDTH] IN BLOOD BY AUTOMATED COUNT: 16.7 % (ref 12.3–15.4)
GLUCOSE BLDC GLUCOMTR-MCNC: 101 MG/DL (ref 70–130)
GLUCOSE BLDC GLUCOMTR-MCNC: 101 MG/DL (ref 70–130)
GLUCOSE BLDC GLUCOMTR-MCNC: 130 MG/DL (ref 70–130)
GLUCOSE BLDC GLUCOMTR-MCNC: 132 MG/DL (ref 70–130)
GLUCOSE SERPL-MCNC: 111 MG/DL (ref 65–99)
HCT VFR BLD AUTO: 35 % (ref 34–46.6)
HGB BLD-MCNC: 11.1 G/DL (ref 12–15.9)
IMM GRANULOCYTES # BLD AUTO: 0.02 10*3/MM3 (ref 0–0.05)
IMM GRANULOCYTES NFR BLD AUTO: 0.3 % (ref 0–0.5)
LYMPHOCYTES # BLD AUTO: 1.57 10*3/MM3 (ref 0.7–3.1)
LYMPHOCYTES NFR BLD AUTO: 22.5 % (ref 19.6–45.3)
MCH RBC QN AUTO: 28.5 PG (ref 26.6–33)
MCHC RBC AUTO-ENTMCNC: 31.7 G/DL (ref 31.5–35.7)
MCV RBC AUTO: 89.7 FL (ref 79–97)
MONOCYTES # BLD AUTO: 0.77 10*3/MM3 (ref 0.1–0.9)
MONOCYTES NFR BLD AUTO: 11 % (ref 5–12)
NEUTROPHILS NFR BLD AUTO: 4.21 10*3/MM3 (ref 1.7–7)
NEUTROPHILS NFR BLD AUTO: 60.2 % (ref 42.7–76)
NRBC BLD AUTO-RTO: 0 /100 WBC (ref 0–0.2)
PLATELET # BLD AUTO: 283 10*3/MM3 (ref 140–450)
PMV BLD AUTO: 10.3 FL (ref 6–12)
POTASSIUM SERPL-SCNC: 3.9 MMOL/L (ref 3.5–5.2)
RBC # BLD AUTO: 3.9 10*6/MM3 (ref 3.77–5.28)
SODIUM SERPL-SCNC: 143 MMOL/L (ref 136–145)
WBC NRBC COR # BLD AUTO: 6.99 10*3/MM3 (ref 3.4–10.8)

## 2024-10-26 PROCEDURE — 25010000002 CEFTRIAXONE PER 250 MG: Performed by: STUDENT IN AN ORGANIZED HEALTH CARE EDUCATION/TRAINING PROGRAM

## 2024-10-26 PROCEDURE — 82948 REAGENT STRIP/BLOOD GLUCOSE: CPT

## 2024-10-26 PROCEDURE — 99232 SBSQ HOSP IP/OBS MODERATE 35: CPT | Performed by: STUDENT IN AN ORGANIZED HEALTH CARE EDUCATION/TRAINING PROGRAM

## 2024-10-26 PROCEDURE — 25010000002 LABETALOL 5 MG/ML SOLUTION: Performed by: STUDENT IN AN ORGANIZED HEALTH CARE EDUCATION/TRAINING PROGRAM

## 2024-10-26 PROCEDURE — 80048 BASIC METABOLIC PNL TOTAL CA: CPT | Performed by: STUDENT IN AN ORGANIZED HEALTH CARE EDUCATION/TRAINING PROGRAM

## 2024-10-26 PROCEDURE — 85025 COMPLETE CBC W/AUTO DIFF WBC: CPT | Performed by: STUDENT IN AN ORGANIZED HEALTH CARE EDUCATION/TRAINING PROGRAM

## 2024-10-26 RX ORDER — AMLODIPINE BESYLATE 10 MG/1
10 TABLET ORAL
Status: DISCONTINUED | OUTPATIENT
Start: 2024-10-27 | End: 2024-11-04 | Stop reason: HOSPADM

## 2024-10-26 RX ORDER — LOSARTAN POTASSIUM 50 MG/1
50 TABLET ORAL ONCE
Status: COMPLETED | OUTPATIENT
Start: 2024-10-26 | End: 2024-10-26

## 2024-10-26 RX ORDER — LOSARTAN POTASSIUM 50 MG/1
100 TABLET ORAL DAILY
Status: DISCONTINUED | OUTPATIENT
Start: 2024-10-27 | End: 2024-11-04 | Stop reason: HOSPADM

## 2024-10-26 RX ORDER — LABETALOL HYDROCHLORIDE 5 MG/ML
10 INJECTION, SOLUTION INTRAVENOUS EVERY 6 HOURS PRN
Status: DISCONTINUED | OUTPATIENT
Start: 2024-10-26 | End: 2024-11-04 | Stop reason: HOSPADM

## 2024-10-26 RX ORDER — AMLODIPINE BESYLATE 5 MG/1
5 TABLET ORAL ONCE
Status: COMPLETED | OUTPATIENT
Start: 2024-10-26 | End: 2024-10-26

## 2024-10-26 RX ORDER — AMLODIPINE BESYLATE 5 MG/1
5 TABLET ORAL
Status: DISCONTINUED | OUTPATIENT
Start: 2024-10-26 | End: 2024-10-26

## 2024-10-26 RX ADMIN — ASPIRIN 81 MG 81 MG: 81 TABLET ORAL at 08:54

## 2024-10-26 RX ADMIN — TICAGRELOR 90 MG: 90 TABLET ORAL at 08:54

## 2024-10-26 RX ADMIN — TICAGRELOR 90 MG: 90 TABLET ORAL at 21:14

## 2024-10-26 RX ADMIN — NYSTATIN: 100000 POWDER TOPICAL at 08:54

## 2024-10-26 RX ADMIN — LABETALOL HYDROCHLORIDE 10 MG: 5 INJECTION, SOLUTION INTRAVENOUS at 19:19

## 2024-10-26 RX ADMIN — PANTOPRAZOLE SODIUM 40 MG: 40 TABLET, DELAYED RELEASE ORAL at 05:44

## 2024-10-26 RX ADMIN — QUETIAPINE FUMARATE 50 MG: 25 TABLET ORAL at 21:13

## 2024-10-26 RX ADMIN — LOSARTAN POTASSIUM 50 MG: 50 TABLET, FILM COATED ORAL at 14:41

## 2024-10-26 RX ADMIN — NYSTATIN: 100000 POWDER TOPICAL at 21:16

## 2024-10-26 RX ADMIN — SODIUM CHLORIDE 2000 MG: 900 INJECTION INTRAVENOUS at 21:16

## 2024-10-26 RX ADMIN — AMLODIPINE BESYLATE 5 MG: 5 TABLET ORAL at 11:52

## 2024-10-26 RX ADMIN — ATORVASTATIN CALCIUM 80 MG: 40 TABLET, FILM COATED ORAL at 21:13

## 2024-10-26 RX ADMIN — Medication 1 CAPSULE: at 08:54

## 2024-10-26 RX ADMIN — LOSARTAN POTASSIUM 50 MG: 50 TABLET, FILM COATED ORAL at 08:54

## 2024-10-26 RX ADMIN — QUETIAPINE FUMARATE 50 MG: 25 TABLET ORAL at 08:54

## 2024-10-26 RX ADMIN — Medication 1000 UNITS: at 08:55

## 2024-10-26 RX ADMIN — Medication 10 ML: at 21:16

## 2024-10-26 RX ADMIN — AMLODIPINE BESYLATE 5 MG: 5 TABLET ORAL at 14:41

## 2024-10-26 NOTE — PLAN OF CARE
Problem: Adult Inpatient Plan of Care  Goal: Plan of Care Review  Outcome: Progressing  Goal: Patient-Specific Goal (Individualized)  Outcome: Progressing  Goal: Absence of Hospital-Acquired Illness or Injury  Outcome: Progressing  Intervention: Identify and Manage Fall Risk  Recent Flowsheet Documentation  Taken 10/26/2024 0400 by Aurora Ordoñez RN  Safety Promotion/Fall Prevention:   activity supervised   assistive device/personal items within reach   clutter free environment maintained   fall prevention program maintained   lighting adjusted   nonskid shoes/slippers when out of bed   room organization consistent   safety round/check completed  Taken 10/26/2024 0200 by Aurora Ordoñez RN  Safety Promotion/Fall Prevention:   activity supervised   assistive device/personal items within reach   clutter free environment maintained   fall prevention program maintained   lighting adjusted   nonskid shoes/slippers when out of bed   room organization consistent   safety round/check completed  Taken 10/26/2024 0000 by Aurora Ordoñez RN  Safety Promotion/Fall Prevention:   activity supervised   assistive device/personal items within reach   clutter free environment maintained   fall prevention program maintained   lighting adjusted   nonskid shoes/slippers when out of bed   room organization consistent   safety round/check completed  Taken 10/25/2024 2200 by Aurora Ordoñez RN  Safety Promotion/Fall Prevention:   activity supervised   assistive device/personal items within reach   clutter free environment maintained   fall prevention program maintained   lighting adjusted   nonskid shoes/slippers when out of bed   room organization consistent   safety round/check completed  Taken 10/25/2024 2000 by Aurora Ordoñez RN  Safety Promotion/Fall Prevention:   activity supervised   assistive device/personal items within reach   clutter free environment maintained   fall prevention program maintained   lighting  adjusted   nonskid shoes/slippers when out of bed   room organization consistent   safety round/check completed  Intervention: Prevent Skin Injury  Recent Flowsheet Documentation  Taken 10/26/2024 0400 by Aurora Ordoñez RN  Body Position:   supine   neutral body alignment   neutral head position  Skin Protection:   incontinence pads utilized   transparent dressing maintained  Taken 10/26/2024 0200 by Aurora Ordoñez RN  Body Position:   tilted   left  Skin Protection:   incontinence pads utilized   transparent dressing maintained  Taken 10/26/2024 0000 by Aurora Ordoñez RN  Body Position: supine  Skin Protection:   incontinence pads utilized   transparent dressing maintained  Taken 10/25/2024 2200 by Aurora Ordoñez RN  Body Position:   turned   left  Skin Protection:   incontinence pads utilized   transparent dressing maintained  Taken 10/25/2024 2000 by Aurora Ordoñez RN  Body Position:   turned   left  Skin Protection:   incontinence pads utilized   transparent dressing maintained  Intervention: Prevent and Manage VTE (Venous Thromboembolism) Risk  Recent Flowsheet Documentation  Taken 10/25/2024 2000 by Aurora Ordoñez RN  VTE Prevention/Management:   bilateral   SCDs (sequential compression devices) off   patient refused intervention  Intervention: Prevent Infection  Recent Flowsheet Documentation  Taken 10/26/2024 0400 by Aurora Ordoñez RN  Infection Prevention:   cohorting utilized   environmental surveillance performed   equipment surfaces disinfected   hand hygiene promoted   rest/sleep promoted  Taken 10/26/2024 0200 by Aurora Ordoñez RN  Infection Prevention:   cohorting utilized   environmental surveillance performed   equipment surfaces disinfected   hand hygiene promoted   rest/sleep promoted  Taken 10/26/2024 0000 by Aurora Ordoñez RN  Infection Prevention:   cohorting utilized   environmental surveillance performed   equipment surfaces disinfected   hand hygiene promoted    rest/sleep promoted  Taken 10/25/2024 2200 by Aurora Ordoñez RN  Infection Prevention:   cohorting utilized   environmental surveillance performed   equipment surfaces disinfected   hand hygiene promoted   rest/sleep promoted  Taken 10/25/2024 2000 by Aurora Ordoñez RN  Infection Prevention:   cohorting utilized   environmental surveillance performed   equipment surfaces disinfected   hand hygiene promoted   rest/sleep promoted  Goal: Optimal Comfort and Wellbeing  Outcome: Progressing  Intervention: Provide Person-Centered Care  Recent Flowsheet Documentation  Taken 10/25/2024 2000 by Aurora Ordoñez RN  Trust Relationship/Rapport:   care explained   choices provided   emotional support provided   questions answered   empathic listening provided   questions encouraged   reassurance provided   thoughts/feelings acknowledged  Goal: Readiness for Transition of Care  Outcome: Progressing     Problem: Fall Injury Risk  Goal: Absence of Fall and Fall-Related Injury  Outcome: Progressing  Intervention: Identify and Manage Contributors  Recent Flowsheet Documentation  Taken 10/26/2024 0400 by Aurora Ordoñez RN  Self-Care Promotion: independence encouraged  Taken 10/26/2024 0200 by Aurora Ordoñez RN  Self-Care Promotion: independence encouraged  Taken 10/26/2024 0000 by Aurora Ordoñez RN  Self-Care Promotion: independence encouraged  Taken 10/25/2024 2200 by Aurora Ordoñez RN  Self-Care Promotion: independence encouraged  Taken 10/25/2024 2000 by Aurora Ordoñez RN  Medication Review/Management: medications reviewed  Self-Care Promotion: independence encouraged  Intervention: Promote Injury-Free Environment  Recent Flowsheet Documentation  Taken 10/26/2024 0400 by Aurora Ordoñez RN  Safety Promotion/Fall Prevention:   activity supervised   assistive device/personal items within reach   clutter free environment maintained   fall prevention program maintained   lighting adjusted   nonskid  shoes/slippers when out of bed   room organization consistent   safety round/check completed  Taken 10/26/2024 0200 by Aurora Ordoñez, RN  Safety Promotion/Fall Prevention:   activity supervised   assistive device/personal items within reach   clutter free environment maintained   fall prevention program maintained   lighting adjusted   nonskid shoes/slippers when out of bed   room organization consistent   safety round/check completed  Taken 10/26/2024 0000 by Aurora Ordoñez, RN  Safety Promotion/Fall Prevention:   activity supervised   assistive device/personal items within reach   clutter free environment maintained   fall prevention program maintained   lighting adjusted   nonskid shoes/slippers when out of bed   room organization consistent   safety round/check completed  Taken 10/25/2024 2200 by Aurora Ordoñez, RN  Safety Promotion/Fall Prevention:   activity supervised   assistive device/personal items within reach   clutter free environment maintained   fall prevention program maintained   lighting adjusted   nonskid shoes/slippers when out of bed   room organization consistent   safety round/check completed  Taken 10/25/2024 2000 by Aurora Ordoñez, RN  Safety Promotion/Fall Prevention:   activity supervised   assistive device/personal items within reach   clutter free environment maintained   fall prevention program maintained   lighting adjusted   nonskid shoes/slippers when out of bed   room organization consistent   safety round/check completed     Problem: Skin Injury Risk Increased  Goal: Skin Health and Integrity  Outcome: Progressing  Intervention: Optimize Skin Protection  Recent Flowsheet Documentation  Taken 10/26/2024 0400 by Aurora Ordoñez, RN  Activity Management: activity encouraged  Pressure Reduction Techniques:   frequent weight shift encouraged   weight shift assistance provided   pressure points protected  Head of Bed (HOB) Positioning: HOB elevated  Pressure Reduction  Devices:   pressure-redistributing mattress utilized   positioning supports utilized  Skin Protection:   incontinence pads utilized   transparent dressing maintained  Taken 10/26/2024 0200 by Aurora Ordoñez RN  Activity Management: activity encouraged  Pressure Reduction Techniques:   frequent weight shift encouraged   weight shift assistance provided   pressure points protected  Head of Bed (HOB) Positioning: HOB elevated  Pressure Reduction Devices:   pressure-redistributing mattress utilized   positioning supports utilized  Skin Protection:   incontinence pads utilized   transparent dressing maintained  Taken 10/26/2024 0000 by Aurora Ordoñez RN  Activity Management: activity encouraged  Pressure Reduction Techniques:   frequent weight shift encouraged   weight shift assistance provided   pressure points protected  Head of Bed (HOB) Positioning: HOB elevated  Pressure Reduction Devices:   pressure-redistributing mattress utilized   positioning supports utilized  Skin Protection:   incontinence pads utilized   transparent dressing maintained  Taken 10/25/2024 2200 by Aurora Ordoñez RN  Activity Management: activity encouraged  Pressure Reduction Techniques:   frequent weight shift encouraged   weight shift assistance provided   pressure points protected  Head of Bed (HOB) Positioning: HOB elevated  Pressure Reduction Devices:   pressure-redistributing mattress utilized   positioning supports utilized  Skin Protection:   incontinence pads utilized   transparent dressing maintained  Taken 10/25/2024 2000 by Aurora Ordoñez RN  Activity Management: activity encouraged  Pressure Reduction Techniques:   frequent weight shift encouraged   weight shift assistance provided   pressure points protected  Head of Bed (HOB) Positioning: HOB elevated  Pressure Reduction Devices:   pressure-redistributing mattress utilized   positioning supports utilized  Skin Protection:   incontinence pads utilized   transparent  dressing maintained     Problem: Comorbidity Management  Goal: Maintenance of Behavioral Health Symptom Control  Outcome: Progressing  Intervention: Maintain Behavioral Health Symptom Control  Recent Flowsheet Documentation  Taken 10/25/2024 2000 by Aurora Ordoñez RN  Medication Review/Management: medications reviewed  Goal: Blood Pressure in Desired Range  Outcome: Progressing  Intervention: Maintain Blood Pressure Management  Recent Flowsheet Documentation  Taken 10/25/2024 2000 by Aurora Ordoñez, RN  Medication Review/Management: medications reviewed   Goal Outcome Evaluation:         10/25- Pt remains AOX2. RA. NSR. NIH: 6. Pt had some incontinence on cedric, but was not able to urinated when on bsc. Bladder scanned pt and saw 128 ml, pt stated she did not feel she had to pee. Plan of care ongoing.

## 2024-10-26 NOTE — PROGRESS NOTES
[facility] Medicine Services  PROGRESS NOTE    Patient Name: Olga Sandoval  : 1955  MRN: [id number]    Date of Admission: 10/23/2024  Primary Care Physician: Ileana Kerr MD    Subjective   Subjective     CC:  Altered mental status    HPI:  Reports she slept well.  Remains dysarthric.  Denied pain.  No nausea or vomiting.  Had 2 bowel movements.  No nursing concerns.  No family bedside.    Objective   Objective     Vital Signs:   Temp:  [98 °F (36.7 °C)-98.6 °F (37 °C)] 98 °F (36.7 °C)  Heart Rate:  [83-94] 94  Resp:  [18] 18  BP: (126-184)/(60-85) 168/70     Physical Exam:  Constitutional: Awake, alert, resting comfortably, obese, laying in bed  HENT: NCAT, mucous membranes moist  Respiratory: Clear to auscultation bilaterally, respiratory effort normal   Cardiovascular: Regular rate and rhythm  Gastrointestinal: soft, nontender, nondistended  Musculoskeletal: No bilateral ankle edema  Psychiatric: Appropriate affect, cooperative  Neurologic: Alert, oriented to self, place, mild left facial droop present, chronic aphasia present  Skin: No rashes on exposed skin    Results Reviewed:  LAB RESULTS:      Lab 10/25/24  0951 10/24/24  1633 10/23/24  1732 10/23/24  172   WBC 10.55 10.96* 16.23*  --    HEMOGLOBIN 12.8 11.6* 13.8  --    HEMOGLOBIN, POC  --   --   --  15.0   HEMATOCRIT 39.7 35.3 43.1  --    HEMATOCRIT POC  --   --   --  44   PLATELETS 384 315 385  --    NEUTROS ABS 7.40* 8.21* 14.33*  --    IMMATURE GRANS (ABS) 0.04 0.04 0.04  --    LYMPHS ABS 1.88 1.36 0.77  --    MONOS ABS 1.08* 1.32* 0.87  --    EOS ABS 0.10 0.01 0.17  --    MCV 88.8 87.6 88.9  --    LACTATE  --   --  1.8  --    APTT  --   --  24.9  --    HSTROP T  --   --  25*  --          Lab 10/24/24  1633 10/23/24  1732 10/23/24  1731 10/23/24  1726   SODIUM 143 141  --   --    POTASSIUM 3.8 4.4  --   --    CHLORIDE 105 102  --   --    CO2 25.0 21.0*  --   --    ANION GAP 13.0 18.0*  --   --    BUN 25* 25*  --    --    CREATININE 1.17* 1.32* 1.40* 1.40*   EGFR 50.0* 43.3*  --  40.3*   GLUCOSE 100* 125*  --   --    CALCIUM 9.8 10.1  --   --    HEMOGLOBIN A1C 5.40  --   --   --    TSH  --  3.950  --   --          Lab 10/23/24  1732   TOTAL PROTEIN 7.6   ALBUMIN 4.5   GLOBULIN 3.1   ALT (SGPT) 9  9   AST (SGOT) 18  18   BILIRUBIN 0.6   ALK PHOS 104         Lab 10/23/24  1732   HSTROP T 25*         Lab 10/24/24  1633   CHOLESTEROL 163   LDL CHOL 87   HDL CHOL 60   TRIGLYCERIDES 83         Lab 10/23/24  1732   VITAMIN B 12 294         Brief Urine Lab Results  (Last result in the past 365 days)        Color   Clarity   Blood   Leuk Est   Nitrite   Protein   CREAT   Urine HCG        10/23/24 1937 Yellow   Turbid   Trace   Moderate (2+)   Negative   30 mg/dL (1+)                   Microbiology Results Abnormal       Procedure Component Value - Date/Time    Blood Culture - Blood, Arm, Right [[id number]]  (Normal) Collected: 10/23/24 2122    Lab Status: Preliminary result Specimen: Blood from Arm, Right Updated: 10/25/24 2131     Blood Culture No growth at 2 days    Narrative:      Less than seven (7) mL's of blood was collected.  Insufficient quantity may yield false negative results.    Blood Culture - Blood, Arm, Right [[id number]]  (Normal) Collected: 10/23/24 2107    Lab Status: Preliminary result Specimen: Blood from Arm, Right Updated: 10/25/24 2131     Blood Culture No growth at 2 days    Narrative:      Less than seven (7) mL's of blood was collected.  Insufficient quantity may yield false negative results.    Urine Culture - Urine, Straight Cath [[id number]] Collected: 10/23/24 1937    Lab Status: Final result Specimen: Urine from Straight Cath Updated: 10/25/24 1355     Urine Culture >100,000 CFU/mL Mixed Betty Isolated    Narrative:      Specimen contains mixed organisms of questionable pathogenicity suggestive of contamination. If symptoms persist, suggest recollection.  Colonization of the urinary tract without infection  is common. Treatment is discouraged unless the patient is symptomatic, pregnant, or undergoing an invasive urologic procedure.    Respiratory Panel PCR w/COVID-19(SARS-CoV-2) [facility]ONA/PRIYANKA/GABY/PAD/COR/MILDRED In-House, NP Swab in UTM/VTM, 2 HR TAT - Swab, Nasopharynx [[id number]]  (Normal) Collected: 10/24/24 1504    Lab Status: Final result Specimen: Swab from Nasopharynx Updated: 10/24/24 1556     ADENOVIRUS, PCR Not Detected     Coronavirus 229E Not Detected     Coronavirus HKU1 Not Detected     Coronavirus NL63 Not Detected     Coronavirus OC43 Not Detected     COVID19 Not Detected     Human Metapneumovirus Not Detected     Human Rhinovirus/Enterovirus Not Detected     Influenza A PCR Not Detected     Influenza B PCR Not Detected     Parainfluenza Virus 1 Not Detected     Parainfluenza Virus 2 Not Detected     Parainfluenza Virus 3 Not Detected     Parainfluenza Virus 4 Not Detected     RSV, PCR Not Detected     Bordetella pertussis pcr Not Detected     Bordetella parapertussis PCR Not Detected     Chlamydophila pneumoniae PCR Not Detected     Mycoplasma pneumo by PCR Not Detected    Narrative:      In the setting of a positive respiratory panel with a viral infection PLUS a negative procalcitonin without other underlying concern for bacterial infection, consider observing off antibiotics or discontinuation of antibiotics and continue supportive care. If the respiratory panel is positive for atypical bacterial infection (Bordetella pertussis, Chlamydophila pneumoniae, or Mycoplasma pneumoniae), consider antibiotic de-escalation to target atypical bacterial infection.    COVID PRE-OP / PRE-PROCEDURE SCREENING ORDER (NO ISOLATION) - Swab, Nasopharynx [[id number]]  (Normal) Collected: 10/23/24 1938    Lab Status: Final result Specimen: Swab from Nasopharynx Updated: 10/23/24 2007    Narrative:      The following orders were created for panel order COVID PRE-OP / PRE-PROCEDURE SCREENING ORDER (NO ISOLATION) - Swab,  Nasopharynx.  Procedure                               Abnormality         Status                     ---------                               -----------         ------                     COVID-19 and FLU A/B PCR...[[id number]]  Normal              Final result                 Please view results for these tests on the individual orders.    COVID-19 and FLU A/B PCR, 1 HR TAT - Swab, Nasopharynx [[id number]]  (Normal) Collected: 10/23/24 1938    Lab Status: Final result Specimen: Swab from Nasopharynx Updated: 10/23/24 2007     COVID19 Not Detected     Influenza A PCR Not Detected     Influenza B PCR Not Detected    Narrative:      Fact sheet for providers: https://www.fda.gov/media/036836/download    Fact sheet for patients: https://www.fda.gov/media/128612/download    Test performed by PCR.            MRI Brain Without Contrast    Result Date: 10/25/2024  MRI BRAIN WO CONTRAST Date of Exam: 10/24/2024 11:59 PM EDT Indication: stroke r/o.  Comparison: CT head with angiography 10/23/2024. Technique:  Routine multiplanar/multisequence sequence images of the brain were obtained without contrast administration. Findings: Study is limited by motion despite motion mitigating technique. Patient refused SWI sequence. There is no diffusion restriction to suggest acute infarct. There are confluent regions of FLAIR hyperintense signal within the cerebral white matter. There is a chronic cortical infarct in the posterior right frontal lobe. There is a chronic infarct in the left temporal occipital region. Cerebellum is unremarkable. There are patchy FLAIR hyperintensities in the central tc. Midline structures appear intact. Calvarial and superficial soft tissue signal is within normal limits. Flow voids not assessed. No mass effect, midline shift or abnormal extra-axial collection. There is a chronic lacunar infarction in the right thalamus. There is generalized parenchymal  atrophy. There is a left mastoid effusion.      Impression: Impression: Motion limited study demonstrates multiple chronic cortical infarcts, advanced chronic small vessel ischemic change and a chronic lacunar infarct in the right thalamus. No definite acute findings Electronically Signed: Vick Kathleen MD  10/25/2024 4:31 AM EDT  Workstation ID: [id number]    Adult Transthoracic Echo Complete W/ Cont if Necessary Per Protocol (With Agitated Saline)    Result Date: 10/24/2024    Left ventricular ejection fraction appears to be 51 - 55%.   Unable to fully assess valvular function due to suboptimal windows.     FL Video Swallow With Speech Single Contrast    Result Date: 10/24/2024  FL VIDEO SWALLOW W SPEECH SINGLE-CONTRAST Date of Exam: 10/24/2024 11:09 AM EDT Indication: dysphagia.   Comparison: None available. Technique:   The speech pathologist administered food and/or liquid mixed with barium to the patient with cine/video imaging.  Imaging assistance was provided to the speech pathologist and an image was saved. Fluoroscopic Time: 30 seconds Number of Images: 7 associated fluoroscopic loops were saved Findings: No aspiration was seen during fluoroscopic guided modified barium swallowing series. Please see speech therapy report for full details and recommendations.     Impression: Impression: Fluoroscopy provided for a modified barium swallow. No aspiration was seen during swallowing evaluation. Please see speech therapy report for full details and recommendations. Report dictated by: Abigail Hernández PA-c  I have personally reviewed this case and agree with the findings above: Electronically Signed: Orlando Tidwell MD  10/24/2024 5:01 PM EDT  Workstation ID: [id number]    EEG    Result Date: 10/24/2024  Reason for referral: 69 y.o.female with altered mental status Technical Summary:  A 19 channel digital EEG was performed using the international 10-20 placement system, including eye leads and EKG leads. Duration: 17 minutes Findings: The patient is awake.   Diffuse medium amplitude 2-5 Hz intermixed delta and theta activity is present symmetrically over both hemispheres.  EMG and movement artifact are variably prominent.  A clear posterior rhythm is not seen.  As a study proceeds, at times faster 5 to 6 Hz theta frequencies become more prominent.  No focal features or epileptiform activity are seen.  Photic stimulation does not change the background.  Hyperventilation is not performed. Video: Available Technical quality: Good EKG: Regular, 80 bpm SUMMARY: Moderate generalized slow No focal features or epileptiform activity are seen     Impression: Diffuse cerebral dysfunction moderate degree but nonspecific No evidence for epilepsy is present This report is transcribed using the Dragon dictation system.      XR Abdomen KUB    Result Date: 10/24/2024  XR ABDOMEN KUB Date of Exam: 10/24/2024 8:42 AM EDT Indication: MRI clearance Comparison: None available. Findings: There is no radiodense foreign body overlying the abdomen or pelvis. Contrast in the bladder related to recent CT exam. Air-filled bowel loops overlying the left mid abdomen are mildly dilated up to 4.2 cm which could relate to ileus, nonspecific. The lung bases are clear. No evidence of pneumoperitoneum within limits of supine technique.     Impression: Impression: 1. No radiodense foreign body overlying the abdomen or pelvis or findings to prevent MRI. 2. Mildly dilated air-filled bowel loops overlying the left mid abdomen may relate to ileus, nonspecific. Electronically Signed: Rahat Stokes MD  10/24/2024 9:20 AM EDT  Workstation ID: [id number]     Results for orders placed during the hospital encounter of 10/23/24    Adult Transthoracic Echo Complete W/ Cont if Necessary Per Protocol (With Agitated Saline)    Interpretation Summary    Left ventricular ejection fraction appears to be 51 - 55%.    Unable to fully assess valvular function due to suboptimal windows.      Current medications:  Scheduled  Meds:aspirin, 81 mg, Oral, Daily  atorvastatin, 80 mg, Oral, Nightly  cefTRIAXone, 2,000 mg, Intravenous, Q24H  cholecalciferol, 1,000 Units, Oral, Daily  insulin lispro, 2-7 Units, Subcutaneous, 4x Daily AC & at Bedtime  lactobacillus acidophilus, 1 capsule, Oral, Daily  losartan, 50 mg, Oral, Daily  nystatin, , Topical, Q12H  pantoprazole, 40 mg, Oral, Q AM  QUEtiapine, 50 mg, Oral, Q12H  ticagrelor, 90 mg, Oral, BID  venlafaxine XR, 150 mg, Oral, Daily      Continuous Infusions:   PRN Meds:.  acetaminophen    dextrose    dextrose    glucagon (human recombinant)    sodium chloride    ziprasidone    Assessment & Plan   Assessment & Plan     Active Hospital Problems    Diagnosis  POA    **Dysarthria [R47.1]  Yes    UTI (urinary tract infection) [N39.0]  Yes      Resolved Hospital Problems   No resolved problems to display.        Brief Hospital Course to date:  Olga Sandoval is a 69 y.o. 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 She was found on the floor by family member.     Labs notable for UA with leuk esterase, too numerous to count WBCs, 2+ bacteria, 21-30 squamous cells, WBCs 16, lactic 1.8. CXR with no acute abnormality, CTA head/neck with w chronic moderate stenoses with b/l cervical internal carotid arteries, moderate stenoses within the b/l PCAs, 1cm aneurysm from left cavernous segment ICA.     This patient's problems and plans were partially entered by my partner and updated as appropriate by me 10/26/24.    Altered mental status  -suspect infectious vs metabolic etiology  -LFTs normal, TSH normal, B12 normal, ammonia low, resp PCR negative  -MRI brain showed multiple chronic cortical infarcts, chronic lacunar infarct in right thalamus  -Stroke  Neurology followed, recommended aspirin 81 mg daily, Brilinta 90 mg twice daily, atorvastatin 80 mg daily.   -Continue treatment of suspected UTI. Continue reduced dose Seroquel for now. PT/OT recommending SNF on discharge.    Acute UTI  Leukocytosis with neutrophilia  -Urine culture growing >100k mixed tavo  -Continue IV ceftriaxone. Follow final blood cultures. Patient is improving on IV ceftriaxone so patient appears to have true UTI.   -Urinary retention protocol.    FLASH on suspected CKD  -Cr improved with fluids on admission.    1cm aneurysm left cavernous segment ICA  -Patient follows with Neurosurgery at , her neurosurgeon had requested outpatient MRA/MRI before her next follow-up.   -Stroke neurology discussed bleeding risk with patient's daughter.      Candida dermatitis  -Nystatin powder      Hypertension- continue home losartan.  Amlodipine added on 10/26.  Hyperlipidemia- continue atorvastatin.   DM2 - A1c 5.4%; SSI + glucose checks + hypoglycemia protocol  Anxiety- continue home venlafaxine.   COPD  Morbid obesity    Expected Discharge Location and Transportation: SNF  Expected Discharge   Expected Discharge Date: 10/26/2024; Expected Discharge Time:      VTE Prophylaxis:  Mechanical VTE prophylaxis orders are present.         AM-PAC 6 Clicks Score (PT): 13 (10/25/24 2000)    CODE STATUS:   Code Status and Medical Interventions: CPR (Attempt to Resuscitate); Full Support   Ordered at: 10/24/24 9676     Level Of Support Discussed With:    Patient     Code Status (Patient has no pulse and is not breathing):    CPR (Attempt to Resuscitate)     Medical Interventions (Patient has pulse or is breathing):    Full Support       Jen Andrade MD  10/26/24

## 2024-10-27 LAB
ANION GAP SERPL CALCULATED.3IONS-SCNC: 13 MMOL/L (ref 5–15)
ANISOCYTOSIS BLD QL: NORMAL
BASOPHILS # BLD AUTO: 0.02 10*3/MM3 (ref 0–0.2)
BASOPHILS NFR BLD AUTO: 0.3 % (ref 0–1.5)
BUN SERPL-MCNC: 16 MG/DL (ref 8–23)
BUN/CREAT SERPL: 16.7 (ref 7–25)
BURR CELLS BLD QL SMEAR: NORMAL
CALCIUM SPEC-SCNC: 8.9 MG/DL (ref 8.6–10.5)
CHLORIDE SERPL-SCNC: 107 MMOL/L (ref 98–107)
CO2 SERPL-SCNC: 21 MMOL/L (ref 22–29)
CREAT SERPL-MCNC: 0.96 MG/DL (ref 0.57–1)
DEPRECATED RDW RBC AUTO: 55 FL (ref 37–54)
EGFRCR SERPLBLD CKD-EPI 2021: 63.4 ML/MIN/1.73
EOSINOPHIL # BLD AUTO: 0.33 10*3/MM3 (ref 0–0.4)
EOSINOPHIL NFR BLD AUTO: 5.2 % (ref 0.3–6.2)
ERYTHROCYTE [DISTWIDTH] IN BLOOD BY AUTOMATED COUNT: 17 % (ref 12.3–15.4)
GLUCOSE BLDC GLUCOMTR-MCNC: 103 MG/DL (ref 70–130)
GLUCOSE BLDC GLUCOMTR-MCNC: 115 MG/DL (ref 70–130)
GLUCOSE BLDC GLUCOMTR-MCNC: 120 MG/DL (ref 70–130)
GLUCOSE BLDC GLUCOMTR-MCNC: 121 MG/DL (ref 70–130)
GLUCOSE SERPL-MCNC: 194 MG/DL (ref 65–99)
HCT VFR BLD AUTO: 35 % (ref 34–46.6)
HGB BLD-MCNC: 11.5 G/DL (ref 12–15.9)
IMM GRANULOCYTES # BLD AUTO: 0.02 10*3/MM3 (ref 0–0.05)
IMM GRANULOCYTES NFR BLD AUTO: 0.3 % (ref 0–0.5)
LYMPHOCYTES # BLD AUTO: 1.31 10*3/MM3 (ref 0.7–3.1)
LYMPHOCYTES NFR BLD AUTO: 20.6 % (ref 19.6–45.3)
MCH RBC QN AUTO: 29 PG (ref 26.6–33)
MCHC RBC AUTO-ENTMCNC: 32.9 G/DL (ref 31.5–35.7)
MCV RBC AUTO: 88.4 FL (ref 79–97)
MONOCYTES # BLD AUTO: 0.68 10*3/MM3 (ref 0.1–0.9)
MONOCYTES NFR BLD AUTO: 10.7 % (ref 5–12)
NEUTROPHILS NFR BLD AUTO: 3.99 10*3/MM3 (ref 1.7–7)
NEUTROPHILS NFR BLD AUTO: 62.9 % (ref 42.7–76)
NRBC BLD AUTO-RTO: 0 /100 WBC (ref 0–0.2)
OVALOCYTES BLD QL SMEAR: NORMAL
PLAT MORPH BLD: NORMAL
PLATELET # BLD AUTO: 275 10*3/MM3 (ref 140–450)
PMV BLD AUTO: 10.8 FL (ref 6–12)
POTASSIUM SERPL-SCNC: 4.5 MMOL/L (ref 3.5–5.2)
RBC # BLD AUTO: 3.96 10*6/MM3 (ref 3.77–5.28)
SODIUM SERPL-SCNC: 141 MMOL/L (ref 136–145)
WBC MORPH BLD: NORMAL
WBC NRBC COR # BLD AUTO: 6.35 10*3/MM3 (ref 3.4–10.8)

## 2024-10-27 PROCEDURE — 99232 SBSQ HOSP IP/OBS MODERATE 35: CPT | Performed by: STUDENT IN AN ORGANIZED HEALTH CARE EDUCATION/TRAINING PROGRAM

## 2024-10-27 PROCEDURE — 25010000002 LABETALOL 5 MG/ML SOLUTION: Performed by: STUDENT IN AN ORGANIZED HEALTH CARE EDUCATION/TRAINING PROGRAM

## 2024-10-27 PROCEDURE — 85007 BL SMEAR W/DIFF WBC COUNT: CPT | Performed by: STUDENT IN AN ORGANIZED HEALTH CARE EDUCATION/TRAINING PROGRAM

## 2024-10-27 PROCEDURE — 85025 COMPLETE CBC W/AUTO DIFF WBC: CPT | Performed by: STUDENT IN AN ORGANIZED HEALTH CARE EDUCATION/TRAINING PROGRAM

## 2024-10-27 PROCEDURE — 82948 REAGENT STRIP/BLOOD GLUCOSE: CPT

## 2024-10-27 PROCEDURE — 80048 BASIC METABOLIC PNL TOTAL CA: CPT | Performed by: STUDENT IN AN ORGANIZED HEALTH CARE EDUCATION/TRAINING PROGRAM

## 2024-10-27 PROCEDURE — 25010000002 HYDRALAZINE PER 20 MG: Performed by: STUDENT IN AN ORGANIZED HEALTH CARE EDUCATION/TRAINING PROGRAM

## 2024-10-27 PROCEDURE — 25010000002 CEFTRIAXONE PER 250 MG: Performed by: STUDENT IN AN ORGANIZED HEALTH CARE EDUCATION/TRAINING PROGRAM

## 2024-10-27 RX ORDER — HYDRALAZINE HYDROCHLORIDE 20 MG/ML
20 INJECTION INTRAMUSCULAR; INTRAVENOUS ONCE
Status: COMPLETED | OUTPATIENT
Start: 2024-10-27 | End: 2024-10-27

## 2024-10-27 RX ORDER — HYDRALAZINE HYDROCHLORIDE 50 MG/1
100 TABLET, FILM COATED ORAL EVERY 8 HOURS SCHEDULED
Status: DISCONTINUED | OUTPATIENT
Start: 2024-10-27 | End: 2024-11-04 | Stop reason: HOSPADM

## 2024-10-27 RX ORDER — HYDRALAZINE HYDROCHLORIDE 25 MG/1
25 TABLET, FILM COATED ORAL EVERY 8 HOURS SCHEDULED
Status: DISCONTINUED | OUTPATIENT
Start: 2024-10-27 | End: 2024-10-27

## 2024-10-27 RX ORDER — CARVEDILOL 6.25 MG/1
6.25 TABLET ORAL 2 TIMES DAILY WITH MEALS
Status: DISCONTINUED | OUTPATIENT
Start: 2024-10-27 | End: 2024-10-29

## 2024-10-27 RX ADMIN — Medication 10 ML: at 19:57

## 2024-10-27 RX ADMIN — HYDRALAZINE HYDROCHLORIDE 20 MG: 20 INJECTION INTRAMUSCULAR; INTRAVENOUS at 19:55

## 2024-10-27 RX ADMIN — HYDRALAZINE HYDROCHLORIDE 100 MG: 50 TABLET ORAL at 17:37

## 2024-10-27 RX ADMIN — ASPIRIN 81 MG 81 MG: 81 TABLET ORAL at 08:10

## 2024-10-27 RX ADMIN — VENLAFAXINE HYDROCHLORIDE 150 MG: 75 CAPSULE, EXTENDED RELEASE ORAL at 08:21

## 2024-10-27 RX ADMIN — NYSTATIN: 100000 POWDER TOPICAL at 21:12

## 2024-10-27 RX ADMIN — TICAGRELOR 90 MG: 90 TABLET ORAL at 08:10

## 2024-10-27 RX ADMIN — ACETAMINOPHEN 650 MG: 325 TABLET ORAL at 06:26

## 2024-10-27 RX ADMIN — ATORVASTATIN CALCIUM 80 MG: 40 TABLET, FILM COATED ORAL at 21:09

## 2024-10-27 RX ADMIN — QUETIAPINE FUMARATE 50 MG: 25 TABLET ORAL at 08:09

## 2024-10-27 RX ADMIN — QUETIAPINE FUMARATE 50 MG: 25 TABLET ORAL at 21:08

## 2024-10-27 RX ADMIN — HYDRALAZINE HYDROCHLORIDE 25 MG: 25 TABLET ORAL at 12:24

## 2024-10-27 RX ADMIN — Medication 1 CAPSULE: at 08:10

## 2024-10-27 RX ADMIN — Medication 1000 UNITS: at 08:10

## 2024-10-27 RX ADMIN — LABETALOL HYDROCHLORIDE 10 MG: 5 INJECTION, SOLUTION INTRAVENOUS at 15:11

## 2024-10-27 RX ADMIN — LOSARTAN POTASSIUM 100 MG: 50 TABLET, FILM COATED ORAL at 08:09

## 2024-10-27 RX ADMIN — AMLODIPINE BESYLATE 10 MG: 10 TABLET ORAL at 08:10

## 2024-10-27 RX ADMIN — TICAGRELOR 90 MG: 90 TABLET ORAL at 21:08

## 2024-10-27 RX ADMIN — LABETALOL HYDROCHLORIDE 10 MG: 5 INJECTION, SOLUTION INTRAVENOUS at 21:15

## 2024-10-27 RX ADMIN — CARVEDILOL 6.25 MG: 6.25 TABLET, FILM COATED ORAL at 19:55

## 2024-10-27 RX ADMIN — SODIUM CHLORIDE 2000 MG: 900 INJECTION INTRAVENOUS at 21:12

## 2024-10-27 RX ADMIN — NYSTATIN: 100000 POWDER TOPICAL at 08:10

## 2024-10-27 RX ADMIN — PANTOPRAZOLE SODIUM 40 MG: 40 TABLET, DELAYED RELEASE ORAL at 05:55

## 2024-10-27 NOTE — PLAN OF CARE
Problem: Adult Inpatient Plan of Care  Goal: Absence of Hospital-Acquired Illness or Injury  Intervention: Identify and Manage Fall Risk  Recent Flowsheet Documentation  Taken 10/27/2024 1800 by Teresa Garcia RN  Safety Promotion/Fall Prevention:   activity supervised   assistive device/personal items within reach   clutter free environment maintained   lighting adjusted   room organization consistent   safety round/check completed  Taken 10/27/2024 1600 by Teresa Garcia RN  Safety Promotion/Fall Prevention:   assistive device/personal items within reach   activity supervised   clutter free environment maintained   lighting adjusted   room organization consistent   safety round/check completed  Taken 10/27/2024 1400 by Teresa Garcia RN  Safety Promotion/Fall Prevention:   activity supervised   assistive device/personal items within reach   clutter free environment maintained   lighting adjusted   safety round/check completed   room organization consistent  Taken 10/27/2024 1200 by Teresa Garcia RN  Safety Promotion/Fall Prevention:   activity supervised   assistive device/personal items within reach   clutter free environment maintained   lighting adjusted   room organization consistent   safety round/check completed  Taken 10/27/2024 1000 by Teresa Garcia RN  Safety Promotion/Fall Prevention:   activity supervised   assistive device/personal items within reach   clutter free environment maintained   lighting adjusted   room organization consistent   safety round/check completed  Taken 10/27/2024 0800 by Teresa Garcia RN  Safety Promotion/Fall Prevention:   activity supervised   assistive device/personal items within reach   clutter free environment maintained   lighting adjusted   room organization consistent   safety round/check completed     Problem: Adult Inpatient Plan of Care  Goal: Absence of Hospital-Acquired Illness or Injury  Intervention: Prevent Skin Injury  Recent Flowsheet  Documentation  Taken 10/27/2024 1800 by Teresa Garcia RN  Body Position: supine  Skin Protection: incontinence pads utilized  Taken 10/27/2024 1600 by Teresa Garcia RN  Body Position: right  Skin Protection: incontinence pads utilized  Taken 10/27/2024 1400 by Teresa Garcia RN  Body Position: left  Skin Protection: incontinence pads utilized  Taken 10/27/2024 1200 by Teresa Garcia RN  Body Position: supine  Skin Protection: incontinence pads utilized  Taken 10/27/2024 1000 by Teresa Garcia RN  Body Position: right  Skin Protection: incontinence pads utilized  Taken 10/27/2024 0800 by Teresa Garcia RN  Body Position: left  Skin Protection: incontinence pads utilized     Problem: Adult Inpatient Plan of Care  Goal: Optimal Comfort and Wellbeing  Intervention: Provide Person-Centered Care  Recent Flowsheet Documentation  Taken 10/27/2024 0800 by Teresa Garcia RN  Trust Relationship/Rapport:   questions answered   questions encouraged   Goal Outcome Evaluation:

## 2024-10-27 NOTE — PROGRESS NOTES
[facility] Medicine Services  PROGRESS NOTE    Patient Name: Olga Sandoval  : 1955  MRN: [id number]    Date of Admission: 10/23/2024  Primary Care Physician: Ileana Kerr MD    Subjective   Subjective     CC:  Altered mental status    HPI:  Ports feeling back to baseline.  Much more alert today.  Ate breakfast.  No nausea or vomiting.  No chest pain.  Hypertensive to systolic in the 180s.    Objective   Objective     Vital Signs:   Temp:  [97.8 °F (36.6 °C)-98.3 °F (36.8 °C)] 98.3 °F (36.8 °C)  Heart Rate:  [67-78] 74  Resp:  [16-20] 16  BP: (140-213)/() 161/74     Physical Exam:  Constitutional: Awake, alert, resting comfortably, obese, laying in bed  HENT: NCAT, mucous membranes moist  Respiratory: Clear to auscultation bilaterally, respiratory effort normal   Cardiovascular: RRR  Gastrointestinal: soft, nontender, nondistended  Musculoskeletal: No bilateral ankle edema  Psychiatric: Appropriate affect, cooperative  Neurologic: Alert, oriented to self, place, mild left facial droop present, chronic aphasia present  Skin: No rashes on exposed skin    Results Reviewed:  LAB RESULTS:      Lab 10/27/24  0911 10/26/24  0827 10/25/24  0951 10/24/24  1633 10/23/24  1732   WBC 6.35 6.99 10.55 10.96* 16.23*   HEMOGLOBIN 11.5* 11.1* 12.8 11.6* 13.8   HEMATOCRIT 35.0 35.0 39.7 35.3 43.1   PLATELETS 275 283 384 315 385   NEUTROS ABS 3.99 4.21 7.40* 8.21* 14.33*   IMMATURE GRANS (ABS) 0.02 0.02 0.04 0.04 0.04   LYMPHS ABS 1.31 1.57 1.88 1.36 0.77   MONOS ABS 0.68 0.77 1.08* 1.32* 0.87   EOS ABS 0.33 0.37 0.10 0.01 0.17   MCV 88.4 89.7 88.8 87.6 88.9   LACTATE  --   --   --   --  1.8   APTT  --   --   --   --  24.9   HSTROP T  --   --   --   --  25*         Lab 10/27/24  0911 10/26/24  0827 10/24/24  1633 10/23/24  1732 10/23/24  1731 10/23/24  1726   SODIUM 141 143 143 141  --   --    POTASSIUM 4.5 3.9 3.8 4.4  --   --    CHLORIDE 107 108* 105 102  --   --    CO2 21.0* 22.0 25.0  21.0*  --   --    ANION GAP 13.0 13.0 13.0 18.0*  --   --    BUN 16 19 25* 25*  --   --    CREATININE 0.96 0.90 1.17* 1.32* 1.40* 1.40*   EGFR 63.4 68.5 50.0* 43.3*  --  40.3*   GLUCOSE 194* 111* 100* 125*  --   --    CALCIUM 8.9 9.0 9.8 10.1  --   --    HEMOGLOBIN A1C  --   --  5.40  --   --   --    TSH  --   --   --  3.950  --   --          Lab 10/23/24  1732   TOTAL PROTEIN 7.6   ALBUMIN 4.5   GLOBULIN 3.1   ALT (SGPT) 9  9   AST (SGOT) 18  18   BILIRUBIN 0.6   ALK PHOS 104         Lab 10/23/24  1732   HSTROP T 25*         Lab 10/24/24  1633   CHOLESTEROL 163   LDL CHOL 87   HDL CHOL 60   TRIGLYCERIDES 83         Lab 10/23/24  1732   VITAMIN B 12 294         Brief Urine Lab Results  (Last result in the past 365 days)        Color   Clarity   Blood   Leuk Est   Nitrite   Protein   CREAT   Urine HCG        10/23/24 1937 Yellow   Turbid   Trace   Moderate (2+)   Negative   30 mg/dL (1+)                   Microbiology Results Abnormal       Procedure Component Value - Date/Time    Blood Culture - Blood, Arm, Right [[id number]]  (Normal) Collected: 10/23/24 2122    Lab Status: Preliminary result Specimen: Blood from Arm, Right Updated: 10/26/24 2131     Blood Culture No growth at 3 days    Narrative:      Less than seven (7) mL's of blood was collected.  Insufficient quantity may yield false negative results.    Blood Culture - Blood, Arm, Right [[id number]]  (Normal) Collected: 10/23/24 2107    Lab Status: Preliminary result Specimen: Blood from Arm, Right Updated: 10/26/24 2131     Blood Culture No growth at 3 days    Narrative:      Less than seven (7) mL's of blood was collected.  Insufficient quantity may yield false negative results.    Urine Culture - Urine, Straight Cath [[id number]] Collected: 10/23/24 1937    Lab Status: Final result Specimen: Urine from Straight Cath Updated: 10/25/24 1355     Urine Culture >100,000 CFU/mL Mixed Betty Isolated    Narrative:      Specimen contains mixed organisms of  questionable pathogenicity suggestive of contamination. If symptoms persist, suggest recollection.  Colonization of the urinary tract without infection is common. Treatment is discouraged unless the patient is symptomatic, pregnant, or undergoing an invasive urologic procedure.    Respiratory Panel PCR w/COVID-19(SARS-CoV-2) LEONA/PRIYANKA/GABY/PAD/COR/MILDRED In-House, NP Swab in UTM/VTM, 2 HR TAT - Swab, Nasopharynx [[id number]]  (Normal) Collected: 10/24/24 1504    Lab Status: Final result Specimen: Swab from Nasopharynx Updated: 10/24/24 1556     ADENOVIRUS, PCR Not Detected     Coronavirus 229E Not Detected     Coronavirus HKU1 Not Detected     Coronavirus NL63 Not Detected     Coronavirus OC43 Not Detected     COVID19 Not Detected     Human Metapneumovirus Not Detected     Human Rhinovirus/Enterovirus Not Detected     Influenza A PCR Not Detected     Influenza B PCR Not Detected     Parainfluenza Virus 1 Not Detected     Parainfluenza Virus 2 Not Detected     Parainfluenza Virus 3 Not Detected     Parainfluenza Virus 4 Not Detected     RSV, PCR Not Detected     Bordetella pertussis pcr Not Detected     Bordetella parapertussis PCR Not Detected     Chlamydophila pneumoniae PCR Not Detected     Mycoplasma pneumo by PCR Not Detected    Narrative:      In the setting of a positive respiratory panel with a viral infection PLUS a negative procalcitonin without other underlying concern for bacterial infection, consider observing off antibiotics or discontinuation of antibiotics and continue supportive care. If the respiratory panel is positive for atypical bacterial infection (Bordetella pertussis, Chlamydophila pneumoniae, or Mycoplasma pneumoniae), consider antibiotic de-escalation to target atypical bacterial infection.    COVID PRE-OP / PRE-PROCEDURE SCREENING ORDER (NO ISOLATION) - Swab, Nasopharynx [[id number]]  (Normal) Collected: 10/23/24 1938    Lab Status: Final result Specimen: Swab from Nasopharynx Updated: 10/23/24  2007    Narrative:      The following orders were created for panel order COVID PRE-OP / PRE-PROCEDURE SCREENING ORDER (NO ISOLATION) - Swab, Nasopharynx.  Procedure                               Abnormality         Status                     ---------                               -----------         ------                     COVID-19 and FLU A/B PCR...[[id number]]  Normal              Final result                 Please view results for these tests on the individual orders.    COVID-19 and FLU A/B PCR, 1 HR TAT - Swab, Nasopharynx [[id number]]  (Normal) Collected: 10/23/24 1938    Lab Status: Final result Specimen: Swab from Nasopharynx Updated: 10/23/24 2007     COVID19 Not Detected     Influenza A PCR Not Detected     Influenza B PCR Not Detected    Narrative:      Fact sheet for providers: https://www.fda.gov/media/972406/download    Fact sheet for patients: https://www.fda.gov/media/965513/download    Test performed by PCR.            No radiology results from the last 24 hrs    Results for orders placed during the hospital encounter of 10/23/24    Adult Transthoracic Echo Complete W/ Cont if Necessary Per Protocol (With Agitated Saline)    Interpretation Summary    Left ventricular ejection fraction appears to be 51 - 55%.    Unable to fully assess valvular function due to suboptimal windows.      Current medications:  Scheduled Meds:amLODIPine, 10 mg, Oral, Q24H  aspirin, 81 mg, Oral, Daily  atorvastatin, 80 mg, Oral, Nightly  cefTRIAXone, 2,000 mg, Intravenous, Q24H  cholecalciferol, 1,000 Units, Oral, Daily  hydrALAZINE, 25 mg, Oral, Q8H  insulin lispro, 2-7 Units, Subcutaneous, 4x Daily AC & at Bedtime  lactobacillus acidophilus, 1 capsule, Oral, Daily  losartan, 100 mg, Oral, Daily  nystatin, , Topical, Q12H  pantoprazole, 40 mg, Oral, Q AM  QUEtiapine, 50 mg, Oral, Q12H  ticagrelor, 90 mg, Oral, BID  venlafaxine XR, 150 mg, Oral, Daily      Continuous Infusions:   PRN Meds:.  acetaminophen    dextrose     dextrose    glucagon (human recombinant)    labetalol    sodium chloride    ziprasidone    Assessment & Plan   Assessment & Plan     Active Hospital Problems    Diagnosis  POA    **Dysarthria [R47.1]  Yes    UTI (urinary tract infection) [N39.0]  Yes      Resolved Hospital Problems   No resolved problems to display.        Brief Hospital Course to date:  Olga Sandoval is a 69 y.o. 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 She was found on the floor by family member.     Labs notable for UA with leuk esterase, too numerous to count WBCs, 2+ bacteria, 21-30 squamous cells, WBCs 16, lactic 1.8. CXR with no acute abnormality, CTA head/neck with w chronic moderate stenoses with b/l cervical internal carotid arteries, moderate stenoses within the b/l PCAs, 1cm aneurysm from left cavernous segment ICA.     This patient's problems and plans were partially entered by my partner and updated as appropriate by me 10/27/24.    Altered mental status  -suspect infectious vs metabolic etiology  -LFTs normal, TSH normal, B12 normal, ammonia low, resp PCR negative  -MRI brain showed multiple chronic cortical infarcts, chronic lacunar infarct in right thalamus  -Stroke Neurology followed, recommended aspirin 81 mg daily, Brilinta 90 mg twice daily, atorvastatin 80 mg daily.   -Continue treatment of suspected UTI. Continue reduced dose Seroquel for now. PT/OT recommending SNF on discharge.    Acute UTI  Leukocytosis with neutrophilia  -Urine culture growing >100k mixed tavo  -Continue IV ceftriaxone. Follow final blood cultures. Patient is improving on IV ceftriaxone so patient appears to have true UTI.   -Urinary retention protocol.    FLASH on suspected CKD  -Cr improved with  fluids on admission.    1cm aneurysm left cavernous segment ICA  -Patient follows with Neurosurgery at , her neurosurgeon had requested outpatient MRA/MRI before her next follow-up.   -Stroke neurology discussed bleeding risk with patient's daughter.      Candida dermatitis  -Nystatin powder      Hypertension- continue home losartan.  Amlodipine added on 10/26.  Hydralazine added on 10/27.  Hyperlipidemia- continue atorvastatin.   DM2 - A1c 5.4%; SSI + glucose checks + hypoglycemia protocol  Anxiety- continue home venlafaxine.   COPD  Morbid obesity    Expected Discharge Location and Transportation: SNF  Expected Discharge   Expected Discharge Date: 10/29/2024; Expected Discharge Time:      VTE Prophylaxis:  Mechanical VTE prophylaxis orders are present.         AM-PAC 6 Clicks Score (PT): 13 (10/27/24 0800)    CODE STATUS:   Code Status and Medical Interventions: CPR (Attempt to Resuscitate); Full Support   Ordered at: 10/24/24 1550     Level Of Support Discussed With:    Patient     Code Status (Patient has no pulse and is not breathing):    CPR (Attempt to Resuscitate)     Medical Interventions (Patient has pulse or is breathing):    Full Support       Jen Andrade MD  10/27/24

## 2024-10-28 ENCOUNTER — APPOINTMENT (OUTPATIENT)
Dept: CT IMAGING | Facility: HOSPITAL | Age: 69
DRG: 690 | End: 2024-10-28
Payer: COMMERCIAL

## 2024-10-28 LAB
ANION GAP SERPL CALCULATED.3IONS-SCNC: 10 MMOL/L (ref 5–15)
BACTERIA SPEC AEROBE CULT: NORMAL
BACTERIA SPEC AEROBE CULT: NORMAL
BASOPHILS # BLD AUTO: 0.04 10*3/MM3 (ref 0–0.2)
BASOPHILS NFR BLD AUTO: 0.4 % (ref 0–1.5)
BUN SERPL-MCNC: 17 MG/DL (ref 8–23)
BUN/CREAT SERPL: 19.1 (ref 7–25)
CALCIUM SPEC-SCNC: 9.2 MG/DL (ref 8.6–10.5)
CHLORIDE SERPL-SCNC: 106 MMOL/L (ref 98–107)
CO2 SERPL-SCNC: 23 MMOL/L (ref 22–29)
CREAT SERPL-MCNC: 0.89 MG/DL (ref 0.57–1)
DEPRECATED RDW RBC AUTO: 58.1 FL (ref 37–54)
EGFRCR SERPLBLD CKD-EPI 2021: 69.4 ML/MIN/1.73
EOSINOPHIL # BLD AUTO: 0.41 10*3/MM3 (ref 0–0.4)
EOSINOPHIL NFR BLD AUTO: 4.5 % (ref 0.3–6.2)
ERYTHROCYTE [DISTWIDTH] IN BLOOD BY AUTOMATED COUNT: 17.2 % (ref 12.3–15.4)
GLUCOSE BLDC GLUCOMTR-MCNC: 119 MG/DL (ref 70–130)
GLUCOSE BLDC GLUCOMTR-MCNC: 130 MG/DL (ref 70–130)
GLUCOSE BLDC GLUCOMTR-MCNC: 152 MG/DL (ref 70–130)
GLUCOSE BLDC GLUCOMTR-MCNC: 194 MG/DL (ref 70–130)
GLUCOSE SERPL-MCNC: 124 MG/DL (ref 65–99)
HCT VFR BLD AUTO: 37.8 % (ref 34–46.6)
HGB BLD-MCNC: 11.8 G/DL (ref 12–15.9)
IMM GRANULOCYTES # BLD AUTO: 0.03 10*3/MM3 (ref 0–0.05)
IMM GRANULOCYTES NFR BLD AUTO: 0.3 % (ref 0–0.5)
LYMPHOCYTES # BLD AUTO: 1.63 10*3/MM3 (ref 0.7–3.1)
LYMPHOCYTES NFR BLD AUTO: 18 % (ref 19.6–45.3)
MCH RBC QN AUTO: 28.7 PG (ref 26.6–33)
MCHC RBC AUTO-ENTMCNC: 31.2 G/DL (ref 31.5–35.7)
MCV RBC AUTO: 92 FL (ref 79–97)
MONOCYTES # BLD AUTO: 1.01 10*3/MM3 (ref 0.1–0.9)
MONOCYTES NFR BLD AUTO: 11.2 % (ref 5–12)
NEUTROPHILS NFR BLD AUTO: 5.93 10*3/MM3 (ref 1.7–7)
NEUTROPHILS NFR BLD AUTO: 65.6 % (ref 42.7–76)
NRBC BLD AUTO-RTO: 0 /100 WBC (ref 0–0.2)
PLATELET # BLD AUTO: 326 10*3/MM3 (ref 140–450)
PMV BLD AUTO: 10.7 FL (ref 6–12)
POTASSIUM SERPL-SCNC: 3.8 MMOL/L (ref 3.5–5.2)
RBC # BLD AUTO: 4.11 10*6/MM3 (ref 3.77–5.28)
SODIUM SERPL-SCNC: 139 MMOL/L (ref 136–145)
WBC NRBC COR # BLD AUTO: 9.05 10*3/MM3 (ref 3.4–10.8)

## 2024-10-28 PROCEDURE — 97110 THERAPEUTIC EXERCISES: CPT

## 2024-10-28 PROCEDURE — 97530 THERAPEUTIC ACTIVITIES: CPT

## 2024-10-28 PROCEDURE — 70450 CT HEAD/BRAIN W/O DYE: CPT

## 2024-10-28 PROCEDURE — 99232 SBSQ HOSP IP/OBS MODERATE 35: CPT | Performed by: NURSE PRACTITIONER

## 2024-10-28 PROCEDURE — 82948 REAGENT STRIP/BLOOD GLUCOSE: CPT

## 2024-10-28 PROCEDURE — 85025 COMPLETE CBC W/AUTO DIFF WBC: CPT | Performed by: STUDENT IN AN ORGANIZED HEALTH CARE EDUCATION/TRAINING PROGRAM

## 2024-10-28 PROCEDURE — 92526 ORAL FUNCTION THERAPY: CPT

## 2024-10-28 PROCEDURE — 80048 BASIC METABOLIC PNL TOTAL CA: CPT | Performed by: STUDENT IN AN ORGANIZED HEALTH CARE EDUCATION/TRAINING PROGRAM

## 2024-10-28 PROCEDURE — 63710000001 INSULIN LISPRO (HUMAN) PER 5 UNITS: Performed by: STUDENT IN AN ORGANIZED HEALTH CARE EDUCATION/TRAINING PROGRAM

## 2024-10-28 PROCEDURE — 25010000002 CEFTRIAXONE PER 250 MG: Performed by: STUDENT IN AN ORGANIZED HEALTH CARE EDUCATION/TRAINING PROGRAM

## 2024-10-28 PROCEDURE — 92507 TX SP LANG VOICE COMM INDIV: CPT

## 2024-10-28 RX ADMIN — CARVEDILOL 6.25 MG: 6.25 TABLET, FILM COATED ORAL at 17:47

## 2024-10-28 RX ADMIN — TICAGRELOR 90 MG: 90 TABLET ORAL at 08:09

## 2024-10-28 RX ADMIN — Medication 10 ML: at 08:09

## 2024-10-28 RX ADMIN — TICAGRELOR 90 MG: 90 TABLET ORAL at 21:04

## 2024-10-28 RX ADMIN — Medication 1000 UNITS: at 08:08

## 2024-10-28 RX ADMIN — AMLODIPINE BESYLATE 10 MG: 10 TABLET ORAL at 08:08

## 2024-10-28 RX ADMIN — VENLAFAXINE HYDROCHLORIDE 150 MG: 75 CAPSULE, EXTENDED RELEASE ORAL at 10:39

## 2024-10-28 RX ADMIN — ASPIRIN 81 MG 81 MG: 81 TABLET ORAL at 08:09

## 2024-10-28 RX ADMIN — ACETAMINOPHEN 650 MG: 325 TABLET ORAL at 02:50

## 2024-10-28 RX ADMIN — Medication 1 CAPSULE: at 08:08

## 2024-10-28 RX ADMIN — LOSARTAN POTASSIUM 100 MG: 50 TABLET, FILM COATED ORAL at 08:08

## 2024-10-28 RX ADMIN — HYDRALAZINE HYDROCHLORIDE 100 MG: 50 TABLET ORAL at 00:17

## 2024-10-28 RX ADMIN — HYDRALAZINE HYDROCHLORIDE 100 MG: 50 TABLET ORAL at 13:17

## 2024-10-28 RX ADMIN — ATORVASTATIN CALCIUM 80 MG: 40 TABLET, FILM COATED ORAL at 21:03

## 2024-10-28 RX ADMIN — QUETIAPINE FUMARATE 50 MG: 25 TABLET ORAL at 08:08

## 2024-10-28 RX ADMIN — QUETIAPINE FUMARATE 50 MG: 25 TABLET ORAL at 21:03

## 2024-10-28 RX ADMIN — INSULIN LISPRO 2 UNITS: 100 INJECTION, SOLUTION INTRAVENOUS; SUBCUTANEOUS at 12:22

## 2024-10-28 RX ADMIN — PANTOPRAZOLE SODIUM 40 MG: 40 TABLET, DELAYED RELEASE ORAL at 05:50

## 2024-10-28 RX ADMIN — HYDRALAZINE HYDROCHLORIDE 100 MG: 50 TABLET ORAL at 05:50

## 2024-10-28 RX ADMIN — SODIUM CHLORIDE 2000 MG: 900 INJECTION INTRAVENOUS at 21:04

## 2024-10-28 RX ADMIN — HYDRALAZINE HYDROCHLORIDE 100 MG: 50 TABLET ORAL at 21:03

## 2024-10-28 RX ADMIN — NYSTATIN: 100000 POWDER TOPICAL at 08:10

## 2024-10-28 RX ADMIN — CARVEDILOL 6.25 MG: 6.25 TABLET, FILM COATED ORAL at 08:08

## 2024-10-28 NOTE — PLAN OF CARE
Goal Outcome Evaluation:  Plan of Care Reviewed With: patient        Progress: improving  Outcome Evaluation: Patient cognition and safety awarenss improved today. She continues to require modAx2 to transfer to chair with BUE support. IPPT remains indicated to address current deficits resulting in functional mobility below patient's baseline. Continue to recommend D/C to SNF.    Anticipated Discharge Disposition (PT): skilled nursing facility

## 2024-10-28 NOTE — THERAPY TREATMENT NOTE
Patient Name: Olga Sandoval  : 1955    MRN: [id number]                              Today's Date: 10/28/2024       Admit Date: 10/23/2024    Visit Dx:     ICD-10-CM ICD-9-CM   1. Left-sided weakness  R53.1 728.87   2. Slurred speech  R47.81 784.59   3. Acute dehydration  E86.0 276.51   4. Acute urinary tract infection  N39.0 599.0   5. Acute encephalopathy  G93.40 348.30   6. Oral phase dysphagia  R13.11 787.21   7. Cognitive communication deficit  R41.841 799.52   8. Aphasia  R47.01 784.3     Patient Active Problem List   Diagnosis    Aphasia    Bipolar 2 disorder    Type 2 diabetes mellitus    Essential hypertension    Hyperlipidemia    Carotid stenosis, right    Obesity, Class III, BMI 40-49.9 (morbid obesity)    History of CVA (cerebrovascular accident)    Hypertension    Gastroesophageal reflux disease    Encounter for screening for colorectal cancer in high risk patient    Immunization due    Medicare annual wellness visit, subsequent    Lymphedema    Bleeding from right ear    Healthcare maintenance    Post-menopausal    Dysarthria    UTI (urinary tract infection)     Past Medical History:   Diagnosis Date    Aneurysm     left carotid    Bipolar 2 disorder     Diabetes mellitus     H/O Bell's palsy     Left sided facial droop    History of loop recorder     Hyperlipidemia     Hypertension     Stroke     x 4     Past Surgical History:   Procedure Laterality Date    CARDIAC CATHETERIZATION      CATARACT EXTRACTION Bilateral      SECTION        General Information       Row Name 10/28/24 1610          Physical Therapy Time and Intention    Document Type therapy note (daily note)  -CK     Mode of Treatment physical therapy;individual therapy  -CK       Row Name 10/28/24 1610          General Information    Patient Profile Reviewed yes  -CK     Existing Precautions/Restrictions fall;other (see comments)  remote CVA with L sided weakness and expressive aphasia, BLE lymphedema  -CK     Barriers to  Rehab medically complex;previous functional deficit;contractures  -CK       Row Name 10/28/24 1610          Cognition    Orientation Status (Cognition) oriented x 3  -CK       Row Name 10/28/24 1610          Safety Issues/Impairments Affecting Functional Mobility    Safety Issues Affecting Function (Mobility) awareness of need for assistance;insight into deficits/self-awareness;safety precaution awareness;safety precautions follow-through/compliance;sequencing abilities  -CK     Impairments Affecting Function (Mobility) balance;endurance/activity tolerance;cognition;coordination;shortness of breath;strength;range of motion (ROM)  -CK               User Key  (r) = Recorded By, (t) = Taken By, (c) = Cosigned By      Initials Name Provider Type    CK Laurita Olivier PT Physical Therapist                   Mobility       Row Name 10/28/24 1612          Bed Mobility    Bed Mobility supine-sit  -CK     Supine-Sit Renovo (Bed Mobility) contact guard  -CK     Assistive Device (Bed Mobility) bed rails;head of bed elevated  -CK     Comment, (Bed Mobility) heavy reliance on bedrail, but completes without physical assist  -CK       Row Name 10/28/24 1612          Bed-Chair Transfer    Bed-Chair Renovo (Transfers) moderate assist (50% patient effort);2 person assist  -CK     Assistive Device (Bed-Chair Transfers) other (see comments)  BUE support  -CK     Comment, (Bed-Chair Transfer) patient able to take 1-2 sidesteps from bed to chair, assist for balance and L knee buckling, remains flexed throughout  -CK       Row Name 10/28/24 1612          Sit-Stand Transfer    Sit-Stand Renovo (Transfers) minimum assist (75% patient effort);2 person assist  -CK     Assistive Device (Sit-Stand Transfers) other (see comments)  BUE support  -CK     Comment, (Sit-Stand Transfer) assist to clear hips from bed  -CK       Row Name 10/28/24 1612          Gait/Stairs (Locomotion)    Renovo Level (Gait) not tested  -CK                User Key  (r) = Recorded By, (t) = Taken By, (c) = Cosigned By      Initials Name Provider Type    CK Laurita Olivier PT Physical Therapist                   Obj/Interventions       Row Name 10/28/24 1614          Motor Skills    Therapeutic Exercise hip;knee;ankle  -CK       Row Name 10/28/24 1614          Hip (Therapeutic Exercise)    Hip (Therapeutic Exercise) strengthening exercise  -CK     Hip Strengthening (Therapeutic Exercise) bilateral;marching while seated;10 repetitions  -CK       Row Name 10/28/24 1614          Knee (Therapeutic Exercise)    Knee (Therapeutic Exercise) strengthening exercise  -CK     Knee Strengthening (Therapeutic Exercise) bilateral;LAQ (long arc quad);10 repetitions  -CK       Row Name 10/28/24 1614          Ankle (Therapeutic Exercise)    Ankle (Therapeutic Exercise) AROM (active range of motion)  -CK     Ankle AROM (Therapeutic Exercise) bilateral;dorsiflexion;plantarflexion;10 repetitions  -CK       Row Name 10/28/24 1614          Balance    Balance Assessment sitting static balance;standing static balance;standing dynamic balance  -CK     Static Sitting Balance standby assist  -CK     Position, Sitting Balance unsupported;sitting edge of bed  -CK     Static Standing Balance minimal assist;2-person assist  -CK     Dynamic Standing Balance moderate assist;2-person assist  -CK     Position/Device Used, Standing Balance supported;other (see comments)  BUE support  -CK               User Key  (r) = Recorded By, (t) = Taken By, (c) = Cosigned By      Initials Name Provider Type    CK Laurita Olivier PT Physical Therapist                   Goals/Plan    No documentation.                  Clinical Impression       Row Name 10/28/24 1615          Pain    Pretreatment Pain Rating 0/10 - no pain  -CK     Posttreatment Pain Rating 0/10 - no pain  -CK       Row Name 10/28/24 1615          Plan of Care Review    Plan of Care Reviewed With patient  -CK     Progress improving   -CK     Outcome Evaluation Patient cognition and safety awarenss improved today. She continues to require modAx2 to transfer to chair with BUE support. IPPT remains indicated to address current deficits resulting in functional mobility below patient's baseline. Continue to recommend D/C to SNF.  -CK       Row Name 10/28/24 1615          Vital Signs    Pre Systolic BP Rehab 179  -CK     Pre Treatment Diastolic BP 88  -CK     Post Systolic BP Rehab 149  -CK     Post Treatment Diastolic BP 69  -CK     Pretreatment Heart Rate (beats/min) 75  -CK     Posttreatment Heart Rate (beats/min) 80  -CK     Pre SpO2 (%) 97  -CK     O2 Delivery Pre Treatment room air  -CK     O2 Delivery Intra Treatment room air  -CK     Post SpO2 (%) 96  -CK     O2 Delivery Post Treatment room air  -CK     Pre Patient Position Supine  -CK     Post Patient Position Sitting  -CK       Row Name 10/28/24 1615          Positioning and Restraints    Pre-Treatment Position in bed  -CK     Post Treatment Position chair  -CK     In Chair reclined;call light within reach;encouraged to call for assist;exit alarm on;with family/caregiver;waffle cushion;on mechanical lift sling;LLE elevated;RLE elevated;notified nsg  -CK               User Key  (r) = Recorded By, (t) = Taken By, (c) = Cosigned By      Initials Name Provider Type    CK Laurita Olivier, PT Physical Therapist                   Outcome Measures       Row Name 10/28/24 1613          How much help from another person do you currently need...    Turning from your back to your side while in flat bed without using bedrails? 3  -CK     Moving from lying on back to sitting on the side of a flat bed without bedrails? 2  -CK     Moving to and from a bed to a chair (including a wheelchair)? 2  -CK     Standing up from a chair using your arms (e.g., wheelchair, bedside chair)? 3  -CK     Climbing 3-5 steps with a railing? 1  -CK     To walk in hospital room? 2  -CK     AM-PAC 6 Clicks Score (PT) 13  -CK      Highest Level of Mobility Goal 4 --> Transfer to chair/commode  -       Row Name 10/28/24 1619          Functional Assessment    Outcome Measure Options AM-PAC 6 Clicks Basic Mobility (PT)  -               User Key  (r) = Recorded By, (t) = Taken By, (c) = Cosigned By      Initials Name Provider Type    CK Laurita Olivier, PT Physical Therapist                                 Physical Therapy Education       Title: PT OT SLP Therapies (In Progress)       Topic: Physical Therapy (Done)       Point: Mobility training (Done)       Learning Progress Summary            Patient Acceptance, E, VU by CK at 10/28/2024 1619    Acceptance, E, VU,NR by CD at 10/24/2024 1614    Comment: BENEFITS OF OOB ACTIVITY, SAFETY WITH MOBILITY, PROGRESSION OF POC, D/C PLANNING,                      Point: Home exercise program (Done)       Learning Progress Summary            Patient Acceptance, E, VU by CK at 10/28/2024 1619    Acceptance, E, VU,NR by CD at 10/24/2024 1614    Comment: BENEFITS OF OOB ACTIVITY, SAFETY WITH MOBILITY, PROGRESSION OF POC, D/C PLANNING,                      Point: Body mechanics (Done)       Learning Progress Summary            Patient Acceptance, E, VU by CK at 10/28/2024 1619    Acceptance, E, VU,NR by CD at 10/24/2024 1614    Comment: BENEFITS OF OOB ACTIVITY, SAFETY WITH MOBILITY, PROGRESSION OF POC, D/C PLANNING,                      Point: Precautions (Done)       Learning Progress Summary            Patient Acceptance, E, VU by CK at 10/28/2024 1619    Acceptance, E, VU,NR by CD at 10/24/2024 1614    Comment: BENEFITS OF OOB ACTIVITY, SAFETY WITH MOBILITY, PROGRESSION OF POC, D/C PLANNING,                                      User Key       Initials Effective Dates Name Provider Type Discipline    CD 02/03/23 -  Baylee Cabrera PT Physical Therapist PT     02/06/24 -  Laurita Olivier PT Physical Therapist PT                  PT Recommendation and Plan     Progress: improving  Outcome  Evaluation: Patient cognition and safety awarenss improved today. She continues to require modAx2 to transfer to chair with BUE support. IPPT remains indicated to address current deficits resulting in functional mobility below patient's baseline. Continue to recommend D/C to SNF.     Time Calculation:         PT Charges       Row Name 10/28/24 1619             Time Calculation    Start Time 1529  -CK      PT Received On 10/28/24  -CK         Timed Charges    87884 - PT Therapeutic Exercise Minutes 8  -CK      57757 - PT Therapeutic Activity Minutes 15  -CK         Total Minutes    Timed Charges Total Minutes 23  -CK       Total Minutes 23  -CK                User Key  (r) = Recorded By, (t) = Taken By, (c) = Cosigned By      Initials Name Provider Type    CK Laurita Olivier, GISELLE Physical Therapist                  Therapy Charges for Today       Code Description Service Date Service Provider Modifiers Qty    02553378774  PT THER PROC EA 15 MIN 10/28/2024 Laurita Olivier, PT GP 1    70791042668 HC PT THERAPEUTIC ACT EA 15 MIN 10/28/2024 Laurita Olivier, PT GP 1    34150048852 HC PT THER SUPP EA 15 MIN 10/28/2024 Laurita Olivier, PT GP 2            PT G-Codes  Outcome Measure Options: AM-PAC 6 Clicks Basic Mobility (PT)  AM-PAC 6 Clicks Score (PT): 13  AM-PAC 6 Clicks Score (OT): 12  Modified Starr Scale: 4 - Moderately severe disability.  Unable to walk without assistance, and unable to attend to own bodily needs without assistance.  PT Discharge Summary  Anticipated Discharge Disposition (PT): skilled nursing facility    Laurita Olivier PT  10/28/2024

## 2024-10-28 NOTE — PLAN OF CARE
Problem: Adult Inpatient Plan of Care  Goal: Optimal Comfort and Wellbeing  Outcome: Not Progressing  Intervention: Provide Person-Centered Care  Recent Flowsheet Documentation  Taken 10/28/2024 0000 by Sherrie Anna RNA  Trust Relationship/Rapport:   care explained   choices provided   emotional support provided  Taken 10/27/2024 2200 by Sherrie Anna RNA  Trust Relationship/Rapport:   care explained   questions answered  Taken 10/27/2024 2000 by Sherrie Anna RNA  Trust Relationship/Rapport:   care explained   questions answered     Problem: Comorbidity Management  Goal: Blood Pressure in Desired Range  Outcome: Not Progressing     Problem: Adult Inpatient Plan of Care  Goal: Plan of Care Review  10/28/2024 0240 by Sherrie Anna RNA  Outcome: Progressing  10/28/2024 0239 by Sherrie Anna RNA  Outcome: Progressing  Goal: Patient-Specific Goal (Individualized)  10/28/2024 0240 by Sherrie Anna RNA  Outcome: Progressing  10/28/2024 0239 by Sherrie Anna RNA  Outcome: Progressing  Goal: Absence of Hospital-Acquired Illness or Injury  10/28/2024 0240 by Sherrie Anna RNA  Outcome: Progressing  10/28/2024 0239 by Sherrie Anna RNA  Outcome: Progressing  Intervention: Identify and Manage Fall Risk  Recent Flowsheet Documentation  Taken 10/28/2024 0200 by Sherrie Anna RNA  Safety Promotion/Fall Prevention: safety round/check completed  Taken 10/28/2024 0000 by Sherrie Anna RNA  Safety Promotion/Fall Prevention:   assistive device/personal items within reach   safety round/check completed   room organization consistent   clutter free environment maintained  Taken 10/27/2024 2200 by Sherrie Anna RNA  Safety Promotion/Fall Prevention:   activity supervised   safety round/check completed  Taken 10/27/2024 2000 by Sherrie Anna RNA  Safety Promotion/Fall Prevention:   activity supervised   assistive device/personal items within reach   safety round/check completed  Intervention: Prevent Skin Injury  Recent Flowsheet Documentation  Taken 10/28/2024 0200 by  Anna, Sherrie, RNA  Body Position:   side-lying   left  Skin Protection: incontinence pads utilized  Taken 10/28/2024 0000 by Sherrie Anna RNA  Body Position: position changed independently  Skin Protection: incontinence pads utilized  Taken 10/27/2024 2200 by Sherrie Anna RNA  Body Position: position changed independently  Skin Protection: incontinence pads utilized  Taken 10/27/2024 2000 by Sherrie Anna RNA  Body Position:   turned   right  Skin Protection: incontinence pads utilized  Intervention: Prevent and Manage VTE (Venous Thromboembolism) Risk  Recent Flowsheet Documentation  Taken 10/27/2024 2200 by Sherrie Anna RNA  VTE Prevention/Management:   SCDs (sequential compression devices) off   patient refused intervention  Taken 10/27/2024 2000 by Sherrie Anna RNA  VTE Prevention/Management: SCDs (sequential compression devices) on  Intervention: Prevent Infection  Recent Flowsheet Documentation  Taken 10/28/2024 0200 by Sherrie Anna RNA  Infection Prevention: environmental surveillance performed  Taken 10/28/2024 0000 by Sherrie Anna RNA  Infection Prevention: environmental surveillance performed  Taken 10/27/2024 2200 by Sherrie Anna RNA  Infection Prevention: environmental surveillance performed  Taken 10/27/2024 2000 by Sherrie Anna RNA  Infection Prevention:   environmental surveillance performed   rest/sleep promoted  Goal: Readiness for Transition of Care  Outcome: Progressing     Problem: Fall Injury Risk  Goal: Absence of Fall and Fall-Related Injury  Outcome: Progressing  Intervention: Promote Injury-Free Environment  Recent Flowsheet Documentation  Taken 10/28/2024 0200 by Sherrie Anna RNA  Safety Promotion/Fall Prevention: safety round/check completed  Taken 10/28/2024 0000 by Sherrie Anna RNA  Safety Promotion/Fall Prevention:   assistive device/personal items within reach   safety round/check completed   room organization consistent   clutter free environment maintained  Taken 10/27/2024 2200 by Sherrie Anna RNA  Safety  Promotion/Fall Prevention:   activity supervised   safety round/check completed  Taken 10/27/2024 2000 by Sherrie Anna RNA  Safety Promotion/Fall Prevention:   activity supervised   assistive device/personal items within reach   safety round/check completed     Problem: Skin Injury Risk Increased  Goal: Skin Health and Integrity  Outcome: Progressing  Intervention: Optimize Skin Protection  Recent Flowsheet Documentation  Taken 10/28/2024 0200 by Sherrie Anna RNA  Activity Management: activity minimized  Pressure Reduction Techniques:   frequent weight shift encouraged   weight shift assistance provided  Head of Bed (HOB) Positioning: [facility] elevated  Pressure Reduction Devices:   pressure-redistributing mattress utilized   specialty bed utilized  Skin Protection: incontinence pads utilized  Taken 10/28/2024 0000 by Sherrie Anna RNA  Activity Management: activity minimized  Pressure Reduction Techniques:   frequent weight shift encouraged   weight shift assistance provided  Head of Bed ([facility]) Positioning: [facility] elevated  Pressure Reduction Devices:   pressure-redistributing mattress utilized   positioning supports utilized  Skin Protection: incontinence pads utilized  Taken 10/27/2024 2200 by Sherrie Anna RNA  Activity Management: activity minimized  Pressure Reduction Techniques:   frequent weight shift encouraged   weight shift assistance provided  Head of Bed ([facility]) Positioning: [facility] elevated  Pressure Reduction Devices:   pressure-redistributing mattress utilized   specialty bed utilized  Skin Protection: incontinence pads utilized  Taken 10/27/2024 2000 by Sherrie Anna RNA  Activity Management: activity encouraged  Pressure Reduction Techniques:   frequent weight shift encouraged   weight shift assistance provided  Head of Bed ([facility]) Positioning: [facility] elevated  Pressure Reduction Devices:   pressure-redistributing mattress utilized   foam padding utilized  Skin Protection: incontinence pads utilized     Problem: Comorbidity  Management  Goal: Maintenance of Behavioral Health Symptom Control  Outcome: Progressing   Goal Outcome Evaluation:

## 2024-10-28 NOTE — NURSING NOTE
"Pt complaining of her face feeling \"different\", RN assessed pt and noted to have sensation differences in her face only. Charge RN, and Stroke navigator called- CT head ordered. No other changes noted to pt assessment. Dr. Andrade notified of changes. Safety provided. Report passed on to nightshift.   "

## 2024-10-28 NOTE — CASE MANAGEMENT/SOCIAL WORK
Continued Stay Note  [facility]     Patient Name: Olga Sandoval  MRN: [id number]  Today's Date: 10/28/2024    Admit Date: 10/23/2024    Plan: Ongoing   Discharge Plan       Row Name 10/28/24 1143       Plan    Plan Comments MSW spoke with pt and pt's daughter at bedside. Pt remains agreeable to rehab. MSW called Shira with the [place] and she reports she is working on referral. MSW continues to follow.                   Discharge Codes    No documentation.                 Expected Discharge Date and Time       Expected Discharge Date Expected Discharge Time    Oct 29, 2024               ANDRESSA Pierce

## 2024-10-28 NOTE — PLAN OF CARE
Goal Outcome Evaluation:  Plan of Care Reviewed With: patient        Progress: improving       Anticipated Discharge Disposition (SLP): home with home health, skilled nursing facility, anticipate therapy at next level of care             Treatment Assessment (SLP): continued, aphasia, improved, oral dysphagia (10/28/24 1450)  Treatment Assessment Comments (SLP): patient completed trials fo regular solids without overt s/sx of aspiration/penetration. Patient without oral residue and able to clear independently. patient would prefer upgrade to regular solids. During speech/language treatment patient with improved ability to answer questions, follow directions, attend to task, and orientation. (10/28/24 1450)  Plan for Continued Treatment (SLP): goals adjusted to reflect functional improvements demonstrated, continue treatment per plan of care (10/28/24 1450)

## 2024-10-28 NOTE — THERAPY TREATMENT NOTE
Acute Care - Speech Language Pathology   Swallow & speech/language Treatment Note  J[facility]     Patient Name: Olga Sandoval  : 1955  MRN: [id number]  Today's Date: 10/28/2024               Admit Date: 10/23/2024    Visit Dx:     ICD-10-CM ICD-9-CM   1. Left-sided weakness  R53.1 728.87   2. Slurred speech  R47.81 784.59   3. Acute dehydration  E86.0 276.51   4. Acute urinary tract infection  N39.0 599.0   5. Acute encephalopathy  G93.40 348.30   6. Oral phase dysphagia  R13.11 787.21   7. Cognitive communication deficit  R41.841 799.52   8. Aphasia  R47.01 784.3     Patient Active Problem List   Diagnosis    Aphasia    Bipolar 2 disorder    Type 2 diabetes mellitus    Essential hypertension    Hyperlipidemia    Carotid stenosis, right    Obesity, Class III, BMI 40-49.9 (morbid obesity)    History of CVA (cerebrovascular accident)    Hypertension    Gastroesophageal reflux disease    Encounter for screening for colorectal cancer in high risk patient    Immunization due    Medicare annual wellness visit, subsequent    Lymphedema    Bleeding from right ear    Healthcare maintenance    Post-menopausal    Dysarthria    UTI (urinary tract infection)     Past Medical History:   Diagnosis Date    Aneurysm     left carotid    Bipolar 2 disorder     Diabetes mellitus     H/O Bell's palsy     Left sided facial droop    History of loop recorder     Hyperlipidemia     Hypertension     Stroke     x 4     Past Surgical History:   Procedure Laterality Date    CARDIAC CATHETERIZATION      CATARACT EXTRACTION Bilateral      SECTION         SLP Recommendation and Plan     SLP Diet Recommendation: regular textures, thin liquids (10/28/24 1450)  Recommended Precautions and Strategies: upright posture during/after eating (10/28/24 1450)  SLP Rec. for Method of Medication Administration: as tolerated (10/28/24 1450)     Monitor for Signs of Aspiration: notify SLP if any concerns (10/28/24 1450)        Anticipated  Discharge Disposition (SLP): home with home health, skilled nursing facility, anticipate therapy at next level of care (10/28/24 1450)     Therapy Frequency (Swallow): 5 days per week (10/28/24 1450)  Predicted Duration Therapy Intervention (Days): 1 week (10/28/24 1450)  Oral Care Recommendations: Oral Care BID/PRN, Toothbrush (10/28/24 1450)        Daily Summary of Progress (SLP): progress toward functional goals is good (10/28/24 1450)               Treatment Assessment (SLP): continued, aphasia, improved, oral dysphagia (10/28/24 1450)  Treatment Assessment Comments (SLP): patient completed trials fo regular solids without overt s/sx of aspiration/penetration. Patient without oral residue and able to clear independently. patient would prefer upgrade to regular solids. During speech/language treatment patient with improved ability to answer questions, follow directions, attend to task, and orientation. (10/28/24 1450)  Plan for Continued Treatment (SLP): goals adjusted to reflect functional improvements demonstrated, continue treatment per plan of care (10/28/24 1450)         Progress: improving      SWALLOW EVALUATION (Last 72 Hours)       SLP Adult Swallow Evaluation       Row Name 10/28/24 1450                   Rehab Evaluation    Document Type therapy note (daily note)  -GA        Subjective Information no complaints  -GA        Patient Observations alert;cooperative;agree to therapy  -GA        Patient/Family/Caregiver Comments/Observations no family present  -GA        Patient Effort adequate  -GA        Symptoms Noted During/After Treatment none  -GA           General Information    Patient Profile Reviewed yes  -GA        Pertinent History Of Current Problem see previous evaluation and tx notes  -GA        Current Method of Nutrition soft to chew textures;chopped;thin liquids  -GA        Precautions/Limitations, Vision WFL;for purposes of eval  -GA        Precautions/Limitations, Hearing WFL;for purposes  of eval  -GA        Plans/Goals Discussed with patient;agreed upon  -GA        Barriers to Rehab previous functional deficit  -GA        Patient's Goals for Discharge patient did not state  -GA           Pain    Pretreatment Pain Rating 0/10 - no pain  -GA        Posttreatment Pain Rating 0/10 - no pain  -GA           SLP Treatment Clinical Impressions    Treatment Assessment (SLP) continued;aphasia;improved;oral dysphagia  -GA        Treatment Assessment Comments (SLP) patient completed trials fo regular solids without overt s/sx of aspiration/penetration. Patient without oral residue and able to clear independently. patient would prefer upgrade to regular solids. During speech/language treatment patient with improved ability to answer questions, follow directions, attend to task, and orientation.  -GA        Daily Summary of Progress (SLP) progress toward functional goals is good  -GA        Barriers to Overall Progress (SLP) Baseline deficits  -GA        Plan for Continued Treatment (SLP) goals adjusted to reflect functional improvements demonstrated;continue treatment per plan of care  -GA        Care Plan Review patient/other agree to care plan  -GA           Recommendations    Therapy Frequency (Swallow) 5 days per week  -GA        Predicted Duration Therapy Intervention (Days) 1 week  -GA        SLP Diet Recommendation regular textures;thin liquids  -GA        Recommended Precautions and Strategies upright posture during/after eating  -GA        Oral Care Recommendations Oral Care BID/PRN;Toothbrush  -GA        SLP Rec. for Method of Medication Administration as tolerated  -GA        Monitor for Signs of Aspiration notify SLP if any concerns  -GA        Anticipated Discharge Disposition (SLP) home with home health;skilled nursing facility;anticipate therapy at next level of care  -GA                  User Key  (r) = Recorded By, (t) = Taken By, (c) = Cosigned By      Initials Name Effective Dates    GA  Veronique Bosch, MS CCC-SLP 09/14/23 -                     EDUCATION  The patient has been educated in the following areas:   Cognitive Impairment Communication Impairment Dysphagia (Swallowing Impairment).        SLP GOALS       Row Name 10/28/24 1445             (LTG) Patient will demonstrate functional swallow for    Diet Texture (Demonstrate functional swallow) regular textures  -GA      Liquid viscosity (Demonstrate functional swallow) thin liquids  -GA      [place] (Demonstrate functional swallow) independently (over 90% accuracy)  -GA      Time Frame (Demonstrate functional swallow) 1 week  -GA      Progress/Outcomes (Demonstrate functional swallow) continuing progress toward goal  -GA         (STG) Patient will tolerate trials of    Consistencies Trialed (Tolerate trials) regular textures;thin liquids  -GA      Desired Outcome (Tolerate trials) with adequate oral prep/transit/clearance;without signs/symptoms of aspiration  -GA      [place] (Tolerate trials) independently (over 90% accuracy)  -GA      Time Frame (Tolerate trials) 1 week  -GA      Progress/Outcomes (Tolerate trials) goal revised this date  -GA         (STG) Patient will tolerate therapeutic trials of    Consistencies Trialed (Tolerate therapeutic trials) regular textures  -GA      Desired Outcome (Tolerate therapeutic trials) with adequate oral prep/transit/clearance  -GA      [place] (Tolerate therapeutic trials) independently (over 90% accuracy)  -GA      Time Frame (Tolerate therapeutic trials) 1 week  -GA      Progress/Outcomes (Tolerate therapeutic trials) goal met  -GA         Comprehend Questions Goal 1 (SLP)    Improve Ability to Comprehend Questions Goal 1 (SLP) complex yes/no questions;complex wh questions;80%;with minimal cues (75-90%)  -GA      Time Frame (Comprehend Questions Goal 1, SLP) 1 week  -GA      Barriers (Comprehend Questions Goal 1, SLP) Baseline deficits  -GA      Progress/Outcomes (Comprehend Questions  Goal 1, SLP) goal revised this date  -GA      Comment (Comprehend Questions Goal 1, SLP) 100% with simple yes/no and wh-questions  -GA         Follow Directions Goal 2 (SLP)    Improve Ability to Follow Directions Goal 1 (SLP) 3 step commands;90%;with minimal cues (75-90%)  -GA      Time Frame (Follow Directions Goal 1, SLP) 1 day  -GA      Progress/Outcomes (Follow Directions Goal 1, SLP) goal revised this date  -GA      Comment (Follow Directions Goal 1, SLP) 100% with 1 & 2 step directions  -GA         Word Retrieval Skills Goal 1 (SLP)    Improve Word Retrieval Skills By Goal 1 (SLP) completing open ended structured sentence;answer WH question with one word;100%;with minimal cues (75-90%)  -GA      Time Frame (Word Retrieval Goal 1, SLP) 1 week  -GA      Barriers (Word Retrieval Goal 1, SLP) Baseline deficits  -GA      Progress (Word Retrieval Skills Goal 1, SLP) 70%  -GA      Progress/Outcomes (Word Retrieval Goal 1, SLP) continuing progress toward goal  -GA         Orientation Goal 1 (SLP)    Improve Orientation Through Goal 1 (SLP) demonstrating orientation to month;demonstrating orientation to year;demonstrating orientation to place;demonstrating orientation to disease/impairment;100%;with moderate cues (50-74%)  -GA      Time Frame (Orientation Goal 1, SLP) 1 week  -GA      Progress (Orientation Goal 1, SLP) 80%;with minimal cues (75-90%)  -GA      Progress/Outcomes (Orientation Goal 1, SLP) continuing progress toward goal  -GA      Comment (Orientation Goal 1, SLP) not oriented to month  -GA                User Key  (r) = Recorded By, (t) = Taken By, (c) = Cosigned By      Initials Name Provider Type    Veronique Huerta MS CCC-SLP Speech and Language Pathologist                         Time Calculation:    Time Calculation- SLP       Row Name 10/28/24 1601             Time Calculation- SLP    SLP Start Time 1445  -GA      SLP Received On 10/28/24  -GA         Untimed Charges    75840-FM Treatment/ST  Modification Prosth Aug Alter  25  -GA      15510-EB Treatment Swallow Minutes 15  -GA         Total Minutes    Untimed Charges Total Minutes 40  -GA       Total Minutes 40  -GA                User Key  (r) = Recorded By, (t) = Taken By, (c) = Cosigned By      Initials Name Provider Type    Veronique Huerta MS CCC-SLP Speech and Language Pathologist                    Therapy Charges for Today       Code Description Service Date Service Provider Modifiers Qty    86031458422 HC ST TREATMENT SPEECH 2 10/28/2024 Veronique Bosch MS CCC-SLP GN 1    24966922951 HC ST TREATMENT SWALLOW 1 10/28/2024 Veronique Bosch MS CCC-SLP GN 1                 Veronique Bosch MS CCC-SLP  10/28/2024

## 2024-10-28 NOTE — PROGRESS NOTES
Stroke Progress Note       Chief Complaint: Confusion, weakness    Subjective    Subjective     Subjective: Called by nursing regarding new complaint of left-sided sensory deficit on her face.    Review of Systems   Constitutional:  Negative for fever.   Eyes:  Negative for visual disturbance.   Respiratory:  Negative for cough and shortness of breath.    Cardiovascular:  Positive for leg swelling. Negative for chest pain and palpitations.   Gastrointestinal:  Negative for nausea and vomiting.   Musculoskeletal: Negative.         Wheelchair-bound at baseline   Skin: Negative.    Neurological:  Positive for speech difficulty. Negative for facial asymmetry, weakness, numbness and headaches.   Psychiatric/Behavioral: Negative.              Objective    Objective      Temp:  [98.1 °F (36.7 °C)-98.6 °F (37 °C)] 98.2 °F (36.8 °C)  Heart Rate:  [73-91] 78  Resp:  [16] 16  BP: (134-205)/(57-98) 149/69            Physical Exam  Vitals reviewed.   HENT:      Head: Normocephalic and atraumatic.   Eyes:      Extraocular Movements: Extraocular movements intact.      Pupils: Pupils are equal, round, and reactive to light.   Cardiovascular:      Rate and Rhythm: Normal rate.   Pulmonary:      Effort: Pulmonary effort is normal. No respiratory distress.   Musculoskeletal:      Cervical back: Normal range of motion.      Right lower leg: Edema present.      Left lower leg: Edema present.   Skin:     General: Skin is warm and dry.   Neurological:      Mental Status: She is alert. She is disoriented.      Cranial Nerves: Cranial nerve deficit present.      Sensory: Sensory deficit present.      Motor: Weakness present.   Psychiatric:         Mood and Affect: Mood normal.         Speech: Speech normal.         Behavior: Behavior normal.         Results Review:    I reviewed the patient's new clinical results.  WBC   Date Value Ref Range Status   10/28/2024 9.05 3.40 - 10.80 10*3/mm3 Final     RBC   Date Value Ref Range Status    10/28/2024 4.11 3.77 - 5.28 10*6/mm3 Final     Hemoglobin   Date Value Ref Range Status   10/28/2024 11.8 (L) 12.0 - 15.9 g/dL Final     Hematocrit   Date Value Ref Range Status   10/28/2024 37.8 34.0 - 46.6 % Final     MCV   Date Value Ref Range Status   10/28/2024 92.0 79.0 - 97.0 fL Final     MCH   Date Value Ref Range Status   10/28/2024 28.7 26.6 - 33.0 pg Final     MCHC   Date Value Ref Range Status   10/28/2024 31.2 (L) 31.5 - 35.7 g/dL Final     RDW   Date Value Ref Range Status   10/28/2024 17.2 (H) 12.3 - 15.4 % Final     RDW-SD   Date Value Ref Range Status   10/28/2024 58.1 (H) 37.0 - 54.0 fl Final     MPV   Date Value Ref Range Status   10/28/2024 10.7 6.0 - 12.0 fL Final     Platelets   Date Value Ref Range Status   10/28/2024 326 140 - 450 10*3/mm3 Final     Neutrophil %   Date Value Ref Range Status   10/28/2024 65.6 42.7 - 76.0 % Final     Lymphocyte %   Date Value Ref Range Status   10/28/2024 18.0 (L) 19.6 - 45.3 % Final     Monocyte %   Date Value Ref Range Status   10/28/2024 11.2 5.0 - 12.0 % Final     Eosinophil %   Date Value Ref Range Status   10/28/2024 4.5 0.3 - 6.2 % Final     Basophil %   Date Value Ref Range Status   10/28/2024 0.4 0.0 - 1.5 % Final     Immature Grans %   Date Value Ref Range Status   10/28/2024 0.3 0.0 - 0.5 % Final     Neutrophils, Absolute   Date Value Ref Range Status   10/28/2024 5.93 1.70 - 7.00 10*3/mm3 Final     Lymphocytes, Absolute   Date Value Ref Range Status   10/28/2024 1.63 0.70 - 3.10 10*3/mm3 Final     Monocytes, Absolute   Date Value Ref Range Status   10/28/2024 1.01 (H) 0.10 - 0.90 10*3/mm3 Final     Eosinophils, Absolute   Date Value Ref Range Status   10/28/2024 0.41 (H) 0.00 - 0.40 10*3/mm3 Final     Basophils, Absolute   Date Value Ref Range Status   10/28/2024 0.04 0.00 - 0.20 10*3/mm3 Final     Immature Grans, Absolute   Date Value Ref Range Status   10/28/2024 0.03 0.00 - 0.05 10*3/mm3 Final     nRBC   Date Value Ref Range Status    10/28/2024 0.0 0.0 - 0.2 /100 WBC Final     Lab Results   Component Value Date    GLUCOSE 124 (H) 10/28/2024    BUN 17 10/28/2024    CREATININE 0.89 10/28/2024     10/28/2024    K 3.8 10/28/2024     10/28/2024    CALCIUM 9.2 10/28/2024    PROTEINTOT 7.6 10/23/2024    ALBUMIN 4.5 10/23/2024    ALT 9 10/23/2024    ALT 9 10/23/2024    AST 18 10/23/2024    AST 18 10/23/2024    ALKPHOS 104 10/23/2024    BILITOT 0.6 10/23/2024    GLOB 3.1 10/23/2024    AGRATIO 1.5 10/23/2024    BCR 19.1 10/28/2024    ANIONGAP 10.0 10/28/2024    EGFR 69.4 10/28/2024     Lipid Panel          9/16/2024    11:55 10/24/2024    16:33   Lipid Panel   Total Cholesterol 133  163    Triglycerides 126  83    HDL Cholesterol 57  60    VLDL Cholesterol 22  16    LDL Cholesterol  54  87    LDL/HDL Ratio 0.89  1.44          Lab 10/24/24  1633   HEMOGLOBIN A1C 5.40         MRI Brain Without Contrast    Result Date: 10/25/2024  Impression: Motion limited study demonstrates multiple chronic cortical infarcts, advanced chronic small vessel ischemic change and a chronic lacunar infarct in the right thalamus. No definite acute findings Electronically Signed: Vick Kathleen MD  10/25/2024 4:31 AM EDT  Workstation ID: [id number]    FL Video Swallow With Speech Single Contrast    Result Date: 10/24/2024  Impression: Fluoroscopy provided for a modified barium swallow. No aspiration was seen during swallowing evaluation. Please see speech therapy report for full details and recommendations. Report dictated by: Abigail Hernández PA-c  I have personally reviewed this case and agree with the findings above: Electronically Signed: Orlando Tidwell MD  10/24/2024 5:01 PM EDT  Workstation ID: [id number]    EEG    Result Date: 10/24/2024  Diffuse cerebral dysfunction moderate degree but nonspecific No evidence for epilepsy is present This report is transcribed using the Dragon dictation system.      XR Abdomen KUB    Result Date: 10/24/2024  Impression: 1. No  radiodense foreign body overlying the abdomen or pelvis or findings to prevent MRI. 2. Mildly dilated air-filled bowel loops overlying the left mid abdomen may relate to ileus, nonspecific. Electronically Signed: Rahat Stokes MD  10/24/2024 9:20 AM EDT  Workstation ID: [id number]    CT Angiogram Head w AI Analysis of LVO    Result Date: 10/23/2024  Impression: No acute arterial abnormality of the head and neck. Chronic moderate stenoses noted within the bilateral cervical internal carotid arteries secondary to calcified and noncalcified plaques. Please note that the stenosis may be exaggerated due to motion artifact in this region. Additionally there is at least moderate stenoses noted within the bilateral PCAs distally. 1.0 cm aneurysm arising from the left cavernous segment ICA. Electronically Signed: Derek Rich DO  10/23/2024 6:00 PM EDT  Workstation ID: [id number]    CT Angiogram Neck    Result Date: 10/23/2024  Impression: No acute arterial abnormality of the head and neck. Chronic moderate stenoses noted within the bilateral cervical internal carotid arteries secondary to calcified and noncalcified plaques. Please note that the stenosis may be exaggerated due to motion artifact in this region. Additionally there is at least moderate stenoses noted within the bilateral PCAs distally. 1.0 cm aneurysm arising from the left cavernous segment ICA. Electronically Signed: Derek Rich DO  10/23/2024 6:00 PM EDT  Workstation ID: [id number]    XR Chest 1 View    Result Date: 10/23/2024  Impression: No acute cardiopulmonary abnormality. Electronically Signed: Vick Wild MD  10/23/2024 5:52 PM EDT  Workstation ID: [id number]    CT Head Without Contrast Stroke Protocol    Result Date: 10/23/2024  Impression: No acute intracranial abnormality. Specifically no acute intracranial hemorrhage. Electronically Signed: Derek Rich DO  10/23/2024 5:24 PM EDT  Workstation ID: [id number]      Results for orders placed  during the hospital encounter of 10/23/24    Adult Transthoracic Echo Complete W/ Cont if Necessary Per Protocol (With Agitated Saline)    Interpretation Summary    Left ventricular ejection fraction appears to be 51 - 55%.    Unable to fully assess valvular function due to suboptimal windows.            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  Stroke neurology was consulted for further evaluation.  CT head 10/23 did not reveal acute intracranial abnormality.  CTA head and neck 10/23 revealed moderate stenoses bilateral internal carotid arteries and bilateral PCAs, as well as 1 cm left ICA aneurysm.  On exam, the patient is confused and aphasic however this is improving.  TTE showed EF 55%, normal left atrium.  MRI brain negative for acute stroke, evidence of multiple chronic infarcts     # Encephalopathy D/T UTI  # Speech deficits per daughter this is her baseline   # Multiple chronic strokes  -10/28 Nursing called regarding patient complaint of sensory deficit on the left side of her face.  Exam otherwise stable. BP currently 188/100. Repeat CT head is stable.   -Suspect sensory deficit is related to elevation in BP and UTI.  Recommend normalizing BP.  -Continue aspirin 81 mg daily and ticagrelor 90 mg BID  -Continue atorvastatin 80 mg daily (LDL 54)  -Continue PT/OT, therapy recommending SNF at discharge for best outcome  -Neuro-checks per unit protocol while inpatient  -Ok to normalize bp  -DVT prophylaxis:  SCD  -No additional stroke workup needed at this time.     # 1 cm left ICA aneurysm  -Patient follows with neurosurgery at , her neurosurgeon had requested outpatient MRA/MRI before her next follow-up.  ANGELIKA Holly gave the patient's daughter instructions to obtain a disc from medical records that she can bring with her to her next appointment as well as bleeding risk.        No further stroke workup is recommended at this time.  Patient will need to follow-up with her neurosurgery at  as scheduled. Please call with any questions or concerns.    ANGELIKA Guerrero, AGACNP-BC  10/28/24  18:27 EDT

## 2024-10-28 NOTE — PROGRESS NOTES
"    [facility] Medicine Services  PROGRESS NOTE    Patient Name: Olga Sandoval  : 1955  MRN: [id number]    Date of Admission: 10/23/2024  Primary Care Physician: Ileana Kerr MD    Subjective   Subjective     CC:  Altered mental status    HPI:  Patient was seen resting up in bed awake and alert.  Acute distress.  Still with some mild chronic expressive aphasia and mild facial droop.  Slow speech.  Oriented x 3.  States she feels \"better today\".  No new issues.  Awaiting rehab.     Objective   Objective     Vital Signs:   Temp:  [98.1 °F (36.7 °C)-98.6 °F (37 °C)] 98.2 °F (36.8 °C)  Heart Rate:  [73-91] 73  Resp:  [14-16] 16  BP: (134-205)/(57-98) 169/69     Physical Exam:  Constitutional: Awake, alert, resting comfortably, sitting up in bed.  No visitors at bedside.  HENT: NCAT, mucous membranes moist  Respiratory: Clear to auscultation bilaterally, respiratory effort normal   Cardiovascular: RRR  Gastrointestinal: soft, nontender, nondistended. +bs.  Obese  Musculoskeletal: No bilateral ankle edema  Psychiatric: Appropriate affect, cooperative  Neurologic: Alert, oriented to self, place, and year currently.  Chronic mild left facial droop present, chronic aphasia present  Skin: No rashes on exposed skin    Results Reviewed:  LAB RESULTS:      Lab 10/28/24  0617 10/27/24  0911 10/26/24  0827 10/25/24  0951 10/24/24  1633 10/23/24  1732   WBC 9.05 6.35 6.99 10.55 10.96* 16.23*   HEMOGLOBIN 11.8* 11.5* 11.1* 12.8 11.6* 13.8   HEMATOCRIT 37.8 35.0 35.0 39.7 35.3 43.1   PLATELETS 326 275 283 384 315 385   NEUTROS ABS 5.93 3.99 4.21 7.40* 8.21* 14.33*   IMMATURE GRANS (ABS) 0.03 0.02 0.02 0.04 0.04 0.04   LYMPHS ABS 1.63 1.31 1.57 1.88 1.36 0.77   MONOS ABS 1.01* 0.68 0.77 1.08* 1.32* 0.87   EOS ABS 0.41* 0.33 0.37 0.10 0.01 0.17   MCV 92.0 88.4 89.7 88.8 87.6 88.9   LACTATE  --   --   --   --   --  1.8   APTT  --   --   --   --   --  24.9   HSTROP T  --   --   --   --   --  25*       "   Lab 10/28/24  0617 10/27/24  0911 10/26/24  0827 10/24/24  1633 10/23/24  1732   SODIUM 139 141 143 143 141   POTASSIUM 3.8 4.5 3.9 3.8 4.4   CHLORIDE 106 107 108* 105 102   CO2 23.0 21.0* 22.0 25.0 21.0*   ANION GAP 10.0 13.0 13.0 13.0 18.0*   BUN 17 16 19 25* 25*   CREATININE 0.89 0.96 0.90 1.17* 1.32*   EGFR 69.4 63.4 68.5 50.0* 43.3*   GLUCOSE 124* 194* 111* 100* 125*   CALCIUM 9.2 8.9 9.0 9.8 10.1   HEMOGLOBIN A1C  --   --   --  5.40  --    TSH  --   --   --   --  3.950         Lab 10/23/24  1732   TOTAL PROTEIN 7.6   ALBUMIN 4.5   GLOBULIN 3.1   ALT (SGPT) 9  9   AST (SGOT) 18  18   BILIRUBIN 0.6   ALK PHOS 104         Lab 10/23/24  1732   HSTROP T 25*         Lab 10/24/24  1633   CHOLESTEROL 163   LDL CHOL 87   HDL CHOL 60   TRIGLYCERIDES 83         Lab 10/23/24  1732   VITAMIN B 12 294         Brief Urine Lab Results  (Last result in the past 365 days)        Color   Clarity   Blood   Leuk Est   Nitrite   Protein   CREAT   Urine HCG        10/23/24 1937 Yellow   Turbid   Trace   Moderate (2+)   Negative   30 mg/dL (1+)                   Microbiology Results Abnormal       Procedure Component Value - Date/Time    Blood Culture - Blood, Arm, Right [[id number]]  (Normal) Collected: 10/23/24 2122    Lab Status: Preliminary result Specimen: Blood from Arm, Right Updated: 10/27/24 2131     Blood Culture No growth at 4 days    Narrative:      Less than seven (7) mL's of blood was collected.  Insufficient quantity may yield false negative results.    Blood Culture - Blood, Arm, Right [[id number]]  (Normal) Collected: 10/23/24 2107    Lab Status: Preliminary result Specimen: Blood from Arm, Right Updated: 10/27/24 2131     Blood Culture No growth at 4 days    Narrative:      Less than seven (7) mL's of blood was collected.  Insufficient quantity may yield false negative results.    Urine Culture - Urine, Straight Cath [[id number]] Collected: 10/23/24 1937    Lab Status: Final result Specimen: Urine from Straight  Cath Updated: 10/25/24 1355     Urine Culture >100,000 CFU/mL Mixed Betty Isolated    Narrative:      Specimen contains mixed organisms of questionable pathogenicity suggestive of contamination. If symptoms persist, suggest recollection.  Colonization of the urinary tract without infection is common. Treatment is discouraged unless the patient is symptomatic, pregnant, or undergoing an invasive urologic procedure.    Respiratory Panel PCR w/COVID-19(SARS-CoV-2) LEONA/PRIYANKA/GABY/PAD/COR/MILDRED In-House, NP Swab in UTM/VTM, 2 HR TAT - Swab, Nasopharynx [[id number]]  (Normal) Collected: 10/24/24 1504    Lab Status: Final result Specimen: Swab from Nasopharynx Updated: 10/24/24 1556     ADENOVIRUS, PCR Not Detected     Coronavirus 229E Not Detected     Coronavirus HKU1 Not Detected     Coronavirus NL63 Not Detected     Coronavirus OC43 Not Detected     COVID19 Not Detected     Human Metapneumovirus Not Detected     Human Rhinovirus/Enterovirus Not Detected     Influenza A PCR Not Detected     Influenza B PCR Not Detected     Parainfluenza Virus 1 Not Detected     Parainfluenza Virus 2 Not Detected     Parainfluenza Virus 3 Not Detected     Parainfluenza Virus 4 Not Detected     RSV, PCR Not Detected     Bordetella pertussis pcr Not Detected     Bordetella parapertussis PCR Not Detected     Chlamydophila pneumoniae PCR Not Detected     Mycoplasma pneumo by PCR Not Detected    Narrative:      In the setting of a positive respiratory panel with a viral infection PLUS a negative procalcitonin without other underlying concern for bacterial infection, consider observing off antibiotics or discontinuation of antibiotics and continue supportive care. If the respiratory panel is positive for atypical bacterial infection (Bordetella pertussis, Chlamydophila pneumoniae, or Mycoplasma pneumoniae), consider antibiotic de-escalation to target atypical bacterial infection.    COVID PRE-OP / PRE-PROCEDURE SCREENING ORDER (NO ISOLATION) - Swab,  Nasopharynx [[id number]]  (Normal) Collected: 10/23/24 1938    Lab Status: Final result Specimen: Swab from Nasopharynx Updated: 10/23/24 2007    Narrative:      The following orders were created for panel order COVID PRE-OP / PRE-PROCEDURE SCREENING ORDER (NO ISOLATION) - Swab, Nasopharynx.  Procedure                               Abnormality         Status                     ---------                               -----------         ------                     COVID-19 and FLU A/B PCR...[[id number]]  Normal              Final result                 Please view results for these tests on the individual orders.    COVID-19 and FLU A/B PCR, 1 HR TAT - Swab, Nasopharynx [[id number]]  (Normal) Collected: 10/23/24 1938    Lab Status: Final result Specimen: Swab from Nasopharynx Updated: 10/23/24 2007     COVID19 Not Detected     Influenza A PCR Not Detected     Influenza B PCR Not Detected    Narrative:      Fact sheet for providers: https://www.fda.gov/media/162067/download    Fact sheet for patients: https://www.fda.gov/media/116543/download    Test performed by PCR.            No radiology results from the last 24 hrs    Results for orders placed during the hospital encounter of 10/23/24    Adult Transthoracic Echo Complete W/ Cont if Necessary Per Protocol (With Agitated Saline)    Interpretation Summary    Left ventricular ejection fraction appears to be 51 - 55%.    Unable to fully assess valvular function due to suboptimal windows.      Current medications:  Scheduled Meds:amLODIPine, 10 mg, Oral, Q24H  aspirin, 81 mg, Oral, Daily  atorvastatin, 80 mg, Oral, Nightly  carvedilol, 6.25 mg, Oral, BID With Meals  cefTRIAXone, 2,000 mg, Intravenous, Q24H  cholecalciferol, 1,000 Units, Oral, Daily  hydrALAZINE, 100 mg, Oral, Q8H  insulin lispro, 2-7 Units, Subcutaneous, 4x Daily AC & at Bedtime  lactobacillus acidophilus, 1 capsule, Oral, Daily  losartan, 100 mg, Oral, Daily  nystatin, , Topical, Q12H  pantoprazole, 40  mg, Oral, Q AM  QUEtiapine, 50 mg, Oral, Q12H  ticagrelor, 90 mg, Oral, BID  venlafaxine XR, 150 mg, Oral, Daily      Continuous Infusions:   PRN Meds:.  acetaminophen    dextrose    dextrose    glucagon (human recombinant)    labetalol    sodium chloride    ziprasidone    Assessment & Plan   Assessment & Plan     Active Hospital Problems    Diagnosis  POA    **Dysarthria [R47.1]  Yes    UTI (urinary tract infection) [N39.0]  Yes      Resolved Hospital Problems   No resolved problems to display.        Brief Hospital Course to date:  Olga Sandoval is a 69 y.o. 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 She was found on the floor by family member.     Labs notable for UA with leuk esterase, too numerous to count WBCs, 2+ bacteria, 21-30 squamous cells, WBCs 16, lactic 1.8. CXR with no acute abnormality, CTA head/neck with w chronic moderate stenoses with b/l cervical internal carotid arteries, moderate stenoses within the b/l PCAs, 1cm aneurysm from left cavernous segment ICA.     This patient's problems and plans were partially entered by my partner and updated as appropriate by me 10/28/24.    Assessment/Plan:  Patient is new to me today    Altered mental status  -suspect infectious vs metabolic etiology  -LFTs normal, TSH normal, B12 normal, ammonia low, resp PCR negative  -MRI brain showed multiple chronic cortical infarcts, chronic lacunar infarct in right thalamus  -Stroke Neurology followed, recommended aspirin 81 mg daily, Brilinta 90 mg twice daily, atorvastatin 80 mg daily.   -Continue treatment of suspected UTI. Continue reduced dose Seroquel for now.   --PT/OT recommending SNF on discharge.    Acute UTI  Leukocytosis with neutrophilia  -Urine  culture growing >100k mixed tavo  -Continue IV ceftriaxone. Follow final blood cultures. Patient is improving on IV ceftriaxone so patient appears to have true UTI.   -Urinary retention protocol.    FLASH on suspected CKD  -Cr improved with fluids on admission.  Creatinine currently normal at 0.89.    1cm aneurysm left cavernous segment ICA  -Patient follows with Neurosurgery at , her neurosurgeon had requested outpatient MRA/MRI before her next follow-up.   -Stroke neurology discussed bleeding risk with patient's daughter.      Candida dermatitis  -Nystatin powder      Hypertension- continue home losartan.  Amlodipine added on 10/26.  Hydralazine added on 10/27. On chronic coreg.    --BP slightly better but still high.  Monitor.  Consider increased regimen further tomorrow if needed     Hyperlipidemia- continue atorvastatin.     DM2 - A1c 5.4%; SSI + glucose checks + hypoglycemia protocol    Anxiety- continue home venlafaxine.   COPD  Morbid obesity (BMI 44.08)    Expected Discharge Location and Transportation: [facility]  Expected Discharge   Expected Discharge Date: 10/29/2024; Expected Discharge Time:      VTE Prophylaxis:  Mechanical VTE prophylaxis orders are present.         AM-PAC 6 Clicks Score (PT): 13 (10/27/24 2000)    CODE STATUS:   Code Status and Medical Interventions: CPR (Attempt to Resuscitate); Full Support   Ordered at: 10/24/24 1550     Level Of Support Discussed With:    Patient     Code Status (Patient has no pulse and is not breathing):    CPR (Attempt to Resuscitate)     Medical Interventions (Patient has pulse or is breathing):    Full Support       Rae Lopez, APRN  10/28/24

## 2024-10-29 LAB
ANION GAP SERPL CALCULATED.3IONS-SCNC: 14 MMOL/L (ref 5–15)
BASOPHILS # BLD AUTO: 0.04 10*3/MM3 (ref 0–0.2)
BASOPHILS NFR BLD AUTO: 0.5 % (ref 0–1.5)
BUN SERPL-MCNC: 19 MG/DL (ref 8–23)
BUN/CREAT SERPL: 19.8 (ref 7–25)
CALCIUM SPEC-SCNC: 9.4 MG/DL (ref 8.6–10.5)
CHLORIDE SERPL-SCNC: 104 MMOL/L (ref 98–107)
CO2 SERPL-SCNC: 21 MMOL/L (ref 22–29)
CREAT SERPL-MCNC: 0.96 MG/DL (ref 0.57–1)
DEPRECATED RDW RBC AUTO: 55.8 FL (ref 37–54)
EGFRCR SERPLBLD CKD-EPI 2021: 63.4 ML/MIN/1.73
EOSINOPHIL # BLD AUTO: 0.38 10*3/MM3 (ref 0–0.4)
EOSINOPHIL NFR BLD AUTO: 4.7 % (ref 0.3–6.2)
ERYTHROCYTE [DISTWIDTH] IN BLOOD BY AUTOMATED COUNT: 17.4 % (ref 12.3–15.4)
GLUCOSE BLDC GLUCOMTR-MCNC: 109 MG/DL (ref 70–130)
GLUCOSE BLDC GLUCOMTR-MCNC: 119 MG/DL (ref 70–130)
GLUCOSE BLDC GLUCOMTR-MCNC: 128 MG/DL (ref 70–130)
GLUCOSE BLDC GLUCOMTR-MCNC: 174 MG/DL (ref 70–130)
GLUCOSE SERPL-MCNC: 135 MG/DL (ref 65–99)
HCT VFR BLD AUTO: 39.6 % (ref 34–46.6)
HGB BLD-MCNC: 13 G/DL (ref 12–15.9)
IMM GRANULOCYTES # BLD AUTO: 0.02 10*3/MM3 (ref 0–0.05)
IMM GRANULOCYTES NFR BLD AUTO: 0.2 % (ref 0–0.5)
LYMPHOCYTES # BLD AUTO: 1.64 10*3/MM3 (ref 0.7–3.1)
LYMPHOCYTES NFR BLD AUTO: 20.4 % (ref 19.6–45.3)
MCH RBC QN AUTO: 29.1 PG (ref 26.6–33)
MCHC RBC AUTO-ENTMCNC: 32.8 G/DL (ref 31.5–35.7)
MCV RBC AUTO: 88.6 FL (ref 79–97)
MONOCYTES # BLD AUTO: 1.02 10*3/MM3 (ref 0.1–0.9)
MONOCYTES NFR BLD AUTO: 12.7 % (ref 5–12)
NEUTROPHILS NFR BLD AUTO: 4.92 10*3/MM3 (ref 1.7–7)
NEUTROPHILS NFR BLD AUTO: 61.5 % (ref 42.7–76)
NRBC BLD AUTO-RTO: 0 /100 WBC (ref 0–0.2)
PLATELET # BLD AUTO: 366 10*3/MM3 (ref 140–450)
PMV BLD AUTO: 11.5 FL (ref 6–12)
POTASSIUM SERPL-SCNC: 4.1 MMOL/L (ref 3.5–5.2)
RBC # BLD AUTO: 4.47 10*6/MM3 (ref 3.77–5.28)
SODIUM SERPL-SCNC: 139 MMOL/L (ref 136–145)
WBC NRBC COR # BLD AUTO: 8.02 10*3/MM3 (ref 3.4–10.8)

## 2024-10-29 PROCEDURE — 99232 SBSQ HOSP IP/OBS MODERATE 35: CPT | Performed by: NURSE PRACTITIONER

## 2024-10-29 PROCEDURE — 92526 ORAL FUNCTION THERAPY: CPT

## 2024-10-29 PROCEDURE — 82948 REAGENT STRIP/BLOOD GLUCOSE: CPT

## 2024-10-29 PROCEDURE — 80048 BASIC METABOLIC PNL TOTAL CA: CPT | Performed by: STUDENT IN AN ORGANIZED HEALTH CARE EDUCATION/TRAINING PROGRAM

## 2024-10-29 PROCEDURE — 93010 ELECTROCARDIOGRAM REPORT: CPT | Performed by: INTERNAL MEDICINE

## 2024-10-29 PROCEDURE — 92507 TX SP LANG VOICE COMM INDIV: CPT

## 2024-10-29 PROCEDURE — 93005 ELECTROCARDIOGRAM TRACING: CPT | Performed by: NURSE PRACTITIONER

## 2024-10-29 PROCEDURE — 25010000002 LABETALOL 5 MG/ML SOLUTION: Performed by: STUDENT IN AN ORGANIZED HEALTH CARE EDUCATION/TRAINING PROGRAM

## 2024-10-29 PROCEDURE — 63710000001 INSULIN LISPRO (HUMAN) PER 5 UNITS: Performed by: STUDENT IN AN ORGANIZED HEALTH CARE EDUCATION/TRAINING PROGRAM

## 2024-10-29 PROCEDURE — 85025 COMPLETE CBC W/AUTO DIFF WBC: CPT | Performed by: STUDENT IN AN ORGANIZED HEALTH CARE EDUCATION/TRAINING PROGRAM

## 2024-10-29 RX ORDER — CARVEDILOL 12.5 MG/1
12.5 TABLET ORAL 2 TIMES DAILY WITH MEALS
Status: DISCONTINUED | OUTPATIENT
Start: 2024-10-29 | End: 2024-11-01

## 2024-10-29 RX ADMIN — HYDRALAZINE HYDROCHLORIDE 100 MG: 50 TABLET ORAL at 20:48

## 2024-10-29 RX ADMIN — PANTOPRAZOLE SODIUM 40 MG: 40 TABLET, DELAYED RELEASE ORAL at 05:51

## 2024-10-29 RX ADMIN — Medication 10 ML: at 08:33

## 2024-10-29 RX ADMIN — LOSARTAN POTASSIUM 100 MG: 50 TABLET, FILM COATED ORAL at 08:32

## 2024-10-29 RX ADMIN — HYDRALAZINE HYDROCHLORIDE 100 MG: 50 TABLET ORAL at 13:27

## 2024-10-29 RX ADMIN — Medication 1000 UNITS: at 08:32

## 2024-10-29 RX ADMIN — CARVEDILOL 12.5 MG: 12.5 TABLET, FILM COATED ORAL at 18:10

## 2024-10-29 RX ADMIN — ATORVASTATIN CALCIUM 80 MG: 40 TABLET, FILM COATED ORAL at 20:48

## 2024-10-29 RX ADMIN — Medication 1 CAPSULE: at 08:32

## 2024-10-29 RX ADMIN — VENLAFAXINE HYDROCHLORIDE 150 MG: 75 CAPSULE, EXTENDED RELEASE ORAL at 12:00

## 2024-10-29 RX ADMIN — NYSTATIN: 100000 POWDER TOPICAL at 20:49

## 2024-10-29 RX ADMIN — QUETIAPINE FUMARATE 50 MG: 25 TABLET ORAL at 20:48

## 2024-10-29 RX ADMIN — TICAGRELOR 90 MG: 90 TABLET ORAL at 08:33

## 2024-10-29 RX ADMIN — TICAGRELOR 90 MG: 90 TABLET ORAL at 20:49

## 2024-10-29 RX ADMIN — LABETALOL HYDROCHLORIDE 10 MG: 5 INJECTION, SOLUTION INTRAVENOUS at 15:01

## 2024-10-29 RX ADMIN — HYDRALAZINE HYDROCHLORIDE 100 MG: 50 TABLET ORAL at 05:51

## 2024-10-29 RX ADMIN — ASPIRIN 81 MG 81 MG: 81 TABLET ORAL at 08:32

## 2024-10-29 RX ADMIN — NYSTATIN: 100000 POWDER TOPICAL at 08:33

## 2024-10-29 RX ADMIN — CARVEDILOL 6.25 MG: 6.25 TABLET, FILM COATED ORAL at 08:32

## 2024-10-29 RX ADMIN — AMLODIPINE BESYLATE 10 MG: 10 TABLET ORAL at 08:32

## 2024-10-29 RX ADMIN — INSULIN LISPRO 2 UNITS: 100 INJECTION, SOLUTION INTRAVENOUS; SUBCUTANEOUS at 18:10

## 2024-10-29 RX ADMIN — QUETIAPINE FUMARATE 50 MG: 25 TABLET ORAL at 08:32

## 2024-10-29 NOTE — THERAPY TREATMENT NOTE
Acute Care - Speech Language Pathology   Swallow Treatment Note [facility]       Patient Name: Olga Sandoval  : 1955  MRN: [id number]  Today's Date: 10/29/2024               Admit Date: 10/23/2024    Visit Dx:     ICD-10-CM ICD-9-CM   1. Left-sided weakness  R53.1 728.87   2. Slurred speech  R47.81 784.59   3. Acute dehydration  E86.0 276.51   4. Acute urinary tract infection  N39.0 599.0   5. Acute encephalopathy  G93.40 348.30   6. Oral phase dysphagia  R13.11 787.21   7. Cognitive communication deficit  R41.841 799.52   8. Aphasia  R47.01 784.3     Patient Active Problem List   Diagnosis    Aphasia    Bipolar 2 disorder    Type 2 diabetes mellitus    Essential hypertension    Hyperlipidemia    Carotid stenosis, right    Obesity, Class III, BMI 40-49.9 (morbid obesity)    History of CVA (cerebrovascular accident)    Hypertension    Gastroesophageal reflux disease    Encounter for screening for colorectal cancer in high risk patient    Immunization due    Medicare annual wellness visit, subsequent    Lymphedema    Bleeding from right ear    Healthcare maintenance    Post-menopausal    Dysarthria    UTI (urinary tract infection)     Past Medical History:   Diagnosis Date    Aneurysm     left carotid    Bipolar 2 disorder     Diabetes mellitus     H/O Bell's palsy     Left sided facial droop    History of loop recorder     Hyperlipidemia     Hypertension     Stroke     x 4     Past Surgical History:   Procedure Laterality Date    CARDIAC CATHETERIZATION      CATARACT EXTRACTION Bilateral      SECTION         SLP Recommendation and Plan     SLP Diet Recommendation: regular textures, thin liquids (10/29/24 1610)  Recommended Precautions and Strategies: general aspiration precautions (10/29/24 1610)  SLP Rec. for Method of Medication Administration: as tolerated (10/29/24 161)     Monitor for Signs of Aspiration: notify SLP if any concerns (10/29/24 1610)  Recommended Diagnostics: No further SLP  services recommended (10/29/24 1610)     Anticipated Discharge Disposition (SLP): home with home health, skilled nursing facility, anticipate therapy at next level of care (10/29/24 1610)     Therapy Frequency (Swallow): evaluation only (10/29/24 1610)     Oral Care Recommendations: Oral Care BID/PRN, Toothbrush (10/29/24 1610)        Daily Summary of Progress (SLP): progress toward functional goals as expected (10/29/24 1610)               Treatment Assessment (SLP): continued, aphasia, no clinical signs of, pharyngeal dysphagia (10/29/24 1610)  Treatment Assessment Comments (SLP): No overt s/s of aspiration w/ thin liquid trials, pt declined regular solid trials. Pt denies difficulty swallowing & RN reports no concerns. Cont w/ aphasia, pt reports she is nearing baseline level of fx. Dysarthria appears improved. SLP will sign off for dysphagia and cont to f/u for communication. (10/29/24 1610)  Plan for Continued Treatment (SLP): treatment no longer indicated as all goals met (10/29/24 1610)                SWALLOW EVALUATION (Last 72 Hours)       SLP Adult Swallow Evaluation       Row Name 10/29/24 1610 10/28/24 1450                Rehab Evaluation    Document Type therapy note (daily note)  - therapy note (daily note)  -GA       Subjective Information no complaints  - no complaints  -GA       Patient Observations alert;cooperative  - alert;cooperative;agree to therapy  -GA       Patient/Family/Caregiver Comments/Observations -- no family present  -GA       Patient Effort good  - adequate  -GA       Symptoms Noted During/After Treatment none  - none  -GA          General Information    Patient Profile Reviewed -- yes  -GA       Pertinent History Of Current Problem -- see previous evaluation and tx notes  -GA       Current Method of Nutrition -- soft to chew textures;chopped;thin liquids  -GA       Precautions/Limitations, Vision -- WFL;for purposes of eval  -GA       Precautions/Limitations, Hearing --  WFL;for purposes of eval  -GA       Plans/Goals Discussed with -- patient;agreed upon  -GA       Barriers to Rehab -- previous functional deficit  -GA       Patient's Goals for Discharge -- patient did not state  -GA          Pain    Pretreatment Pain Rating -- 0/10 - no pain  -GA       Posttreatment Pain Rating -- 0/10 - no pain  -GA       Additional Documentation Pain Scale: FACES Pre/Post-Treatment (Group)  - --          Pain Scale: FACES Pre/Post-Treatment    Pain: FACES Scale, Pretreatment 0-->no hurt  - --       Posttreatment Pain Rating 0-->no hurt  - --          SLP Treatment Clinical Impressions    Treatment Assessment (SLP) continued;aphasia;no clinical signs of;pharyngeal dysphagia  - continued;aphasia;improved;oral dysphagia  -GA       Treatment Assessment Comments (SLP) No overt s/s of aspiration w/ thin liquid trials, pt declined regular solid trials. Pt denies difficulty swallowing & RN reports no concerns. Cont w/ aphasia, pt reports she is nearing baseline level of fx. Dysarthria appears improved. SLP will sign off for dysphagia and cont to f/u for communication.  - patient completed trials fo regular solids without overt s/sx of aspiration/penetration. Patient without oral residue and able to clear independently. patient would prefer upgrade to regular solids. During speech/language treatment patient with improved ability to answer questions, follow directions, attend to task, and orientation.  -GA       Daily Summary of Progress (SLP) progress toward functional goals as expected  - progress toward functional goals is good  -GA       Barriers to Overall Progress (SLP) -- Baseline deficits  -GA       Plan for Continued Treatment (SLP) treatment no longer indicated as all goals met  - goals adjusted to reflect functional improvements demonstrated;continue treatment per plan of care  -GA       Care Plan Review care plan/treatment goals reviewed  - patient/other agree to care plan  -GA           Recommendations    Therapy Frequency (Swallow) evaluation only  - 5 days per week  -GA       Predicted Duration Therapy Intervention (Days) -- 1 week  -GA       SLP Diet Recommendation regular textures;thin liquids  - regular textures;thin liquids  -GA       Recommended Diagnostics No further SLP services recommended  - --       Recommended Precautions and Strategies general aspiration precautions  - upright posture during/after eating  -GA       Oral Care Recommendations Oral Care BID/PRN;Toothbrush  - Oral Care BID/PRN;Toothbrush  -GA       SLP Rec. for Method of Medication Administration as tolerated  - as tolerated  -GA       Monitor for Signs of Aspiration notify SLP if any concerns  - notify SLP if any concerns  -GA       Anticipated Discharge Disposition (SLP) home with home health;skilled nursing facility;anticipate therapy at next level of care  - home with home health;skilled nursing facility;anticipate therapy at next level of care  -GA                 User Key  (r) = Recorded By, (t) = Taken By, (c) = Cosigned By      Initials Name Effective Dates     Aurora Bishop MS CCC-SLP 05/12/23 -     GA Veronique Bosch MS CCC-SLP 09/14/23 -                     EDUCATION  The patient has been educated in the following areas:   Cognitive Impairment Communication Impairment Dysphagia (Swallowing Impairment).        SLP GOALS       Row Name 10/29/24 1610 10/28/24 1445          (LTG) Patient will demonstrate functional swallow for    Diet Texture (Demonstrate functional swallow) regular textures  - regular textures  -GA     Liquid viscosity (Demonstrate functional swallow) thin liquids  - thin liquids  -GA     [place] (Demonstrate functional swallow) independently (over 90% accuracy)  - independently (over 90% accuracy)  -GA     Time Frame (Demonstrate functional swallow) 1 week  - 1 week  -GA     Progress/Outcomes (Demonstrate functional swallow) goal met  - continuing  progress toward goal  -GA        (STG) Patient will tolerate trials of    Consistencies Trialed (Tolerate trials) regular textures;thin liquids  - regular textures;thin liquids  -GA     Desired Outcome (Tolerate trials) with adequate oral prep/transit/clearance;without signs/symptoms of aspiration  - with adequate oral prep/transit/clearance;without signs/symptoms of aspiration  -GA     [place] (Tolerate trials) independently (over 90% accuracy)  - independently (over 90% accuracy)  -GA     Time Frame (Tolerate trials) 1 week  - 1 week  -GA     Progress/Outcomes (Tolerate trials) goal met  - goal revised this date  -GA        (STG) Patient will tolerate therapeutic trials of    Consistencies Trialed (Tolerate therapeutic trials) regular textures  - regular textures  -GA     Desired Outcome (Tolerate therapeutic trials) with adequate oral prep/transit/clearance  - with adequate oral prep/transit/clearance  -GA     [place] (Tolerate therapeutic trials) independently (over 90% accuracy)  - independently (over 90% accuracy)  -GA     Time Frame (Tolerate therapeutic trials) 1 week  - 1 week  -GA     Progress/Outcomes (Tolerate therapeutic trials) goal met  - goal met  -GA     Comment (Tolerate therapeutic trials) No overt s/s of aspiration w/ thin liquid trials, pt declined regular solid trials. Pt denies difficulty swallowing, RN reports no concerns.  - --        Patient will demonstrate functional cognitive-linguistic skills for return to discharge environment    [place] with minimal cues  - --     Time frame 1 week  - --     Progress/Outcomes continuing progress toward goal  - --        SLP Diagnostic Treatment     Patient will participate in further assessment in the following areas reading comprehension;graphic expression  - --     Time Frame (Diagnostic) 1 week  - --     Progress/Outcomes (Additional Goal 1, SLP) goal met  - --        Comprehend Questions Goal 1  (SLP)    Improve Ability to Comprehend Questions Goal 1 (SLP) complex yes/no questions;complex wh questions;80%;with minimal cues (75-90%)  - complex yes/no questions;complex wh questions;80%;with minimal cues (75-90%)  -GA     Time Frame (Comprehend Questions Goal 1, SLP) 1 week  - 1 week  -GA     Barriers (Comprehend Questions Goal 1, SLP) -- Baseline deficits  -GA     Progress (Ability to Comprehend Questions Goal 1, SLP) 60%;with moderate cues (50-74%)  - --     Progress/Outcomes (Comprehend Questions Goal 1, SLP) continuing progress toward goal  - goal revised this date  -GA     Comment (Comprehend Questions Goal 1, SLP) -- 100% with simple yes/no and wh-questions  -GA        Follow Directions Goal 2 (SLP)    Improve Ability to Follow Directions Goal 1 (SLP) 3 step commands;90%;with minimal cues (75-90%)  - 3 step commands;90%;with minimal cues (75-90%)  -GA     Time Frame (Follow Directions Goal 1, SLP) 1 week  - 1 day  -GA     Progress (Ability to Follow Directions Goal 1, SLP) 20%;with moderate cues (50-74%)  - --     Progress/Outcomes (Follow Directions Goal 1, SLP) continuing progress toward goal  - goal revised this date  -GA     Comment (Follow Directions Goal 1, SLP) -- 100% with 1 & 2 step directions  -GA        Word Retrieval Skills Goal 1 (SLP)    Improve Word Retrieval Skills By Goal 1 (SLP) completing open ended structured sentence;answer WH question with one word;100%;with minimal cues (75-90%)  - completing open ended structured sentence;answer WH question with one word;100%;with minimal cues (75-90%)  -GA     Time Frame (Word Retrieval Goal 1, SLP) 1 week  - 1 week  -GA     Barriers (Word Retrieval Goal 1, SLP) -- Baseline deficits  -GA     Progress (Word Retrieval Skills Goal 1, SLP) 60%;with minimal cues (75-90%)  - 70%  -GA     Progress/Outcomes (Word Retrieval Goal 1, SLP) continuing progress toward goal  - continuing progress toward goal  -GA     Comment (Word  Retrieval Goal 1, SLP) Completing open ended sentences w/ one word  - --        Phonation Goal 1 (SLP)    Improve Phonation By Goal 1 (SLP) using loud speech;90%;with minimal cues (75-90%)  - --     Time Frame (Phonation Goal 1, SLP) 1 week  -MH --     Progress/Outcomes (Phonation Goal 1, SLP) goal no longer appropriate  - --        Attention Goal 1 (SLP)    Improve Attention by Goal 1 (SLP) attending to task;100%;with minimal cues (75-90%)  - --     Time Frame (Attention Goal 1, SLP) 1 week  -MH --     Progress/Outcomes (Attention Goal 1, SLP) goal met  -MH --        Orientation Goal 1 (SLP)    Improve Orientation Through Goal 1 (SLP) demonstrating orientation to month;demonstrating orientation to year;demonstrating orientation to place;demonstrating orientation to disease/impairment;100%;with moderate cues (50-74%)  - demonstrating orientation to month;demonstrating orientation to year;demonstrating orientation to place;demonstrating orientation to disease/impairment;100%;with moderate cues (50-74%)  -GA     Time Frame (Orientation Goal 1, SLP) 1 week  -MH 1 week  -GA     Progress (Orientation Goal 1, SLP) 70%;with minimal cues (75-90%)  -MH 80%;with minimal cues (75-90%)  -GA     Progress/Outcomes (Orientation Goal 1, SLP) continuing progress toward goal  -MH continuing progress toward goal  -GA     Comment (Orientation Goal 1, SLP) Oriented to place and year, not oriented to month  -MH not oriented to month  -GA               User Key  (r) = Recorded By, (t) = Taken By, (c) = Cosigned By      Initials Name Provider Type    Aurora Nolasco, MS CCC-SLP Speech and Language Pathologist    Veronique Huerta, MS CCC-SLP Speech and Language Pathologist                         Time Calculation:    Time Calculation- SLP       Row Name 10/29/24 6860             Time Calculation- SLP    SLP Start Time 1610  -      SLP Received On 10/29/24  -         Untimed Charges    18823-PU Treatment/ST Modification  Prosth Aug Alter  35  -      79178-BT Treatment Swallow Minutes 15  -MH         Total Minutes    Untimed Charges Total Minutes 50  -MH       Total Minutes 50  -MH                User Key  (r) = Recorded By, (t) = Taken By, (c) = Cosigned By      Initials Name Provider Type    Aurora Nolasco, MS CCC-SLP Speech and Language Pathologist                    Therapy Charges for Today       Code Description Service Date Service Provider Modifiers Qty    10585700248 HC ST TREATMENT SWALLOW 1 10/29/2024 Aurora Bishop MS CCC-SLP GN 1    08776926764 HC ST TREATMENT SPEECH 3 10/29/2024 Aurora Bishop MS CCC-SLP GN 1                 MS KATARZYNA Beaulieu  10/29/2024

## 2024-10-29 NOTE — CASE MANAGEMENT/SOCIAL WORK
Continued Stay Note  [facility]     Patient Name: Olga Sandoval  MRN: [id number]  Today's Date: 10/29/2024    Admit Date: 10/23/2024    Plan: Ongoing   Discharge Plan       Row Name 10/29/24 1542       Plan    Plan Comments ANDRESSA followed up with [facility]. [facility] is still reviewing pt.                   Discharge Codes    No documentation.                 Expected Discharge Date and Time       Expected Discharge Date Expected Discharge Time    Oct 29, 2024               ANDRESSA Pierce

## 2024-10-29 NOTE — PROGRESS NOTES
[facility]  PROGRESS NOTE    Patient Name: Olga Sandoval  : 1955  MRN: [id number]    Date of Admission: 10/23/2024  Primary Care Physician: Ileana Kerr MD    Subjective   Subjective     CC:  Altered mental status    HPI:  No new issues overnight.  BP remains elevated  Denies pain    Objective   Objective     Vital Signs:   Temp:  [97.6 °F (36.4 °C)-98.5 °F (36.9 °C)] 97.6 °F (36.4 °C)  Heart Rate:  [] 86  Resp:  [16-18] 18  BP: (142-201)/(54-87) 169/69     Physical Exam:  Constitutional: No acute distress, sleeping, but awakens easily  HENT: NCAT, mucous membranes moist  Respiratory: Clear to auscultation bilaterally, respiratory effort normal   Cardiovascular: RRR, no murmurs, rubs, or gallops  Gastrointestinal: Positive bowel sounds, soft, nontender, nondistended, obese  Musculoskeletal: No bilateral ankle edema  Psychiatric: Appropriate affect, cooperative  Neurologic: Oriented x 3, MILLAN, speech clear  Skin: No rashes noted      Results Reviewed:  LAB RESULTS:      Lab 10/28/24  0617 10/27/24  0911 10/26/24  0827 10/25/24  0951 10/24/24  1633 10/23/24  1732   WBC 9.05 6.35 6.99 10.55 10.96* 16.23*   HEMOGLOBIN 11.8* 11.5* 11.1* 12.8 11.6* 13.8   HEMATOCRIT 37.8 35.0 35.0 39.7 35.3 43.1   PLATELETS 326 275 283 384 315 385   NEUTROS ABS 5.93 3.99 4.21 7.40* 8.21* 14.33*   IMMATURE GRANS (ABS) 0.03 0.02 0.02 0.04 0.04 0.04   LYMPHS ABS 1.63 1.31 1.57 1.88 1.36 0.77   MONOS ABS 1.01* 0.68 0.77 1.08* 1.32* 0.87   EOS ABS 0.41* 0.33 0.37 0.10 0.01 0.17   MCV 92.0 88.4 89.7 88.8 87.6 88.9   LACTATE  --   --   --   --   --  1.8   APTT  --   --   --   --   --  24.9   HSTROP T  --   --   --   --   --  25*         Lab 10/28/24  0617 10/27/24  0911 10/26/24  0827 10/24/24  1633 10/23/24  1732   SODIUM 139 141 143 143 141   POTASSIUM 3.8 4.5 3.9 3.8 4.4   CHLORIDE 106 107 108* 105 102   CO2 23.0 21.0* 22.0 25.0 21.0*   ANION GAP 10.0 13.0 13.0 13.0 18.0*   BUN 17  16 19 25* 25*   CREATININE 0.89 0.96 0.90 1.17* 1.32*   EGFR 69.4 63.4 68.5 50.0* 43.3*   GLUCOSE 124* 194* 111* 100* 125*   CALCIUM 9.2 8.9 9.0 9.8 10.1   HEMOGLOBIN A1C  --   --   --  5.40  --    TSH  --   --   --   --  3.950         Lab 10/23/24  1732   TOTAL PROTEIN 7.6   ALBUMIN 4.5   GLOBULIN 3.1   ALT (SGPT) 9  9   AST (SGOT) 18  18   BILIRUBIN 0.6   ALK PHOS 104         Lab 10/23/24  1732   HSTROP T 25*         Lab 10/24/24  1633   CHOLESTEROL 163   LDL CHOL 87   HDL CHOL 60   TRIGLYCERIDES 83         Lab 10/23/24  1732   VITAMIN B 12 294         Brief Urine Lab Results  (Last result in the past 365 days)        Color   Clarity   Blood   Leuk Est   Nitrite   Protein   CREAT   Urine HCG        10/23/24 1937 Yellow   Turbid   Trace   Moderate (2+)   Negative   30 mg/dL (1+)                   Microbiology Results Abnormal       None            CT Head Without Contrast    Result Date: 10/28/2024  CT HEAD WO CONTRAST Date of Exam: 10/28/2024 6:20 PM EDT Indication: facial numbness. Comparison: 10/24/2024 Technique: Axial CT images were obtained of the head without contrast administration.  Automated exposure control and iterative construction methods were used. Findings: No intracranial hemorrhage. Gray-white matter differentiation is maintained without evidence of an acute infarction. Multiple foci of decreased attenuation are present within the subcortical, deep cerebral, and periventricular white matter consistent with chronic small vessel/microangiopathic ischemic changes. No extra-axial mass or collection. The ventricles and sulci are prominent commensurate with involutional changes. The posterior fossa appears grossly normal. Sellar and suprasellar structures are normal. Lens replacements. The paranasal sinuses, ethmoid air cells, and mastoid air cells are aerated. The bony calvarium is intact.     Impression: Impression: No acute intracranial pathology. Electronically Signed: Robson Cutler MD  10/28/2024  6:48 PM EDT  Workstation ID: [id number]     Results for orders placed during the hospital encounter of 10/23/24    Adult Transthoracic Echo Complete W/ Cont if Necessary Per Protocol (With Agitated Saline)    Interpretation Summary    Left ventricular ejection fraction appears to be 51 - 55%.    Unable to fully assess valvular function due to suboptimal windows.      Current medications:  Scheduled Meds:amLODIPine, 10 mg, Oral, Q24H  aspirin, 81 mg, Oral, Daily  atorvastatin, 80 mg, Oral, Nightly  carvedilol, 6.25 mg, Oral, BID With Meals  cholecalciferol, 1,000 Units, Oral, Daily  hydrALAZINE, 100 mg, Oral, Q8H  insulin lispro, 2-7 Units, Subcutaneous, 4x Daily AC & at Bedtime  lactobacillus acidophilus, 1 capsule, Oral, Daily  losartan, 100 mg, Oral, Daily  nystatin, , Topical, Q12H  pantoprazole, 40 mg, Oral, Q AM  QUEtiapine, 50 mg, Oral, Q12H  ticagrelor, 90 mg, Oral, BID  venlafaxine XR, 150 mg, Oral, Daily      Continuous Infusions:   PRN Meds:.  acetaminophen    dextrose    dextrose    glucagon (human recombinant)    labetalol    sodium chloride    ziprasidone    Assessment & Plan   Assessment & Plan     Active Hospital Problems    Diagnosis  POA    **Dysarthria [R47.1]  Yes    UTI (urinary tract infection) [N39.0]  Yes      Resolved Hospital Problems   No resolved problems to display.        Brief Hospital Course to date:  Olga Sandoval is a 69 y.o. 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 She was found on the floor by family member.     Labs notable for UA with leuk esterase, too numerous to count WBCs, 2+ bacteria, 21-30 squamous cells, WBCs 16, lactic 1.8. CXR with no acute abnormality, CTA head/neck with w chronic moderate stenoses with b/l  cervical internal carotid arteries, moderate stenoses within the b/l PCAs, 1cm aneurysm from left cavernous segment ICA.     This patient's problems and plans were partially entered by my partner and updated as appropriate by me 10/29/24.    Assessment/Plan:      Altered mental status  -suspect infectious vs metabolic etiology  -LFTs normal, TSH normal, B12 normal, ammonia low, resp PCR negative  -MRI brain showed multiple chronic cortical infarcts, chronic lacunar infarct in right thalamus  -Stroke Neurology followed, recommended aspirin 81 mg daily, Brilinta 90 mg twice daily, atorvastatin 80 mg daily.   -Continue treatment of suspected UTI. Continue reduced dose Seroquel for now.   --PT/OT recommending SNF on discharge.    Acute UTI  Leukocytosis with neutrophilia  -Urine culture growing >100k mixed tavo  -s/p 6d IV ceftriaxone. BC NG x5d Patient is improving on IV ceftriaxone so patient appears to have true UTI.   -Urinary retention protocol.    FLASH on suspected CKD  -Cr improved with fluids on admission.  Creatinine currently normal at 0.89.    1cm aneurysm left cavernous segment ICA  -Patient follows with Neurosurgery at , her neurosurgeon had requested outpatient MRA/MRI before her next follow-up.   -Stroke neurology discussed bleeding risk with patient's daughter.      Candida dermatitis  -Nystatin powder      Hypertension- continue home losartan.  Amlodipine added on 10/26.  Hydralazine added on 10/27. On chronic coreg.    --BP slightly better but still some high readings.    --tachycardic today, EKG reviewed and unremarkable  --increase coreg 10/29, consider switching amlodipine to nifedipine tomorrow if not improved    Hyperlipidemia- continue atorvastatin.     DM2 - A1c 5.4%; SSI + glucose checks + hypoglycemia protocol    Anxiety- continue home venlafaxine.   COPD  Morbid obesity (BMI 44.08)    Expected Discharge Location and Transportation: SNF  Expected Discharge   Expected Discharge Date:  10/29/2024; Expected Discharge Time:      VTE Prophylaxis:  Mechanical VTE prophylaxis orders are present.         AM-PAC 6 Clicks Score (PT): 13 (10/29/24 0800)    CODE STATUS:   Code Status and Medical Interventions: CPR (Attempt to Resuscitate); Full Support   Ordered at: 10/24/24 1550     Level Of Support Discussed With:    Patient     Code Status (Patient has no pulse and is not breathing):    CPR (Attempt to Resuscitate)     Medical Interventions (Patient has pulse or is breathing):    Full Support       ANGEILKA Holman  10/29/24

## 2024-10-29 NOTE — PLAN OF CARE
Goal Outcome Evaluation:                   Anticipated Discharge Disposition (SLP): home with home health, skilled nursing facility, anticipate therapy at next level of care

## 2024-10-30 LAB
ANION GAP SERPL CALCULATED.3IONS-SCNC: 10 MMOL/L (ref 5–15)
BASOPHILS # BLD AUTO: 0.03 10*3/MM3 (ref 0–0.2)
BASOPHILS NFR BLD AUTO: 0.5 % (ref 0–1.5)
BUN SERPL-MCNC: 21 MG/DL (ref 8–23)
BUN/CREAT SERPL: 22.3 (ref 7–25)
CALCIUM SPEC-SCNC: 9.4 MG/DL (ref 8.6–10.5)
CHLORIDE SERPL-SCNC: 106 MMOL/L (ref 98–107)
CO2 SERPL-SCNC: 24 MMOL/L (ref 22–29)
CREAT SERPL-MCNC: 0.94 MG/DL (ref 0.57–1)
DEPRECATED RDW RBC AUTO: 58.4 FL (ref 37–54)
EGFRCR SERPLBLD CKD-EPI 2021: 65 ML/MIN/1.73
EOSINOPHIL # BLD AUTO: 0.35 10*3/MM3 (ref 0–0.4)
EOSINOPHIL NFR BLD AUTO: 5.3 % (ref 0.3–6.2)
ERYTHROCYTE [DISTWIDTH] IN BLOOD BY AUTOMATED COUNT: 17.4 % (ref 12.3–15.4)
GLUCOSE BLDC GLUCOMTR-MCNC: 101 MG/DL (ref 70–130)
GLUCOSE BLDC GLUCOMTR-MCNC: 114 MG/DL (ref 70–130)
GLUCOSE BLDC GLUCOMTR-MCNC: 120 MG/DL (ref 70–130)
GLUCOSE BLDC GLUCOMTR-MCNC: 154 MG/DL (ref 70–130)
GLUCOSE BLDC GLUCOMTR-MCNC: 176 MG/DL (ref 70–130)
GLUCOSE SERPL-MCNC: 152 MG/DL (ref 65–99)
HCT VFR BLD AUTO: 38 % (ref 34–46.6)
HGB BLD-MCNC: 11.8 G/DL (ref 12–15.9)
IMM GRANULOCYTES # BLD AUTO: 0.02 10*3/MM3 (ref 0–0.05)
IMM GRANULOCYTES NFR BLD AUTO: 0.3 % (ref 0–0.5)
LYMPHOCYTES # BLD AUTO: 1.35 10*3/MM3 (ref 0.7–3.1)
LYMPHOCYTES NFR BLD AUTO: 20.5 % (ref 19.6–45.3)
MCH RBC QN AUTO: 28.2 PG (ref 26.6–33)
MCHC RBC AUTO-ENTMCNC: 31.1 G/DL (ref 31.5–35.7)
MCV RBC AUTO: 90.9 FL (ref 79–97)
MONOCYTES # BLD AUTO: 0.63 10*3/MM3 (ref 0.1–0.9)
MONOCYTES NFR BLD AUTO: 9.5 % (ref 5–12)
NEUTROPHILS NFR BLD AUTO: 4.22 10*3/MM3 (ref 1.7–7)
NEUTROPHILS NFR BLD AUTO: 63.9 % (ref 42.7–76)
NRBC BLD AUTO-RTO: 0 /100 WBC (ref 0–0.2)
PLATELET # BLD AUTO: 335 10*3/MM3 (ref 140–450)
PMV BLD AUTO: 10.2 FL (ref 6–12)
POTASSIUM SERPL-SCNC: 3.8 MMOL/L (ref 3.5–5.2)
RBC # BLD AUTO: 4.18 10*6/MM3 (ref 3.77–5.28)
SODIUM SERPL-SCNC: 140 MMOL/L (ref 136–145)
WBC NRBC COR # BLD AUTO: 6.6 10*3/MM3 (ref 3.4–10.8)

## 2024-10-30 PROCEDURE — 63710000001 INSULIN LISPRO (HUMAN) PER 5 UNITS: Performed by: STUDENT IN AN ORGANIZED HEALTH CARE EDUCATION/TRAINING PROGRAM

## 2024-10-30 PROCEDURE — 99232 SBSQ HOSP IP/OBS MODERATE 35: CPT | Performed by: NURSE PRACTITIONER

## 2024-10-30 PROCEDURE — 80048 BASIC METABOLIC PNL TOTAL CA: CPT | Performed by: STUDENT IN AN ORGANIZED HEALTH CARE EDUCATION/TRAINING PROGRAM

## 2024-10-30 PROCEDURE — 85025 COMPLETE CBC W/AUTO DIFF WBC: CPT | Performed by: STUDENT IN AN ORGANIZED HEALTH CARE EDUCATION/TRAINING PROGRAM

## 2024-10-30 PROCEDURE — 82948 REAGENT STRIP/BLOOD GLUCOSE: CPT

## 2024-10-30 RX ADMIN — HYDRALAZINE HYDROCHLORIDE 100 MG: 50 TABLET ORAL at 05:38

## 2024-10-30 RX ADMIN — TICAGRELOR 90 MG: 90 TABLET ORAL at 08:01

## 2024-10-30 RX ADMIN — QUETIAPINE FUMARATE 50 MG: 25 TABLET ORAL at 19:46

## 2024-10-30 RX ADMIN — NYSTATIN: 100000 POWDER TOPICAL at 19:46

## 2024-10-30 RX ADMIN — NYSTATIN: 100000 POWDER TOPICAL at 08:00

## 2024-10-30 RX ADMIN — Medication 1 CAPSULE: at 08:01

## 2024-10-30 RX ADMIN — HYDRALAZINE HYDROCHLORIDE 100 MG: 50 TABLET ORAL at 19:46

## 2024-10-30 RX ADMIN — Medication 10 ML: at 08:00

## 2024-10-30 RX ADMIN — CARVEDILOL 12.5 MG: 12.5 TABLET, FILM COATED ORAL at 08:01

## 2024-10-30 RX ADMIN — ASPIRIN 81 MG 81 MG: 81 TABLET ORAL at 08:00

## 2024-10-30 RX ADMIN — VENLAFAXINE HYDROCHLORIDE 150 MG: 75 CAPSULE, EXTENDED RELEASE ORAL at 08:01

## 2024-10-30 RX ADMIN — AMLODIPINE BESYLATE 10 MG: 10 TABLET ORAL at 08:01

## 2024-10-30 RX ADMIN — INSULIN LISPRO 2 UNITS: 100 INJECTION, SOLUTION INTRAVENOUS; SUBCUTANEOUS at 18:11

## 2024-10-30 RX ADMIN — QUETIAPINE FUMARATE 50 MG: 25 TABLET ORAL at 08:01

## 2024-10-30 RX ADMIN — HYDRALAZINE HYDROCHLORIDE 100 MG: 50 TABLET ORAL at 14:49

## 2024-10-30 RX ADMIN — TICAGRELOR 90 MG: 90 TABLET ORAL at 19:46

## 2024-10-30 RX ADMIN — Medication 10 ML: at 19:47

## 2024-10-30 RX ADMIN — PANTOPRAZOLE SODIUM 40 MG: 40 TABLET, DELAYED RELEASE ORAL at 05:38

## 2024-10-30 RX ADMIN — ATORVASTATIN CALCIUM 80 MG: 40 TABLET, FILM COATED ORAL at 19:46

## 2024-10-30 RX ADMIN — CARVEDILOL 12.5 MG: 12.5 TABLET, FILM COATED ORAL at 18:16

## 2024-10-30 RX ADMIN — Medication 1000 UNITS: at 08:01

## 2024-10-30 RX ADMIN — LOSARTAN POTASSIUM 100 MG: 50 TABLET, FILM COATED ORAL at 08:01

## 2024-10-30 NOTE — PROGRESS NOTES
Nutrition Services    Patient Name:  Olga Sandoval  YOB: 1955  MRN: [id number]  Admit Date:  10/23/2024    Patient screened for LOS. Chart reviewed. No significant weight changes documented/reported and PO intake has been adequate. No nutrition risks identified at this time. RDN following peripherally. Available via consult.     Electronically signed by:  Audrey Harris MS,MARTIN,RONNY  10/30/24 08:39 EDT

## 2024-10-30 NOTE — CASE MANAGEMENT/SOCIAL WORK
Continued Stay Note  [facility]     Patient Name: Olga Sandoval  MRN: [id number]  Today's Date: 10/30/2024    Admit Date: 10/23/2024    Plan: Rehab   Discharge Plan       Row Name 10/30/24 1111       Plan    Plan Rehab    Patient/Family in Agreement with Plan other (see comments)    Plan Comments Pt was discussed in Medical Rounds today. Pt is medically ready for rehab.  spoke with Alexus/Nancy Rivera. They do not have any beds available at [facility] or [facility]. CM will follow up with pt on facilities.  will continue to follow.    1315- met with pt, at the bedside, and pt's daughter/Gail, on speaker phone. CM explained that all [facility] in [place] do not have any skilled beds available. CM reviewed Pt Choice list with pt and daughter. They would like referrals to [facility], [facility], [facility], and [facility]. CM spoke with Juan R/DARCY, he is reviewing. CM left a voicemail for [facility], CM attempted to call Celia/[facility], no answer, voicemail full.     1530-CM received call from Celia[facility]. CM gave her pt's referral information.     Final Discharge Disposition Code 03 - skilled nursing facility (SNF)                   Discharge Codes    No documentation.                 Expected Discharge Date and Time       Expected Discharge Date Expected Discharge Time    Oct 29, 2024               Dimple Santiago RN

## 2024-10-30 NOTE — PROGRESS NOTES
[facility] Medicine Services  PROGRESS NOTE    Patient Name: Olga Sandoval  : 1955  MRN: [id number]    Date of Admission: 10/23/2024  Primary Care Physician: Ileana Kerr MD    Subjective   Subjective     CC:  Altered mental status    HPI:  No complaints  BP improved. Pt happy about it    Objective   Objective     Vital Signs:   Temp:  [98 °F (36.7 °C)-98.5 °F (36.9 °C)] 98.1 °F (36.7 °C)  Heart Rate:  [60-89] 86  Resp:  [16-20] 20  BP: (113-153)/(55-78) 138/66     Physical Exam:  Constitutional: No acute distress, sleeping, but awakens easily  HENT: NCAT, mucous membranes moist  Respiratory: Clear to auscultation bilaterally, respiratory effort normal   Cardiovascular: RRR, no murmurs, rubs, or gallops  Gastrointestinal: Positive bowel sounds, soft, nontender, nondistended, obese  Musculoskeletal: No bilateral ankle edema  Psychiatric: Appropriate affect, cooperative  Neurologic: Oriented x 3, MILLAN, speech clear  Skin: No rashes noted    No change in exam from 10/29/24    Results Reviewed:  LAB RESULTS:      Lab 10/30/24  1032 10/29/24  1332 10/28/24  0617 10/27/24  0911 10/26/24  0827   WBC 6.60 8.02 9.05 6.35 6.99   HEMOGLOBIN 11.8* 13.0 11.8* 11.5* 11.1*   HEMATOCRIT 38.0 39.6 37.8 35.0 35.0   PLATELETS 335 366 326 275 283   NEUTROS ABS 4.22 4.92 5.93 3.99 4.21   IMMATURE GRANS (ABS) 0.02 0.02 0.03 0.02 0.02   LYMPHS ABS 1.35 1.64 1.63 1.31 1.57   MONOS ABS 0.63 1.02* 1.01* 0.68 0.77   EOS ABS 0.35 0.38 0.41* 0.33 0.37   MCV 90.9 88.6 92.0 88.4 89.7         Lab 10/30/24  1032 10/29/24  1332 10/28/24  0617 10/27/24  0911 10/26/24  0827 10/24/24  1633   SODIUM 140 139 139 141 143 143   POTASSIUM 3.8 4.1 3.8 4.5 3.9 3.8   CHLORIDE 106 104 106 107 108* 105   CO2 24.0 21.0* 23.0 21.0* 22.0 25.0   ANION GAP 10.0 14.0 10.0 13.0 13.0 13.0   BUN 21 19 17 16 19 25*   CREATININE 0.94 0.96 0.89 0.96 0.90 1.17*   EGFR 65.0 63.4 69.4 63.4 68.5 50.0*   GLUCOSE 152* 135* 124* 194* 111*  100*   CALCIUM 9.4 9.4 9.2 8.9 9.0 9.8   HEMOGLOBIN A1C  --   --   --   --   --  5.40             Lab 10/24/24  1633   CHOLESTEROL 163   LDL CHOL 87   HDL CHOL 60   TRIGLYCERIDES 83       Brief Urine Lab Results  (Last result in the past 365 days)        Color   Clarity   Blood   Leuk Est   Nitrite   Protein   CREAT   Urine HCG        10/23/24 1937 Yellow   Turbid   Trace   Moderate (2+)   Negative   30 mg/dL (1+)                   Microbiology Results Abnormal       None            No radiology results from the last 24 hrs    Results for orders placed during the hospital encounter of 10/23/24    Adult Transthoracic Echo Complete W/ Cont if Necessary Per Protocol (With Agitated Saline)    Interpretation Summary    Left ventricular ejection fraction appears to be 51 - 55%.    Unable to fully assess valvular function due to suboptimal windows.      Current medications:  Scheduled Meds:amLODIPine, 10 mg, Oral, Q24H  aspirin, 81 mg, Oral, Daily  atorvastatin, 80 mg, Oral, Nightly  carvedilol, 12.5 mg, Oral, BID With Meals  cholecalciferol, 1,000 Units, Oral, Daily  hydrALAZINE, 100 mg, Oral, Q8H  insulin lispro, 2-7 Units, Subcutaneous, 4x Daily AC & at Bedtime  lactobacillus acidophilus, 1 capsule, Oral, Daily  losartan, 100 mg, Oral, Daily  nystatin, , Topical, Q12H  pantoprazole, 40 mg, Oral, Q AM  QUEtiapine, 50 mg, Oral, Q12H  ticagrelor, 90 mg, Oral, BID  venlafaxine XR, 150 mg, Oral, Daily      Continuous Infusions:   PRN Meds:.  acetaminophen    dextrose    dextrose    glucagon (human recombinant)    labetalol    sodium chloride    ziprasidone    Assessment & Plan   Assessment & Plan     Active Hospital Problems    Diagnosis  POA    **Dysarthria [R47.1]  Yes    UTI (urinary tract infection) [N39.0]  Yes      Resolved Hospital Problems   No resolved problems to display.        Brief Hospital Course to date:  Olga Sandoval is a 69 y.o. female with history of COPD, frequent falls, HLD, HTN, prior Bell's palsy,  bipolar disorder, prior tobacco use disorder, multiple CVAs, morbid obesity, loop recorder, carotid artery aneurysm status post pipeline embolization, who presented for evaluation of worsening confusion, slurred speech, vomiting. She was found on the floor by family member.     Labs notable for UA with leuk esterase, too numerous to count WBCs, 2+ bacteria, 21-30 squamous cells, WBCs 16, lactic 1.8. CXR with no acute abnormality, CTA head/neck with w chronic moderate stenoses with b/l cervical internal carotid arteries, moderate stenoses within the b/l PCAs, 1cm aneurysm from left cavernous segment ICA.     This patient's problems and plans were partially entered by my partner and updated as appropriate by me 10/30/24.    Assessment/Plan:    Altered mental status  -suspect infectious vs metabolic etiology  -LFTs normal, TSH normal, B12 normal, ammonia low, resp PCR negative  -MRI brain showed multiple chronic cortical infarcts, chronic lacunar infarct in right thalamus  -Stroke Neurology followed, recommended aspirin 81 mg daily, Brilinta 90 mg twice daily, atorvastatin 80 mg daily.   -Continue treatment of suspected UTI. Continue reduced dose Seroquel for now.   --PT/OT recommending SNF on discharge.    Acute UTI  Leukocytosis with neutrophilia  -Urine culture growing >100k mixed tavo  -s/p 6d IV ceftriaxone. BC NG x5d Patient is improving on IV ceftriaxone so patient appears to have true UTI.   -Urinary retention protocol.    FLASH on suspected CKD  -Cr improved with fluids on admission.  Creatinine currently normal at 0.89.    1cm aneurysm left cavernous segment ICA  -Patient follows with Neurosurgery at , her neurosurgeon had requested outpatient MRA/MRI before her next follow-up.   -Stroke neurology discussed bleeding risk with patient's daughter.      Candida dermatitis  -Nystatin powder      Hypertension- continue home losartan.  Amlodipine added on 10/26.  Hydralazine added on 10/27. On chronic coreg.     --BP improved  --tachycardic 10/29/24, EKG reviewed and unremarkable  --increase coreg 10/29  --consider switching amlodipine to nifedipine if BP goes back up    Hyperlipidemia- continue atorvastatin.     DM2 - A1c 5.4%; SSI + glucose checks + hypoglycemia protocol    Anxiety- continue home venlafaxine.   COPD  Morbid obesity (BMI 44.08)    Expected Discharge Location and Transportation: SNF, medically ready  Expected Discharge   Expected Discharge Date: 10/29/2024; Expected Discharge Time:      VTE Prophylaxis:  Mechanical VTE prophylaxis orders are present.         AM-PAC 6 Clicks Score (PT): 13 (10/30/24 1700)    CODE STATUS:   Code Status and Medical Interventions: CPR (Attempt to Resuscitate); Full Support   Ordered at: 10/24/24 1550     Level Of Support Discussed With:    Patient     Code Status (Patient has no pulse and is not breathing):    CPR (Attempt to Resuscitate)     Medical Interventions (Patient has pulse or is breathing):    Full Support       ANGELIKA Holman  10/30/24

## 2024-10-31 LAB
GLUCOSE BLDC GLUCOMTR-MCNC: 119 MG/DL (ref 70–130)
GLUCOSE BLDC GLUCOMTR-MCNC: 122 MG/DL (ref 70–130)
GLUCOSE BLDC GLUCOMTR-MCNC: 127 MG/DL (ref 70–130)
GLUCOSE BLDC GLUCOMTR-MCNC: 134 MG/DL (ref 70–130)

## 2024-10-31 PROCEDURE — 99231 SBSQ HOSP IP/OBS SF/LOW 25: CPT | Performed by: STUDENT IN AN ORGANIZED HEALTH CARE EDUCATION/TRAINING PROGRAM

## 2024-10-31 PROCEDURE — 92507 TX SP LANG VOICE COMM INDIV: CPT

## 2024-10-31 PROCEDURE — 82948 REAGENT STRIP/BLOOD GLUCOSE: CPT

## 2024-10-31 PROCEDURE — 97116 GAIT TRAINING THERAPY: CPT

## 2024-10-31 PROCEDURE — 97110 THERAPEUTIC EXERCISES: CPT

## 2024-10-31 RX ADMIN — QUETIAPINE FUMARATE 50 MG: 25 TABLET ORAL at 21:25

## 2024-10-31 RX ADMIN — VENLAFAXINE HYDROCHLORIDE 150 MG: 75 CAPSULE, EXTENDED RELEASE ORAL at 08:55

## 2024-10-31 RX ADMIN — HYDRALAZINE HYDROCHLORIDE 100 MG: 50 TABLET ORAL at 06:06

## 2024-10-31 RX ADMIN — Medication 1 CAPSULE: at 08:55

## 2024-10-31 RX ADMIN — ATORVASTATIN CALCIUM 80 MG: 40 TABLET, FILM COATED ORAL at 21:25

## 2024-10-31 RX ADMIN — TICAGRELOR 90 MG: 90 TABLET ORAL at 08:55

## 2024-10-31 RX ADMIN — CARVEDILOL 12.5 MG: 12.5 TABLET, FILM COATED ORAL at 08:55

## 2024-10-31 RX ADMIN — NYSTATIN: 100000 POWDER TOPICAL at 21:30

## 2024-10-31 RX ADMIN — ASPIRIN 81 MG 81 MG: 81 TABLET ORAL at 08:55

## 2024-10-31 RX ADMIN — LOSARTAN POTASSIUM 100 MG: 50 TABLET, FILM COATED ORAL at 08:55

## 2024-10-31 RX ADMIN — QUETIAPINE FUMARATE 50 MG: 25 TABLET ORAL at 08:55

## 2024-10-31 RX ADMIN — TICAGRELOR 90 MG: 90 TABLET ORAL at 21:25

## 2024-10-31 RX ADMIN — NYSTATIN: 100000 POWDER TOPICAL at 08:56

## 2024-10-31 RX ADMIN — PANTOPRAZOLE SODIUM 40 MG: 40 TABLET, DELAYED RELEASE ORAL at 06:06

## 2024-10-31 RX ADMIN — Medication 1000 UNITS: at 08:55

## 2024-10-31 RX ADMIN — CARVEDILOL 12.5 MG: 12.5 TABLET, FILM COATED ORAL at 18:12

## 2024-10-31 RX ADMIN — AMLODIPINE BESYLATE 10 MG: 10 TABLET ORAL at 08:55

## 2024-10-31 NOTE — THERAPY TREATMENT NOTE
Acute Care - Speech Language Pathology Treatment Note  [facility]     Patient Name: Olga Sandoval  : 1955  MRN: [id number]  Today's Date: 10/31/2024               Admit Date: 10/23/2024     Visit Dx:    ICD-10-CM ICD-9-CM   1. Left-sided weakness  R53.1 728.87   2. Slurred speech  R47.81 784.59   3. Acute dehydration  E86.0 276.51   4. Acute urinary tract infection  N39.0 599.0   5. Acute encephalopathy  G93.40 348.30   6. Cognitive communication deficit  R41.841 799.52     Patient Active Problem List   Diagnosis    Aphasia    Bipolar 2 disorder    Type 2 diabetes mellitus    Essential hypertension    Hyperlipidemia    Carotid stenosis, right    Obesity, Class III, BMI 40-49.9 (morbid obesity)    History of CVA (cerebrovascular accident)    Hypertension    Gastroesophageal reflux disease    Encounter for screening for colorectal cancer in high risk patient    Immunization due    Medicare annual wellness visit, subsequent    Lymphedema    Bleeding from right ear    Healthcare maintenance    Post-menopausal    Dysarthria    UTI (urinary tract infection)     Past Medical History:   Diagnosis Date    Aneurysm     left carotid    Bipolar 2 disorder     Diabetes mellitus     H/O Bell's palsy     Left sided facial droop    History of loop recorder     Hyperlipidemia     Hypertension     Stroke     x 4     Past Surgical History:   Procedure Laterality Date    CARDIAC CATHETERIZATION      CATARACT EXTRACTION Bilateral      SECTION         SLP Recommendation and Plan         Anticipated Discharge Disposition (SLP): No further SLP services warranted (10/31/24 0920)         Daily Summary of Progress (SLP): prepare for discharge (10/31/24 0920)     Patient/Family Concerns, Anticipated Discharge Disposition (SLP): Suspect would benefit from continued family assist w/ cooking, medications, etc. when returns to home setting. (10/31/24 0920)     Treatment Assessment (SLP): improved, mild-moderate,  cognitive-linguistic disorder (10/31/24 0920)  Treatment Assessment Comments (SLP): Pt feels that has returned to baseline cognitive-linguistic level. Reviewed SLP cognitive-communication eval from 9/2023 when pt/family reported pt was at baseline w/ mild-moderate deficits. Results consistent w/ presentation today. (10/31/24 0920)  Plan for Continued Treatment (SLP): treatment no longer indicated as all goals met (10/31/24 0920)  Progress: improving (10/31/24 1030)      SLP EVALUATION (Last 72 Hours)       SLP SLC Evaluation       Row Name 10/31/24 0920                   Communication Assessment/Intervention    Document Type discharge treatment  -AC        Subjective Information no complaints  -AC        Patient Observations alert;cooperative  -AC        Patient/Family/Caregiver Comments/Observations No family present.  -AC        Patient Effort excellent  -AC           Pain    Pretreatment Pain Rating 0/10 - no pain  -AC        Posttreatment Pain Rating 0/10 - no pain  -AC           Comprehension Assessment/Intervention    Comprehension Assessment/Intervention Reading Comprehension  -AC           Reading Comprehension Assessment/Intervention    Reading Comprehension (Communication) WFL  -AC        Phrase Level WFL  -AC           Cognitive Assessment Intervention- SLP    Memory (Cognitive) mild impairment;short-term;delayed;unrelated;other (see comments)  5-min delay: 3/5 words (5/5 w/ min cue)  -AC        Attention (Cognitive) WFL;sustained  -AC        Thought Organization (Cognitive) WFL;abstract convergent;mild impairment;concrete divergent  -AC        Reasoning (Cognitive) WFL;deductive;mental flexibility  -AC        Problem Solving (Cognitive) mild impairment;temporal  -AC        Functional Math (Cognitive) mild impairment;moderate impairment;word problems  -AC           SLP Treatment Clinical Impressions    Treatment Assessment (SLP) improved;mild-moderate;cognitive-linguistic disorder  -AC        Treatment  Assessment Comments (SLP) Pt feels that has returned to baseline cognitive-linguistic level. Reviewed SLP cognitive-communication eval from 9/2023 when pt/family reported pt was at baseline w/ mild-moderate deficits. Results consistent w/ presentation today.  -AC        Daily Summary of Progress (SLP) prepare for discharge  -AC        Barriers to Overall Progress (SLP) Baseline deficits  -AC        Plan for Continued Treatment (SLP) treatment no longer indicated as all goals met  -AC        Care Plan Review evaluation/treatment results reviewed;care plan/treatment goals reviewed;risks/benefits reviewed;current/potential barriers reviewed;patient/other agree to care plan  -AC           Recommendations    Anticipated Discharge Disposition (SLP) No further SLP services warranted  -AC        Patient/Family Concerns, Anticipated Discharge Disposition (SLP) Suspect would benefit from continued family assist w/ cooking, medications, etc. when returns to home setting.  -AC                  User Key  (r) = Recorded By, (t) = Taken By, (c) = Cosigned By      Initials Name Effective Dates     Toña Valdez, MS CCC-SLP 02/03/23 -                        EDUCATION  The patient has been educated in the following areas:     Cognitive Impairment Communication Impairment.           SLP GOALS       Row Name 10/31/24 0920 10/29/24 1610 10/28/24 1445       (LTG) Patient will demonstrate functional swallow for    Diet Texture (Demonstrate functional swallow) -- regular textures  - regular textures  -GA    Liquid viscosity (Demonstrate functional swallow) -- thin liquids  - thin liquids  -GA    [place] (Demonstrate functional swallow) -- independently (over 90% accuracy)  - independently (over 90% accuracy)  -GA    Time Frame (Demonstrate functional swallow) -- 1 week  - 1 week  -GA    Progress/Outcomes (Demonstrate functional swallow) -- goal met  - continuing progress toward goal  -GA       (STG) Patient will tolerate  trials of    Consistencies Trialed (Tolerate trials) -- regular textures;thin liquids  - regular textures;thin liquids  -GA    Desired Outcome (Tolerate trials) -- with adequate oral prep/transit/clearance;without signs/symptoms of aspiration  - with adequate oral prep/transit/clearance;without signs/symptoms of aspiration  -GA    [place] (Tolerate trials) -- independently (over 90% accuracy)  - independently (over 90% accuracy)  -GA    Time Frame (Tolerate trials) -- 1 week  - 1 week  -GA    Progress/Outcomes (Tolerate trials) -- goal met  - goal revised this date  -GA       (STG) Patient will tolerate therapeutic trials of    Consistencies Trialed (Tolerate therapeutic trials) -- regular textures  - regular textures  -GA    Desired Outcome (Tolerate therapeutic trials) -- with adequate oral prep/transit/clearance  - with adequate oral prep/transit/clearance  -GA    [place] (Tolerate therapeutic trials) -- independently (over 90% accuracy)  - independently (over 90% accuracy)  -GA    Time Frame (Tolerate therapeutic trials) -- 1 week  - 1 week  -GA    Progress/Outcomes (Tolerate therapeutic trials) -- goal met  - goal met  -GA    Comment (Tolerate therapeutic trials) -- No overt s/s of aspiration w/ thin liquid trials, pt declined regular solid trials. Pt denies difficulty swallowing, RN reports no concerns.  - --       Patient will demonstrate functional cognitive-linguistic skills for return to discharge environment    [place] with minimal cues  - with minimal cues  - --    Time frame 1 week  - 1 week  - --    Progress/Outcomes goal met  - continuing progress toward goal  - --       SLP Diagnostic Treatment     Patient will participate in further assessment in the following areas reading comprehension;graphic expression  - reading comprehension;graphic expression  - --    Time Frame (Diagnostic) 1 week  -AC 1 week  - --    Progress/Outcomes (Additional Goal  1, SLP) goal met  -AC goal met  - --    Comment (Diagnostic) Limited as pt does not have reading glasses @ hospital. Pt was able to read large print 2 step written command and complete w/o diffculty. Would not expect acute deficits in these areas in setting of encephalopathy r/t UTI.  -AC -- --       Comprehend Questions Goal 1 (SLP)    Improve Ability to Comprehend Questions Goal 1 (SLP) complex yes/no questions;complex wh questions;80%;with minimal cues (75-90%)  -AC complex yes/no questions;complex wh questions;80%;with minimal cues (75-90%)  -MH complex yes/no questions;complex wh questions;80%;with minimal cues (75-90%)  -GA    Time Frame (Comprehend Questions Goal 1, SLP) 1 week  -AC 1 week  -MH 1 week  -GA    Barriers (Comprehend Questions Goal 1, SLP) -- -- Baseline deficits  -GA    Progress (Ability to Comprehend Questions Goal 1, SLP) 100%;independently (over 90% accuracy)  -AC 60%;with moderate cues (50-74%)  - --    Progress/Outcomes (Comprehend Questions Goal 1, SLP) goal met  -AC continuing progress toward goal  - goal revised this date  -GA    Comment (Comprehend Questions Goal 1, SLP) -- -- 100% with simple yes/no and wh-questions  -GA       Follow Directions Goal 2 (SLP)    Improve Ability to Follow Directions Goal 1 (SLP) 3 step commands;90%;with minimal cues (75-90%)  -AC 3 step commands;90%;with minimal cues (75-90%)  - 3 step commands;90%;with minimal cues (75-90%)  -GA    Time Frame (Follow Directions Goal 1, SLP) 1 week  -AC 1 week  - 1 day  -GA    Progress (Ability to Follow Directions Goal 1, SLP) 100%;independently (over 90% accuracy)  -AC 20%;with moderate cues (50-74%)  - --    Progress/Outcomes (Follow Directions Goal 1, SLP) goal met  -AC continuing progress toward goal  - goal revised this date  -GA    Comment (Follow Directions Goal 1, SLP) -- -- 100% with 1 & 2 step directions  -GA       Word Retrieval Skills Goal 1 (SLP)    Improve Word Retrieval Skills By Goal 1 (SLP)  completing open ended structured sentence;answer WH question with one word;100%;with minimal cues (75-90%)  -AC completing open ended structured sentence;answer WH question with one word;100%;with minimal cues (75-90%)  - completing open ended structured sentence;answer WH question with one word;100%;with minimal cues (75-90%)  -GA    Time Frame (Word Retrieval Goal 1, SLP) 1 week  -AC 1 week  -MH 1 week  -GA    Barriers (Word Retrieval Goal 1, SLP) -- -- Baseline deficits  -GA    Progress (Word Retrieval Skills Goal 1, SLP) 100%;independently (over 90% accuracy)  -AC 60%;with minimal cues (75-90%)  - 70%  -GA    Progress/Outcomes (Word Retrieval Goal 1, SLP) goal met  -AC continuing progress toward goal  -MH continuing progress toward goal  -GA    Comment (Word Retrieval Goal 1, SLP) -- Completing open ended sentences w/ one word  - --       Phonation Goal 1 (SLP)    Improve Phonation By Goal 1 (SLP) -- using loud speech;90%;with minimal cues (75-90%)  - --    Time Frame (Phonation Goal 1, SLP) -- 1 week  - --    Progress/Outcomes (Phonation Goal 1, SLP) -- goal no longer appropriate  - --       Attention Goal 1 (SLP)    Improve Attention by Goal 1 (SLP) -- attending to task;100%;with minimal cues (75-90%)  - --    Time Frame (Attention Goal 1, SLP) -- 1 week  - --    Progress/Outcomes (Attention Goal 1, SLP) -- goal met  - --       Orientation Goal 1 (SLP)    Improve Orientation Through Goal 1 (SLP) demonstrating orientation to month;demonstrating orientation to year;demonstrating orientation to place;demonstrating orientation to disease/impairment;100%;with moderate cues (50-74%)  -AC demonstrating orientation to month;demonstrating orientation to year;demonstrating orientation to place;demonstrating orientation to disease/impairment;100%;with moderate cues (50-74%)  - demonstrating orientation to month;demonstrating orientation to year;demonstrating orientation to place;demonstrating  orientation to disease/impairment;100%;with moderate cues (50-74%)  -GA    Time Frame (Orientation Goal 1, SLP) 1 week  -AC 1 week  -MH 1 week  -GA    Progress (Orientation Goal 1, SLP) 100%;independently (over 90% accuracy)  -AC 70%;with minimal cues (75-90%)  -MH 80%;with minimal cues (75-90%)  -GA    Progress/Outcomes (Orientation Goal 1, SLP) goal met  -AC continuing progress toward goal  -MH continuing progress toward goal  -GA    Comment (Orientation Goal 1, SLP) -- Oriented to place and year, not oriented to month  -MH not oriented to month  -GA              User Key  (r) = Recorded By, (t) = Taken By, (c) = Cosigned By      Initials Name Provider Type    Toña Molina MS CCC-SLP Speech and Language Pathologist    Aurora Nolasco, MS CCC-SLP Speech and Language Pathologist    Veronique Huerta MS CCC-SLP Speech and Language Pathologist                              Time Calculation:      Time Calculation- SLP       Row Name 10/31/24 1031             Time Calculation- SLP    SLP Start Time 0920  -AC      SLP Received On 10/31/24  -AC         Untimed Charges    22234-MY Treatment/ST Modification Prosth Aug Alter  40  -AC         Total Minutes    Untimed Charges Total Minutes 40  -AC       Total Minutes 40  -AC                User Key  (r) = Recorded By, (t) = Taken By, (c) = Cosigned By      Initials Name Provider Type    Toña Molina MS CCC-SLP Speech and Language Pathologist                    Therapy Charges for Today       Code Description Service Date Service Provider Modifiers Qty    [id number]  ST TREATMENT SPEECH 3 10/31/2024 Toña Valdez MS CCC-SLP GN 1                       Toña Valdez MS CCC-SLP  10/31/2024

## 2024-10-31 NOTE — PLAN OF CARE
Goal Outcome Evaluation:  Plan of Care Reviewed With: patient        Progress: improving       Anticipated Discharge Disposition (SLP): No further SLP services warranted             Treatment Assessment (SLP): improved, mild-moderate, cognitive-linguistic disorder (10/31/24 0920)  Treatment Assessment Comments (SLP): Pt feels that has returned to baseline cognitive-linguistic level. Reviewed SLP cognitive-communication eval from 9/2023 when pt/family reported pt was at baseline w/ mild-moderate deficits. Results consistent w/ presentation today. (10/31/24 0920)  Plan for Continued Treatment (SLP): treatment no longer indicated as all goals met (10/31/24 0920)

## 2024-10-31 NOTE — PLAN OF CARE
Goal Outcome Evaluation:  Plan of Care Reviewed With: patient        Progress: improving  Outcome Evaluation: patient took 6 side steps to HOB with min assist x2 and rolling walker for supprt, decreased step length bilaterally with slow transition with each step. Forward flexed posture with cues to correct. Patient declined further side steps d/t pain and weakness. Completed B LE exercises at EOB with cues for technique. Recommend SNF at D/C for best functional outcome.

## 2024-10-31 NOTE — PROGRESS NOTES
[facility] Medicine Services  PROGRESS NOTE    Patient Name: Olga Sandoval  : 1955  MRN: [id number]    Date of Admission: 10/23/2024  Primary Care Physician: Ielana Kerr MD    Subjective   Subjective     CC:  Altered mental status    HPI:  Reports she slept well.  No dysuria.  No chest pain or shortness of breath.  Eating and drinking okay.  No bowel movement.  Awaiting placement.    Objective   Objective     Vital Signs:   Temp:  [98 °F (36.7 °C)-98.5 °F (36.9 °C)] 98.2 °F (36.8 °C)  Heart Rate:  [60-87] 87  Resp:  [16-20] 18  BP: (113-149)/(47-78) 149/54     Physical Exam:  Constitutional: No acute distress, sleeping, but awakens easily, obese, laying in bed  HENT: NCAT, mucous membranes moist  Respiratory: Clear to auscultation bilaterally, respiratory effort normal   Cardiovascular: RRR, no murmurs, rubs, or gallops  Gastrointestinal: Positive bowel sounds, soft, nontender, nondistended, obese  Musculoskeletal: No bilateral ankle edema  Psychiatric: Appropriate affect, cooperative  Neurologic: Oriented x 3, MILLAN, speech clear  Skin: No rashes on exposed skin, has multiple ecchymoses on extremities    Results Reviewed:  LAB RESULTS:      Lab 10/30/24  1032 10/29/24  1332 10/28/24  0617 10/27/24  0911 10/26/24  0827   WBC 6.60 8.02 9.05 6.35 6.99   HEMOGLOBIN 11.8* 13.0 11.8* 11.5* 11.1*   HEMATOCRIT 38.0 39.6 37.8 35.0 35.0   PLATELETS 335 366 326 275 283   NEUTROS ABS 4.22 4.92 5.93 3.99 4.21   IMMATURE GRANS (ABS) 0.02 0.02 0.03 0.02 0.02   LYMPHS ABS 1.35 1.64 1.63 1.31 1.57   MONOS ABS 0.63 1.02* 1.01* 0.68 0.77   EOS ABS 0.35 0.38 0.41* 0.33 0.37   MCV 90.9 88.6 92.0 88.4 89.7         Lab 10/30/24  1032 10/29/24  1332 10/28/24  0617 10/27/24  0911 10/26/24  0827 10/24/24  1633   SODIUM 140 139 139 141 143 143   POTASSIUM 3.8 4.1 3.8 4.5 3.9 3.8   CHLORIDE 106 104 106 107 108* 105   CO2 24.0 21.0* 23.0 21.0* 22.0 25.0   ANION GAP 10.0 14.0 10.0 13.0 13.0 13.0   BUN 21  19 17 16 19 25*   CREATININE 0.94 0.96 0.89 0.96 0.90 1.17*   EGFR 65.0 63.4 69.4 63.4 68.5 50.0*   GLUCOSE 152* 135* 124* 194* 111* 100*   CALCIUM 9.4 9.4 9.2 8.9 9.0 9.8   HEMOGLOBIN A1C  --   --   --   --   --  5.40             Lab 10/24/24  1633   CHOLESTEROL 163   LDL CHOL 87   HDL CHOL 60   TRIGLYCERIDES 83       Brief Urine Lab Results  (Last result in the past 365 days)        Color   Clarity   Blood   Leuk Est   Nitrite   Protein   CREAT   Urine HCG        10/23/24 1937 Yellow   Turbid   Trace   Moderate (2+)   Negative   30 mg/dL (1+)                   Microbiology Results Abnormal       None            No radiology results from the last 24 hrs    Results for orders placed during the hospital encounter of 10/23/24    Adult Transthoracic Echo Complete W/ Cont if Necessary Per Protocol (With Agitated Saline)    Interpretation Summary    Left ventricular ejection fraction appears to be 51 - 55%.    Unable to fully assess valvular function due to suboptimal windows.      Current medications:  Scheduled Meds:amLODIPine, 10 mg, Oral, Q24H  aspirin, 81 mg, Oral, Daily  atorvastatin, 80 mg, Oral, Nightly  carvedilol, 12.5 mg, Oral, BID With Meals  cholecalciferol, 1,000 Units, Oral, Daily  hydrALAZINE, 100 mg, Oral, Q8H  insulin lispro, 2-7 Units, Subcutaneous, 4x Daily AC & at Bedtime  lactobacillus acidophilus, 1 capsule, Oral, Daily  losartan, 100 mg, Oral, Daily  nystatin, , Topical, Q12H  pantoprazole, 40 mg, Oral, Q AM  QUEtiapine, 50 mg, Oral, Q12H  ticagrelor, 90 mg, Oral, BID  venlafaxine XR, 150 mg, Oral, Daily      Continuous Infusions:   PRN Meds:.  acetaminophen    dextrose    dextrose    glucagon (human recombinant)    labetalol    sodium chloride    ziprasidone    Assessment & Plan   Assessment & Plan     Active Hospital Problems    Diagnosis  POA    **Dysarthria [R47.1]  Yes    UTI (urinary tract infection) [N39.0]  Yes      Resolved Hospital Problems   No resolved problems to display.        Brief  Hospital Course to date:  Olga Sandoval is a 69 y.o. 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 She was found on the floor by family member.     Labs notable for UA with leuk esterase, too numerous to count WBCs, 2+ bacteria, 21-30 squamous cells, WBCs 16, lactic 1.8. CXR with no acute abnormality, CTA head/neck with w chronic moderate stenoses with b/l cervical internal carotid arteries, moderate stenoses within the b/l PCAs, 1cm aneurysm from left cavernous segment ICA.     This patient's problems and plans were partially entered by my partner and updated as appropriate by me 10/31/24.    Altered mental status  -suspect infectious vs metabolic etiology  -LFTs normal, TSH normal, B12 normal, ammonia low, resp PCR negative  -MRI brain showed multiple chronic cortical infarcts, chronic lacunar infarct in right thalamus  -Stroke Neurology followed, recommended aspirin 81 mg daily, Brilinta 90 mg twice daily, atorvastatin 80 mg daily.   -Continue treatment of suspected UTI. Continue reduced dose Seroquel for now.   --PT/OT recommending SNF on discharge.    Acute UTI  Leukocytosis with neutrophilia  -Urine culture growing >100k mixed tavo  -s/p 6d IV ceftriaxone. BC NG x5d Patient is improving on IV ceftriaxone so patient appears to have true UTI.   -Urinary retention protocol.    FLASH on suspected CKD  -Cr improved with fluids on admission.  Creatinine currently normal at 0.89.    1cm aneurysm left cavernous segment ICA  -Patient follows with Neurosurgery at , her neurosurgeon had requested outpatient MRA/MRI before her next follow-up.   -Stroke neurology discussed bleeding risk with patient's daughter.      Candida dermatitis  -Nystatin powder       Hypertension- continue home losartan.  Amlodipine added on 10/26.  Hydralazine added on 10/27. On chronic coreg.    --BP improved  --tachycardic 10/29/24, EKG reviewed and unremarkable  --increase coreg 10/29  --consider switching amlodipine to nifedipine if BP goes back up    Hyperlipidemia- continue atorvastatin.     DM2 - A1c 5.4%; SSI + glucose checks + hypoglycemia protocol    Anxiety- continue home venlafaxine.   COPD  Morbid obesity (BMI 44.08)    Expected Discharge Location and Transportation: SNF, medically ready  Expected Discharge   Expected Discharge Date: 10/29/2024; Expected Discharge Time:      VTE Prophylaxis:  Mechanical VTE prophylaxis orders are present.         AM-PAC 6 Clicks Score (PT): 13 (10/30/24 2000)    CODE STATUS:   Code Status and Medical Interventions: CPR (Attempt to Resuscitate); Full Support   Ordered at: 10/24/24 1550     Level Of Support Discussed With:    Patient     Code Status (Patient has no pulse and is not breathing):    CPR (Attempt to Resuscitate)     Medical Interventions (Patient has pulse or is breathing):    Full Support       Jen Andrade MD  10/31/24     Plastic Surgeon Procedure Text (A): After obtaining clear surgical margins the patient was sent to plastics for surgical repair.  The patient understands they will receive post-surgical care and follow-up from the referring physician's office.

## 2024-10-31 NOTE — THERAPY TREATMENT NOTE
Patient Name: Olga Sandoval  : 1955    MRN: [id number]                              Today's Date: 10/31/2024       Admit Date: 10/23/2024    Visit Dx:     ICD-10-CM ICD-9-CM   1. Left-sided weakness  R53.1 728.87   2. Slurred speech  R47.81 784.59   3. Acute dehydration  E86.0 276.51   4. Acute urinary tract infection  N39.0 599.0   5. Acute encephalopathy  G93.40 348.30   6. Cognitive communication deficit  R41.841 799.52     Patient Active Problem List   Diagnosis    Aphasia    Bipolar 2 disorder    Type 2 diabetes mellitus    Essential hypertension    Hyperlipidemia    Carotid stenosis, right    Obesity, Class III, BMI 40-49.9 (morbid obesity)    History of CVA (cerebrovascular accident)    Hypertension    Gastroesophageal reflux disease    Encounter for screening for colorectal cancer in high risk patient    Immunization due    Medicare annual wellness visit, subsequent    Lymphedema    Bleeding from right ear    Healthcare maintenance    Post-menopausal    Dysarthria    UTI (urinary tract infection)     Past Medical History:   Diagnosis Date    Aneurysm     left carotid    Bipolar 2 disorder     Diabetes mellitus     H/O Bell's palsy     Left sided facial droop    History of loop recorder     Hyperlipidemia     Hypertension     Stroke     x 4     Past Surgical History:   Procedure Laterality Date    CARDIAC CATHETERIZATION      CATARACT EXTRACTION Bilateral      SECTION        General Information       Row Name 10/31/24 1038          Physical Therapy Time and Intention    Document Type therapy note (daily note)  -AS     Mode of Treatment physical therapy  -AS       Row Name 10/31/24 1032          General Information    Patient Profile Reviewed yes  -AS     Existing Precautions/Restrictions fall;other (see comments)  remote CVA with L side weakness, B LE lymphedema  -AS     Barriers to Rehab medically complex;previous functional deficit;contractures  -AS       Row Name 10/31/24 5892           Cognition    Orientation Status (Cognition) oriented x 3  -AS       Row Name 10/31/24 1035          Safety Issues/Impairments Affecting Functional Mobility    Safety Issues Affecting Function (Mobility) awareness of need for assistance;insight into deficits/self-awareness;safety precaution awareness;safety precautions follow-through/compliance;sequencing abilities  -AS     Impairments Affecting Function (Mobility) balance;endurance/activity tolerance;cognition;coordination;shortness of breath;strength;range of motion (ROM)  -AS     Comment, Safety Issues/Impairments (Mobility) patient reports mainly transfers to w/c then back to bed at home, does not sit for extended period of times d/t back pain  -AS               User Key  (r) = Recorded By, (t) = Taken By, (c) = Cosigned By      Initials Name Provider Type    AS Emmie Hurst PTA Physical Therapist Assistant                   Mobility       Row Name 10/31/24 1038          Bed Mobility    Scooting/Bridging [place] (Bed Mobility) verbal cues;maximum assist (25% patient effort);2 person assist  -AS     Supine-Sit [place] (Bed Mobility) verbal cues;contact guard;1 person assist  -AS     Sit-Supine [place] (Bed Mobility) contact guard;1 person assist  -AS     Assistive Device (Bed Mobility) head of bed elevated;bed rails;repositioning sheet  -AS     Comment, (Bed Mobility) assist needed to reposition in bed, CGA at trunk to reach EOB  -AS       Row Name 10/31/24 1038          Transfers    Comment, (Transfers) completed 3 sit<>stands from EOB with Min assist x2 and  B UE support on walker, cues for upright posture  -AS       Row Name 10/31/24 1038          Bed-Chair Transfer    Bed-Chair [place] (Transfers) unable to assess  -AS     Comment, (Bed-Chair Transfer) patien declined UIC  -AS       Row Name 10/31/24 1038          Sit-Stand Transfer    Sit-Stand [place] (Transfers) verbal cues;minimum assist (75% patient effort);2 person assist   -AS     Assistive Device (Sit-Stand Transfers) walker, front-wheeled  -AS     Comment, (Sit-Stand Transfer) cues for upright posture  -AS       Row Name 10/31/24 1038          Gait/Stairs (Locomotion)    [place] (Gait) verbal cues;minimum assist (75% patient effort);2 person assist  -AS     Assistive Device (Gait) walker, front-wheeled  -AS     Distance in Feet (Gait) 6  -AS     Deviations/Abnormal Patterns (Gait) bilateral deviations;base of support, wide  -AS     Bilateral Gait Deviations forward flexed posture;weight shift ability decreased;heel strike decreased  -AS     Comment, (Gait/Stairs) patient took 6 side steps to HOB with min assist x2 and rolling walker for supprt, decreased step length bilaterally with slow transition with each step. Forward flexed posture with cues to correct. Patient declined further side steps d/t pain and weakness.  -AS               User Key  (r) = Recorded By, (t) = Taken By, (c) = Cosigned By      Initials Name Provider Type    AS Emmie Hurst PTA Physical Therapist Assistant                   Obj/Interventions       Row Name 10/31/24 1043          Motor Skills    Therapeutic Exercise knee;hip;ankle  -AS       Row Name 10/31/24 1043          Knee (Therapeutic Exercise)    Knee (Therapeutic Exercise) strengthening exercise  -AS     Knee Strengthening (Therapeutic Exercise) bilateral;marching while seated;LAQ (long arc quad);sitting;10 repetitions  -AS       Row Name 10/31/24 1043          Ankle (Therapeutic Exercise)    Ankle (Therapeutic Exercise) AROM (active range of motion)  -AS     Ankle AROM (Therapeutic Exercise) bilateral;dorsiflexion;plantarflexion;sitting;10 repetitions  -AS       Row Name 10/31/24 1043          Balance    Dynamic Standing Balance verbal cues;minimal assist;2-person assist  -AS     Position/Device Used, Standing Balance supported;walker, front-wheeled  -AS     Comment, Balance min assist x2 and rolling walker for support, B LE  weakness  -AS               User Key  (r) = Recorded By, (t) = Taken By, (c) = Cosigned By      Initials Name Provider Type    AS Emmie Hurst PTA Physical Therapist Assistant                   Goals/Plan    No documentation.                  Clinical Impression       Row Name 10/31/24 1043          Pain    Pretreatment Pain Rating 3/10  -AS     Posttreatment Pain Rating 3/10  -AS     Pain Location back  -AS     Pain Side/Orientation bilateral  -AS     Pain Management Interventions exercise or physical activity utilized  -AS     Response to Pain Interventions activity participation with tolerable pain  -AS       Row Name 10/31/24 1043          Plan of Care Review    Plan of Care Reviewed With patient  -AS     Progress improving  -AS     Outcome Evaluation patient took 6 side steps to HOB with min assist x2 and rolling walker for supprt, decreased step length bilaterally with slow transition with each step. Forward flexed posture with cues to correct. Patient declined further side steps d/t pain and weakness. Completed B LE exercises at EOB with cues for technique. Recommend SNF at D/C for best functional outcome.  -AS       [facility] Name 10/31/24 1043          Positioning and Restraints    Pre-Treatment Position in bed  -AS     Post Treatment Position bed  -AS     In Bed side lying left;call light within reach;encouraged to call for assist;exit alarm on;side rails up x3;pillow between legs  -AS               User Key  (r) = Recorded By, (t) = Taken By, (c) = Cosigned By      Initials Name Provider Type    AS Emmie Hurst PTA Physical Therapist Assistant                   Outcome Measures       Row Name 10/31/24 1045 10/31/24 0800       How much help from another person do you currently need...    Turning from your back to your side while in flat bed without using bedrails? 3  -AS 3  -HK    Moving from lying on back to sitting on the side of a flat bed without bedrails? 3  -AS 2  -HK    Moving to and from  a bed to a chair (including a wheelchair)? 2  -AS 2  -HK    Standing up from a chair using your arms (e.g., wheelchair, bedside chair)? 2  -AS 3  -HK    Climbing 3-5 steps with a railing? 1  -AS 1  -HK    To walk in hospital room? 2  -AS 2  -HK    AM-PAC 6 Clicks Score (PT) 13  -AS 13  -HK    Highest Level of Mobility Goal 4 --> Transfer to chair/commode  -AS 4 --> Transfer to chair/commode  -HK      Row Name 10/31/24 1045          Functional Assessment    Outcome Measure Options AM-PAC 6 Clicks Basic Mobility (PT)  -AS               User Key  (r) = Recorded By, (t) = Taken By, (c) = Cosigned By      Initials Name Provider Type    AS Emmie Hurst PTA Physical Therapist Assistant    Chioma Daniel RN Registered Nurse                                 Physical Therapy Education       Title: PT OT SLP Therapies (In Progress)       Topic: Physical Therapy (In Progress)       Point: Mobility training (In Progress)       Learning Progress Summary            Patient Acceptance, E, NR by AS at 10/31/2024 1046    Acceptance, E, VU by CK at 10/28/2024 1619    Acceptance, E, VU,NR by CD at 10/24/2024 1614    Comment: BENEFITS OF OOB ACTIVITY, SAFETY WITH MOBILITY, PROGRESSION OF POC, D/C PLANNING,                      Point: Home exercise program (In Progress)       Learning Progress Summary            Patient Acceptance, E, NR by AS at 10/31/2024 1046    Acceptance, E, VU by CK at 10/28/2024 1619    Acceptance, E, VU,NR by CD at 10/24/2024 1614    Comment: BENEFITS OF OOB ACTIVITY, SAFETY WITH MOBILITY, PROGRESSION OF POC, D/C PLANNING,                      Point: Body mechanics (In Progress)       Learning Progress Summary            Patient Acceptance, E, NR by AS at 10/31/2024 1046    Acceptance, E, VU by CK at 10/28/2024 1619    Acceptance, E, VU,NR by CD at 10/24/2024 1614    Comment: BENEFITS OF OOB ACTIVITY, SAFETY WITH MOBILITY, PROGRESSION OF POC, D/C PLANNING,                      Point: Precautions (In  Progress)       Learning Progress Summary            Patient Acceptance, E, NR by AS at 10/31/2024 1046    Acceptance, E, VU by CK at 10/28/2024 1619    Acceptance, E, VU,NR by CD at 10/24/2024 1614    Comment: BENEFITS OF OOB ACTIVITY, SAFETY WITH MOBILITY, PROGRESSION OF POC, D/C PLANNING,                                      User Key       Initials Effective Dates Name Provider Type Discipline    CD 02/03/23 -  Baylee Cabrera PT Physical Therapist PT    AS 04/28/23 -  Emmie Hurst PTA Physical Therapist Assistant PT    CK 02/06/24 -  Laurita Olivier PT Physical Therapist PT                  PT Recommendation and Plan     Progress: improving  Outcome Evaluation: patient took 6 side steps to HOB with min assist x2 and rolling walker for supprt, decreased step length bilaterally with slow transition with each step. Forward flexed posture with cues to correct. Patient declined further side steps d/t pain and weakness. Completed B LE exercises at EOB with cues for technique. Recommend SNF at D/C for best functional outcome.     Time Calculation:         PT Charges       Row Name 10/31/24 1046             Time Calculation    Start Time 1000  -AS      PT Received On 10/31/24  -AS      PT Goal Re-Cert Due Date 11/03/24  -AS         Timed Charges    52080 - PT Therapeutic Exercise Minutes 15  -AS      15638 - Gait Training Minutes  8  -AS         Total Minutes    Timed Charges Total Minutes 23  -AS       Total Minutes 23  -AS                User Key  (r) = Recorded By, (t) = Taken By, (c) = Cosigned By      Initials Name Provider Type    AS Emmie Hurst PTA Physical Therapist Assistant                  Therapy Charges for Today       Code Description Service Date Service Provider Modifiers Qty    68972411457 HC PT THER PROC EA 15 MIN 10/31/2024 Emmie Hurst, VITALIY GP 1    75306684453 HC GAIT TRAINING EA 15 MIN 10/31/2024 Emmie Hurst PTA GP 1    84573587878 HC PT THER SUPP EA 15 MIN  10/31/2024 Emmie Hurst, PTA GP 2            PT G-Codes  Outcome Measure Options: AM-PAC 6 Clicks Basic Mobility (PT)  AM-PAC 6 Clicks Score (PT): 13  AM-PAC 6 Clicks Score (OT): 12  Modified [place] Scale: 4 - Moderately severe disability.  Unable to walk without assistance, and unable to attend to own bodily needs without assistance.       Emmie Hurst, VITALIY  10/31/2024

## 2024-11-01 LAB
GLUCOSE BLDC GLUCOMTR-MCNC: 107 MG/DL (ref 70–130)
GLUCOSE BLDC GLUCOMTR-MCNC: 108 MG/DL (ref 70–130)
GLUCOSE BLDC GLUCOMTR-MCNC: 119 MG/DL (ref 70–130)
GLUCOSE BLDC GLUCOMTR-MCNC: 131 MG/DL (ref 70–130)

## 2024-11-01 PROCEDURE — 82948 REAGENT STRIP/BLOOD GLUCOSE: CPT

## 2024-11-01 PROCEDURE — 97530 THERAPEUTIC ACTIVITIES: CPT

## 2024-11-01 PROCEDURE — 99232 SBSQ HOSP IP/OBS MODERATE 35: CPT | Performed by: INTERNAL MEDICINE

## 2024-11-01 PROCEDURE — 97110 THERAPEUTIC EXERCISES: CPT

## 2024-11-01 RX ORDER — CARVEDILOL 3.12 MG/1
3.12 TABLET ORAL 2 TIMES DAILY WITH MEALS
Status: DISCONTINUED | OUTPATIENT
Start: 2024-11-01 | End: 2024-11-04 | Stop reason: HOSPADM

## 2024-11-01 RX ADMIN — ATORVASTATIN CALCIUM 80 MG: 40 TABLET, FILM COATED ORAL at 21:32

## 2024-11-01 RX ADMIN — VENLAFAXINE HYDROCHLORIDE 150 MG: 75 CAPSULE, EXTENDED RELEASE ORAL at 09:10

## 2024-11-01 RX ADMIN — NYSTATIN: 100000 POWDER TOPICAL at 09:14

## 2024-11-01 RX ADMIN — ASPIRIN 81 MG 81 MG: 81 TABLET ORAL at 09:10

## 2024-11-01 RX ADMIN — AMLODIPINE BESYLATE 10 MG: 10 TABLET ORAL at 09:10

## 2024-11-01 RX ADMIN — QUETIAPINE FUMARATE 50 MG: 25 TABLET ORAL at 21:31

## 2024-11-01 RX ADMIN — TICAGRELOR 90 MG: 90 TABLET ORAL at 09:10

## 2024-11-01 RX ADMIN — PANTOPRAZOLE SODIUM 40 MG: 40 TABLET, DELAYED RELEASE ORAL at 05:02

## 2024-11-01 RX ADMIN — Medication 1000 UNITS: at 09:11

## 2024-11-01 RX ADMIN — TICAGRELOR 90 MG: 90 TABLET ORAL at 21:32

## 2024-11-01 RX ADMIN — QUETIAPINE FUMARATE 50 MG: 25 TABLET ORAL at 09:10

## 2024-11-01 RX ADMIN — NYSTATIN: 100000 POWDER TOPICAL at 21:33

## 2024-11-01 RX ADMIN — CARVEDILOL 12.5 MG: 12.5 TABLET, FILM COATED ORAL at 09:10

## 2024-11-01 RX ADMIN — Medication 1 CAPSULE: at 09:10

## 2024-11-01 NOTE — CASE MANAGEMENT/SOCIAL WORK
Continued Stay Note  [facility]     Patient Name: Olga Sandoval  MRN: [id number]  Today's Date: 11/1/2024    Admit Date: 10/23/2024    Plan: Rehab   Discharge Plan       Row Name 11/01/24 1056       Plan    Plan Comments MSW updated pt's daughter Reva on everything. Celia with [facility] re[facility] also completing paperwork with pt at bedside today. Once pt approved for [facility], pt's daughter Reva reports she will provide transportation.      Row Name 11/01/24 1058       Plan    Plan Comments Pt was offered a bed at [facility] re[facility]. MSW went to update pt and she was sleeping. MSW called pt's daughter and she reports that okay to start precert and accept bed at [facility]. OT is working with pt this AM, and once does, [facility] will initiate precert.                   Discharge Codes    No documentation.                 Expected Discharge Date and Time       Expected Discharge Date Expected Discharge Time    Oct 29, 2024               ANDRESSA Pierce

## 2024-11-01 NOTE — PLAN OF CARE
Problem: Adult Inpatient Plan of Care  Goal: Plan of Care Review  Outcome: Progressing  Goal: Patient-Specific Goal (Individualized)  Outcome: Progressing  Goal: Absence of Hospital-Acquired Illness or Injury  Outcome: Progressing  Intervention: Identify and Manage Fall Risk  Recent Flowsheet Documentation  Taken 11/1/2024 0400 by Aurora Ordoñez RN  Safety Promotion/Fall Prevention:   activity supervised   clutter free environment maintained   assistive device/personal items within reach   fall prevention program maintained   lighting adjusted   nonskid shoes/slippers when out of bed   safety round/check completed   room organization consistent  Taken 11/1/2024 0200 by Aurora Ordoñez RN  Safety Promotion/Fall Prevention:   activity supervised   clutter free environment maintained   assistive device/personal items within reach   fall prevention program maintained   lighting adjusted   nonskid shoes/slippers when out of bed   safety round/check completed   room organization consistent  Taken 11/1/2024 0000 by Aurora Ordoñez RN  Safety Promotion/Fall Prevention:   activity supervised   clutter free environment maintained   assistive device/personal items within reach   fall prevention program maintained   lighting adjusted   nonskid shoes/slippers when out of bed   safety round/check completed   room organization consistent  Taken 10/31/2024 2200 by Aurora Ordoñez RN  Safety Promotion/Fall Prevention:   activity supervised   clutter free environment maintained   assistive device/personal items within reach   fall prevention program maintained   lighting adjusted   nonskid shoes/slippers when out of bed   safety round/check completed   room organization consistent  Taken 10/31/2024 2000 by Aurora Ordoñez RN  Safety Promotion/Fall Prevention:   activity supervised   assistive device/personal items within reach   clutter free environment maintained   fall prevention program maintained   lighting  adjusted   nonskid shoes/slippers when out of bed   safety round/check completed   room organization consistent  Intervention: Prevent Skin Injury  Recent Flowsheet Documentation  Taken 11/1/2024 0400 by Aurora Ordoñez RN  Body Position:   patient/family refused   supine  Skin Protection:   incontinence pads utilized   transparent dressing maintained  Taken 11/1/2024 0200 by Aurora Ordoñez RN  Body Position:   turned   left  Skin Protection:   incontinence pads utilized   transparent dressing maintained  Taken 11/1/2024 0000 by Aurora Ordoñez RN  Body Position:   supine   legs elevated   heels elevated  Skin Protection:   incontinence pads utilized   transparent dressing maintained  Taken 10/31/2024 2200 by Aurora Ordoñez RN  Body Position:   turned   left   patient/family refused  Skin Protection:   incontinence pads utilized   transparent dressing maintained  Taken 10/31/2024 2000 by Aurora Ordoñze RN  Body Position:   turned   left  Skin Protection:   incontinence pads utilized   transparent dressing maintained  Intervention: Prevent and Manage VTE (Venous Thromboembolism) Risk  Recent Flowsheet Documentation  Taken 10/31/2024 2000 by Aurora Ordoñez RN  VTE Prevention/Management:   bilateral   SCDs (sequential compression devices) off   patient refused intervention  Intervention: Prevent Infection  Recent Flowsheet Documentation  Taken 11/1/2024 0400 by Aurora Ordoñez RN  Infection Prevention:   cohorting utilized   environmental surveillance performed   equipment surfaces disinfected   hand hygiene promoted   rest/sleep promoted  Taken 11/1/2024 0200 by Aurora Ordoñez RN  Infection Prevention:   cohorting utilized   environmental surveillance performed   equipment surfaces disinfected   hand hygiene promoted   rest/sleep promoted  Taken 11/1/2024 0000 by Aurora Ordoñez RN  Infection Prevention:   cohorting utilized   environmental surveillance performed   equipment surfaces  disinfected   hand hygiene promoted   rest/sleep promoted  Taken 10/31/2024 2200 by Aurora Ordoñez RN  Infection Prevention:   cohorting utilized   environmental surveillance performed   equipment surfaces disinfected   hand hygiene promoted   rest/sleep promoted  Taken 10/31/2024 2000 by Aurora Ordoñez RN  Infection Prevention:   cohorting utilized   environmental surveillance performed   equipment surfaces disinfected   hand hygiene promoted   rest/sleep promoted  Goal: Optimal Comfort and Wellbeing  Outcome: Progressing  Intervention: Provide Person-Centered Care  Recent Flowsheet Documentation  Taken 10/31/2024 2000 by Aurora Ordoñez RN  Trust Relationship/Rapport:   care explained   choices provided   empathic listening provided   emotional support provided   questions answered   questions encouraged   thoughts/feelings acknowledged   reassurance provided  Goal: Readiness for Transition of Care  Outcome: Progressing     Problem: Fall Injury Risk  Goal: Absence of Fall and Fall-Related Injury  Outcome: Progressing  Intervention: Identify and Manage Contributors  Recent Flowsheet Documentation  Taken 11/1/2024 0400 by Aurora Ordoñez RN  Self-Care Promotion: independence encouraged  Taken 11/1/2024 0200 by Aurora Ordoñez RN  Self-Care Promotion: independence encouraged  Taken 11/1/2024 0000 by Aurora Ordoñez RN  Self-Care Promotion: independence encouraged  Taken 10/31/2024 2200 by Aurora Ordoñez RN  Self-Care Promotion: independence encouraged  Taken 10/31/2024 2000 by Aurora Ordoñez RN  Medication Review/Management: medications reviewed  Self-Care Promotion:   independence encouraged   BADL personal objects within reach  Intervention: Promote Injury-Free Environment  Recent Flowsheet Documentation  Taken 11/1/2024 0400 by Aurora Ordoñez RN  Safety Promotion/Fall Prevention:   activity supervised   clutter free environment maintained   assistive device/personal items within reach   fall  prevention program maintained   lighting adjusted   nonskid shoes/slippers when out of bed   safety round/check completed   room organization consistent  Taken 11/1/2024 0200 by Aurora Ordoñez, RN  Safety Promotion/Fall Prevention:   activity supervised   clutter free environment maintained   assistive device/personal items within [facility]   fall prevention program maintained   lighting adjusted   nonskid shoes/slippers when out of bed   safety round/check completed   room organization consistent  Taken 11/1/2024 0000 by Aurora Ordoñez, RN  Safety Promotion/Fall Prevention:   activity supervised   clutter free environment maintained   assistive device/personal items within [facility]   fall prevention program maintained   lighting adjusted   nonskid shoes/slippers when out of bed   safety round/check completed   room organization consistent  Taken 10/31/2024 2200 by Aurora Ordoñez, RN  Safety Promotion/Fall Prevention:   activity supervised   clutter free environment maintained   assistive device/personal items within [facility]   fall prevention program maintained   lighting adjusted   nonskid shoes/slippers when out of bed   safety round/check completed   room organization consistent  Taken 10/31/2024 2000 by Aurora Ordoñez, RN  Safety Promotion/Fall Prevention:   activity supervised   assistive device/personal items within [facility]   clutter free environment maintained   fall prevention program maintained   lighting adjusted   nonskid shoes/slippers when out of bed   safety round/check completed   room organization consistent     Problem: Skin Injury Risk Increased  Goal: Skin Health and Integrity  Outcome: Progressing  Intervention: Optimize Skin Protection  Recent Flowsheet Documentation  Taken 11/1/2024 0400 by Aurora Ordoñez, RN  Activity Management: activity encouraged  Pressure Reduction Techniques:   frequent weight shift encouraged   weight shift assistance provided   pressure points protected  Head of Bed  (HOB) Positioning: HOB elevated  Pressure Reduction Devices:   pressure-redistributing mattress utilized   positioning supports utilized  Skin Protection:   incontinence pads utilized   transparent dressing maintained  Taken 11/1/2024 0200 by Aurora Ordoñez RN  Activity Management: activity encouraged  Pressure Reduction Techniques:   frequent weight shift encouraged   weight shift assistance provided   pressure points protected  Head of Bed (HOB) Positioning: [facility] elevated  Pressure Reduction Devices:   pressure-redistributing mattress utilized   positioning supports utilized  Skin Protection:   incontinence pads utilized   transparent dressing maintained  Taken 11/1/2024 0000 by Aurora Ordoñez RN  Activity Management: activity encouraged  Pressure Reduction Techniques:   frequent weight shift encouraged   weight shift assistance provided   pressure points protected  Head of Bed (HOB) Positioning: HOB elevated  Pressure Reduction Devices:   pressure-redistributing mattress utilized   positioning supports utilized  Skin Protection:   incontinence pads utilized   transparent dressing maintained  Taken 10/31/2024 2200 by Aurora Ordoñez RN  Activity Management: activity encouraged  Pressure Reduction Techniques:   frequent weight shift encouraged   weight shift assistance provided   pressure points protected  Head of Bed (HOB) Positioning: [facility] elevated  Pressure Reduction Devices:   pressure-redistributing mattress utilized   positioning supports utilized  Skin Protection:   incontinence pads utilized   transparent dressing maintained  Taken 10/31/2024 2000 by Aurora Ordoñez RN  Activity Management: activity encouraged  Pressure Reduction Techniques:   frequent weight shift encouraged   weight shift assistance provided   pressure points protected  Head of Bed (HOB) Positioning: [facility] elevated  Pressure Reduction Devices:   pressure-redistributing mattress utilized   positioning supports utilized  Skin  Protection:   incontinence pads utilized   transparent dressing maintained     Problem: Comorbidity Management  Goal: Maintenance of Behavioral Health Symptom Control  Outcome: Progressing  Intervention: Maintain Behavioral Health Symptom Control  Recent Flowsheet Documentation  Taken 10/31/2024 2000 by Aurora Ordoñez RN  Medication Review/Management: medications reviewed  Goal: Blood Pressure in Desired Range  Outcome: Progressing  Intervention: Maintain Blood Pressure Management  Recent Flowsheet Documentation  Taken 10/31/2024 2000 by Aurora Ordoñez RN  Medication Review/Management: medications reviewed   Goal Outcome Evaluation:               10/31- Pt remained AOX4. RA. NSR. NIH: 1. Pt remained free from falls throughout shift. Plan of care ongoing.

## 2024-11-01 NOTE — PROGRESS NOTES
[facility] Medicine Services  PROGRESS NOTE    Patient Name: Olga Sandoval  : 1955  MRN: [id number]    Date of Admission: 10/23/2024  Primary Care Physician: Ileana Kerr MD    Subjective   Subjective     CC: f/u UTI    HPI: Up in bed resting comfortably. Says she is doing much better. Still agreeable to rehab at d/c.      Objective   Objective     Vital Signs:   Temp:  [98.4 °F (36.9 °C)-98.7 °F (37.1 °C)] 98.5 °F (36.9 °C)  Heart Rate:  [63-76] 66  Resp:  [16-18] 18  BP: (123-166)/(45-63) 143/61     Physical Exam:  Constitutional: No acute distress, awake, alert, elderly female  HENT: NCAT, mucous membranes moist  Respiratory: Clear to auscultation bilaterally, respiratory effort normal   Cardiovascular: RRR, no murmurs, rubs, or gallops  Gastrointestinal: Positive bowel sounds, soft, nontender, nondistended, obese abd  Musculoskeletal: No bilateral ankle edema  Psychiatric: Appropriate affect, cooperative  Neurologic: Oriented x 3, strength symmetric in all extremities, Cranial Nerves grossly intact to confrontation, speech clear  Skin: No rashes     Results Reviewed:  LAB RESULTS:      Lab 10/30/24  1032 10/29/24  1332 10/28/24  0617 10/27/24  0911 10/26/24  0827   WBC 6.60 8.02 9.05 6.35 6.99   HEMOGLOBIN 11.8* 13.0 11.8* 11.5* 11.1*   HEMATOCRIT 38.0 39.6 37.8 35.0 35.0   PLATELETS 335 366 326 275 283   NEUTROS ABS 4.22 4.92 5.93 3.99 4.21   IMMATURE GRANS (ABS) 0.02 0.02 0.03 0.02 0.02   LYMPHS ABS 1.35 1.64 1.63 1.31 1.57   MONOS ABS 0.63 1.02* 1.01* 0.68 0.77   EOS ABS 0.35 0.38 0.41* 0.33 0.37   MCV 90.9 88.6 92.0 88.4 89.7         Lab 10/30/24  1032 10/29/24  1332 10/28/24  0617 10/27/24  0911 10/26/24  0827   SODIUM 140 139 139 141 143   POTASSIUM 3.8 4.1 3.8 4.5 3.9   CHLORIDE 106 104 106 107 108*   CO2 24.0 21.0* 23.0 21.0* 22.0   ANION GAP 10.0 14.0 10.0 13.0 13.0   BUN 21 19 17 16 19   CREATININE 0.94 0.96 0.89 0.96 0.90   EGFR 65.0 63.4 69.4 63.4 68.5    GLUCOSE 152* 135* 124* 194* 111*   CALCIUM 9.4 9.4 9.2 8.9 9.0                         Brief Urine Lab Results  (Last result in the past 365 days)        Color   Clarity   Blood   Leuk Est   Nitrite   Protein   CREAT   Urine HCG        10/23/24 1937 Yellow   Turbid   Trace   Moderate (2+)   Negative   30 mg/dL (1+)                   Microbiology Results Abnormal       None            No radiology results from the last 24 hrs    Results for orders placed during the hospital encounter of 10/23/24    Adult Transthoracic Echo Complete W/ Cont if Necessary Per Protocol (With Agitated Saline)    Interpretation Summary    Left ventricular ejection fraction appears to be 51 - 55%.    Unable to fully assess valvular function due to suboptimal windows.      Current medications:  Scheduled Meds:amLODIPine, 10 mg, Oral, Q24H  aspirin, 81 mg, Oral, Daily  atorvastatin, 80 mg, Oral, Nightly  carvedilol, 12.5 mg, Oral, BID With Meals  cholecalciferol, 1,000 Units, Oral, Daily  hydrALAZINE, 100 mg, Oral, Q8H  insulin lispro, 2-7 Units, Subcutaneous, 4x Daily AC & at Bedtime  lactobacillus acidophilus, 1 capsule, Oral, Daily  losartan, 100 mg, Oral, Daily  nystatin, , Topical, Q12H  pantoprazole, 40 mg, Oral, Q AM  QUEtiapine, 50 mg, Oral, Q12H  ticagrelor, 90 mg, Oral, BID  venlafaxine XR, 150 mg, Oral, Daily      Continuous Infusions:   PRN Meds:.  acetaminophen    dextrose    dextrose    glucagon (human recombinant)    labetalol    sodium chloride    ziprasidone    Assessment & Plan   Assessment & Plan     Active Hospital Problems    Diagnosis  POA    **Dysarthria [R47.1]  Yes    UTI (urinary tract infection) [N39.0]  Yes      Resolved Hospital Problems   No resolved problems to display.        Brief Hospital Course to date:  Olga Sandoval is a 69 y.o. female with history of COPD, frequent falls, HLD, HTN, prior Bell's palsy, bipolar disorder, prior tobacco use disorder, multiple CVAs, morbid obesity, loop recorder, carotid  artery aneurysm status post pipeline embolization, who presented for evaluation of worsening confusion, slurred speech, vomiting. She was found on the floor by family member.      Labs notable for UA with leuk esterase, too numerous to count WBCs, 2+ bacteria, 21-30 squamous cells, WBCs 16, lactic 1.8. CXR with no acute abnormality, CTA head/neck with w chronic moderate stenoses with b/l cervical internal carotid arteries, moderate stenoses within the b/l PCAs, 1cm aneurysm from left cavernous segment ICA.      Altered mental status  -Suspect due to UTI, improved with tx as below.  -LFTs normal, TSH normal, B12 normal, ammonia low, resp PCR negative  -MRI brain showed multiple chronic cortical infarcts, chronic lacunar infarct in right thalamus  -Stroke Neurology followed, recommended aspirin 81 mg daily, Brilinta 90 mg twice daily, atorvastatin 80 mg daily.   -Continue treatment of suspected UTI. Continue reduced dose Seroquel for now.   --PT/OT recommending SNF on discharge. Patient agreeable, referrals pending.     Acute UTI  Leukocytosis with neutrophilia  -Urine culture growing >100k mixed tavo  -She completed 6d IV ceftriaxone. BC NG x5d Patient improved on IV ceftriaxone so patient appears to have true UTI.      FLASH on suspected CKD  -Cr improved with fluids on admission.  Creatinine currently normal at 0.89.     1cm aneurysm left cavernous segment ICA  -Patient follows with Neurosurgery at , her neurosurgeon had requested outpatient MRA/MRI before her next follow-up.   -Stroke neurology discussed bleeding risk with patient's daughter.      Candida dermatitis  -Nystatin powder      Hypertension- continue home losartan.  Amlodipine added on 10/26.  Hydralazine added on 10/27. On chronic coreg.    --BP improved  --tachycardic 10/29/24, EKG reviewed and unremarkable  --increased coreg 10/29  --consider switching amlodipine to nifedipine if BP goes back up     Hyperlipidemia- continue atorvastatin.      DM2 -  A1c 5.4%; SSI + glucose checks + hypoglycemia protocol     Anxiety- continue home venlafaxine.   COPD  Morbid obesity (BMI 44.08)    Expected Discharge Location and Transportation:   Expected Discharge   Expected Discharge Date: 10/29/2024; Expected Discharge Time:      VTE Prophylaxis:  Mechanical VTE prophylaxis orders are present.         AM-PAC 6 Clicks Score (PT): 13 (10/31/24 2000)    CODE STATUS:   Code Status and Medical Interventions: CPR (Attempt to Resuscitate); Full Support   Ordered at: 10/24/24 1550     Level Of Support Discussed With:    Patient     Code Status (Patient has no pulse and is not breathing):    CPR (Attempt to Resuscitate)     Medical Interventions (Patient has pulse or is breathing):    Full Support       Zainab Wilson II, DO  11/01/24

## 2024-11-01 NOTE — PLAN OF CARE
Goal Outcome Evaluation:  Plan of Care Reviewed With: patient        Progress: no change  Outcome Evaluation: Pt tolerated BUE AROM and pulmonary ther-ex sitting EOB, remains assist of 2 for STS and approx 8ft with RW, Dep for LB dressing. Will cont IPOT per POC as tolerated, Rec d/c to SNF.    Anticipated Discharge Disposition (OT): skilled nursing facility

## 2024-11-01 NOTE — CASE MANAGEMENT/SOCIAL WORK
Continued Stay Note  [facility]     Patient Name: Olga Sandoval  MRN: [id number]  Today's Date: 11/1/2024    Admit Date: 10/23/2024    Plan: Rehab   Discharge Plan       Row Name 11/01/24 1052       Plan    Plan Comments Pt was offered a bed at [facility] and rehab. MSW went to update pt and she was sleeping. MSW called pt's daughter and she reports that okay to start precert and accept bed at [facility]. OT is working with pt this AM, and once does, [facility] and rehab will initiate precert.                   Discharge Codes    No documentation.                 Expected Discharge Date and Time       Expected Discharge Date Expected Discharge Time    Oct 29, 2024               ANDRESSA Pierce

## 2024-11-01 NOTE — THERAPY TREATMENT NOTE
Patient Name: Olga Sandoval  : 1955    MRN: [id number]                              Today's Date: 2024       Admit Date: 10/23/2024    Visit Dx:     ICD-10-CM ICD-9-CM   1. Left-sided weakness  R53.1 728.87   2. Slurred speech  R47.81 784.59   3. Acute dehydration  E86.0 276.51   4. Acute urinary tract infection  N39.0 599.0   5. Acute encephalopathy  G93.40 348.30   6. Cognitive communication deficit  R41.841 799.52     Patient Active Problem List   Diagnosis    Aphasia    Bipolar 2 disorder    Type 2 diabetes mellitus    Essential hypertension    Hyperlipidemia    Carotid stenosis, right    Obesity, Class III, BMI 40-49.9 (morbid obesity)    History of CVA (cerebrovascular accident)    Hypertension    Gastroesophageal reflux disease    Encounter for screening for colorectal cancer in high risk patient    Immunization due    Medicare annual wellness visit, subsequent    Lymphedema    Bleeding from right ear    Healthcare maintenance    Post-menopausal    Dysarthria    UTI (urinary tract infection)     Past Medical History:   Diagnosis Date    Aneurysm     left carotid    Bipolar 2 disorder     Diabetes mellitus     H/O Bell's palsy     Left sided facial droop    History of loop recorder     Hyperlipidemia     Hypertension     Stroke     x 4     Past Surgical History:   Procedure Laterality Date    CARDIAC CATHETERIZATION      CATARACT EXTRACTION Bilateral      SECTION        General Information       Row Name 24 1133          OT Time and Intention    Subjective Information fatigue  -CS     Document Type therapy note (daily note)  -CS     Mode of Treatment occupational therapy  -CS     Patient Effort good  -CS       Row Name 24 1133          General Information    Patient Profile Reviewed yes  -CS     Existing Precautions/Restrictions fall;other (see comments)  remote CVA with L side weakness, B LE lymphedema  -CS     Barriers to Rehab medically complex;previous functional deficit   -CS       Row Name 11/01/24 1133          Living Environment    People in Home child(janki), adult  -CS       Row Name 11/01/24 1133          Home Main Entrance    Number of Stairs, Main Entrance none  -CS       Row Name 11/01/24 1133          Stairs Within Home, Primary    Number of Stairs, Within Home, Primary none  -CS       Row Name 11/01/24 1133          Cognition    Orientation Status (Cognition) oriented x 3  -CS       Row Name 11/01/24 1133          Safety Issues/Impairments Affecting Functional Mobility    Safety Issues Affecting Function (Mobility) insight into deficits/self-awareness;safety precaution awareness;safety precautions follow-through/compliance  -CS     Impairments Affecting Function (Mobility) balance;endurance/activity tolerance;cognition;shortness of breath;strength;range of motion (ROM)  -CS     Cognitive Impairments, Mobility Safety/Performance awareness, need for assistance;safety precaution awareness;safety precaution follow-through;sequencing abilities;problem-solving/reasoning;insight into deficits/self-awareness  -CS               User Key  (r) = Recorded By, (t) = Taken By, (c) = Cosigned By      Initials Name Provider Type    CS Clayton Julian, OT Occupational Therapist                     Mobility/ADL's       Row Name 11/01/24 1135          Bed Mobility    Scooting/Bridging [place] (Bed Mobility) verbal cues;maximum assist (25% patient effort);2 person assist  -CS     Supine-Sit [place] (Bed Mobility) verbal cues;contact guard;1 person assist  -CS     Sit-Supine [place] (Bed Mobility) contact guard;1 person assist  -       Row Name 11/01/24 1135          Transfers    Transfers sit-stand transfer;bed-chair transfer  -     Comment, (Transfers) Pt declined OOB activities despite encouragement, returned to bed  -       Row Name 11/01/24 1135          Sit-Stand Transfer    Sit-Stand [place] (Transfers) minimum assist (75% patient effort);2 person assist;verbal  cues;nonverbal cues (demo/gesture)  -       Row Name 11/01/24 1135          Functional Mobility    Functional Mobility- Comment tolerated approx 8ft forward mobility around bed, regressed to ModA x2 due to fatigue and SOA, BLE flexed throughout with cues for knee extension  -CS     Patient was able to Ambulate yes  -CS       Row Name 11/01/24 1135          Activities of Daily Living    BADL Assessment/Intervention upper body dressing;lower body dressing;grooming  -       Row Name 11/01/24 1135          Grooming Assessment/Training    [place] Level (Grooming) wash face, hands;set up;hair care, combing/brushing;minimum assist (75% patient effort)  -CS     Position (Grooming) edge of bed sitting  -       Row Name 11/01/24 1135          Lower Body Dressing Assessment/Training    [place] Level (Lower Body Dressing) don;socks;dependent (less than 25% patient effort)  -CS     Comment, (Lower Body Dressing) Pt attempted, declined AE, wears slippers or no shoes at baseline  -       Row Name 11/01/24 1135          Upper Body Dressing Assessment/Training    [place] Level (Upper Body Dressing) don;pajama/robe;minimum assist (75% patient effort)  -CS     Position (Upper Body Dressing) edge of bed sitting  -               User Key  (r) = Recorded By, (t) = Taken By, (c) = Cosigned By      Initials Name Provider Type     Clayton Julian, OT Occupational Therapist                   Obj/Interventions       Row Name 11/01/24 1138          Motor Skills    Therapeutic Exercise shoulder;elbow/forearm;other (see comments)  BUE AROM incorporated into pulmonary ther-ex  -       Row Name 11/01/24 1138          Balance    Balance Assessment sitting static balance;sitting dynamic balance;standing static balance;standing dynamic balance  -     Static Sitting Balance standby assist  -     Dynamic Sitting Balance contact guard  -CS     Position, Sitting Balance unsupported;sitting edge of bed  -CS     Static  Standing Balance minimal assist;2-person assist  -CS     Dynamic Standing Balance moderate assist;2-person assist  -CS     Position/Device Used, Standing Balance supported;walker, front-wheeled  -CS     Balance Interventions sitting;standing;sit to stand;occupation based/functional task  -CS               User Key  (r) = Recorded By, (t) = Taken By, (c) = Cosigned By      Initials Name Provider Type     Clayton Julian, GUILHERME Occupational Therapist                   Goals/Plan    No documentation.                  Clinical Impression       Row Name 11/01/24 1140          Pain Assessment    Additional Documentation Pain Scale: FACES Pre/Post-Treatment (Group)  -       Row Name 11/01/24 1140          Pain Scale: FACES Pre/Post-Treatment    Pain: FACES Scale, Pretreatment 0-->no hurt  -CS     Posttreatment Pain Rating 0-->no hurt  -CS       Row Name 11/01/24 1140          Plan of Care Review    Plan of Care Reviewed With patient  -CS     Progress no change  -CS     Outcome Evaluation Pt tolerated BUE AROM and pulmonary ther-ex sitting EOB, remains assist of 2 for STS and approx 8ft with RW, Dep for LB dressing. Will cont IPOT per POC as tolerated, Rec d/c to SNF.  -       Row Name 11/01/24 1140          Therapy Plan Review/Discharge Plan (OT)    Anticipated Discharge Disposition (OT) skilled nursing facility  -       Row Name 11/01/24 1140          Vital Signs    Pre Systolic BP Rehab --  RN cleared for tx  -CS     O2 Delivery Pre Treatment room air  -CS     O2 Delivery Intra Treatment room air  -CS     O2 Delivery Post Treatment room air  -CS     Pre Patient Position Supine  -CS     Intra Patient Position Standing  -CS     Post Patient Position Sitting  -       Row Name 11/01/24 1140          Positioning and Restraints    Pre-Treatment Position in bed  -CS     Post Treatment Position chair  -CS     In Bed notified nsg;side lying left;call light within reach;encouraged to call for assist;exit alarm on  -CS                User Key  (r) = Recorded By, (t) = Taken By, (c) = Cosigned By      Initials Name Provider Type    Clayton Mathias OT Occupational Therapist                   Outcome Measures       Row Name 11/01/24 1141          How much help from another is currently needed...    Putting on and taking off regular lower body clothing? 1  -CS     Bathing (including washing, rinsing, and drying) 2  -CS     Toileting (which includes using toilet bed pan or urinal) 2  -CS     Putting on and taking off regular upper body clothing 2  -CS     Taking care of personal grooming (such as brushing teeth) 3  -CS     Eating meals 3  -CS     AM-PAC 6 Clicks Score (OT) 13  -CS       Row Name 11/01/24 0800          How much help from another person do you currently need...    Turning from your back to your side while in flat bed without using bedrails? 3  -HK     Moving from lying on back to sitting on the side of a flat bed without bedrails? 3  -HK     Moving to and from a bed to a chair (including a wheelchair)? 2  -HK     Standing up from a chair using your arms (e.g., wheelchair, bedside chair)? 2  -HK     Climbing 3-5 steps with a railing? 1  -HK     To walk in hospital room? 2  -HK     AM-PAC 6 Clicks Score (PT) 13  -HK     Highest Level of Mobility Goal 4 --> Transfer to chair/commode  -HK       Row Name 11/01/24 1141          Functional Assessment    Outcome Measure Options AM-PAC 6 Clicks Daily Activity (OT)  -CS               User Key  (r) = Recorded By, (t) = Taken By, (c) = Cosigned By      Initials Name Provider Type    Clayton Mathias OT Occupational Therapist    HK Chioma Ochoa RN Registered Nurse                    Occupational Therapy Education       Title: PT OT SLP Therapies (In Progress)       Topic: Occupational Therapy (Done)       Point: ADL training (Done)       Description:   Instruct learner(s) on proper safety adaptation and remediation techniques during self care or transfers.   Instruct in  proper use of assistive devices.                  Learning Progress Summary            Patient Acceptance, E,D, VU,DU by  at 11/1/2024 1142    Acceptance, E, VU,NR by  at 10/24/2024 1555                      Point: Home exercise program (Done)       Description:   Instruct learner(s) on appropriate technique for monitoring, assisting and/or progressing therapeutic exercises/activities.                  Learning Progress Summary            Patient Acceptance, E,D, VU,DU by  at 11/1/2024 1142                      Point: Precautions (Done)       Description:   Instruct learner(s) on prescribed precautions during self-care and functional transfers.                  Learning Progress Summary            Patient Acceptance, E,D, VU,DU by  at 11/1/2024 1142    Acceptance, E, VU,NR by  at 10/24/2024 1555                      Point: Body mechanics (Done)       Description:   Instruct learner(s) on proper positioning and spine alignment during self-care, functional mobility activities and/or exercises.                  Learning Progress Summary            Patient Acceptance, E,D, VU,DU by  at 11/1/2024 1142    Acceptance, E, VU,NR by  at 10/24/2024 1555                                      User Key       Initials Effective Dates Name Provider Type Discipline     06/16/21 -  Clayton Julian, OT Occupational Therapist OT     08/08/24 -  Julia Mcrae OT Student OT Student OT                  OT Recommendation and Plan     Plan of Care Review  Plan of Care Reviewed With: patient  Progress: no change  Outcome Evaluation: Pt tolerated BUE AROM and pulmonary ther-ex sitting EOB, remains assist of 2 for STS and approx 8ft with RW, Dep for LB dressing. Will cont IPOT per POC as tolerated, Rec d/c to SNF.     Time Calculation:         Time Calculation- OT       Row Name 11/01/24 1142             Time Calculation- OT    OT Start Time 1010  -      OT Received On 11/01/24  -      OT Goal Re-Cert Due Date  11/03/24  -CS         Timed Charges    52512 - OT Therapeutic Exercise Minutes 5  -CS      58307 - OT Therapeutic Activity Minutes 15  -CS      62605 - OT Self Care/Mgmt Minutes 5  -CS         Total Minutes    Timed Charges Total Minutes 25  -CS       Total Minutes 25  -CS                User Key  (r) = Recorded By, (t) = Taken By, (c) = Cosigned By      Initials Name Provider Type    CS Clayton Julian, OT Occupational Therapist                  Therapy Charges for Today       Code Description Service Date Service Provider Modifiers Qty    65217674851 HC OT THER PROC EA 15 MIN 11/1/2024 Clayton Julian, OT GO 1    92455371121 HC OT THERAPEUTIC ACT EA 15 MIN 11/1/2024 Clayton Julian, OT GO 1    19238908519 HC OT THER SUPP EA 15 MIN 11/1/2024 Clayton Julian, OT GO 1    83070919470 HC OT THER SUPP EA 15 MIN 11/1/2024 Clayton Julian, OT GO 1                 Clayton Julian OT  11/1/2024

## 2024-11-02 LAB
GLUCOSE BLDC GLUCOMTR-MCNC: 100 MG/DL (ref 70–130)
GLUCOSE BLDC GLUCOMTR-MCNC: 107 MG/DL (ref 70–130)
GLUCOSE BLDC GLUCOMTR-MCNC: 115 MG/DL (ref 70–130)
GLUCOSE BLDC GLUCOMTR-MCNC: 160 MG/DL (ref 70–130)
GLUCOSE BLDC GLUCOMTR-MCNC: 166 MG/DL (ref 70–130)

## 2024-11-02 PROCEDURE — 63710000001 INSULIN LISPRO (HUMAN) PER 5 UNITS: Performed by: STUDENT IN AN ORGANIZED HEALTH CARE EDUCATION/TRAINING PROGRAM

## 2024-11-02 PROCEDURE — 82948 REAGENT STRIP/BLOOD GLUCOSE: CPT

## 2024-11-02 PROCEDURE — 99232 SBSQ HOSP IP/OBS MODERATE 35: CPT | Performed by: INTERNAL MEDICINE

## 2024-11-02 RX ADMIN — Medication 1000 UNITS: at 08:14

## 2024-11-02 RX ADMIN — TICAGRELOR 90 MG: 90 TABLET ORAL at 08:14

## 2024-11-02 RX ADMIN — VENLAFAXINE HYDROCHLORIDE 150 MG: 75 CAPSULE, EXTENDED RELEASE ORAL at 08:14

## 2024-11-02 RX ADMIN — NYSTATIN: 100000 POWDER TOPICAL at 23:11

## 2024-11-02 RX ADMIN — INSULIN LISPRO 2 UNITS: 100 INJECTION, SOLUTION INTRAVENOUS; SUBCUTANEOUS at 13:14

## 2024-11-02 RX ADMIN — LOSARTAN POTASSIUM 100 MG: 50 TABLET, FILM COATED ORAL at 08:14

## 2024-11-02 RX ADMIN — AMLODIPINE BESYLATE 10 MG: 10 TABLET ORAL at 08:14

## 2024-11-02 RX ADMIN — INSULIN LISPRO 2 UNITS: 100 INJECTION, SOLUTION INTRAVENOUS; SUBCUTANEOUS at 17:20

## 2024-11-02 RX ADMIN — NYSTATIN: 100000 POWDER TOPICAL at 08:15

## 2024-11-02 RX ADMIN — ATORVASTATIN CALCIUM 80 MG: 40 TABLET, FILM COATED ORAL at 23:10

## 2024-11-02 RX ADMIN — Medication 1 CAPSULE: at 08:14

## 2024-11-02 RX ADMIN — QUETIAPINE FUMARATE 50 MG: 25 TABLET ORAL at 08:14

## 2024-11-02 RX ADMIN — PANTOPRAZOLE SODIUM 40 MG: 40 TABLET, DELAYED RELEASE ORAL at 05:35

## 2024-11-02 RX ADMIN — QUETIAPINE FUMARATE 50 MG: 25 TABLET ORAL at 23:10

## 2024-11-02 RX ADMIN — CARVEDILOL 3.12 MG: 3.12 TABLET, FILM COATED ORAL at 08:14

## 2024-11-02 RX ADMIN — TICAGRELOR 90 MG: 90 TABLET ORAL at 23:10

## 2024-11-02 RX ADMIN — ASPIRIN 81 MG 81 MG: 81 TABLET ORAL at 08:14

## 2024-11-02 RX ADMIN — CARVEDILOL 3.12 MG: 3.12 TABLET, FILM COATED ORAL at 17:20

## 2024-11-02 NOTE — PROGRESS NOTES
[facility] Medicine Services  PROGRESS NOTE    Patient Name: Olga Sandoval  : 1955  MRN: [id number]    Date of Admission: 10/23/2024  Primary Care Physician: Ileana Kerr MD    Subjective   Subjective     CC: f/u weakness    HPI: Up in bed w/ nursing in room. Didn't sleep well but doing okay. No complaints.      Objective   Objective     Vital Signs:   Temp:  [98.1 °F (36.7 °C)-99 °F (37.2 °C)] 98.9 °F (37.2 °C)  Heart Rate:  [60-78] 67  Resp:  [16-18] 18  BP: (103-169)/(43-65) 168/58     Physical Exam:  Constitutional: No acute distress, awake, alert  HENT: NCAT, mucous membranes moist  Respiratory: Clear to auscultation bilaterally, respiratory effort normal   Cardiovascular: RRR, no murmurs, rubs, or gallops  Gastrointestinal: Positive bowel sounds, soft, nontender, nondistended, obese  Musculoskeletal: No bilateral ankle edema  Psychiatric: Appropriate affect, cooperative  Neurologic: Oriented x 3, strength symmetric in all extremities, Cranial Nerves grossly intact to confrontation, speech clear  Skin: No rashes     Results Reviewed:  LAB RESULTS:      Lab 10/30/24  1032 10/29/24  1332 10/28/24  0617 10/27/24  0911   WBC 6.60 8.02 9.05 6.35   HEMOGLOBIN 11.8* 13.0 11.8* 11.5*   HEMATOCRIT 38.0 39.6 37.8 35.0   PLATELETS 335 366 326 275   NEUTROS ABS 4.22 4.92 5.93 3.99   IMMATURE GRANS (ABS) 0.02 0.02 0.03 0.02   LYMPHS ABS 1.35 1.64 1.63 1.31   MONOS ABS 0.63 1.02* 1.01* 0.68   EOS ABS 0.35 0.38 0.41* 0.33   MCV 90.9 88.6 92.0 88.4         Lab 10/30/24  1032 10/29/24  1332 10/28/24  0617 10/27/24  0911   SODIUM 140 139 139 141   POTASSIUM 3.8 4.1 3.8 4.5   CHLORIDE 106 104 106 107   CO2 24.0 21.0* 23.0 21.0*   ANION GAP 10.0 14.0 10.0 13.0   BUN 21 19 17 16   CREATININE 0.94 0.96 0.89 0.96   EGFR 65.0 63.4 69.4 63.4   GLUCOSE 152* 135* 124* 194*   CALCIUM 9.4 9.4 9.2 8.9                         Brief Urine Lab Results  (Last result in the past 365 days)        Color    Clarity   Blood   Leuk Est   Nitrite   Protein   CREAT   Urine HCG        10/23/24 1937 Yellow   Turbid   Trace   Moderate (2+)   Negative   30 mg/dL (1+)                   Microbiology Results Abnormal       None            No radiology results from the last 24 hrs    Results for orders placed during the hospital encounter of 10/23/24    Adult Transthoracic Echo Complete W/ Cont if Necessary Per Protocol (With Agitated Saline)    Interpretation Summary    Left ventricular ejection fraction appears to be 51 - 55%.    Unable to fully assess valvular function due to suboptimal windows.      Current medications:  Scheduled Meds:amLODIPine, 10 mg, Oral, Q24H  aspirin, 81 mg, Oral, Daily  atorvastatin, 80 mg, Oral, Nightly  carvedilol, 3.125 mg, Oral, BID With Meals  cholecalciferol, 1,000 Units, Oral, Daily  [Held by provider] hydrALAZINE, 100 mg, Oral, Q8H  insulin lispro, 2-7 Units, Subcutaneous, 4x Daily AC & at Bedtime  lactobacillus acidophilus, 1 capsule, Oral, Daily  losartan, 100 mg, Oral, Daily  nystatin, , Topical, Q12H  pantoprazole, 40 mg, Oral, Q AM  QUEtiapine, 50 mg, Oral, Q12H  ticagrelor, 90 mg, Oral, BID  venlafaxine XR, 150 mg, Oral, Daily      Continuous Infusions:   PRN Meds:.  acetaminophen    dextrose    dextrose    glucagon (human recombinant)    labetalol    sodium chloride    ziprasidone    Assessment & Plan   Assessment & Plan     Active Hospital Problems    Diagnosis  POA    **Dysarthria [R47.1]  Yes    UTI (urinary tract infection) [N39.0]  Yes      Resolved Hospital Problems   No resolved problems to display.        Brief Hospital Course to date:  Olga Sandoval is a 69 y.o. 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 She was found on  the floor by family member.      Labs notable for UA with leuk esterase, too numerous to count WBCs, 2+ bacteria, 21-30 squamous cells, WBCs 16, lactic 1.8. CXR with no acute abnormality, CTA head/neck with w chronic moderate stenoses with b/l cervical internal carotid arteries, moderate stenoses within the b/l PCAs, 1cm aneurysm from left cavernous segment ICA.      Altered mental status, resolved.  -Suspect due to UTI, improved with tx as below.  -LFTs normal, TSH normal, B12 normal, ammonia low, resp PCR negative  -MRI brain showed multiple chronic cortical infarcts, chronic lacunar infarct in right thalamus  -Stroke Neurology followed, recommended aspirin 81 mg daily, Brilinta 90 mg twice daily, atorvastatin 80 mg daily.   -Continue treatment of suspected UTI. Continue reduced dose Seroquel for now.   -PT/OT recommending SNF on discharge. Patient agreeable, referrals pending.     Acute UTI  Leukocytosis with neutrophilia  -Urine culture growing >100k mixed tavo  -She completed 6d IV ceftriaxone. BC NG x5d Patient improved on IV ceftriaxone so patient appears to have true UTI.      FLASH on suspected CKD  -Cr improved with fluids on admission.  Creatinine currently normal at 0.89.     1cm aneurysm left cavernous segment ICA  -Patient follows with Neurosurgery at , her neurosurgeon had requested outpatient MRA/MRI before her next follow-up.   -Stroke neurology discussed bleeding risk with patient's daughter.      Candida dermatitis  -Nystatin powder      Hypertension, slightly over treated.  --BP aggressively treated upon arrival and now slightly low. Stopped hydralazine, decreased coreg.  --continue losartan, norvasc.     Hyperlipidemia- continue atorvastatin.      DM2 - A1c 5.4%; SSI + glucose checks + hypoglycemia protocol    Expected Discharge Location and Transportation:   Expected Discharge   Expected Discharge Date: 10/29/2024; Expected Discharge Time:      VTE Prophylaxis:  Mechanical VTE prophylaxis  orders are present.         AM-PAC 6 Clicks Score (PT): 13 (11/01/24 0800)    CODE STATUS:   Code Status and Medical Interventions: CPR (Attempt to Resuscitate); Full Support   Ordered at: 10/24/24 1550     Level Of Support Discussed With:    Patient     Code Status (Patient has no pulse and is not breathing):    CPR (Attempt to Resuscitate)     Medical Interventions (Patient has pulse or is breathing):    Full Support       Zainab Wilson II, DO  11/02/24

## 2024-11-02 NOTE — PLAN OF CARE
Goal Outcome Evaluation:         Pt sleeping throughout the night. Pt d/o to year around 1900 but was able to recall for the rest of the shift. No other changes at this time. BP WNL.

## 2024-11-03 LAB
GLUCOSE BLDC GLUCOMTR-MCNC: 100 MG/DL (ref 70–130)
GLUCOSE BLDC GLUCOMTR-MCNC: 123 MG/DL (ref 70–130)
GLUCOSE BLDC GLUCOMTR-MCNC: 140 MG/DL (ref 70–130)
GLUCOSE BLDC GLUCOMTR-MCNC: 85 MG/DL (ref 70–130)
QT INTERVAL: 370 MS
QTC INTERVAL: 447 MS

## 2024-11-03 PROCEDURE — 82948 REAGENT STRIP/BLOOD GLUCOSE: CPT

## 2024-11-03 PROCEDURE — 99232 SBSQ HOSP IP/OBS MODERATE 35: CPT | Performed by: INTERNAL MEDICINE

## 2024-11-03 RX ADMIN — ASPIRIN 81 MG 81 MG: 81 TABLET ORAL at 09:00

## 2024-11-03 RX ADMIN — AMLODIPINE BESYLATE 10 MG: 10 TABLET ORAL at 09:01

## 2024-11-03 RX ADMIN — Medication 10 ML: at 09:01

## 2024-11-03 RX ADMIN — CARVEDILOL 3.12 MG: 3.12 TABLET, FILM COATED ORAL at 09:00

## 2024-11-03 RX ADMIN — Medication 1 CAPSULE: at 09:00

## 2024-11-03 RX ADMIN — Medication 1000 UNITS: at 09:01

## 2024-11-03 RX ADMIN — QUETIAPINE FUMARATE 50 MG: 25 TABLET ORAL at 21:50

## 2024-11-03 RX ADMIN — NYSTATIN: 100000 POWDER TOPICAL at 22:39

## 2024-11-03 RX ADMIN — CARVEDILOL 3.12 MG: 3.12 TABLET, FILM COATED ORAL at 18:29

## 2024-11-03 RX ADMIN — NYSTATIN: 100000 POWDER TOPICAL at 09:01

## 2024-11-03 RX ADMIN — QUETIAPINE FUMARATE 50 MG: 25 TABLET ORAL at 09:00

## 2024-11-03 RX ADMIN — TICAGRELOR 90 MG: 90 TABLET ORAL at 21:50

## 2024-11-03 RX ADMIN — TICAGRELOR 90 MG: 90 TABLET ORAL at 09:00

## 2024-11-03 RX ADMIN — PANTOPRAZOLE SODIUM 40 MG: 40 TABLET, DELAYED RELEASE ORAL at 05:15

## 2024-11-03 RX ADMIN — ATORVASTATIN CALCIUM 80 MG: 40 TABLET, FILM COATED ORAL at 21:50

## 2024-11-03 RX ADMIN — LOSARTAN POTASSIUM 100 MG: 50 TABLET, FILM COATED ORAL at 09:00

## 2024-11-03 RX ADMIN — VENLAFAXINE HYDROCHLORIDE 150 MG: 75 CAPSULE, EXTENDED RELEASE ORAL at 09:01

## 2024-11-04 VITALS
TEMPERATURE: 98.2 F | WEIGHT: 241 LBS | HEIGHT: 62 IN | OXYGEN SATURATION: 97 % | RESPIRATION RATE: 18 BRPM | DIASTOLIC BLOOD PRESSURE: 59 MMHG | BODY MASS INDEX: 44.35 KG/M2 | SYSTOLIC BLOOD PRESSURE: 150 MMHG | HEART RATE: 65 BPM

## 2024-11-04 PROBLEM — R47.1 DYSARTHRIA: Status: RESOLVED | Noted: 2024-10-23 | Resolved: 2024-11-04

## 2024-11-04 PROBLEM — N39.0 UTI (URINARY TRACT INFECTION): Status: RESOLVED | Noted: 2024-10-25 | Resolved: 2024-11-04

## 2024-11-04 LAB
GLUCOSE BLDC GLUCOMTR-MCNC: 105 MG/DL (ref 70–130)
GLUCOSE BLDC GLUCOMTR-MCNC: 127 MG/DL (ref 70–130)

## 2024-11-04 PROCEDURE — 82948 REAGENT STRIP/BLOOD GLUCOSE: CPT

## 2024-11-04 PROCEDURE — 99239 HOSP IP/OBS DSCHRG MGMT >30: CPT | Performed by: INTERNAL MEDICINE

## 2024-11-04 RX ORDER — CARVEDILOL 3.12 MG/1
3.12 TABLET ORAL 2 TIMES DAILY WITH MEALS
Start: 2024-11-04 | End: 2024-11-13 | Stop reason: SDUPTHER

## 2024-11-04 RX ORDER — QUETIAPINE FUMARATE 300 MG/1
300 TABLET, FILM COATED ORAL NIGHTLY
Start: 2024-11-04 | End: 2024-11-13 | Stop reason: SDUPTHER

## 2024-11-04 RX ORDER — BISACODYL 10 MG
10 SUPPOSITORY, RECTAL RECTAL DAILY PRN
Status: DISCONTINUED | OUTPATIENT
Start: 2024-11-04 | End: 2024-11-04 | Stop reason: HOSPADM

## 2024-11-04 RX ORDER — QUETIAPINE FUMARATE 50 MG/1
50 TABLET, FILM COATED ORAL EVERY 12 HOURS SCHEDULED
Start: 2024-11-04 | End: 2024-11-04 | Stop reason: HOSPADM

## 2024-11-04 RX ORDER — LOSARTAN POTASSIUM 50 MG/1
100 TABLET ORAL DAILY
Start: 2024-11-04 | End: 2024-11-13 | Stop reason: SDUPTHER

## 2024-11-04 RX ORDER — POLYETHYLENE GLYCOL 3350 17 G/17G
17 POWDER, FOR SOLUTION ORAL DAILY
Status: DISCONTINUED | OUTPATIENT
Start: 2024-11-04 | End: 2024-11-04 | Stop reason: HOSPADM

## 2024-11-04 RX ORDER — DOCUSATE SODIUM 100 MG/1
100 CAPSULE, LIQUID FILLED ORAL 2 TIMES DAILY PRN
Status: DISCONTINUED | OUTPATIENT
Start: 2024-11-04 | End: 2024-11-04 | Stop reason: HOSPADM

## 2024-11-04 RX ORDER — AMLODIPINE BESYLATE 10 MG/1
10 TABLET ORAL
Start: 2024-11-05 | End: 2024-11-13 | Stop reason: SDUPTHER

## 2024-11-04 RX ADMIN — CARVEDILOL 3.12 MG: 3.12 TABLET, FILM COATED ORAL at 08:34

## 2024-11-04 RX ADMIN — Medication 1 CAPSULE: at 08:33

## 2024-11-04 RX ADMIN — POLYETHYLENE GLYCOL 3350 17 G: 17 POWDER, FOR SOLUTION ORAL at 08:34

## 2024-11-04 RX ADMIN — AMLODIPINE BESYLATE 10 MG: 10 TABLET ORAL at 08:33

## 2024-11-04 RX ADMIN — VENLAFAXINE HYDROCHLORIDE 150 MG: 75 CAPSULE, EXTENDED RELEASE ORAL at 08:34

## 2024-11-04 RX ADMIN — ASPIRIN 81 MG 81 MG: 81 TABLET ORAL at 08:33

## 2024-11-04 RX ADMIN — QUETIAPINE FUMARATE 50 MG: 25 TABLET ORAL at 08:33

## 2024-11-04 RX ADMIN — Medication 1000 UNITS: at 08:32

## 2024-11-04 RX ADMIN — NYSTATIN: 100000 POWDER TOPICAL at 08:32

## 2024-11-04 RX ADMIN — LOSARTAN POTASSIUM 100 MG: 50 TABLET, FILM COATED ORAL at 08:34

## 2024-11-04 RX ADMIN — TICAGRELOR 90 MG: 90 TABLET ORAL at 08:34

## 2024-11-04 RX ADMIN — DOCUSATE SODIUM 100 MG: 100 CAPSULE, LIQUID FILLED ORAL at 08:34

## 2024-11-04 RX ADMIN — PANTOPRAZOLE SODIUM 40 MG: 40 TABLET, DELAYED RELEASE ORAL at 06:09

## 2024-11-04 NOTE — CASE MANAGEMENT/SOCIAL WORK
Case Management Discharge Note      Final Note: MSW notified by Celia with [facility] that pt's insurance has approved and they have a bed for pt today at [facility]. MSW updated pt's daughter who is on her way to [facility] to provide transportation. Celia with [facility] will pull dishcagre summary out of EPIC. Number to call report is [phone].         Selected Continued Care - Admitted Since 10/23/2024       Destination Coordination complete.      Service Provider Services Address Phone Fax Patient Preferred    [facility] [address] [address] [phone] --       Internal Comment last updated by Dimple Santiago RN 10/30/2024 1331    10/30-No answer, VM full                         Durable Medical Equipment    No services have been selected for the patient.                Dialysis/Infusion    No services have been selected for the patient.                Home Medical Care    No services have been selected for the patient.                Therapy    No services have been selected for the patient.                Community Resources    No services have been selected for the patient.                Community & DME    No services have been selected for the patient.                         Final Discharge Disposition Code: 03 - skilled nursing facility (SNF)

## 2024-11-04 NOTE — DISCHARGE SUMMARY
[facility] Medicine Services  DISCHARGE SUMMARY    Patient Name: Olga Sandoval  : 1955  MRN: [id number]    Date of Admission: 10/23/2024  5:17 PM  Date of Discharge:  2024  Primary Care Physician: Ileana Kerr MD    Consults       Date and Time Order Name Status Description    10/23/2024  5:17 PM Inpatient Neurology Consult Stroke Completed             Hospital Course     Presenting Problem: dysarthria    Active Hospital Problems   No active problems to display.      Resolved Hospital Problems    Diagnosis Date Resolved POA    **Dysarthria [R47.1] 2024 Yes    UTI (urinary tract infection) [N39.0] 2024 Yes          Hospital Course:  Olga Sandoval is a 69 y.o. 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 She was found on the floor by family member.      Labs notable for UA with leuk esterase, too numerous to count WBCs, 2+ bacteria, 21-30 squamous cells, WBCs 16, lactic 1.8. CXR with no acute abnormality, CTA head/neck with w chronic moderate stenoses with b/l cervical internal carotid arteries, moderate stenoses within the b/l PCAs, 1cm aneurysm from left cavernous segment ICA.      Altered mental status, resolved.  -Suspect due to UTI, improved with tx as below.  -Stroke Neurology followed, recommended aspirin 81 mg daily, Brilinta 90 mg twice daily, atorvastatin 80 mg daily. MRI brain showed multiple chronic cortical infarcts, chronic lacunar infarct in right thalamus  -Continue treatment of suspected UTI. Continue reduced dose Seroquel at d/c.  -PT/OT recommending SNF on discharge. Transfer to [facility].     Acute UTI  Leukocytosis with neutrophilia  -Urine culture growing >100k mixed  tavo however symptomatic as above so treated with 6d IV ceftriaxone w/ improvement.     FLASH on suspected CKD  -Cr improved with fluids on admission.     Discharge Follow Up Recommendations for outpatient labs/diagnostics:   PCP 1 week after d/c from SNF.    Day of Discharge     HPI: Up in bed. Had a good night. No issues. Looking forward to d/c.    Review of Systems  Gen- No fevers, chills  CV- No chest pain, palpitations  Resp- No cough, dyspnea  GI- No N/V/D, abd pain    Vital Signs:   Temp:  [98 °F (36.7 °C)-98.6 °F (37 °C)] 98.2 °F (36.8 °C)  Heart Rate:  [55-66] 65  Resp:  [16-18] 18  BP: (127-161)/(51-67) 150/59      Physical Exam:  Constitutional: No acute distress, awake, alert  HENT: NCAT, mucous membranes moist  Respiratory: Clear to auscultation bilaterally, respiratory effort normal   Cardiovascular: RRR, no murmurs, rubs, or gallops  Gastrointestinal: Positive bowel sounds, soft, nontender, nondistended  Musculoskeletal: No bilateral ankle edema  Psychiatric: Appropriate affect, cooperative  Neurologic: Oriented x 3, strength symmetric in all extremities, Cranial Nerves grossly intact to confrontation, speech clear  Skin: No rashes     Pertinent  and/or Most Recent Results     LAB RESULTS:      Lab 10/30/24  1032 10/29/24  1332   WBC 6.60 8.02   HEMOGLOBIN 11.8* 13.0   HEMATOCRIT 38.0 39.6   PLATELETS 335 366   NEUTROS ABS 4.22 4.92   IMMATURE GRANS (ABS) 0.02 0.02   LYMPHS ABS 1.35 1.64   MONOS ABS 0.63 1.02*   EOS ABS 0.35 0.38   MCV 90.9 88.6         Lab 10/30/24  1032 10/29/24  1332   SODIUM 140 139   POTASSIUM 3.8 4.1   CHLORIDE 106 104   CO2 24.0 21.0*   ANION GAP 10.0 14.0   BUN 21 19   CREATININE 0.94 0.96   EGFR 65.0 63.4   GLUCOSE 152* 135*   CALCIUM 9.4 9.4                         Brief Urine Lab Results  (Last result in the past 365 days)        Color   Clarity   Blood   Leuk Est   Nitrite   Protein   CREAT   Urine HCG        10/23/24 1937 Yellow   Turbid   Trace   Moderate (2+)    Negative   30 mg/dL (1+)                 Microbiology Results (last 10 days)       ** No results found for the last 240 hours. **            CT Head Without Contrast    Result Date: 10/28/2024  CT HEAD WO CONTRAST Date of Exam: 10/28/2024 6:20 PM EDT Indication: facial numbness. Comparison: 10/24/2024 Technique: Axial CT images were obtained of the head without contrast administration.  Automated exposure control and iterative construction methods were used. Findings: No intracranial hemorrhage. Gray-white matter differentiation is maintained without evidence of an acute infarction. Multiple foci of decreased attenuation are present within the subcortical, deep cerebral, and periventricular white matter consistent with chronic small vessel/microangiopathic ischemic changes. No extra-axial mass or collection. The ventricles and sulci are prominent commensurate with involutional changes. The posterior fossa appears grossly normal. Sellar and suprasellar structures are normal. Lens replacements. The paranasal sinuses, ethmoid air cells, and mastoid air cells are aerated. The bony calvarium is intact.     Impression: No acute intracranial pathology. Electronically Signed: Robson Cutler MD  10/28/2024 6:48 PM EDT  Workstation ID: [id number]    MRI Brain Without Contrast    Result Date: 10/25/2024  MRI BRAIN WO CONTRAST Date of Exam: 10/24/2024 11:59 PM EDT Indication: stroke r/o.  Comparison: CT head with angiography 10/23/2024. Technique:  Routine multiplanar/multisequence sequence images of the brain were obtained without contrast administration. Findings: Study is limited by motion despite motion mitigating technique. Patient refused SWI sequence. There is no diffusion restriction to suggest acute infarct. There are confluent regions of FLAIR hyperintense signal within the cerebral white matter. There is a chronic cortical infarct in the posterior right frontal lobe. There is a chronic infarct in the left temporal  occipital region. Cerebellum is unremarkable. There are patchy FLAIR hyperintensities in the central tc. Midline structures appear intact. Calvarial and superficial soft tissue signal is within normal limits. Flow voids not assessed. No mass effect, midline shift or abnormal extra-axial collection. There is a chronic lacunar infarction in the right thalamus. There is generalized parenchymal  atrophy. There is a left mastoid effusion.     Impression: Motion limited study demonstrates multiple chronic cortical infarcts, advanced chronic small vessel ischemic change and a chronic lacunar infarct in the right thalamus. No definite acute findings Electronically Signed: Vick Kathleen MD  10/25/2024 4:31 AM EDT  Workstation ID: [id number]    Adult Transthoracic Echo Complete W/ Cont if Necessary Per Protocol (With Agitated Saline)    Result Date: 10/24/2024    Left ventricular ejection fraction appears to be 51 - 55%.   Unable to fully assess valvular function due to suboptimal windows.     EEG    Result Date: 10/24/2024  Reason for referral: 69 y.o.female with altered mental status Technical Summary:  A 19 channel digital EEG was performed using the international 10-20 placement system, including eye leads and EKG leads. Duration: 17 minutes Findings: The patient is awake.  Diffuse medium amplitude 2-5 Hz intermixed delta and theta activity is present symmetrically over both hemispheres.  EMG and movement artifact are variably prominent.  A clear posterior rhythm is not seen.  As a study proceeds, at times faster 5 to 6 Hz theta frequencies become more prominent.  No focal features or epileptiform activity are seen.  Photic stimulation does not change the background.  Hyperventilation is not performed. Video: Available Technical quality: Good EKG: Regular, 80 bpm SUMMARY: Moderate generalized slow No focal features or epileptiform activity are seen     Diffuse cerebral dysfunction moderate degree but nonspecific No  evidence for epilepsy is present This report is transcribed using the Dragon dictation system.       Results for orders placed during the hospital encounter of 09/05/23    Duplex Carotid Ultrasound CAR    Interpretation Summary    Right internal carotid artery demonstrates a less than 50% stenosis.    Left internal carotid artery demonstrates a less than 50% stenosis.    Bilateral antegrade vertebral artery flow.    No previous for comparison      Results for orders placed during the hospital encounter of 09/05/23    Duplex Carotid Ultrasound CAR    Interpretation Summary    Right internal carotid artery demonstrates a less than 50% stenosis.    Left internal carotid artery demonstrates a less than 50% stenosis.    Bilateral antegrade vertebral artery flow.    No previous for comparison      Results for orders placed during the hospital encounter of 10/23/24    Adult Transthoracic Echo Complete W/ Cont if Necessary Per Protocol (With Agitated Saline)    Interpretation Summary    Left ventricular ejection fraction appears to be 51 - 55%.    Unable to fully assess valvular function due to suboptimal windows.      Plan for Follow-up of Pending Labs/Results:     Discharge Details        Discharge Medications        New Medications        Instructions Start Date   amLODIPine 10 MG tablet  Commonly known as: NORVASC   10 mg, Oral, Every 24 Hours Scheduled   Start Date: November 5, 2024     carvedilol 3.125 MG tablet  Commonly known as: COREG   3.125 mg, Oral, 2 Times Daily With Meals             Changes to Medications        Instructions Start Date   losartan 50 MG tablet  Commonly known as: COZAAR  What changed: how much to take   100 mg, Oral, Daily      QUEtiapine 300 MG tablet  Commonly known as: SEROquel  What changed: how much to take   300 mg, Oral, Nightly             Continue These Medications        Instructions Start Date   aspirin 81 MG chewable tablet   81 mg, Daily      atorvastatin 80 MG tablet  Commonly  known as: LIPITOR   80 mg, Oral, Nightly      cholecalciferol 25 MCG (1000 UT) tablet  Commonly known as: VITAMIN D3   1,000 Units, Daily      lansoprazole 15 MG capsule  Commonly known as: PREVACID   15 mg, Oral, Daily      magnesium oxide 400 MG tablet  Commonly known as: MAG-OX   400 mg, Daily      metFORMIN  MG 24 hr tablet  Commonly known as: GLUCOPHAGE-XR   1,000 mg, Oral, Every 12 Hours Scheduled      Ozempic (1 MG/DOSE) 4 MG/3ML solution pen-injector  Generic drug: Semaglutide (1 MG/DOSE)   1 mg, Subcutaneous, Weekly, Patient takes on Friday      ticagrelor 90 MG tablet tablet  Commonly known as: BRILINTA   90 mg, Oral, 2 Times Daily      venlafaxine  MG 24 hr capsule  Commonly known as: EFFEXOR-XR   150 mg, Oral, Daily               Allergies   Allergen Reactions    Codeine Palpitations and Unknown (See Comments)     Other reaction(s): Unknown         Discharge Disposition:  Skilled Nursing Facility (DC - External)    Diet:  Hospital:  Diet Order   Procedures    Diet: Cardiac, Diabetic; Healthy Heart (2-3 Na+); Consistent Carbohydrate; Texture: Regular (IDDSI 7); Fluid Consistency: Thin (IDDSI 0)            Activity:      Restrictions or Other Recommendations:         CODE STATUS:    Code Status and Medical Interventions: CPR (Attempt to Resuscitate); Full Support   Ordered at: 10/24/24 1550     Level Of Support Discussed With:    Patient     Code Status (Patient has no pulse and is not breathing):    CPR (Attempt to Resuscitate)     Medical Interventions (Patient has pulse or is breathing):    Full Support       Future Appointments   Date Time Provider Department [place]   2/10/2025  1:30 PM Ileana Kerr MD [facility]   10/16/2025 10:00 AM Ileana Kerr MD [facility]       Additional Instructions for the Follow-ups that You Need to Schedule       Discharge Follow-up with PCP   As directed       Currently Documented PCP:    Ileana Kerr MD    PCP Phone Number:     [phone]     Follow Up Details: 1 week upon release from SNF                      Zainab Wilson II, DO  11/04/24      Time Spent on Discharge:  I spent  33  minutes on this discharge activity which included: face-to-face encounter with the patient, reviewing the data in the system, coordination of the care with the nursing staff as well as consultants, documentation, and entering orders.

## 2024-11-06 NOTE — PROGRESS NOTES
Noted patient transferred to ICU - patient will be placed on hold - RN to be notified later this morning.  Please place new orders for therapy when patient appropriate to participate in therapy.  Thank you         Nursing Home History and Physical       [facility]   [address] [place] [address] Phone: [phone]  Fax: [phone]     PATIENT NAME: Olga Sandoval                                                                          YOB: 1955           DATE OF SERVICE: 11/07/2024  FACILITY:  [facility]    CHIEF COMPLAINT:  Nursing facility admission    History of Present Illness  The patient is a 69-year-old female with a history of COPD, frequent falls, hyperlipidemia, hypertension, Bell's palsy, bipolar disorder, multiple strokes, and carotid artery aneurysm status post pipeline embolization. She recently presented to [facility] for worsening confusion, slurred speech, and vomiting. She was admitted for altered mental status with initial concerns of a stroke.    She was continued on a regimen of aspirin, Brilinta, and a statin. However, it was determined that her acute presentation was likely due to a urinary tract infection (UTI). She was treated with 6 days of IV ceftriaxone, resulting in significant improvement in her mental status. Following this, she was transferred to this facility for further strengthening and rehabilitation due to debility and weakness. She is accompanied by a nurse.    Currently, she reports no confusion and is alert and oriented. She is eating well and actively participating in therapy. There are no issues with her medication regimen. She has trace edema in her legs and is experiencing knee pain.     ALLERGIES  She is allergic to CODEINE.       PAST MEDICAL & SURGICAL HISTORY:   Past Medical History:   Diagnosis Date    Aneurysm     left carotid    Bipolar 2 disorder     Diabetes mellitus     H/O Bell's palsy     Left sided facial droop    History of loop recorder     Hyperlipidemia     Hypertension     Stroke     x 4      Past Surgical History:   Procedure Laterality Date    CARDIAC CATHETERIZATION      CATARACT EXTRACTION Bilateral       SECTION           MEDICATIONS:  I have reviewed and reconciled the patients medication list in the patients chart at the [facility] nursing [facility] on 2024.      ALLERGIES:  Allergies   Allergen Reactions    Codeine Palpitations and Unknown (See Comments)     Other reaction(s): Unknown         SOCIAL HISTORY:  Social History     Socioeconomic History    Marital status:    Tobacco Use    Smoking status: Former     Current packs/day: 0.00     Average packs/day: 0.8 packs/day for 5.0 years (3.8 ttl pk-yrs)     Types: Cigarettes     Start date:      Quit date: 2017     Years since quittin.8    Smokeless tobacco: Never   Vaping Use    Vaping status: Never Used   Substance and Sexual Activity    Alcohol use: Never    Drug use: Never    Sexual activity: Defer       FAMILY HISTORY:  Family History   Problem Relation Age of Onset    Hypertension Mother     Lung cancer Mother     Cataracts Mother     Transient ischemic attack Father     Hypertension Father     Cancer Father     Heart attack Brother     Stroke Brother         REVIEW OF SYSTEMS:  Review of Systems   Constitutional:  Negative for chills, fatigue and fever.   HENT:  Negative for congestion, ear pain, rhinorrhea, sinus pressure and sore throat.    Eyes:  Negative for visual disturbance.   Respiratory:  Negative for cough, chest tightness, shortness of breath and wheezing.    Cardiovascular:  Negative for chest pain, palpitations and leg swelling.   Gastrointestinal:  Negative for abdominal pain, blood in stool, constipation, diarrhea, nausea and vomiting.   Endocrine: Negative for polydipsia and polyuria.   Genitourinary:  Negative for dysuria and hematuria.   Musculoskeletal:  Negative for arthralgias and back pain.   Skin:  Negative for rash.   Neurological:  Negative for dizziness, light-headedness, numbness and headaches.   Psychiatric/Behavioral:  Negative for dysphoric mood and sleep disturbance. The patient is not  "nervous/anxious.        PHYSICAL EXAMINATION:   VITAL SIGNS: /86   Pulse 70   Temp 98.1 °F (36.7 °C)   Resp 16   Ht 157.5 cm (62\")   Wt 108 kg (238 lb)   SpO2 97%   BMI 43.53 kg/m²     Physical Exam  Vitals and nursing note reviewed.   Constitutional:       Appearance: Normal appearance. She is well-developed.   HENT:      Head: Normocephalic and atraumatic.      Nose: Nose normal.      Mouth/Throat:      Mouth: Mucous membranes are moist.      Pharynx: No oropharyngeal exudate.   Eyes:      General: No scleral icterus.     Conjunctiva/sclera: Conjunctivae normal.      Pupils: Pupils are equal, round, and reactive to light.   Neck:      Thyroid: No thyromegaly.   Cardiovascular:      Rate and Rhythm: Normal rate and regular rhythm.      Heart sounds: Normal heart sounds. No murmur heard.     No friction rub. No gallop.   Pulmonary:      Effort: Pulmonary effort is normal. No respiratory distress.      Breath sounds: Normal breath sounds. No wheezing.   Abdominal:      General: Bowel sounds are normal. There is no distension.      Palpations: Abdomen is soft.      Tenderness: There is no abdominal tenderness.   Musculoskeletal:         General: No deformity or signs of injury.      Cervical back: Normal range of motion and neck supple.   Lymphadenopathy:      Cervical: No cervical adenopathy.   Skin:     General: Skin is warm and dry.      Findings: No rash.   Neurological:      Mental Status: She is alert and oriented to person, place, and time.   Psychiatric:         Mood and Affect: Mood normal.         Behavior: Behavior normal.         RECORDS REVIEW:   Discharge Summary [facility] 11/4/2024    ASSESSMENT   Diagnoses and all orders for this visit:    1. Metabolic encephalopathy (Primary)    2. Acute cystitis without hematuria    3. History of CVA (cerebrovascular accident)    4. FLASH (acute kidney injury)    5. Falls    6. GERD without esophagitis    7. Primary hypertension    8. Type 2 diabetes mellitus " without complication, without long-term current use of insulin    9. Chronic pain of both knees        Assessment & Plan  1. Metabolic encephalopathy secondary to UTI.  The condition has resolved and her mental status has returned to baseline.    2. Acute UTI.  She has completed a 6-day course of IV ceftriaxone during hospitalization. Supportive care will be continued, ensuring appropriate hydration while in the nursing facility.    3. History of multiple strokes.  Aspirin, Brilinta, and statin will be continued. Supportive care in the nursing facility for mobility will be maintained.    4. Acute kidney injury on chronic kidney disease.  Creatinine levels have improved since hospitalization. Renal function may need to be monitored intermittently.    5. Falls.  Fall precautions will be continued in the nursing facility.    6. Bipolar disorder.  The condition is stable on a regimen with Seroquel. Effexor will be continued with the Seroquel regimen.    7. GERD.  PPI will be continued.    8. Hypertension.  The condition is stable on losartan.    9. Type 2 diabetes mellitus.  Ozempic will be continued.    10. Knee pain.  Tylenol 650 mg three times a day as needed for knee pain will be started.           [x]  Discussed Patient in detail with nursing/staff, addressed all needs today.     [x]  Plan of Care Reviewed   [x]  PT/OT Reviewed   []  Order Changes  []  Discharge Plans Reviewed  [x]  Advance Directive on file with Nursing Home.   [x]  POA on file with Nursing Home.    [x]  Code Status listed and reviewed.     Art Vera DO.  11/11/2024      **Part of this note may be an electronic transcription/translation of spoken language to printed text using the Dragon Dictation System.**    Patient or patient representative verbalized consent for the use of Ambient Listening during the visit with  Art Vera DO for chart documentation. 11/11/2024  16:40 EST

## 2024-11-07 ENCOUNTER — NURSING HOME (OUTPATIENT)
Dept: INTERNAL MEDICINE | Facility: CLINIC | Age: 69
End: 2024-11-07
Payer: MEDICARE

## 2024-11-07 VITALS
RESPIRATION RATE: 16 BRPM | HEART RATE: 70 BPM | TEMPERATURE: 98.1 F | BODY MASS INDEX: 43.79 KG/M2 | SYSTOLIC BLOOD PRESSURE: 132 MMHG | DIASTOLIC BLOOD PRESSURE: 86 MMHG | OXYGEN SATURATION: 97 % | HEIGHT: 62 IN | WEIGHT: 238 LBS

## 2024-11-07 DIAGNOSIS — N17.9 AKI (ACUTE KIDNEY INJURY): ICD-10-CM

## 2024-11-07 DIAGNOSIS — M25.562 CHRONIC PAIN OF BOTH KNEES: ICD-10-CM

## 2024-11-07 DIAGNOSIS — N30.00 ACUTE CYSTITIS WITHOUT HEMATURIA: ICD-10-CM

## 2024-11-07 DIAGNOSIS — G89.29 CHRONIC PAIN OF BOTH KNEES: ICD-10-CM

## 2024-11-07 DIAGNOSIS — E11.9 TYPE 2 DIABETES MELLITUS WITHOUT COMPLICATION, WITHOUT LONG-TERM CURRENT USE OF INSULIN: ICD-10-CM

## 2024-11-07 DIAGNOSIS — G93.41 METABOLIC ENCEPHALOPATHY: Primary | ICD-10-CM

## 2024-11-07 DIAGNOSIS — K21.9 GERD WITHOUT ESOPHAGITIS: ICD-10-CM

## 2024-11-07 DIAGNOSIS — I10 PRIMARY HYPERTENSION: ICD-10-CM

## 2024-11-07 DIAGNOSIS — Z86.73 HISTORY OF CVA (CEREBROVASCULAR ACCIDENT): ICD-10-CM

## 2024-11-07 DIAGNOSIS — M25.561 CHRONIC PAIN OF BOTH KNEES: ICD-10-CM

## 2024-11-07 DIAGNOSIS — R29.6 FALLS: ICD-10-CM

## 2024-11-07 PROCEDURE — 99305 1ST NF CARE MODERATE MDM 35: CPT | Performed by: INTERNAL MEDICINE

## 2024-11-07 NOTE — LETTER
Nursing Home History and Physical       Art Fordhar   [address] [place] [address] Phone: [phone]  Fax: [phone]     PATIENT NAME: Olga Sandoval                                                                          YOB: 1955           DATE OF SERVICE: 11/07/2024  FACILITY:  [facility]    CHIEF COMPLAINT:  Nursing facility admission    History of Present Illness  The patient is a 69-year-old female with a history of COPD, frequent falls, hyperlipidemia, hypertension, Bell's palsy, bipolar disorder, multiple strokes, and carotid artery aneurysm status post pipeline embolization. She recently presented to [facility] for worsening confusion, slurred speech, and vomiting. She was admitted for altered mental status with initial concerns of a stroke.    She was continued on a regimen of aspirin, Brilinta, and a statin. However, it was determined that her acute presentation was likely due to a urinary tract infection (UTI). She was treated with 6 days of IV ceftriaxone, resulting in significant improvement in her mental status. Following this, she was transferred to this facility for further strengthening and rehabilitation due to debility and weakness. She is accompanied by a nurse.    Currently, she reports no confusion and is alert and oriented. She is eating well and actively participating in therapy. There are no issues with her medication regimen. She has trace edema in her legs and is experiencing knee pain.     ALLERGIES  She is allergic to CODEINE.       PAST MEDICAL & SURGICAL HISTORY:   Past Medical History:   Diagnosis Date   • Aneurysm     left carotid   • Bipolar 2 disorder    • Diabetes mellitus    • H/O Bell's palsy     Left sided facial droop   • History of loop recorder    • Hyperlipidemia    • Hypertension    • Stroke     x 4      Past Surgical History:   Procedure Laterality Date   • CARDIAC CATHETERIZATION     • CATARACT EXTRACTION  Bilateral    •  SECTION           MEDICATIONS:  I have reviewed and reconciled the patients medication list in the patients chart at the [facility] nursing [facility] on 2024.      ALLERGIES:  Allergies   Allergen Reactions   • Codeine Palpitations and Unknown (See Comments)     Other reaction(s): Unknown         SOCIAL HISTORY:  Social History     Socioeconomic History   • Marital status:    Tobacco Use   • Smoking status: Former     Current packs/day: 0.00     Average packs/day: 0.8 packs/day for 5.0 years (3.8 ttl pk-yrs)     Types: Cigarettes     Start date:      Quit date: 2017     Years since quittin.8   • Smokeless tobacco: Never   Vaping Use   • Vaping status: Never Used   Substance and Sexual Activity   • Alcohol use: Never   • Drug use: Never   • Sexual activity: Defer       FAMILY HISTORY:  Family History   Problem Relation Age of Onset   • Hypertension Mother    • Lung cancer Mother    • Cataracts Mother    • Transient ischemic attack Father    • Hypertension Father    • Cancer Father    • Heart attack Brother    • Stroke Brother         REVIEW OF SYSTEMS:  Review of Systems   Constitutional:  Negative for chills, fatigue and fever.   HENT:  Negative for congestion, ear pain, rhinorrhea, sinus pressure and sore throat.    Eyes:  Negative for visual disturbance.   Respiratory:  Negative for cough, chest tightness, shortness of breath and wheezing.    Cardiovascular:  Negative for chest pain, palpitations and leg swelling.   Gastrointestinal:  Negative for abdominal pain, blood in stool, constipation, diarrhea, nausea and vomiting.   Endocrine: Negative for polydipsia and polyuria.   Genitourinary:  Negative for dysuria and hematuria.   Musculoskeletal:  Negative for arthralgias and back pain.   Skin:  Negative for rash.   Neurological:  Negative for dizziness, light-headedness, numbness and headaches.   Psychiatric/Behavioral:  Negative for dysphoric mood and sleep disturbance.  "The patient is not nervous/anxious.        PHYSICAL EXAMINATION:   VITAL SIGNS: /86   Pulse 70   Temp 98.1 °F (36.7 °C)   Resp 16   Ht 157.5 cm (62\")   Wt 108 kg (238 lb)   SpO2 97%   BMI 43.53 kg/m²     Physical Exam  Vitals and nursing note reviewed.   Constitutional:       Appearance: Normal appearance. She is well-developed.   HENT:      Head: Normocephalic and atraumatic.      Nose: Nose normal.      Mouth/Throat:      Mouth: Mucous membranes are moist.      Pharynx: No oropharyngeal exudate.   Eyes:      General: No scleral icterus.     Conjunctiva/sclera: Conjunctivae normal.      Pupils: Pupils are equal, round, and reactive to light.   Neck:      Thyroid: No thyromegaly.   Cardiovascular:      Rate and Rhythm: Normal rate and regular rhythm.      Heart sounds: Normal heart sounds. No murmur heard.     No friction rub. No gallop.   Pulmonary:      Effort: Pulmonary effort is normal. No respiratory distress.      Breath sounds: Normal breath sounds. No wheezing.   Abdominal:      General: Bowel sounds are normal. There is no distension.      Palpations: Abdomen is soft.      Tenderness: There is no abdominal tenderness.   Musculoskeletal:         General: No deformity or signs of injury.      Cervical back: Normal range of motion and neck supple.   Lymphadenopathy:      Cervical: No cervical adenopathy.   Skin:     General: Skin is warm and dry.      Findings: No rash.   Neurological:      Mental Status: She is alert and oriented to person, place, and time.   Psychiatric:         Mood and Affect: Mood normal.         Behavior: Behavior normal.         RECORDS REVIEW:   Discharge Summary formerly [facility] 11/4/2024    ASSESSMENT   Diagnoses and all orders for this visit:    1. Metabolic encephalopathy (Primary)    2. Acute cystitis without hematuria    3. History of CVA (cerebrovascular accident)    4. FLASH (acute kidney injury)    5. Falls    6. GERD without esophagitis    7. Primary hypertension    8. Type 2 " diabetes mellitus without complication, without long-term current use of insulin    9. Chronic pain of both knees        Assessment & Plan  1. Metabolic encephalopathy secondary to UTI.  The condition has resolved and her mental status has returned to baseline.    2. Acute UTI.  She has completed a 6-day course of IV ceftriaxone during hospitalization. Supportive care will be continued, ensuring appropriate hydration while in the nursing facility.    3. History of multiple strokes.  Aspirin, Brilinta, and statin will be continued. Supportive care in the nursing facility for mobility will be maintained.    4. Acute kidney injury on chronic kidney disease.  Creatinine levels have improved since hospitalization. Renal function may need to be monitored intermittently.    5. Falls.  Fall precautions will be continued in the nursing facility.    6. Bipolar disorder.  The condition is stable on a regimen with Seroquel. Effexor will be continued with the Seroquel regimen.    7. GERD.  PPI will be continued.    8. Hypertension.  The condition is stable on losartan.    9. Type 2 diabetes mellitus.  Ozempic will be continued.    10. Knee pain.  Tylenol 650 mg three times a day as needed for knee pain will be started.           [x]  Discussed Patient in detail with nursing/staff, addressed all needs today.     [x]  Plan of Care Reviewed   [x]  PT/OT Reviewed   []  Order Changes  []  Discharge Plans Reviewed  [x]  Advance Directive on file with Nursing Home.   [x]  POA on file with Nursing Home.    [x]  Code Status listed and reviewed.     Art Vera DO.  11/11/2024      **Part of this note may be an electronic transcription/translation of spoken language to printed text using the Dragon Dictation System.**    Patient or patient representative verbalized consent for the use of Ambient Listening during the visit with  Art Vera DO for chart documentation. 11/11/2024  16:40 EST

## 2024-11-08 ENCOUNTER — TELEPHONE (OUTPATIENT)
Dept: FAMILY MEDICINE CLINIC | Facility: CLINIC | Age: 69
End: 2024-11-08
Payer: MEDICARE

## 2024-11-08 NOTE — PAYOR COMM NOTE
"  Ref# [id number]   Discharge Summary    AURY Bills, RN  Utilization Review  Phone [phone]  Fax [phone]    [place] [address]           Olga Adamson (69 y.o. Female)       Date of Birth   1955    Social Security Number       Address   [address] [place] [address]    Home Phone   [phone]    MRN   [id number]       [employer]   [employer]    Marital Status                               Admission Date   10/23/24    Admission Type   Emergency    Admitting Provider   Zainab Wilson II, DO    Attending Provider       Department, Room/Bed   [facility] 3E, S332/1       Discharge Date   2024    Discharge Disposition   Skilled Nursing Facility (DC - External)    Discharge Destination                                 Attending Provider: (none)   Allergies: Codeine    Isolation: None   Infection: None   Code Status: Prior    Ht: 157.5 cm (62\")   Wt: 109 kg (241 lb)    Admission Cmt: None   Principal Problem: Dysarthria [R47.1]                   Active Insurance as of 10/23/2024       Primary Coverage       Payor Plan Insurance Group Employer/Plan Group    ANTHEM MEDICARE REPLACEMENT ANTHEM MEDICARE ADVANTAGE [id number]       Payor Plan Address Payor Plan Phone Number Payor Plan Fax Number Effective Dates     [address] [phone]  2022 - None Entered    [facility] [address]         Subscriber Name Subscriber Birth Date Member ID       OLGA ADAMSON 1955 [id number]                     Emergency Contacts        (Rel.) Home Phone Work Phone Mobile Phone    UMER HARTLEY (Daughter) [phone] -- [phone]    Libia Vasques (Daughter) -- -- [phone]                 Discharge Summary        Zainab Wilson II, DO at 24 0735              [facility] Medicine Services  DISCHARGE SUMMARY    Patient Name: Olga Adamson  : 1955  MRN: " [id number]    Date of Admission: 10/23/2024  5:17 PM  Date of Discharge:  11/4/2024  Primary Care Physician: Ileana Kerr MD    Consults       Date and Time Order Name Status Description    10/23/2024  5:17 PM Inpatient Neurology Consult Stroke Completed             Hospital Course     Presenting Problem: dysarthria    Active Hospital Problems   No active problems to display.      Resolved Hospital Problems    Diagnosis Date Resolved POA    **Dysarthria [R47.1] 11/04/2024 Yes    UTI (urinary tract infection) [N39.0] 11/04/2024 Yes          Hospital Course:  Olga Sandoval is a 69 y.o. 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 She was found on the floor by family member.      Labs notable for UA with leuk esterase, too numerous to count WBCs, 2+ bacteria, 21-30 squamous cells, WBCs 16, lactic 1.8. CXR with no acute abnormality, CTA head/neck with w chronic moderate stenoses with b/l cervical internal carotid arteries, moderate stenoses within the b/l PCAs, 1cm aneurysm from left cavernous segment ICA.      Altered mental status, resolved.  -Suspect due to UTI, improved with tx as below.  -Stroke Neurology followed, recommended aspirin 81 mg daily, Brilinta 90 mg twice daily, atorvastatin 80 mg daily. MRI brain showed multiple chronic cortical infarcts, chronic lacunar infarct in right thalamus  -Continue treatment of suspected UTI. Continue reduced dose Seroquel at d/c.  -PT/OT recommending SNF on discharge. Transfer to [facility].     Acute UTI  Leukocytosis with neutrophilia  -Urine culture growing >100k mixed tavo however symptomatic as above so treated with 6d IV ceftriaxone w/ improvement.     FLASH on suspected CKD  -Cr improved with  fluids on admission.     Discharge Follow Up Recommendations for outpatient labs/diagnostics:   PCP 1 week after d/c from SNF.    Day of Discharge     HPI: Up in bed. Had a good night. No issues. Looking forward to d/c.    Review of Systems  Gen- No fevers, chills  CV- No chest pain, palpitations  Resp- No cough, dyspnea  GI- No N/V/D, abd pain    Vital Signs:   Temp:  [98 °F (36.7 °C)-98.6 °F (37 °C)] 98.2 °F (36.8 °C)  Heart Rate:  [55-66] 65  Resp:  [16-18] 18  BP: (127-161)/(51-67) 150/59      Physical Exam:  Constitutional: No acute distress, awake, alert  HENT: NCAT, mucous membranes moist  Respiratory: Clear to auscultation bilaterally, respiratory effort normal   Cardiovascular: RRR, no murmurs, rubs, or gallops  Gastrointestinal: Positive bowel sounds, soft, nontender, nondistended  Musculoskeletal: No bilateral ankle edema  Psychiatric: Appropriate affect, cooperative  Neurologic: Oriented x 3, strength symmetric in all extremities, Cranial Nerves grossly intact to confrontation, speech clear  Skin: No rashes     Pertinent  and/or Most Recent Results     LAB RESULTS:      Lab 10/30/24  1032 10/29/24  1332   WBC 6.60 8.02   HEMOGLOBIN 11.8* 13.0   HEMATOCRIT 38.0 39.6   PLATELETS 335 366   NEUTROS ABS 4.22 4.92   IMMATURE GRANS (ABS) 0.02 0.02   LYMPHS ABS 1.35 1.64   MONOS ABS 0.63 1.02*   EOS ABS 0.35 0.38   MCV 90.9 88.6         Lab 10/30/24  1032 10/29/24  1332   SODIUM 140 139   POTASSIUM 3.8 4.1   CHLORIDE 106 104   CO2 24.0 21.0*   ANION GAP 10.0 14.0   BUN 21 19   CREATININE 0.94 0.96   EGFR 65.0 63.4   GLUCOSE 152* 135*   CALCIUM 9.4 9.4                         Brief Urine Lab Results  (Last result in the past 365 days)        Color   Clarity   Blood   Leuk Est   Nitrite   Protein   CREAT   Urine HCG        10/23/24 1937 Yellow   Turbid   Trace   Moderate (2+)   Negative   30 mg/dL (1+)                 Microbiology Results (last 10 days)       ** No results found for the last 240 hours. **             CT Head Without Contrast    Result Date: 10/28/2024  CT HEAD WO CONTRAST Date of Exam: 10/28/2024 6:20 PM EDT Indication: facial numbness. Comparison: 10/24/2024 Technique: Axial CT images were obtained of the head without contrast administration.  Automated exposure control and iterative construction methods were used. Findings: No intracranial hemorrhage. Gray-white matter differentiation is maintained without evidence of an acute infarction. Multiple foci of decreased attenuation are present within the subcortical, deep cerebral, and periventricular white matter consistent with chronic small vessel/microangiopathic ischemic changes. No extra-axial mass or collection. The ventricles and sulci are prominent commensurate with involutional changes. The posterior fossa appears grossly normal. Sellar and suprasellar structures are normal. Lens replacements. The paranasal sinuses, ethmoid air cells, and mastoid air cells are aerated. The bony calvarium is intact.     Impression: No acute intracranial pathology. Electronically Signed: Robson Cutler MD  10/28/2024 6:48 PM EDT  Workstation ID: [id number]    MRI Brain Without Contrast    Result Date: 10/25/2024  MRI BRAIN WO CONTRAST Date of Exam: 10/24/2024 11:59 PM EDT Indication: stroke r/o.  Comparison: CT head with angiography 10/23/2024. Technique:  Routine multiplanar/multisequence sequence images of the brain were obtained without contrast administration. Findings: Study is limited by motion despite motion mitigating technique. Patient refused SWI sequence. There is no diffusion restriction to suggest acute infarct. There are confluent regions of FLAIR hyperintense signal within the cerebral white matter. There is a chronic cortical infarct in the posterior right frontal lobe. There is a chronic infarct in the left temporal occipital region. Cerebellum is unremarkable. There are patchy FLAIR hyperintensities in the central tc. Midline structures appear intact.  Calvarial and superficial soft tissue signal is within normal limits. Flow voids not assessed. No mass effect, midline shift or abnormal extra-axial collection. There is a chronic lacunar infarction in the right thalamus. There is generalized parenchymal  atrophy. There is a left mastoid effusion.     Impression: Motion limited study demonstrates multiple chronic cortical infarcts, advanced chronic small vessel ischemic change and a chronic lacunar infarct in the right thalamus. No definite acute findings Electronically Signed: Vick Kathleen MD  10/25/2024 4:31 AM EDT  Workstation ID: [id number]    Adult Transthoracic Echo Complete W/ Cont if Necessary Per Protocol (With Agitated Saline)    Result Date: 10/24/2024    Left ventricular ejection fraction appears to be 51 - 55%.   Unable to fully assess valvular function due to suboptimal windows.     EEG    Result Date: 10/24/2024  Reason for referral: 69 y.o.female with altered mental status Technical Summary:  A 19 channel digital EEG was performed using the international 10-20 placement system, including eye leads and EKG leads. Duration: 17 minutes Findings: The patient is awake.  Diffuse medium amplitude 2-5 Hz intermixed delta and theta activity is present symmetrically over both hemispheres.  EMG and movement artifact are variably prominent.  A clear posterior rhythm is not seen.  As a study proceeds, at times faster 5 to 6 Hz theta frequencies become more prominent.  No focal features or epileptiform activity are seen.  Photic stimulation does not change the background.  Hyperventilation is not performed. Video: Available Technical quality: Good EKG: Regular, 80 bpm SUMMARY: Moderate generalized slow No focal features or epileptiform activity are seen     Diffuse cerebral dysfunction moderate degree but nonspecific No evidence for epilepsy is present This report is transcribed using the Dragon dictation system.       Results for orders placed during the hospital  encounter of 09/05/23    Duplex Carotid Ultrasound CAR    Interpretation Summary    Right internal carotid artery demonstrates a less than 50% stenosis.    Left internal carotid artery demonstrates a less than 50% stenosis.    Bilateral antegrade vertebral artery flow.    No previous for comparison      Results for orders placed during the hospital encounter of 09/05/23    Duplex Carotid Ultrasound CAR    Interpretation Summary    Right internal carotid artery demonstrates a less than 50% stenosis.    Left internal carotid artery demonstrates a less than 50% stenosis.    Bilateral antegrade vertebral artery flow.    No previous for comparison      Results for orders placed during the hospital encounter of 10/23/24    Adult Transthoracic Echo Complete W/ Cont if Necessary Per Protocol (With Agitated Saline)    Interpretation Summary    Left ventricular ejection fraction appears to be 51 - 55%.    Unable to fully assess valvular function due to suboptimal windows.      Plan for Follow-up of Pending Labs/Results:     Discharge Details        Discharge Medications        New Medications        Instructions Start Date   amLODIPine 10 MG tablet  Commonly known as: NORVASC   10 mg, Oral, Every 24 Hours Scheduled   Start Date: November 5, 2024     carvedilol 3.125 MG tablet  Commonly known as: COREG   3.125 mg, Oral, 2 Times Daily With Meals             Changes to Medications        Instructions Start Date   losartan 50 MG tablet  Commonly known as: COZAAR  What changed: how much to take   100 mg, Oral, Daily      QUEtiapine 300 MG tablet  Commonly known as: SEROquel  What changed: how much to take   300 mg, Oral, Nightly             Continue These Medications        Instructions Start Date   aspirin 81 MG chewable tablet   81 mg, Daily      atorvastatin 80 MG tablet  Commonly known as: LIPITOR   80 mg, Oral, Nightly      cholecalciferol 25 MCG (1000 UT) tablet  Commonly known as: VITAMIN D3   1,000 Units, Daily       lansoprazole 15 MG capsule  Commonly known as: PREVACID   15 mg, Oral, Daily      magnesium oxide 400 MG tablet  Commonly known as: MAG-OX   400 mg, Daily      metFORMIN  MG 24 hr tablet  Commonly known as: GLUCOPHAGE-XR   1,000 mg, Oral, Every 12 Hours Scheduled      Ozempic (1 MG/DOSE) 4 MG/3ML solution pen-injector  Generic drug: Semaglutide (1 MG/DOSE)   1 mg, Subcutaneous, Weekly, Patient takes on Friday      ticagrelor 90 MG tablet tablet  Commonly known as: BRILINTA   90 mg, Oral, 2 Times Daily      venlafaxine  MG 24 hr capsule  Commonly known as: EFFEXOR-XR   150 mg, Oral, Daily               Allergies   Allergen Reactions    Codeine Palpitations and Unknown (See Comments)     Other reaction(s): Unknown         Discharge Disposition:  Skilled Nursing Facility (DC - External)    Diet:  Hospital:  Diet Order   Procedures    Diet: Cardiac, Diabetic; Healthy Heart (2-3 Na+); Consistent Carbohydrate; Texture: Regular (IDDSI 7); Fluid Consistency: Thin (IDDSI 0)            Activity:      Restrictions or Other Recommendations:         CODE STATUS:    Code Status and Medical Interventions: CPR (Attempt to Resuscitate); Full Support   Ordered at: 10/24/24 1550     Level Of Support Discussed With:    Patient     Code Status (Patient has no pulse and is not breathing):    CPR (Attempt to Resuscitate)     Medical Interventions (Patient has pulse or is breathing):    Full Support       Future Appointments   Date Time Provider Department Center   2/10/2025  1:30 PM Ileana Kerr MD [facility]   10/16/2025 10:00 AM Ileana Kerr MD [facility]  [facility]       Additional Instructions for the Follow-ups that You Need to Schedule       Discharge Follow-up with PCP   As directed       Currently Documented PCP:    Ileana Kerr MD    PCP Phone Number:    [phone]     Follow Up Details: 1 week upon release from                       Zainab Wilson II, DO  11/04/24      Time Spent on  Discharge:  I spent  33  minutes on this discharge activity which included: face-to-face encounter with the patient, reviewing the data in the system, coordination of the care with the nursing staff as well as consultants, documentation, and entering orders.           Electronically signed by Zainab Harris II, DO at 11/04/24 1232       Discharge Order (From admission, onward)       Start     Ordered    11/04/24 0942  Discharge patient  Once        Expected Discharge Date: 11/04/24   Discharge Disposition: Skilled Nursing Facility (DC - External)   Physician of Record for Attribution - Please select from Treatment Team: ZAINAB HARRIS II [[id number]]   Review needed by CMO to determine Physician of Record: No      Question Answer Comment   Physician of Record for Attribution - Please select from Treatment Team ZAINAB HARRIS II    Review needed by CMO to determine Physician of Record No        11/04/24 0945

## 2024-11-08 NOTE — TELEPHONE ENCOUNTER
Caller: [facility]    Relationship: Self    Best call back number: [phone]     What orders are you requesting (i.e. lab or imaging): VERBAL ORDERS NEEDED THAT PROVIDER WILL FOLLOW PATIENT WITH HOME HEALTH     In what timeframe would the patient need to come in: ASAP    Where will you receive your lab/imaging services: IN HOME    Additional notes: [facility] NEEDING VERBAL ORDERS TODAY TO START HOME HEALTH ON MONDAY.

## 2024-11-11 NOTE — TELEPHONE ENCOUNTER
[facility] CALLED BACK. I ADVISED OF PCP'S MESSAGE FROM 11/8/24. SHE SAID THANK YOU. SHE WILL GO OPEN A CASE ON PATIENT ASAP.

## 2024-11-12 LAB — QT INTERVAL: 356 MS

## 2024-11-13 ENCOUNTER — OFFICE VISIT (OUTPATIENT)
Dept: FAMILY MEDICINE CLINIC | Facility: CLINIC | Age: 69
End: 2024-11-13
Payer: MEDICARE

## 2024-11-13 VITALS
DIASTOLIC BLOOD PRESSURE: 72 MMHG | TEMPERATURE: 98.6 F | OXYGEN SATURATION: 99 % | HEART RATE: 95 BPM | SYSTOLIC BLOOD PRESSURE: 124 MMHG

## 2024-11-13 DIAGNOSIS — R41.82 ALTERED MENTAL STATUS, UNSPECIFIED ALTERED MENTAL STATUS TYPE: Primary | ICD-10-CM

## 2024-11-13 DIAGNOSIS — I10 HYPERTENSION, UNSPECIFIED TYPE: ICD-10-CM

## 2024-11-13 DIAGNOSIS — H92.21 BLEEDING FROM RIGHT EAR: ICD-10-CM

## 2024-11-13 DIAGNOSIS — F31.81 BIPOLAR 2 DISORDER: ICD-10-CM

## 2024-11-13 PROCEDURE — 1126F AMNT PAIN NOTED NONE PRSNT: CPT | Performed by: STUDENT IN AN ORGANIZED HEALTH CARE EDUCATION/TRAINING PROGRAM

## 2024-11-13 PROCEDURE — 3074F SYST BP LT 130 MM HG: CPT | Performed by: STUDENT IN AN ORGANIZED HEALTH CARE EDUCATION/TRAINING PROGRAM

## 2024-11-13 PROCEDURE — 1111F DSCHRG MED/CURRENT MED MERGE: CPT | Performed by: STUDENT IN AN ORGANIZED HEALTH CARE EDUCATION/TRAINING PROGRAM

## 2024-11-13 PROCEDURE — 1160F RVW MEDS BY RX/DR IN RCRD: CPT | Performed by: STUDENT IN AN ORGANIZED HEALTH CARE EDUCATION/TRAINING PROGRAM

## 2024-11-13 PROCEDURE — 99214 OFFICE O/P EST MOD 30 MIN: CPT | Performed by: STUDENT IN AN ORGANIZED HEALTH CARE EDUCATION/TRAINING PROGRAM

## 2024-11-13 PROCEDURE — 3078F DIAST BP <80 MM HG: CPT | Performed by: STUDENT IN AN ORGANIZED HEALTH CARE EDUCATION/TRAINING PROGRAM

## 2024-11-13 PROCEDURE — 3044F HG A1C LEVEL LT 7.0%: CPT | Performed by: STUDENT IN AN ORGANIZED HEALTH CARE EDUCATION/TRAINING PROGRAM

## 2024-11-13 PROCEDURE — 1159F MED LIST DOCD IN RCRD: CPT | Performed by: STUDENT IN AN ORGANIZED HEALTH CARE EDUCATION/TRAINING PROGRAM

## 2024-11-13 RX ORDER — QUETIAPINE FUMARATE 300 MG/1
300 TABLET, FILM COATED ORAL NIGHTLY
Qty: 90 TABLET | Refills: 1
Start: 2024-11-13

## 2024-11-13 RX ORDER — AMLODIPINE BESYLATE 10 MG/1
10 TABLET ORAL
Qty: 90 TABLET | Refills: 1
Start: 2024-11-13

## 2024-11-13 RX ORDER — CARVEDILOL 3.12 MG/1
3.12 TABLET ORAL 2 TIMES DAILY WITH MEALS
Qty: 180 TABLET | Refills: 1
Start: 2024-11-13

## 2024-11-13 RX ORDER — LOSARTAN POTASSIUM 100 MG/1
100 TABLET ORAL DAILY
Qty: 90 TABLET | Refills: 1
Start: 2024-11-13

## 2024-11-13 NOTE — PROGRESS NOTES
Transitional Care Follow Up Visit  Subjective     Olga Sandoval is a 69 y.o. female who presents for a hospital follow-up visit     Current outpatient and discharge medications have been reconciled for the patient.  Reviewed by: Ileana Kerr MD      History of Present Illness   Course During Hospital Stay: Patient was admitted on 10/23/2024 until 11/4/2024 for altered mental status.  She was found down by family member.  She had worsening confusion and slurred speech.  During her hospital admission, they thought that this was secondary to urinary tract infection.  She was treated with a 6-day course of IV Rocephin with improvement in symptoms.  Stroke neurology was on board.  Patient had already been on aspirin, Brilinta and atorvastatin.  They did adjust her blood pressure medication and added amlodipine 10 mg daily and carvedilol 3.125 mg twice daily.  She has been tolerating the medications well.  The MRI showed multiple chronic cortical infarcts and chronic lacunar infarct in the right thalamus.  Patient ended up going to rehab and be starting home health soon.  She feels much better overall and denies any urinary symptoms.    They also decreased her Seroquel to 300 mg daily.  She has been feeling fine.     The following portions of the patient's history were reviewed and updated as appropriate: allergies, current medications, past family history, past medical history, past social history, past surgical history, and problem list.      Objective   /72   Pulse 95   Temp 98.6 °F (37 °C) (Infrared)   SpO2 99%   Physical Exam  Vitals reviewed.   Constitutional:       Appearance: Normal appearance.      Comments: Sitting in wheelchair   HENT:      Head: Normocephalic.      Right Ear: Tympanic membrane normal.      Left Ear: Tympanic membrane normal.      Ears:      Comments: Right ear with healed lesion, appears to have clotted     Mouth/Throat:      Mouth: Mucous membranes are moist.   Cardiovascular:       Rate and Rhythm: Normal rate and regular rhythm.   Pulmonary:      Effort: Pulmonary effort is normal.      Breath sounds: Normal breath sounds.   Abdominal:      General: Abdomen is flat.      Palpations: Abdomen is soft.   Musculoskeletal:      Comments: Moving all extremities spontaneously,  Left-sided weakness in both upper and lower extremities, wheelchair-bound   Skin:     General: Skin is warm.   Neurological:      Mental Status: She is alert and oriented to person, place, and time. Mental status is at baseline.      Comments: Left-sided facial droop   Psychiatric:         Mood and Affect: Mood normal.         Behavior: Behavior normal.         Assessment & Plan   Diagnoses and all orders for this visit:    1. Altered mental status, unspecified altered mental status type (Primary)  Assessment & Plan:  - Patient was hospitalized for altered mental status, likely secondary to urinary tract infection as there was improvement after IV ceftriaxone  - Patient is back to baseline and is doing much better per the daughter, aphasia has been better as well      2. Hypertension, unspecified type  Assessment & Plan:  - Controlled  - Continue losartan 100 mg daily (was increased at her last hospitalization)  -Patient had amlodipine 10 mg daily and carvedilol 3.125 mg twice daily added during her hospitalization and and she has been tolerating it well, so we will continue    Orders:  -     amLODIPine (NORVASC) 10 MG tablet; Take 1 tablet by mouth Daily.  Dispense: 90 tablet; Refill: 1  -     carvedilol (COREG) 3.125 MG tablet; Take 1 tablet by mouth 2 (Two) Times a Day With Meals.  Dispense: 180 tablet; Refill: 1  -     losartan (COZAAR) 100 MG tablet; Take 1 tablet by mouth Daily. These note that only 1 tablet needs to be taken.  Dispense: 90 tablet; Refill: 1    3. Bleeding from right ear  Assessment & Plan:  - Patient appears to have a lesion in her right ear that was bleeding but appears to have clotted off, never  did end up going to ENT, okay to monitor for now, will continue to evaluate at subsequent visits      4. Bipolar 2 disorder  Assessment & Plan:  - Stable  - Will continue lower dose of Seroquel 300 mg, which was decreased during her hospitalization and will continue venlafaxine 150 mg daily    Orders:  -     QUEtiapine (SEROquel) 300 MG tablet; Take 1 tablet by mouth Every Night.  Dispense: 90 tablet; Refill: 1      Follow-up as scheduled or sooner if needed

## 2024-11-13 NOTE — ASSESSMENT & PLAN NOTE
- Controlled  - Continue losartan 100 mg daily (was increased at her last hospitalization)  -Patient had amlodipine 10 mg daily and carvedilol 3.125 mg twice daily added during her hospitalization and and she has been tolerating it well, so we will continue

## 2024-11-13 NOTE — ASSESSMENT & PLAN NOTE
- Stable  - Will continue lower dose of Seroquel 300 mg, which was decreased during her hospitalization and will continue venlafaxine 150 mg daily

## 2024-11-13 NOTE — ASSESSMENT & PLAN NOTE
- Patient was hospitalized for altered mental status, likely secondary to urinary tract infection as there was improvement after IV ceftriaxone  - Patient is back to baseline and is doing much better per the daughter, aphasia has been better as well

## 2024-11-13 NOTE — ASSESSMENT & PLAN NOTE
- Patient appears to have a lesion in her right ear that was bleeding but appears to have clotted off, never did end up going to ENT, okay to monitor for now, will continue to evaluate at subsequent visits

## 2024-11-20 ENCOUNTER — TELEPHONE (OUTPATIENT)
Dept: FAMILY MEDICINE CLINIC | Facility: CLINIC | Age: 69
End: 2024-11-20

## 2024-11-20 NOTE — TELEPHONE ENCOUNTER
Caller: CARI    Relationship: OCCUPATIONAL THERAPIST FOR CARE [facility].      Best call back number: [phone]     What orders are you requesting (i.e. lab or imaging): OT 1XWEEK FOR 4 WEEKS STARTING NEXT WEEK.    In what timeframe would the patient need to come in: N/A    Where will you receive your lab/imaging services: HOME    Additional notes: PLEASE CALL CARI TO GIVE A VERBAL AUTHORIZATION.    THANK YOU

## 2024-11-25 ENCOUNTER — TELEPHONE (OUTPATIENT)
Dept: FAMILY MEDICINE CLINIC | Facility: CLINIC | Age: 69
End: 2024-11-25
Payer: MEDICARE

## 2024-11-25 NOTE — TELEPHONE ENCOUNTER
Patient daughter notified of providers response message and verbalized understanding.   Recommend continue regular diet upon discharge. Recommend follow up visit with Transplant MD and outpatient RD for dietary modifications as warranted./yes

## 2024-11-25 NOTE — TELEPHONE ENCOUNTER
Maritza the occupational therapist is calling. She  went to house today and had normal vitals. They worked on some transfers and then patient got dizzy and had to sit down. She then began vomiting repeatedly. Current BP is 132/68. Please advise.

## 2024-12-09 DIAGNOSIS — F31.81 BIPOLAR 2 DISORDER: ICD-10-CM

## 2024-12-09 DIAGNOSIS — I10 HYPERTENSION, UNSPECIFIED TYPE: ICD-10-CM

## 2024-12-09 RX ORDER — AMLODIPINE BESYLATE 5 MG/1
5 TABLET ORAL DAILY
Qty: 90 TABLET | Refills: 0 | Status: SHIPPED | OUTPATIENT
Start: 2024-12-09

## 2024-12-09 RX ORDER — QUETIAPINE FUMARATE 300 MG/1
300 TABLET, FILM COATED ORAL NIGHTLY
Qty: 90 TABLET | Refills: 0 | Status: SHIPPED | OUTPATIENT
Start: 2024-12-09

## 2024-12-09 RX ORDER — CARVEDILOL 3.12 MG/1
3.12 TABLET ORAL 2 TIMES DAILY WITH MEALS
Qty: 180 TABLET | Refills: 0 | Status: SHIPPED | OUTPATIENT
Start: 2024-12-09

## 2024-12-09 RX ORDER — LOSARTAN POTASSIUM 100 MG/1
100 TABLET ORAL DAILY
Qty: 90 TABLET | Refills: 0 | Status: SHIPPED | OUTPATIENT
Start: 2024-12-09

## 2024-12-09 NOTE — TELEPHONE ENCOUNTER
Caller: UMER HARTLEY    Relationship: Emergency Contact    Best call back number: [phone]    Which medication are you concerned about: ALL MEDICATIONS REFILLED ON 11/13/2024    Who prescribed you this medication: DR BURNETT    What are your concerns: ALL THE REFILLS THAT WERE SENT ON 11/13/2024 PHARMACY NEVER RECEIVED. PLEASE RE SEND TO PHARMACY AND CALL UMER BACK WHEN THIS HAS BEEN COMPLETED.

## 2025-01-24 ENCOUNTER — TELEPHONE (OUTPATIENT)
Dept: FAMILY MEDICINE CLINIC | Facility: CLINIC | Age: 70
End: 2025-01-24
Payer: MEDICARE

## 2025-03-19 DIAGNOSIS — I10 HYPERTENSION, UNSPECIFIED TYPE: ICD-10-CM

## 2025-03-19 RX ORDER — LOSARTAN POTASSIUM 100 MG/1
100 TABLET ORAL DAILY
Qty: 90 TABLET | Refills: 0 | Status: SHIPPED | OUTPATIENT
Start: 2025-03-19

## 2025-03-19 RX ORDER — CARVEDILOL 3.12 MG/1
3.12 TABLET ORAL 2 TIMES DAILY WITH MEALS
Qty: 180 TABLET | Refills: 0 | Status: SHIPPED | OUTPATIENT
Start: 2025-03-19

## 2025-03-19 RX ORDER — AMLODIPINE BESYLATE 5 MG/1
5 TABLET ORAL DAILY
Qty: 90 TABLET | Refills: 0 | OUTPATIENT
Start: 2025-03-19

## 2025-04-04 RX ORDER — AMLODIPINE BESYLATE 5 MG/1
5 TABLET ORAL DAILY
Qty: 90 TABLET | Refills: 0 | OUTPATIENT
Start: 2025-04-04

## 2025-04-04 NOTE — TELEPHONE ENCOUNTER
Rx Refill Note  Requested Prescriptions     Pending Prescriptions Disp Refills    amLODIPine (NORVASC) 5 MG tablet [Pharmacy Med Name: AMLODIPINE BESYLATE 5MG TABLETS] 90 tablet 0     Sig: TAKE 1 TABLET BY MOUTH DAILY      Last office visit with prescribing clinician: Visit date not found   Last telemedicine visit with prescribing clinician: Visit date not found   Next office visit with prescribing clinician: Visit date not found                         Would you like a call back once the refill request has been completed: [] Yes [] No    If the office needs to give you a call back, can they leave a voicemail: [] Yes [] No    Huyen Snow MA  04/04/25, 08:21 EDT

## 2025-04-29 ENCOUNTER — HOSPITAL ENCOUNTER (OUTPATIENT)
Facility: HOSPITAL | Age: 70
Setting detail: OBSERVATION
Discharge: HOME | End: 2025-05-02
Attending: EMERGENCY MEDICINE | Admitting: INTERNAL MEDICINE
Payer: COMMERCIAL

## 2025-04-29 ENCOUNTER — APPOINTMENT (OUTPATIENT)
Dept: GENERAL RADIOLOGY | Facility: HOSPITAL | Age: 70
End: 2025-04-29
Payer: MEDICARE

## 2025-04-29 ENCOUNTER — APPOINTMENT (OUTPATIENT)
Dept: MRI IMAGING | Facility: HOSPITAL | Age: 70
End: 2025-04-29
Payer: MEDICARE

## 2025-04-29 ENCOUNTER — APPOINTMENT (OUTPATIENT)
Dept: CT IMAGING | Facility: HOSPITAL | Age: 70
End: 2025-04-29
Payer: MEDICARE

## 2025-04-29 DIAGNOSIS — R41.841 COGNITIVE COMMUNICATION DISORDER: ICD-10-CM

## 2025-04-29 DIAGNOSIS — R47.01 EXPRESSIVE APHASIA: Primary | ICD-10-CM

## 2025-04-29 DIAGNOSIS — R29.810 FACIAL DROOP: ICD-10-CM

## 2025-04-29 DIAGNOSIS — R13.10 DYSPHAGIA, UNSPECIFIED TYPE: ICD-10-CM

## 2025-04-29 DIAGNOSIS — I10 HYPERTENSION, UNSPECIFIED TYPE: ICD-10-CM

## 2025-04-29 LAB
ALBUMIN SERPL-MCNC: 4.5 G/DL (ref 3.5–5.2)
ALBUMIN/GLOB SERPL: 1.6 G/DL
ALP SERPL-CCNC: 90 U/L (ref 39–117)
ALT SERPL W P-5'-P-CCNC: 6 U/L (ref 1–33)
ANION GAP SERPL CALCULATED.3IONS-SCNC: 15 MMOL/L (ref 5–15)
AST SERPL-CCNC: 13 U/L (ref 1–32)
B PARAPERT DNA SPEC QL NAA+PROBE: NOT DETECTED
B PERT DNA SPEC QL NAA+PROBE: NOT DETECTED
BACTERIA UR QL AUTO: ABNORMAL /HPF
BASOPHILS # BLD AUTO: 0.03 10*3/MM3 (ref 0–0.2)
BASOPHILS NFR BLD AUTO: 0.3 % (ref 0–1.5)
BILIRUB SERPL-MCNC: 0.8 MG/DL (ref 0–1.2)
BILIRUB UR QL STRIP: ABNORMAL
BUN SERPL-MCNC: 21 MG/DL (ref 8–23)
BUN/CREAT SERPL: 16.9 (ref 7–25)
C PNEUM DNA NPH QL NAA+NON-PROBE: NOT DETECTED
CALCIUM SPEC-SCNC: 10.1 MG/DL (ref 8.6–10.5)
CHLORIDE SERPL-SCNC: 101 MMOL/L (ref 98–107)
CLARITY UR: ABNORMAL
CO2 SERPL-SCNC: 26 MMOL/L (ref 22–29)
COLOR UR: ABNORMAL
CREAT SERPL-MCNC: 1.24 MG/DL (ref 0.57–1)
DEPRECATED RDW RBC AUTO: 47.8 FL (ref 37–54)
EGFRCR SERPLBLD CKD-EPI 2021: 46.3 ML/MIN/1.73
EOSINOPHIL # BLD AUTO: 0.15 10*3/MM3 (ref 0–0.4)
EOSINOPHIL NFR BLD AUTO: 1.6 % (ref 0.3–6.2)
ERYTHROCYTE [DISTWIDTH] IN BLOOD BY AUTOMATED COUNT: 14.2 % (ref 12.3–15.4)
FLUAV SUBTYP SPEC NAA+PROBE: NOT DETECTED
FLUBV RNA ISLT QL NAA+PROBE: NOT DETECTED
GLOBULIN UR ELPH-MCNC: 2.8 GM/DL
GLUCOSE SERPL-MCNC: 105 MG/DL (ref 65–99)
GLUCOSE UR STRIP-MCNC: NEGATIVE MG/DL
HADV DNA SPEC NAA+PROBE: NOT DETECTED
HCOV 229E RNA SPEC QL NAA+PROBE: NOT DETECTED
HCOV HKU1 RNA SPEC QL NAA+PROBE: NOT DETECTED
HCOV NL63 RNA SPEC QL NAA+PROBE: NOT DETECTED
HCOV OC43 RNA SPEC QL NAA+PROBE: NOT DETECTED
HCT VFR BLD AUTO: 38.7 % (ref 34–46.6)
HGB BLD-MCNC: 12.5 G/DL (ref 12–15.9)
HGB UR QL STRIP.AUTO: NEGATIVE
HMPV RNA NPH QL NAA+NON-PROBE: NOT DETECTED
HOLD SPECIMEN: NORMAL
HPIV1 RNA ISLT QL NAA+PROBE: NOT DETECTED
HPIV2 RNA SPEC QL NAA+PROBE: NOT DETECTED
HPIV3 RNA NPH QL NAA+PROBE: NOT DETECTED
HPIV4 P GENE NPH QL NAA+PROBE: NOT DETECTED
HYALINE CASTS UR QL AUTO: ABNORMAL /LPF
IMM GRANULOCYTES # BLD AUTO: 0.04 10*3/MM3 (ref 0–0.05)
IMM GRANULOCYTES NFR BLD AUTO: 0.4 % (ref 0–0.5)
KETONES UR QL STRIP: ABNORMAL
LEUKOCYTE ESTERASE UR QL STRIP.AUTO: ABNORMAL
LYMPHOCYTES # BLD AUTO: 0.96 10*3/MM3 (ref 0.7–3.1)
LYMPHOCYTES NFR BLD AUTO: 10.3 % (ref 19.6–45.3)
M PNEUMO IGG SER IA-ACNC: NOT DETECTED
MAGNESIUM SERPL-MCNC: 1.7 MG/DL (ref 1.6–2.4)
MCH RBC QN AUTO: 29.5 PG (ref 26.6–33)
MCHC RBC AUTO-ENTMCNC: 32.3 G/DL (ref 31.5–35.7)
MCV RBC AUTO: 91.3 FL (ref 79–97)
MONOCYTES # BLD AUTO: 0.54 10*3/MM3 (ref 0.1–0.9)
MONOCYTES NFR BLD AUTO: 5.8 % (ref 5–12)
NEUTROPHILS NFR BLD AUTO: 7.58 10*3/MM3 (ref 1.7–7)
NEUTROPHILS NFR BLD AUTO: 81.6 % (ref 42.7–76)
NITRITE UR QL STRIP: NEGATIVE
NRBC BLD AUTO-RTO: 0 /100 WBC (ref 0–0.2)
PH UR STRIP.AUTO: <=5 [PH] (ref 5–8)
PLATELET # BLD AUTO: 337 10*3/MM3 (ref 140–450)
PMV BLD AUTO: 10.8 FL (ref 6–12)
POTASSIUM SERPL-SCNC: 4.7 MMOL/L (ref 3.5–5.2)
PROT SERPL-MCNC: 7.3 G/DL (ref 6–8.5)
PROT UR QL STRIP: ABNORMAL
RBC # BLD AUTO: 4.24 10*6/MM3 (ref 3.77–5.28)
RBC # UR STRIP: ABNORMAL /HPF
REF LAB TEST METHOD: ABNORMAL
RHINOVIRUS RNA SPEC NAA+PROBE: NOT DETECTED
RSV RNA NPH QL NAA+NON-PROBE: NOT DETECTED
SARS-COV-2 RNA NPH QL NAA+NON-PROBE: NOT DETECTED
SODIUM SERPL-SCNC: 142 MMOL/L (ref 136–145)
SP GR UR STRIP: >=1.03 (ref 1–1.03)
SQUAMOUS #/AREA URNS HPF: ABNORMAL /HPF
TROPONIN T SERPL HS-MCNC: 23 NG/L
UROBILINOGEN UR QL STRIP: ABNORMAL
WBC # UR STRIP: ABNORMAL /HPF
WBC NRBC COR # BLD AUTO: 9.3 10*3/MM3 (ref 3.4–10.8)
WHOLE BLOOD HOLD COAG: NORMAL
WHOLE BLOOD HOLD SPECIMEN: NORMAL

## 2025-04-29 PROCEDURE — 83735 ASSAY OF MAGNESIUM: CPT | Performed by: EMERGENCY MEDICINE

## 2025-04-29 PROCEDURE — 0202U NFCT DS 22 TRGT SARS-COV-2: CPT | Performed by: EMERGENCY MEDICINE

## 2025-04-29 PROCEDURE — 70450 CT HEAD/BRAIN W/O DYE: CPT

## 2025-04-29 PROCEDURE — 71045 X-RAY EXAM CHEST 1 VIEW: CPT

## 2025-04-29 PROCEDURE — 84484 ASSAY OF TROPONIN QUANT: CPT | Performed by: EMERGENCY MEDICINE

## 2025-04-29 PROCEDURE — 70498 CT ANGIOGRAPHY NECK: CPT

## 2025-04-29 PROCEDURE — P9612 CATHETERIZE FOR URINE SPEC: HCPCS

## 2025-04-29 PROCEDURE — 85025 COMPLETE CBC W/AUTO DIFF WBC: CPT | Performed by: EMERGENCY MEDICINE

## 2025-04-29 PROCEDURE — 80053 COMPREHEN METABOLIC PANEL: CPT | Performed by: EMERGENCY MEDICINE

## 2025-04-29 PROCEDURE — 25510000001 IOPAMIDOL PER 1 ML: Performed by: EMERGENCY MEDICINE

## 2025-04-29 PROCEDURE — G0378 HOSPITAL OBSERVATION PER HR: HCPCS

## 2025-04-29 PROCEDURE — 70496 CT ANGIOGRAPHY HEAD: CPT

## 2025-04-29 PROCEDURE — 93005 ELECTROCARDIOGRAM TRACING: CPT | Performed by: EMERGENCY MEDICINE

## 2025-04-29 PROCEDURE — 81001 URINALYSIS AUTO W/SCOPE: CPT | Performed by: EMERGENCY MEDICINE

## 2025-04-29 PROCEDURE — 99285 EMERGENCY DEPT VISIT HI MDM: CPT

## 2025-04-29 PROCEDURE — 99214 OFFICE O/P EST MOD 30 MIN: CPT

## 2025-04-29 PROCEDURE — 70551 MRI BRAIN STEM W/O DYE: CPT

## 2025-04-29 RX ORDER — IOPAMIDOL 755 MG/ML
75 INJECTION, SOLUTION INTRAVASCULAR
Status: COMPLETED | OUTPATIENT
Start: 2025-04-29 | End: 2025-04-29

## 2025-04-29 RX ORDER — SODIUM CHLORIDE 0.9 % (FLUSH) 0.9 %
10 SYRINGE (ML) INJECTION AS NEEDED
Status: DISCONTINUED | OUTPATIENT
Start: 2025-04-29 | End: 2025-05-02 | Stop reason: HOSPADM

## 2025-04-29 RX ADMIN — IOPAMIDOL 75 ML: 755 INJECTION, SOLUTION INTRAVENOUS at 21:55

## 2025-04-29 NOTE — ED PROVIDER NOTES
Subjective   History of Present Illness  70-year-old female presents for evaluation of confusion.  The patient states for at least 5 days she has had difficulty expressing what she wants to say.  The daughter has expressed concern with his current confusion and the patient's elevated blood pressure.  Current systolic is 190 the patient has a history of multiple strokes in the past.  She also reports a possible history of urine tract infections that have contributed to similar confusion in the past.  She denies dysuria or urinary frequency.  No fever or infectious symptoms.  No upper respiratory congestion, sore throat, rhinorrhea.  No medication changes.  Secondary to previous stroke she does take Brilinta but she is at denies anticoagulation.  No other acute complaints      Review of Systems   Constitutional:  Negative for chills, fatigue and fever.   HENT:  Negative for congestion, ear pain, postnasal drip, sinus pressure and sore throat.    Eyes:  Negative for pain, redness and visual disturbance.   Respiratory:  Negative for cough, chest tightness and shortness of breath.    Cardiovascular:  Negative for chest pain, palpitations and leg swelling.   Gastrointestinal:  Negative for abdominal pain, anal bleeding, blood in stool, diarrhea, nausea and vomiting.   Endocrine: Negative for polydipsia and polyuria.   Genitourinary:  Negative for difficulty urinating, dysuria, frequency and urgency.   Musculoskeletal:  Negative for arthralgias, back pain and neck pain.   Skin:  Negative for pallor and rash.   Allergic/Immunologic: Negative for environmental allergies and immunocompromised state.   Neurological:  Positive for speech difficulty. Negative for dizziness, weakness and headaches.   Hematological:  Negative for adenopathy.   Psychiatric/Behavioral:  Positive for confusion and decreased concentration. Negative for self-injury and suicidal ideas. The patient is not nervous/anxious.    All other systems reviewed  and are negative.      Past Medical History:   Diagnosis Date    Aneurysm     left carotid    Bipolar 2 disorder     Diabetes mellitus     H/O Bell's palsy     Left sided facial droop    History of loop recorder     Hyperlipidemia     Hypertension     Stroke     x 4       Allergies   Allergen Reactions    Codeine Palpitations and Unknown (See Comments)     Other reaction(s): Unknown       Past Surgical History:   Procedure Laterality Date    CARDIAC CATHETERIZATION      CATARACT EXTRACTION Bilateral      SECTION         Family History   Problem Relation Age of Onset    Hypertension Mother     Lung cancer Mother     Cataracts Mother     Transient ischemic attack Father     Hypertension Father     Cancer Father     Heart attack Brother     Stroke Brother        Social History     Socioeconomic History    Marital status:    Tobacco Use    Smoking status: Former     Current packs/day: 0.00     Average packs/day: 0.8 packs/day for 5.0 years (3.8 ttl pk-yrs)     Types: Cigarettes     Start date:      Quit date:      Years since quittin.3    Smokeless tobacco: Never   Vaping Use    Vaping status: Never Used   Substance and Sexual Activity    Alcohol use: Never    Drug use: Never    Sexual activity: Defer           Objective   Physical Exam  Vitals and nursing note reviewed.   Constitutional:       General: She is not in acute distress.     Appearance: Normal appearance. She is well-developed. She is not toxic-appearing or diaphoretic.   HENT:      Head: Normocephalic and atraumatic.      Right Ear: External ear normal.      Left Ear: External ear normal.      Nose: Nose normal.   Eyes:      General: Lids are normal.      Pupils: Pupils are equal, round, and reactive to light.   Neck:      Trachea: No tracheal deviation.   Cardiovascular:      Rate and Rhythm: Normal rate and regular rhythm.      Pulses: No decreased pulses.      Heart sounds: Normal heart sounds. No murmur heard.     No friction  rub. No gallop.   Pulmonary:      Effort: Pulmonary effort is normal. No respiratory distress.      Breath sounds: Normal breath sounds. No decreased breath sounds, wheezing, rhonchi or rales.      Comments: Clear to auscultation diffusely.  Abdominal:      General: Bowel sounds are normal.      Palpations: Abdomen is soft.      Tenderness: There is no abdominal tenderness. There is no guarding or rebound.   Musculoskeletal:         General: No deformity. Normal range of motion.      Cervical back: Normal range of motion and neck supple.      Right lower le+ Edema present.      Left lower le+ Pitting Edema present.   Lymphadenopathy:      Cervical: No cervical adenopathy.   Skin:     General: Skin is warm and dry.      Findings: No rash.   Neurological:      Mental Status: She is alert. Mental status is at baseline. She is disoriented and confused.      GCS: GCS eye subscore is 4. GCS verbal subscore is 4. GCS motor subscore is 6.      Cranial Nerves: No cranial nerve deficit.      Sensory: No sensory deficit.      Comments: Facial droop on the right side.  Baseline decreased mentation.   Psychiatric:         Speech: Speech normal.         Behavior: Behavior normal.         Thought Content: Thought content normal.         Judgment: Judgment normal.         Procedures           ED Course  ED Course as of 25 1230   Tue  The patient presents with a complaint of expressive aphasia.  She states has been present for 5 days.  She has a history of multiple previous strokes but does not typically have expressive aphasia.  She also seems to have increased right facial droop relative to baseline.  This was confirmed with the daughter.  She was hypertensive with systolics into the 200s for most the ER course but it has now improved down to roughly 180.  Antihypertensives have not been initiated at this time.  CT head and CT angiograms of the head and neck are negative for anything acute.  She  does have a chronic right-sided stroke.  Stroke service is following her MRI has been ordered.  Urine shows bacteria, but it is questionable for an acute urinary tract infection with no acute dysuria reported. The daughter reports the patient has had weakness and change in mentation with urinary tract infection in the past. [NS]      ED Course User Index  [NS] Jadyn Richards MD                                Total (NIH Stroke Scale): 4                      Medical Decision Making  Differential includes to stroke, intracranial hemorrhage, adverse medication effect, acute infectious process, renal insufficiency, electrolyte abnormality, other unspecified etiology.    Viral respiratory panel is negative.    Urine shows trace leuk esterase with 1+ bacteria but there is also 3-5 white blood cells and 3-6 squamous epithelial cells.  This is questionable for urinary tract infection.  Antibiotics have not been initiated at this time.  Labs show normal white count, baseline H&H, normal kidney function electrolytes.    Chest x-ray interpreted by myself shows no acute abnormalities.  CT scan of the head without contrast interpreted by myself shows no acute intracranial mass or bleed.  CT angiograms show no evidence of flow-limiting stenosis large vessel occlusion or other acute findings.  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    Blood pressure was elevated greater than 200 systolic for a significant portion of the ER course.  However without any medication the blood pressure had trended down to roughly 180 systolic.  He continued to trend down even after admission.    I discussed the patient with the hospitalist who will consult for admission.    The stroke service will continue to follow the patient.    Problems Addressed:  Expressive aphasia: complicated acute illness or injury with systemic symptoms that poses a threat to life or bodily functions  Facial  droop: chronic illness or injury with exacerbation, progression, or side effects of treatment  Hypertension, unspecified type: chronic illness or injury with exacerbation, progression, or side effects of treatment    Amount and/or Complexity of Data Reviewed  External Data Reviewed: labs, radiology and ECG.  Labs: ordered. Decision-making details documented in ED Course.  Radiology: ordered and independent interpretation performed. Decision-making details documented in ED Course.  ECG/medicine tests: ordered and independent interpretation performed. Decision-making details documented in ED Course.     Details: EKG performed 4/29/2025 at 1921 interpreted by myself shows sinus rhythm, normal QTc, normal QRS, no acute ischemic changes.    Risk  Prescription drug management.  Decision regarding hospitalization.        Final diagnoses:   Expressive aphasia   Facial droop   Hypertension, unspecified type       ED Disposition  ED Disposition       ED Disposition   Decision to Admit    Condition   --    Comment   Level of Care: Telemetry [5]   Diagnosis: Expressive aphasia [224289]   Admitting Physician: ANNABELLE HUSTON [[id number]]   Attending Physician: ANNABELLE HUSTON [[id number]]   Is patient appropriate for Inpatient Observation Unit?: Yes [1]                 No follow-up provider specified.       Medication List        ASK your doctor about these medications      amLODIPine 5 MG tablet  Commonly known as: NORVASC  TAKE 1 TABLET BY MOUTH DAILY  Ask about: Which instructions should I use?                 Jadyn Richards MD  04/30/25 1231

## 2025-04-30 PROBLEM — I16.0 HYPERTENSIVE URGENCY: Status: ACTIVE | Noted: 2025-04-30

## 2025-04-30 LAB
ALBUMIN SERPL-MCNC: 3.9 G/DL (ref 3.5–5.2)
ALBUMIN/GLOB SERPL: 1.6 G/DL
ALP SERPL-CCNC: 77 U/L (ref 39–117)
ALT SERPL W P-5'-P-CCNC: 6 U/L (ref 1–33)
ANION GAP SERPL CALCULATED.3IONS-SCNC: 15 MMOL/L (ref 5–15)
AST SERPL-CCNC: 13 U/L (ref 1–32)
BASOPHILS # BLD AUTO: 0.02 10*3/MM3 (ref 0–0.2)
BASOPHILS NFR BLD AUTO: 0.2 % (ref 0–1.5)
BILIRUB SERPL-MCNC: 0.6 MG/DL (ref 0–1.2)
BUN SERPL-MCNC: 18 MG/DL (ref 8–23)
BUN/CREAT SERPL: 19.4 (ref 7–25)
CALCIUM SPEC-SCNC: 9.4 MG/DL (ref 8.6–10.5)
CHLORIDE SERPL-SCNC: 102 MMOL/L (ref 98–107)
CHOLEST SERPL-MCNC: 125 MG/DL (ref 0–200)
CO2 SERPL-SCNC: 22 MMOL/L (ref 22–29)
CREAT SERPL-MCNC: 0.93 MG/DL (ref 0.57–1)
DEPRECATED RDW RBC AUTO: 45.6 FL (ref 37–54)
EGFRCR SERPLBLD CKD-EPI 2021: 65.4 ML/MIN/1.73
EOSINOPHIL # BLD AUTO: 0.09 10*3/MM3 (ref 0–0.4)
EOSINOPHIL NFR BLD AUTO: 0.9 % (ref 0.3–6.2)
ERYTHROCYTE [DISTWIDTH] IN BLOOD BY AUTOMATED COUNT: 14 % (ref 12.3–15.4)
GEN 5 1HR TROPONIN T REFLEX: 22 NG/L
GLOBULIN UR ELPH-MCNC: 2.4 GM/DL
GLUCOSE BLDC GLUCOMTR-MCNC: 143 MG/DL (ref 70–130)
GLUCOSE BLDC GLUCOMTR-MCNC: 202 MG/DL (ref 70–130)
GLUCOSE BLDC GLUCOMTR-MCNC: 75 MG/DL (ref 70–130)
GLUCOSE BLDC GLUCOMTR-MCNC: 78 MG/DL (ref 70–130)
GLUCOSE BLDC GLUCOMTR-MCNC: 92 MG/DL (ref 70–130)
GLUCOSE SERPL-MCNC: 77 MG/DL (ref 65–99)
HBA1C MFR BLD: 5.1 % (ref 4.8–5.6)
HCT VFR BLD AUTO: 33.7 % (ref 34–46.6)
HDLC SERPL-MCNC: 48 MG/DL (ref 40–60)
HGB BLD-MCNC: 11.2 G/DL (ref 12–15.9)
IMM GRANULOCYTES # BLD AUTO: 0.05 10*3/MM3 (ref 0–0.05)
IMM GRANULOCYTES NFR BLD AUTO: 0.5 % (ref 0–0.5)
LDLC SERPL CALC-MCNC: 59 MG/DL (ref 0–100)
LDLC/HDLC SERPL: 1.2 {RATIO}
LYMPHOCYTES # BLD AUTO: 2.09 10*3/MM3 (ref 0.7–3.1)
LYMPHOCYTES NFR BLD AUTO: 20.6 % (ref 19.6–45.3)
MAGNESIUM SERPL-MCNC: 1.6 MG/DL (ref 1.6–2.4)
MCH RBC QN AUTO: 29.7 PG (ref 26.6–33)
MCHC RBC AUTO-ENTMCNC: 33.2 G/DL (ref 31.5–35.7)
MCV RBC AUTO: 89.4 FL (ref 79–97)
MONOCYTES # BLD AUTO: 1.03 10*3/MM3 (ref 0.1–0.9)
MONOCYTES NFR BLD AUTO: 10.1 % (ref 5–12)
NEUTROPHILS NFR BLD AUTO: 6.87 10*3/MM3 (ref 1.7–7)
NEUTROPHILS NFR BLD AUTO: 67.7 % (ref 42.7–76)
NRBC BLD AUTO-RTO: 0 /100 WBC (ref 0–0.2)
PHOSPHATE SERPL-MCNC: 3.6 MG/DL (ref 2.5–4.5)
PLATELET # BLD AUTO: 314 10*3/MM3 (ref 140–450)
PMV BLD AUTO: 10.6 FL (ref 6–12)
POTASSIUM SERPL-SCNC: 4 MMOL/L (ref 3.5–5.2)
PROT SERPL-MCNC: 6.3 G/DL (ref 6–8.5)
QT INTERVAL: 376 MS
QTC INTERVAL: 428 MS
RBC # BLD AUTO: 3.77 10*6/MM3 (ref 3.77–5.28)
SODIUM SERPL-SCNC: 139 MMOL/L (ref 136–145)
TRIGL SERPL-MCNC: 98 MG/DL (ref 0–150)
TROPONIN T % DELTA: 5
TROPONIN T NUMERIC DELTA: 1 NG/L
TROPONIN T SERPL HS-MCNC: 21 NG/L
TSH SERPL DL<=0.05 MIU/L-ACNC: 2.97 UIU/ML (ref 0.27–4.2)
VLDLC SERPL-MCNC: 18 MG/DL (ref 5–40)
WBC NRBC COR # BLD AUTO: 10.15 10*3/MM3 (ref 3.4–10.8)

## 2025-04-30 PROCEDURE — 25010000002 NICARDIPINE 2.5 MG/ML SOLUTION 10 ML VIAL: Performed by: INTERNAL MEDICINE

## 2025-04-30 PROCEDURE — 99223 1ST HOSP IP/OBS HIGH 75: CPT | Performed by: INTERNAL MEDICINE

## 2025-04-30 PROCEDURE — 63710000001 INSULIN REGULAR HUMAN PER 5 UNITS: Performed by: INTERNAL MEDICINE

## 2025-04-30 PROCEDURE — 80053 COMPREHEN METABOLIC PANEL: CPT | Performed by: INTERNAL MEDICINE

## 2025-04-30 PROCEDURE — 80061 LIPID PANEL: CPT | Performed by: INTERNAL MEDICINE

## 2025-04-30 PROCEDURE — 25810000003 SODIUM CHLORIDE 0.9 % SOLUTION 250 ML FLEX CONT: Performed by: INTERNAL MEDICINE

## 2025-04-30 PROCEDURE — 85025 COMPLETE CBC W/AUTO DIFF WBC: CPT | Performed by: INTERNAL MEDICINE

## 2025-04-30 PROCEDURE — 36415 COLL VENOUS BLD VENIPUNCTURE: CPT

## 2025-04-30 PROCEDURE — 83036 HEMOGLOBIN GLYCOSYLATED A1C: CPT | Performed by: INTERNAL MEDICINE

## 2025-04-30 PROCEDURE — 82948 REAGENT STRIP/BLOOD GLUCOSE: CPT

## 2025-04-30 PROCEDURE — 83735 ASSAY OF MAGNESIUM: CPT | Performed by: INTERNAL MEDICINE

## 2025-04-30 PROCEDURE — 84100 ASSAY OF PHOSPHORUS: CPT | Performed by: INTERNAL MEDICINE

## 2025-04-30 PROCEDURE — 92610 EVALUATE SWALLOWING FUNCTION: CPT

## 2025-04-30 PROCEDURE — 84484 ASSAY OF TROPONIN QUANT: CPT | Performed by: INTERNAL MEDICINE

## 2025-04-30 PROCEDURE — G0378 HOSPITAL OBSERVATION PER HR: HCPCS

## 2025-04-30 PROCEDURE — 99214 OFFICE O/P EST MOD 30 MIN: CPT | Performed by: STUDENT IN AN ORGANIZED HEALTH CARE EDUCATION/TRAINING PROGRAM

## 2025-04-30 PROCEDURE — 96366 THER/PROPH/DIAG IV INF ADDON: CPT

## 2025-04-30 PROCEDURE — 96365 THER/PROPH/DIAG IV INF INIT: CPT

## 2025-04-30 PROCEDURE — 84443 ASSAY THYROID STIM HORMONE: CPT | Performed by: INTERNAL MEDICINE

## 2025-04-30 PROCEDURE — 92523 SPEECH SOUND LANG COMPREHEN: CPT

## 2025-04-30 RX ORDER — HYDRALAZINE HYDROCHLORIDE 20 MG/ML
10 INJECTION INTRAMUSCULAR; INTRAVENOUS EVERY 6 HOURS PRN
Status: DISCONTINUED | OUTPATIENT
Start: 2025-04-30 | End: 2025-05-02 | Stop reason: HOSPADM

## 2025-04-30 RX ORDER — AMOXICILLIN 250 MG
2 CAPSULE ORAL 2 TIMES DAILY PRN
Status: DISCONTINUED | OUTPATIENT
Start: 2025-04-30 | End: 2025-05-02 | Stop reason: HOSPADM

## 2025-04-30 RX ORDER — VENLAFAXINE HYDROCHLORIDE 75 MG/1
150 CAPSULE, EXTENDED RELEASE ORAL DAILY
Status: DISCONTINUED | OUTPATIENT
Start: 2025-04-30 | End: 2025-05-02 | Stop reason: HOSPADM

## 2025-04-30 RX ORDER — BISACODYL 5 MG/1
5 TABLET, DELAYED RELEASE ORAL DAILY PRN
Status: DISCONTINUED | OUTPATIENT
Start: 2025-04-30 | End: 2025-05-02 | Stop reason: HOSPADM

## 2025-04-30 RX ORDER — CHOLECALCIFEROL (VITAMIN D3) 25 MCG
1000 TABLET ORAL DAILY
Status: DISCONTINUED | OUTPATIENT
Start: 2025-04-30 | End: 2025-05-02 | Stop reason: HOSPADM

## 2025-04-30 RX ORDER — CARVEDILOL 3.12 MG/1
3.12 TABLET ORAL 2 TIMES DAILY WITH MEALS
Status: DISCONTINUED | OUTPATIENT
Start: 2025-04-30 | End: 2025-05-02 | Stop reason: HOSPADM

## 2025-04-30 RX ORDER — SODIUM CHLORIDE 9 MG/ML
40 INJECTION, SOLUTION INTRAVENOUS AS NEEDED
Status: DISCONTINUED | OUTPATIENT
Start: 2025-04-30 | End: 2025-05-02 | Stop reason: HOSPADM

## 2025-04-30 RX ORDER — ACETAMINOPHEN 160 MG/5ML
650 SOLUTION ORAL EVERY 4 HOURS PRN
Status: DISCONTINUED | OUTPATIENT
Start: 2025-04-30 | End: 2025-05-02 | Stop reason: HOSPADM

## 2025-04-30 RX ORDER — NITROGLYCERIN 0.4 MG/1
0.4 TABLET SUBLINGUAL
Status: DISCONTINUED | OUTPATIENT
Start: 2025-04-30 | End: 2025-05-02 | Stop reason: HOSPADM

## 2025-04-30 RX ORDER — LOSARTAN POTASSIUM 50 MG/1
100 TABLET ORAL DAILY
Status: DISCONTINUED | OUTPATIENT
Start: 2025-04-30 | End: 2025-05-02 | Stop reason: HOSPADM

## 2025-04-30 RX ORDER — ATORVASTATIN CALCIUM 40 MG/1
80 TABLET, FILM COATED ORAL NIGHTLY
Status: DISCONTINUED | OUTPATIENT
Start: 2025-04-30 | End: 2025-05-02 | Stop reason: HOSPADM

## 2025-04-30 RX ORDER — ACETAMINOPHEN 325 MG/1
650 TABLET ORAL EVERY 4 HOURS PRN
Status: DISCONTINUED | OUTPATIENT
Start: 2025-04-30 | End: 2025-05-02 | Stop reason: HOSPADM

## 2025-04-30 RX ORDER — BISACODYL 10 MG
10 SUPPOSITORY, RECTAL RECTAL DAILY PRN
Status: DISCONTINUED | OUTPATIENT
Start: 2025-04-30 | End: 2025-05-02 | Stop reason: HOSPADM

## 2025-04-30 RX ORDER — NICOTINE POLACRILEX 4 MG
15 LOZENGE BUCCAL
Status: DISCONTINUED | OUTPATIENT
Start: 2025-04-30 | End: 2025-05-02 | Stop reason: HOSPADM

## 2025-04-30 RX ORDER — AMLODIPINE BESYLATE 10 MG/1
10 TABLET ORAL
Status: DISCONTINUED | OUTPATIENT
Start: 2025-04-30 | End: 2025-05-02 | Stop reason: HOSPADM

## 2025-04-30 RX ORDER — POLYETHYLENE GLYCOL 3350 17 G/17G
17 POWDER, FOR SOLUTION ORAL DAILY PRN
Status: DISCONTINUED | OUTPATIENT
Start: 2025-04-30 | End: 2025-05-02 | Stop reason: HOSPADM

## 2025-04-30 RX ORDER — QUETIAPINE FUMARATE 100 MG/1
300 TABLET, FILM COATED ORAL NIGHTLY
Status: DISCONTINUED | OUTPATIENT
Start: 2025-04-30 | End: 2025-05-02 | Stop reason: HOSPADM

## 2025-04-30 RX ORDER — IBUPROFEN 600 MG/1
1 TABLET ORAL
Status: DISCONTINUED | OUTPATIENT
Start: 2025-04-30 | End: 2025-05-02 | Stop reason: HOSPADM

## 2025-04-30 RX ORDER — ACETAMINOPHEN 650 MG/1
650 SUPPOSITORY RECTAL EVERY 4 HOURS PRN
Status: DISCONTINUED | OUTPATIENT
Start: 2025-04-30 | End: 2025-05-02 | Stop reason: HOSPADM

## 2025-04-30 RX ORDER — PANTOPRAZOLE SODIUM 40 MG/1
40 TABLET, DELAYED RELEASE ORAL
Status: DISCONTINUED | OUTPATIENT
Start: 2025-04-30 | End: 2025-05-02 | Stop reason: HOSPADM

## 2025-04-30 RX ORDER — SODIUM CHLORIDE 0.9 % (FLUSH) 0.9 %
10 SYRINGE (ML) INJECTION EVERY 12 HOURS SCHEDULED
Status: DISCONTINUED | OUTPATIENT
Start: 2025-04-30 | End: 2025-05-02 | Stop reason: HOSPADM

## 2025-04-30 RX ORDER — ASPIRIN 81 MG/1
81 TABLET, CHEWABLE ORAL DAILY
Status: DISCONTINUED | OUTPATIENT
Start: 2025-04-30 | End: 2025-05-02 | Stop reason: HOSPADM

## 2025-04-30 RX ORDER — SODIUM CHLORIDE 0.9 % (FLUSH) 0.9 %
10 SYRINGE (ML) INJECTION AS NEEDED
Status: DISCONTINUED | OUTPATIENT
Start: 2025-04-30 | End: 2025-05-02 | Stop reason: HOSPADM

## 2025-04-30 RX ORDER — DEXTROSE MONOHYDRATE 25 G/50ML
25 INJECTION, SOLUTION INTRAVENOUS
Status: DISCONTINUED | OUTPATIENT
Start: 2025-04-30 | End: 2025-05-02 | Stop reason: HOSPADM

## 2025-04-30 RX ADMIN — Medication 10 ML: at 21:36

## 2025-04-30 RX ADMIN — Medication 1000 UNITS: at 14:26

## 2025-04-30 RX ADMIN — TICAGRELOR 90 MG: 90 TABLET ORAL at 14:26

## 2025-04-30 RX ADMIN — SODIUM CHLORIDE 5 MG/HR: 9 INJECTION, SOLUTION INTRAVENOUS at 17:05

## 2025-04-30 RX ADMIN — ACETAMINOPHEN 650 MG: 325 TABLET, FILM COATED ORAL at 05:29

## 2025-04-30 RX ADMIN — Medication 10 ML: at 14:27

## 2025-04-30 RX ADMIN — QUETIAPINE FUMARATE 300 MG: 100 TABLET ORAL at 21:35

## 2025-04-30 RX ADMIN — AMLODIPINE BESYLATE 10 MG: 10 TABLET ORAL at 14:26

## 2025-04-30 RX ADMIN — LOSARTAN POTASSIUM 100 MG: 50 TABLET, FILM COATED ORAL at 14:25

## 2025-04-30 RX ADMIN — ATORVASTATIN CALCIUM 80 MG: 40 TABLET, FILM COATED ORAL at 21:35

## 2025-04-30 RX ADMIN — ASPIRIN 81 MG 81 MG: 81 TABLET ORAL at 14:25

## 2025-04-30 RX ADMIN — PANTOPRAZOLE SODIUM 40 MG: 40 TABLET, DELAYED RELEASE ORAL at 05:30

## 2025-04-30 RX ADMIN — INSULIN HUMAN 3 UNITS: 100 INJECTION, SOLUTION PARENTERAL at 18:50

## 2025-04-30 RX ADMIN — TICAGRELOR 90 MG: 90 TABLET ORAL at 21:35

## 2025-04-30 RX ADMIN — SODIUM CHLORIDE 5 MG/HR: 9 INJECTION, SOLUTION INTRAVENOUS at 22:18

## 2025-04-30 RX ADMIN — CARVEDILOL 3.12 MG: 3.12 TABLET, FILM COATED ORAL at 14:25

## 2025-04-30 RX ADMIN — VENLAFAXINE HYDROCHLORIDE 150 MG: 75 CAPSULE, EXTENDED RELEASE ORAL at 14:25

## 2025-04-30 RX ADMIN — MAGNESIUM OXIDE TAB 400 MG (241.3 MG ELEMENTAL MG) 400 MG: 400 (241.3 MG) TAB at 14:26

## 2025-04-30 NOTE — ED NOTES
Olga Sandoval    Nursing Report ED to Floor:  Mental status: a&ox3  Ambulatory status: upx2/wheelchair   Oxygen Therapy:  ra  Cardiac Rhythm: nsr  Admitted from: home  Safety Concerns:  fall risk   Precautions: none  Social Issues: none  ED Room #:  11    ED Nurse Phone Extension - [phone] or may call [phone].      HPI:   Chief Complaint   Patient presents with    Altered Mental Status       Past Medical History:  Past Medical History:   Diagnosis Date    Aneurysm     left carotid    Bipolar 2 disorder     Diabetes mellitus     H/O Bell's palsy     Left sided facial droop    History of loop recorder     Hyperlipidemia     Hypertension     Stroke     x 4        Past Surgical History:  Past Surgical History:   Procedure Laterality Date    CARDIAC CATHETERIZATION      CATARACT EXTRACTION Bilateral      SECTION          Admitting Doctor:   Florencia Chi MD    Consulting Provider(s):  Consults       Date and Time Order Name Status Description    2025  2:04 AM Inpatient Neurology Consult General               Admitting Diagnosis:   The primary encounter diagnosis was Expressive aphasia. Diagnoses of Facial droop and Hypertension, unspecified type were also pertinent to this visit.    Most Recent Vitals:   Vitals:    25 0130 25 0200 25 0215 25 0230   BP: (!) 202/79 (!) 176/127 (!) 155/128 (!) 187/78   Pulse: 80 83 79 77   Resp:       Temp:       TempSrc:       SpO2: 94% 94% 93% 94%   Weight:       Height:           Active LDAs/IV Access:   Lines, Drains & Airways       Active LDAs       Name Placement date Placement time Site Days    Peripheral IV 25 181 18 G Left Antecubital 25  181  Antecubital  less than 1                    Labs (abnormal labs have a star):   Labs Reviewed   COMPREHENSIVE METABOLIC PANEL - Abnormal; Notable for the following components:       Result Value    Glucose 105 (*)     Creatinine 1.24 (*)     eGFR 46.3 (*)     All other components within normal  limits    Narrative:     GFR Categories in Chronic Kidney Disease (CKD)      GFR Category          GFR (mL/min/1.73)    Interpretation  G1                     90 or greater         Normal or high (1)  G2                      60-89                Mild decrease (1)  G3a                   45-59                Mild to moderate decrease  G3b                   30-44                Moderate to severe decrease  G4                    15-29                Severe decrease  G5                    14 or less           Kidney failure          (1)In the absence of evidence of kidney disease, neither GFR category G1 or G2 fulfill the criteria for CKD.    eGFR calculation 2021 CKD-EPI creatinine equation, which does not include race as a factor   TROPONIN - Abnormal; Notable for the following components:    HS Troponin T 23 (*)     All other components within normal limits    Narrative:     High Sensitive Troponin T Reference Range:  <14.0 ng/L- Negative Female for AMI  <22.0 ng/L- Negative Male for AMI  >=14 - Abnormal Female indicating possible myocardial injury.  >=22 - Abnormal Male indicating possible myocardial injury.   Clinicians would have to utilize clinical acumen, EKG, Troponin, and serial changes to determine if it is an Acute Myocardial Infarction or myocardial injury due to an underlying chronic condition.        CBC WITH AUTO DIFFERENTIAL - Abnormal; Notable for the following components:    Neutrophil % 81.6 (*)     Lymphocyte % 10.3 (*)     Neutrophils, Absolute 7.58 (*)     All other components within normal limits   URINALYSIS W/ CULTURE IF INDICATED - Abnormal; Notable for the following components:    Color, UA Dark Yellow (*)     Appearance, UA Cloudy (*)     Ketones, UA 15 mg/dL (1+) (*)     Bilirubin, UA Moderate (2+) (*)     Protein, UA 30 mg/dL (1+) (*)     Leuk Esterase, UA Trace (*)     All other components within normal limits    Narrative:     In absence of clinical symptoms, the presence of pyuria,  bacteria, and/or nitrites on the urinalysis result does not correlate with infection.   URINALYSIS, MICROSCOPIC ONLY - Abnormal; Notable for the following components:    WBC, UA 3-5 (*)     Bacteria, UA 1+ (*)     Squamous Epithelial Cells, UA 3-6 (*)     All other components within normal limits   TROPONIN - Abnormal; Notable for the following components:    HS Troponin T 21 (*)     All other components within normal limits    Narrative:     High Sensitive Troponin T Reference Range:  <14.0 ng/L- Negative Female for AMI  <22.0 ng/L- Negative Male for AMI  >=14 - Abnormal Female indicating possible myocardial injury.  >=22 - Abnormal Male indicating possible myocardial injury.   Clinicians would have to utilize clinical acumen, EKG, Troponin, and serial changes to determine if it is an Acute Myocardial Infarction or myocardial injury due to an underlying chronic condition.        HIGH SENSITIVITIY TROPONIN T 1HR - Abnormal; Notable for the following components:    HS Troponin T 22 (*)     All other components within normal limits    Narrative:     High Sensitive Troponin T Reference Range:  <14.0 ng/L- Negative Female for AMI  <22.0 ng/L- Negative Male for AMI  >=14 - Abnormal Female indicating possible myocardial injury.  >=22 - Abnormal Male indicating possible myocardial injury.   Clinicians would have to utilize clinical acumen, EKG, Troponin, and serial changes to determine if it is an Acute Myocardial Infarction or myocardial injury due to an underlying chronic condition.        RESPIRATORY PANEL PCR W/ COVID-19 (SARS-COV-2), NP SWAB IN UTM/VTP, 2 HR TAT - Normal    Narrative:     In the setting of a positive respiratory panel with a viral infection PLUS a negative procalcitonin without other underlying concern for bacterial infection, consider observing off antibiotics or discontinuation of antibiotics and continue supportive care. If the respiratory panel is positive for atypical bacterial infection  (Bordetella pertussis, Chlamydophila pneumoniae, or Mycoplasma pneumoniae), consider antibiotic de-escalation to target atypical bacterial infection.   MAGNESIUM - Normal   COVID PRE-OP / PRE-PROCEDURE SCREENING ORDER (NO ISOLATION)    Narrative:     The following orders were created for panel order COVID PRE-OP / PRE-PROCEDURE SCREENING ORDER (NO ISOLATION) - Swab, Nasopharynx.  Procedure                               Abnormality         Status                     ---------                               -----------         ------                     Respiratory Panel PCR w/...[[id number]]  Normal              Final result                 Please view results for these tests on the individual orders.   RAINBOW DRAW    Narrative:     The following orders were created for panel order [place] Draw.  Procedure                               Abnormality         Status                     ---------                               -----------         ------                     Green Top (Gel)[[id number]]                                  Final result               Lavender Top[[id number]]                                     Final result               Gold Top - SST[[id number]]                                   Final result               Black Top[[id number]]                                         Final result               Light Blue Top[[id number]]                                   Final result                 Please view results for these tests on the individual orders.   CBC AND DIFFERENTIAL    Narrative:     The following orders were created for panel order CBC & Differential.  Procedure                               Abnormality         Status                     ---------                               -----------         ------                     CBC Auto Differential[[id number]]        Abnormal            Final result                 Please view results for these tests on the individual orders.   GREEN TOP   LAVENDER TOP   GOLD  TOP - SST   GRAY TOP   LIGHT BLUE TOP       Meds Given in ED:   Medications   sodium chloride 0.9 % flush 10 mL (has no administration in time range)   niCARdipine (CARDENE) 25mg in 250mL NS infusion (0 mg/hr Intravenous Hold 4/30/25 0237)   iopamidol (ISOVUE-370) 76 % injection 75 mL (75 mL Intravenous Given 4/29/25 2155)     niCARdipine, 5-15 mg/hr, Last Rate: Stopped (04/30/25 0237)         Last NIH score:  Interval: baseline  1a. Level of Consciousness: 0-->Alert, keenly responsive  1b. LOC Questions: 0-->Answers both questions correctly  1c. LOC Commands: 0-->Performs both tasks correctly  2. Best Gaze: 0-->Normal  3. Visual: 1-->Partial hemianopia  4. Facial Palsy: 2-->Partial paralysis (total or near-total paralysis of lower face)  5a. Motor Arm, Left: 0-->No drift, limb holds 90 (or 45) degrees for full 10 secs  5b. Motor Arm, Right: 0-->No drift, limb holds 90 (or 45) degrees for full 10 secs  6a. Motor Leg, Left: 0-->No drift, leg holds 30 degree position for full 5 secs  6b. Motor Leg, Right: 1-->Drift, leg falls by the end of the 5-sec period but does not hit bed  7. Limb Ataxia: 1-->Present in one limb  8. Sensory: 0-->Normal, no sensory loss  9. Best Language: 1-->Mild-to-moderate aphasia, some obvious loss of fluency or facility of comprehension, without significant limitation on ideas expressed or form of expression. Reduction of speech and/or comprehension, however, makes conversation. . . (see row details)  10. Dysarthria: 1-->Mild-to-moderate dysarthria, patient slurs at least some words and, at worst, can be understood with some difficulty  11. Extinction and Inattention (formerly Neglect): 0-->No abnormality    Total (NIH Stroke Scale): 7     Dysphagia screening results:  Patient Factors Component (Dysphagia:Stroke or Rule-out)  Best Eye Response: 4-->(E4) spontaneous (04/29/25 1827)  Best Motor Response: 6-->(M6) obeys commands (04/29/25 9377)  Best Verbal Response: 4-->(V4) confused (04/29/25  1827)  Ju Coma Scale Score: 14 (04/29/25 1827)     [place] Coma Scale:  No data recorded     CIWA:        Restraint Type:            Isolation Status:  No active isolations

## 2025-04-30 NOTE — CONSULTS
[facility] Neurology  Consult Note    Patient Name: Olga Sandoval  : 1955  MRN: [id number]  Primary Care Physician:  Ileana Kerr MD  Referring Physician: No ref. provider found  Date of admission: 2025    Subjective     Reason for Consult: Expressive aphasia, negative MRI    Olga Sandoval is a 70 y.o. female with history of multiple strokes, carotid artery aneurysm s/p pipeline embolization, bipolar disorder, Bell's palsy, diabetes, hypertension, hyperlipidemia who is presenting with confusion and aphasia.    Patient presented to [facility] ED on  with confusion and aphasia. Daughter at bedside states it has progressively worsened over the last few weeks.  She is also moving a little slower than usual.  She was initially seen by the stroke team her NIH was 7.  BP in the ER was as high as 219/88.  She has a BP cuff at home but she does not check it.    Aphasia appears improved today    Review Of Systems   Constitutional:   Cardiovascular: Negative for chest pain or palpitations.  Respiratory: Negative for shortness of breath or cough.  Gastrointestinal: Negative for nausea and vomiting.  Genitourinary: Negative for bladder incontinence.  Musculoskeletal: Negative for aches and pains in the muscles or joints.  Dermatological: Negative for skin breakdown.   Neurological: Negative for headache, pain, or vision changes.  Positive for weakness, aphasia    Personal History     Past Medical History:   Diagnosis Date    Aneurysm     left carotid    Bipolar 2 disorder     Diabetes mellitus     H/O Bell's palsy     Left sided facial droop    History of loop recorder     Hyperlipidemia     Hypertension     Stroke     x 4       Past Surgical History:   Procedure Laterality Date    CARDIAC CATHETERIZATION      CATARACT EXTRACTION Bilateral      SECTION         Family History: family history includes Cancer in her father; Cataracts in her mother; Heart attack in her brother; Hypertension in her father  and mother; Lung cancer in her mother; Stroke in her brother; Transient ischemic attack in her father. Otherwise pertinent FHx was reviewed and not pertinent to current issue.    Social History:  reports that she quit smoking about 8 years ago. Her smoking use included cigarettes. She started smoking about 13 years ago. She has a 3.8 pack-year smoking history. She has never used smokeless tobacco. She reports that she does not drink alcohol and does not use drugs.    Home Medications:   QUEtiapine, Semaglutide (1 MG/DOSE), amLODIPine, aspirin, atorvastatin, carvedilol, cholecalciferol, lansoprazole, losartan, metFORMIN ER, ticagrelor, and venlafaxine XR    Current Medications:     Current Facility-Administered Medications:     acetaminophen (TYLENOL) tablet 650 mg, 650 mg, Oral, Q4H PRN, 650 mg at 04/30/25 0529 **OR** acetaminophen (TYLENOL) 160 MG/5ML oral solution 650 mg, 650 mg, Oral, Q4H PRN **OR** acetaminophen (TYLENOL) suppository 650 mg, 650 mg, Rectal, Q4H PRN, DayFlorencia MD    amLODIPine (NORVASC) tablet 10 mg, 10 mg, Oral, Q24H, Day, Florencia DOMINGUEZ MD    aspirin chewable tablet 81 mg, 81 mg, Oral, Daily, DayFlorencia MD    atorvastatin (LIPITOR) tablet 80 mg, 80 mg, Oral, Nightly, Day, Florencia DOMINGUEZ MD    sennosides-docusate (PERICOLACE) 8.6-50 MG per tablet 2 tablet, 2 tablet, Oral, BID PRN **AND** polyethylene glycol (MIRALAX) packet 17 g, 17 g, Oral, Daily PRN **AND** bisacodyl (DULCOLAX) EC tablet 5 mg, 5 mg, Oral, Daily PRN **AND** bisacodyl (DULCOLAX) suppository 10 mg, 10 mg, Rectal, Daily PRN, DayFlorencia MD    Calcium Replacement - Follow Nurse / BPA Driven Protocol, , Not Applicable, PRN, Day, Florencia DOMINGUEZ MD    carvedilol (COREG) tablet 3.125 mg, 3.125 mg, Oral, BID With Meals, Day, Florencia DOMINGUEZ MD    cholecalciferol (VITAMIN D3) tablet 1,000 Units, 1,000 Units, Oral, Daily, DayFlorencia MD    dextrose (D50W) (25 g/50 mL) IV injection 25 g, 25 g, Intravenous, Q15 Min PRN, Florencia Chi MD     dextrose (GLUTOSE) oral gel 15 g, 15 g, Oral, Q15 Min PRN, Florencia Chi MD    glucagon (GLUCAGEN) injection 1 mg, 1 mg, Intramuscular, Q15 Min PRN, Florencia Chi MD    insulin regular (humuLIN R,novoLIN R) injection 2-7 Units, 2-7 Units, Subcutaneous, Q6H, DayFlorencia MD    losartan (COZAAR) tablet 100 mg, 100 mg, Oral, Daily, DayFlorencia MD    magnesium oxide (MAG-OX) tablet 400 mg, 400 mg, Oral, Daily, Day, Florencia DOMINGUEZ MD    Magnesium Standard Dose Replacement - Follow Nurse / BPA Driven Protocol, , Not Applicable, PRN, Florencia Chi MD    niCARdipine (CARDENE) 25mg in 250mL NS infusion, 5-15 mg/hr, Intravenous, Titrated, Day, Florencia DOMINGUEZ MD, Held at 04/30/25 0237    nitroglycerin (NITROSTAT) SL tablet 0.4 mg, 0.4 mg, Sublingual, Q5 Min PRN, Florencia Chi MD    pantoprazole (PROTONIX) EC tablet 40 mg, 40 mg, Oral, Q AM, Day, Florencia DOMINGUEZ MD, 40 mg at 04/30/25 0530    Phosphorus Replacement - Follow Nurse / BPA Driven Protocol, , Not Applicable, PRN, Arti, Florencia DOMINGUEZ MD    Potassium Replacement - Follow Nurse / BPA Driven Protocol, , Not Applicable, PRN, Florencia Chi MD    QUEtiapine (SEROquel) tablet 300 mg, 300 mg, Oral, Nightly, Day, Florencia DOMINGUEZ MD    sodium chloride 0.9 % flush 10 mL, 10 mL, Intravenous, PRN, Jadyn Richards MD    sodium chloride 0.9 % flush 10 mL, 10 mL, Intravenous, Q12H, Florencia Chi MD    sodium chloride 0.9 % flush 10 mL, 10 mL, Intravenous, PRN, Florencia Chi MD    sodium chloride 0.9 % infusion 40 mL, 40 mL, Intravenous, PRN, Florencia Chi MD    ticagrelor (BRILINTA) tablet 90 mg, 90 mg, Oral, BID, DayFlorencia MD    venlafaxine XR (EFFEXOR-XR) 24 hr capsule 150 mg, 150 mg, Oral, Daily, Day, Florencia DOMINGUEZ MD     Allergies:  Allergies   Allergen Reactions    Codeine Palpitations and Unknown (See Comments)     Other reaction(s): Unknown       Objective     Vitals:  Temp:  [97.3 °F (36.3 °C)-98.8 °F (37.1 °C)] 98.8 °F (37.1 °C)  Heart Rate:  [65-83] 76  Resp:  [16-18] 18  BP:  "(149219)/() 153/77    Neurologic Exam   Mental status/Cognition: Awake and alert, oriented to self place month and year.  Able to follow complex commands, able to repeat  Speech/language: fluent; follows commands    Cranial nerves: PERRL. EOMI. left sided full facial droop, baseline. No dysarthria.  Shoulder shrug symmetric.  Tongue protrudes midline    Motor: No drift in any extremities, able to raise all to antigravity.    Sensation: intact to light touch throughout    Coordination/Complex Motor: Finger-to-nose intact bilaterally without dysmetria        Laboratory Results:   Lab Results   Component Value Date    GLUCOSE 77 04/30/2025    CALCIUM 9.4 04/30/2025     04/30/2025    K 4.0 04/30/2025    CO2 22.0 04/30/2025     04/30/2025    BUN 18 04/30/2025    CREATININE 0.93 04/30/2025    EGFRIFAFRI 77 03/24/2021    EGFRIFNONA 66 03/24/2021    BCR 19.4 04/30/2025    ANIONGAP 15.0 04/30/2025     Lab Results   Component Value Date    WBC 10.15 04/30/2025    HGB 11.2 (L) 04/30/2025    HCT 33.7 (L) 04/30/2025    MCV 89.4 04/30/2025     04/30/2025     Lab Results   Component Value Date    CHOL 125 04/30/2025    CHOL 163 10/24/2024    CHOL 133 09/16/2024     Lab Results   Component Value Date    HDL 48 04/30/2025    HDL 60 10/24/2024    HDL 57 09/16/2024     Lab Results   Component Value Date    LDL 59 04/30/2025    LDL 87 10/24/2024    LDL 54 09/16/2024     Lab Results   Component Value Date    TRIG 98 04/30/2025    TRIG 83 10/24/2024    TRIG 126 09/16/2024     Lab Results   Component Value Date    HGBA1C 5.10 04/30/2025     Lab Results   Component Value Date    ENWGCOEJ71 294 10/23/2024     No results found for: \"FOLATE\"    RVP negative    TSH 2.97    Images/Procedures   CT head 4/29/2025  Multiple areas of prior infarct, generalized volume loss    CTA brain and neck 4/29/2025  No LVO or significant stenosis    MRI brain without contrast 4/29/2025  Diffuse atrophy, moderate to severe white matter " changes.    Assessment / Plan   Active Hospital Problems:  Hypertensive encephalopathy  History of stroke  History of Bell's palsy  Hypertension    Brief Patient Summary:  Olga Sandoval is a 70 y.o. female with history of multiple strokes, carotid artery aneurysm s/p pipeline embolization, bipolar disorder, Bell's palsy, diabetes, hypertension, hyperlipidemia who presented to [facility] ED on 4/29 with confusion and aphasia.  In the ER, NIH 7, /88.  CT head and MRI showed known strokes, no new acute abnormalities.     On exam, patient has a baseline left facial droop, no drift in any extremities.  Her aphasia appears to be significantly improved, as is her blood pressure.  I suspect hypertensive encephalopathy contributing to her aphasia.  No infectious etiology to point towards stroke recrudescence.    Progressive weakness could be contributed to deconditioning, would recommend ongoing PT/OT to see if she would benefit from rehab.    Plan:   - Blood pressure control per primary  - PT/OT/SLP  - Delirium precautions    I have discussed the above with the patient, family, bedside RN  Time spent with patient: 45 minutes in face-to-face evaluation and management of the patient.       Jose Roberto Coppola, DO

## 2025-04-30 NOTE — ED NOTES
Olga Sandoval    Nursing Report ED to Floor:  Mental status: ANOx3  Ambulatory status: up w/2& WC  Oxygen Therapy:  RA  Cardiac Rhythm: NSR  Admitted from: home  Safety Concerns:  none  Precautions: n/a  Social Issues: n/a  ED Room #:  11    ED Nurse Phone Extension - [phone] or may call [phone].      HPI:   Chief Complaint   Patient presents with    Altered Mental Status       Past Medical History:  Past Medical History:   Diagnosis Date    Aneurysm     left carotid    Bipolar 2 disorder     Diabetes mellitus     H/O Bell's palsy     Left sided facial droop    History of loop recorder     Hyperlipidemia     Hypertension     Stroke     x 4        Past Surgical History:  Past Surgical History:   Procedure Laterality Date    CARDIAC CATHETERIZATION      CATARACT EXTRACTION Bilateral      SECTION          Admitting Doctor:   Manohar Marquez MD    Consulting Provider(s):  Consults       Date and Time Order Name Status Description    2025  2:04 AM Inpatient Neurology Consult General               Admitting Diagnosis:   The primary encounter diagnosis was Expressive aphasia. Diagnoses of Facial droop and Hypertension, unspecified type were also pertinent to this visit.    Most Recent Vitals:   Vitals:    25 0700 25 0730 25 0800 25 0830   BP: 169/77 149/63 168/67 156/82   Pulse: 79 72 77 75   Resp:       Temp:       TempSrc:       SpO2: 92% 93% 93% 94%   Weight:       Height:           Active LDAs/IV Access:   Lines, Drains & Airways       Active LDAs       Name Placement date Placement time Site Days    Peripheral IV 25 18 G Left Antecubital 25  181  Antecubital  less than 1    Peripheral IV 25 0524 20 G Anterior;Right;Upper Arm 25  0524  Arm  less than 1                    Labs (abnormal labs have a star):   Labs Reviewed   COMPREHENSIVE METABOLIC PANEL - Abnormal; Notable for the following components:       Result Value    Glucose 105 (*)     Creatinine 1.24  (*)     eGFR 46.3 (*)     All other components within normal limits    Narrative:     GFR Categories in Chronic Kidney Disease (CKD)      GFR Category          GFR (mL/min/1.73)    Interpretation  G1                     90 or greater         Normal or high (1)  G2                      60-89                Mild decrease (1)  G3a                   45-59                Mild to moderate decrease  G3b                   30-44                Moderate to severe decrease  G4                    15-29                Severe decrease  G5                    14 or less           Kidney failure          (1)In the absence of evidence of kidney disease, neither GFR category G1 or G2 fulfill the criteria for CKD.    eGFR calculation 2021 CKD-EPI creatinine equation, which does not include race as a factor   TROPONIN - Abnormal; Notable for the following components:    HS Troponin T 23 (*)     All other components within normal limits    Narrative:     High Sensitive Troponin T Reference Range:  <14.0 ng/L- Negative Female for AMI  <22.0 ng/L- Negative Male for AMI  >=14 - Abnormal Female indicating possible myocardial injury.  >=22 - Abnormal Male indicating possible myocardial injury.   Clinicians would have to utilize clinical acumen, EKG, Troponin, and serial changes to determine if it is an Acute Myocardial Infarction or myocardial injury due to an underlying chronic condition.        CBC WITH AUTO DIFFERENTIAL - Abnormal; Notable for the following components:    Neutrophil % 81.6 (*)     Lymphocyte % 10.3 (*)     Neutrophils, Absolute 7.58 (*)     All other components within normal limits   URINALYSIS W/ CULTURE IF INDICATED - Abnormal; Notable for the following components:    Color, UA Dark Yellow (*)     Appearance, UA Cloudy (*)     Ketones, UA 15 mg/dL (1+) (*)     Bilirubin, UA Moderate (2+) (*)     Protein, UA 30 mg/dL (1+) (*)     Leuk Esterase, UA Trace (*)     All other components within normal limits    Narrative:      In absence of clinical symptoms, the presence of pyuria, bacteria, and/or nitrites on the urinalysis result does not correlate with infection.   URINALYSIS, MICROSCOPIC ONLY - Abnormal; Notable for the following components:    WBC, UA 3-5 (*)     Bacteria, UA 1+ (*)     Squamous Epithelial Cells, UA 3-6 (*)     All other components within normal limits   TROPONIN - Abnormal; Notable for the following components:    HS Troponin T 21 (*)     All other components within normal limits    Narrative:     High Sensitive Troponin T Reference Range:  <14.0 ng/L- Negative Female for AMI  <22.0 ng/L- Negative Male for AMI  >=14 - Abnormal Female indicating possible myocardial injury.  >=22 - Abnormal Male indicating possible myocardial injury.   Clinicians would have to utilize clinical acumen, EKG, Troponin, and serial changes to determine if it is an Acute Myocardial Infarction or myocardial injury due to an underlying chronic condition.        HIGH SENSITIVITIY TROPONIN T 1HR - Abnormal; Notable for the following components:    HS Troponin T 22 (*)     All other components within normal limits    Narrative:     High Sensitive Troponin T Reference Range:  <14.0 ng/L- Negative Female for AMI  <22.0 ng/L- Negative Male for AMI  >=14 - Abnormal Female indicating possible myocardial injury.  >=22 - Abnormal Male indicating possible myocardial injury.   Clinicians would have to utilize clinical acumen, EKG, Troponin, and serial changes to determine if it is an Acute Myocardial Infarction or myocardial injury due to an underlying chronic condition.        CBC WITH AUTO DIFFERENTIAL - Abnormal; Notable for the following components:    Hemoglobin 11.2 (*)     Hematocrit 33.7 (*)     Monocytes, Absolute 1.03 (*)     All other components within normal limits   RESPIRATORY PANEL PCR W/ COVID-19 (SARS-COV-2), NP SWAB IN [facility], 2 HR TAT - Normal    Narrative:     In the setting of a positive respiratory panel with a viral infection  PLUS a negative procalcitonin without other underlying concern for bacterial infection, consider observing off antibiotics or discontinuation of antibiotics and continue supportive care. If the respiratory panel is positive for atypical bacterial infection (Bordetella pertussis, Chlamydophila pneumoniae, or Mycoplasma pneumoniae), consider antibiotic de-escalation to target atypical bacterial infection.   MAGNESIUM - Normal   MAGNESIUM - Normal   PHOSPHORUS - Normal   HEMOGLOBIN A1C - Normal    Narrative:     Hemoglobin A1C Ranges:    Increased Risk for Diabetes  5.7% to 6.4%  Diabetes                     >= 6.5%  Diabetic Goal                < 7.0%   TSH - Normal   POCT GLUCOSE FINGERSTICK - Normal   COVID PRE-OP / PRE-PROCEDURE SCREENING ORDER (NO ISOLATION)    Narrative:     The following orders were created for panel order COVID PRE-OP / PRE-PROCEDURE SCREENING ORDER (NO ISOLATION) - Swab, Nasopharynx.  Procedure                               Abnormality         Status                     ---------                               -----------         ------                     Respiratory Panel PCR w/...[[id number]]  Normal              Final result                 Please view results for these tests on the individual orders.   RAINBOW DRAW    Narrative:     The following orders were created for panel order [place] Draw.  Procedure                               Abnormality         Status                     ---------                               -----------         ------                     Green Top (Gel)[[id number]]                                  Final result               Lavender Top[[id number]]                                     Final result               Gold Top - SST[[id number]]                                   Final result               Black Top[[id number]]                                         Final result               Light Blue Top[[id number]]                                   Final result                  Please view results for these tests on the individual orders.   COMPREHENSIVE METABOLIC PANEL    Narrative:     GFR Categories in Chronic Kidney Disease (CKD)      GFR Category          GFR (mL/min/1.73)    Interpretation  G1                     90 or greater         Normal or high (1)  G2                      60-89                Mild decrease (1)  G3a                   45-59                Mild to moderate decrease  G3b                   30-44                Moderate to severe decrease  G4                    15-29                Severe decrease  G5                    14 or less           Kidney failure          (1)In the absence of evidence of kidney disease, neither GFR category G1 or G2 fulfill the criteria for CKD.    eGFR calculation 2021 CKD-EPI creatinine equation, which does not include race as a factor   LIPID PANEL    Narrative:     Cholesterol Reference Ranges  (U.S. Department of Health and Human Services ATP III Classifications)    Desirable          <200 mg/dL  Borderline High    200-239 mg/dL  High Risk          >240 mg/dL      Triglyceride Reference Ranges  (U.S. Department of Health and Human Services ATP III Classifications)    Normal           <150 mg/dL  Borderline High  150-199 mg/dL  High             200-499 mg/dL  Very High        >500 mg/dL    HDL Reference Ranges  (U.S. Department of Health and Human Services ATP III Classifications)    Low     <40 mg/dl (major risk factor for CHD)  High    >60 mg/dl ('negative' risk factor for CHD)        LDL Reference Ranges  (U.S. Department of Health and Human Services ATP III Classifications)    Optimal          <100 mg/dL  Near Optimal     100-129 mg/dL  Borderline High  130-159 mg/dL  High             160-189 mg/dL  Very High        >189 mg/dL    LDL is calculated using the NIH LDL-C calculation.     POCT GLUCOSE FINGERSTICK   POCT GLUCOSE FINGERSTICK   POCT GLUCOSE FINGERSTICK   POCT GLUCOSE FINGERSTICK   POCT GLUCOSE FINGERSTICK   CBC AND  DIFFERENTIAL    Narrative:     The following orders were created for panel order CBC & Differential.  Procedure                               Abnormality         Status                     ---------                               -----------         ------                     CBC Auto Differential[[id number]]        Abnormal            Final result                 Please view results for these tests on the individual orders.   GREEN TOP   LAVENDER TOP   GOLD TOP - SST   GRAY TOP   LIGHT BLUE TOP       Meds Given in ED:   Medications   sodium chloride 0.9 % flush 10 mL (has no administration in time range)   amLODIPine (NORVASC) tablet 10 mg (has no administration in time range)   aspirin chewable tablet 81 mg (has no administration in time range)   atorvastatin (LIPITOR) tablet 80 mg (has no administration in time range)   carvedilol (COREG) tablet 3.125 mg (has no administration in time range)   cholecalciferol (VITAMIN D3) tablet 1,000 Units (has no administration in time range)   pantoprazole (PROTONIX) EC tablet 40 mg (40 mg Oral Given 4/30/25 0530)   losartan (COZAAR) tablet 100 mg (has no administration in time range)   magnesium oxide (MAG-OX) tablet 400 mg (has no administration in time range)   QUEtiapine (SEROquel) tablet 300 mg (has no administration in time range)   ticagrelor (BRILINTA) tablet 90 mg (has no administration in time range)   venlafaxine XR (EFFEXOR-XR) 24 hr capsule 150 mg (has no administration in time range)   sodium chloride 0.9 % flush 10 mL (has no administration in time range)   sodium chloride 0.9 % flush 10 mL (has no administration in time range)   sodium chloride 0.9 % infusion 40 mL (has no administration in time range)   nitroglycerin (NITROSTAT) SL tablet 0.4 mg (has no administration in time range)   Potassium Replacement - Follow Nurse / BPA Driven Protocol (has no administration in time range)   Magnesium Standard Dose Replacement - Follow Nurse / BPA Driven Protocol (has no  administration in time range)   Phosphorus Replacement - Follow Nurse / BPA Driven Protocol (has no administration in time range)   Calcium Replacement - Follow Nurse / BPA Driven Protocol (has no administration in time range)   acetaminophen (TYLENOL) tablet 650 mg (650 mg Oral Given 4/30/25 0529)     Or   acetaminophen (TYLENOL) 160 MG/5ML oral solution 650 mg ( Oral Not Given:  See Alt 4/30/25 0529)     Or   acetaminophen (TYLENOL) suppository 650 mg ( Rectal Not Given:  See Alt 4/30/25 0529)   sennosides-docusate (PERICOLACE) 8.6-50 MG per tablet 2 tablet (has no administration in time range)     And   polyethylene glycol (MIRALAX) packet 17 g (has no administration in time range)     And   bisacodyl (DULCOLAX) EC tablet 5 mg (has no administration in time range)     And   bisacodyl (DULCOLAX) suppository 10 mg (has no administration in time range)   dextrose (GLUTOSE) oral gel 15 g (has no administration in time range)   dextrose (D50W) (25 g/50 mL) IV injection 25 g (has no administration in time range)   glucagon (GLUCAGEN) injection 1 mg (has no administration in time range)   insulin regular (humuLIN R,novoLIN R) injection 2-7 Units ( Subcutaneous Not Given 4/30/25 0537)   niCARdipine (CARDENE) 25mg in 250mL NS infusion (0 mg/hr Intravenous Hold 4/30/25 0237)   iopamidol (ISOVUE-370) 76 % injection 75 mL (75 mL Intravenous Given 4/29/25 2155)     niCARdipine, 5-15 mg/hr, Last Rate: Stopped (04/30/25 0237)         Last NIH score:  Interval: baseline  1a. Level of Consciousness: 0-->Alert, keenly responsive  1b. LOC Questions: 0-->Answers both questions correctly  1c. LOC Commands: 0-->Performs both tasks correctly  2. Best Gaze: 0-->Normal  3. Visual: 0-->No visual loss  4. Facial Palsy: 2-->Partial paralysis (total or near-total paralysis of lower face)  5a. Motor Arm, Left: 0-->No drift, limb holds 90 (or 45) degrees for full 10 secs  5b. Motor Arm, Right: 0-->No drift, limb holds 90 (or 45) degrees for  full 10 secs  6a. Motor Leg, Left: 0-->No drift, leg holds 30 degree position for full 5 secs  6b. Motor Leg, Right: 0-->No drift, leg holds 30 degree position for full 5 secs  7. Limb Ataxia: 0-->Absent  8. Sensory: 0-->Normal, no sensory loss  9. Best Language: 1-->Mild-to-moderate aphasia, some obvious loss of fluency or facility of comprehension, without significant limitation on ideas expressed or form of expression. Reduction of speech and/or comprehension, however, makes conversation. . . (see row details)  10. Dysarthria: 1-->Mild-to-moderate dysarthria, patient slurs at least some words and, at worst, can be understood with some difficulty  11. Extinction and Inattention (formerly Neglect): 0-->No abnormality    Total (NIH Stroke Scale): 4     Dysphagia screening results:  Patient Factors Component (Dysphagia:Stroke or Rule-out)  Best Eye Response: 4-->(E4) spontaneous (04/30/25 0854)  Best Motor Response: 6-->(M6) obeys commands (04/30/25 0854)  Best Verbal Response: 4-->(V4) confused (04/30/25 0854)  [place] Coma Scale Score: 14 (04/30/25 0854)  Is there Facial Asymmetry/Weakness?: Yes (04/30/25 0853)  Patient Assessment Result: (!) Fail (04/30/25 0853)     Ju Coma Scale:  No data recorded     CIWA:        Restraint Type:            Isolation Status:  No active isolations

## 2025-04-30 NOTE — H&P
[facility] Medicine Services  HISTORY AND PHYSICAL    Patient Name: Olga Sandoval  : 1955  MRN: [id number]  Primary Care Physician: Ileana Kerr MD  Date of admission: 2025      Subjective   Subjective     Chief Complaint:   Speech difficulties    HPI:  Olga Sandoval is a 70 y.o. female  with hx of left carotid aneurysm, bipolar disorder, DM, HLD, HTN, stroke x 4, prior hx of tobacco abuse who presents now at the request of her daughter for confusion and difficulty finding words.  Pt reports sx have been present for 5 days.  Sveta not present currently to provide hx of confusion.  Pt states she came in because her daughter made her.  No headache or blurred vision or focal weakness.  States she is weak all over at baseline.  No palpitations.  Has had periods of brief shortness of breath.  No chest pain.  No f/c. No n/v/d/c.  No urinary sx but states she does not have sx typically when she has UTI's.        Personal History     Past Medical History:   Diagnosis Date    Aneurysm     left carotid    Bipolar 2 disorder     Diabetes mellitus     H/O Bell's palsy     Left sided facial droop    History of loop recorder     Hyperlipidemia     Hypertension     Stroke     x 4           Past Surgical History:   Procedure Laterality Date    CARDIAC CATHETERIZATION      CATARACT EXTRACTION Bilateral      SECTION         Family History: family history includes Cancer in her father; Cataracts in her mother; Heart attack in her brother; Hypertension in her father and mother; Lung cancer in her mother; Stroke in her brother; Transient ischemic attack in her father.     Social History:  reports that she quit smoking about 8 years ago. Her smoking use included cigarettes. She started smoking about 13 years ago. She has a 3.8 pack-year smoking history. She has never used smokeless tobacco. She reports that she does not drink alcohol and does not use drugs.  Social History     Social  History Narrative    Not on file       Medications:  Available home medication information reviewed.  QUEtiapine, Semaglutide (1 MG/DOSE), amLODIPine, aspirin, atorvastatin, carvedilol, cholecalciferol, lansoprazole, losartan, magnesium oxide, metFORMIN ER, ticagrelor, and venlafaxine XR    Allergies   Allergen Reactions    Codeine Palpitations and Unknown (See Comments)     Other reaction(s): Unknown       Objective   Objective     Vital Signs:   Temp:  [97.3 °F (36.3 °C)] 97.3 °F (36.3 °C)  Heart Rate:  [65-81] 80  Resp:  [16] 16  BP: (180-219)/() 195/82  Total (NIH Stroke Scale): 7    Physical Exam    Gen; alert, oriented, able to answer questions appropriately  Heent; perrla eomi mmm  Cv; rrr, no mrg  L; ctab, no wheeze/crackles  Abd; soft, +bs, ntnd, no r/g  Ext; nonpitting edema ble, no cc  Skin; cdi, warm  Neuro; right facial droop otherwise intact  Psych; mood and affect appropriate; pleasant    Result Review:  I have personally reviewed the results from the time of this admission to 4/30/2025 00:38 EDT and agree with these findings:  [x]  Laboratory list / accordion  [x]  Microbiology  [x]  Radiology  [x]  EKG/Telemetry   []  Cardiology/Vascular   []  Pathology  []  Old records  []  Other:  Most notable findings include:        LAB RESULTS:      Lab 04/29/25 1906   WBC 9.30   HEMOGLOBIN 12.5   HEMATOCRIT 38.7   PLATELETS 337   NEUTROS ABS 7.58*   IMMATURE GRANS (ABS) 0.04   LYMPHS ABS 0.96   MONOS ABS 0.54   EOS ABS 0.15   MCV 91.3         Lab 04/29/25  1906   SODIUM 142   POTASSIUM 4.7   CHLORIDE 101   CO2 26.0   ANION GAP 15.0   BUN 21   CREATININE 1.24*   EGFR 46.3*   GLUCOSE 105*   CALCIUM 10.1   MAGNESIUM 1.7         Lab 04/29/25 1906   TOTAL PROTEIN 7.3   ALBUMIN 4.5   GLOBULIN 2.8   ALT (SGPT) 6   AST (SGOT) 13   BILIRUBIN 0.8   ALK PHOS 90         Lab 04/29/25  1906   HSTROP T 23*                 UA          10/23/2024    19:37 4/29/2025    19:44   Urinalysis   Squamous Epithelial Cells, UA  21-30  3-6    Specific Gravity, UA 1.093  >=1.030    Ketones, UA 15 mg/dL (1+)  15 mg/dL (1+)    Blood, UA Trace  Negative    Leukocytes, UA Moderate (2+)  Trace    Nitrite, UA Negative  Negative    RBC, UA 3-5  0-2    WBC, UA Too Numerous to Count  3-5    Bacteria, UA 2+  1+        Microbiology Results (last 10 days)       Procedure Component Value - Date/Time    COVID PRE-OP / PRE-PROCEDURE SCREENING ORDER (NO ISOLATION) - Swab, Nasopharynx [[id number]]  (Normal) Collected: 04/29/25 1907    Lab Status: Final result Specimen: Swab from Nasopharynx Updated: 04/29/25 2010    Narrative:      The following orders were created for panel order COVID PRE-OP / PRE-PROCEDURE SCREENING ORDER (NO ISOLATION) - Swab, Nasopharynx.  Procedure                               Abnormality         Status                     ---------                               -----------         ------                     Respiratory Panel PCR w/...[[id number]]  Normal              Final result                 Please view results for these tests on the individual orders.    Respiratory Panel PCR w/COVID-19(SARS-CoV-2) LEONA/PRIYANKA/GABY/PAD/COR/MILDRED In-House, NP Swab in UTM/VTM, 2 HR TAT - Swab, Nasopharynx [[id number]]  (Normal) Collected: 04/29/25 1907    Lab Status: Final result Specimen: Swab from Nasopharynx Updated: 04/29/25 2010     ADENOVIRUS, PCR Not Detected     Coronavirus 229E Not Detected     Coronavirus HKU1 Not Detected     Coronavirus NL63 Not Detected     Coronavirus OC43 Not Detected     COVID19 Not Detected     Human Metapneumovirus Not Detected     Human Rhinovirus/Enterovirus Not Detected     Influenza A PCR Not Detected     Influenza B PCR Not Detected     Parainfluenza Virus 1 Not Detected     Parainfluenza Virus 2 Not Detected     Parainfluenza Virus 3 Not Detected     Parainfluenza Virus 4 Not Detected     RSV, PCR Not Detected     Bordetella pertussis pcr Not Detected     Bordetella parapertussis PCR Not Detected     Chlamydophila  pneumoniae PCR Not Detected     Mycoplasma pneumo by PCR Not Detected    Narrative:      In the setting of a positive respiratory panel with a viral infection PLUS a negative procalcitonin without other underlying concern for bacterial infection, consider observing off antibiotics or discontinuation of antibiotics and continue supportive care. If the respiratory panel is positive for atypical bacterial infection (Bordetella pertussis, Chlamydophila pneumoniae, or Mycoplasma pneumoniae), consider antibiotic de-escalation to target atypical bacterial infection.            MRI Brain Without Contrast  Result Date: 4/29/2025  MRI BRAIN WO CONTRAST Date of Exam: 4/29/2025 10:38 AM EDT Indication: expressive aphasia.  Comparison: CT head without IV contrast 4/29/2025 Technique:  Routine multiplanar/multisequence sequence images of the brain were obtained without contrast administration. Findings: The ventricles, sulci, and cerebellar folia are moderately and diffusely prominent consistent with atrophy. Ill-defined abnormal T2 bright signal is consistent with moderate small vessel disease and/or numerous old lacunar infarcts. No abnormal bright signal is seen on diffusion weighted images. No restricted diffusion is evident. No abnormal dark signal seen on magnetic susceptibility sequence sensitive for blood products. No abnormality of the pituitary or orbits is evident. The paranasal sinuses are well aerated.     Impression: Impression: MR examination of the brain without IV contrast demonstrating diffuse atrophy and white matter changes. No acute intracranial abnormality is seen. Electronically Signed: Omid Tamayo MD  4/29/2025 11:09 PM EDT  Workstation ID: [id number]    CT Head Without Contrast  Result Date: 4/29/2025  CT HEAD WO CONTRAST, CT ANGIOGRAM HEAD W AI ANALYSIS OF LVO, CT ANGIOGRAM NECK Date of Exam: 4/29/2025 9:46 PM EDT Indication: confusion. Comparison: 10/28/2024. 10/23/2024 Technique: Axial CT images of  the brain were obtained prior to and after the administration of 115 mL Isovue-370. CTA of the head and neck were performed after the administration of iodinated contrast.  Reconstructed coronal and sagittal images were also  obtained. A 3-D volume rendered image was created for interpretation. Automated exposure control and iterative reconstruction methods were used.   Findings: CT head: There is redemonstrated generalized volume loss as well as prominent chronic white matter changes and areas of chronic infarct within watershed distributions bilaterally. There is no definite new territorial ischemia and there is no evidence of hemorrhage, mass or mass effect. There is ex vacuo prominence of the ventricles. The orbits are normal. The paranasal sinuses are clear. CT ANGIOGRAM: The lung apices are clear. The neck soft tissues demonstrate no pathologic cervical adenopathy or unexpected aerodigestive tract mass. The osseous structures demonstrate no evidence of acute fracture or aggressive osseous lesion. Patent aortic arch with four-vessel branching, left vertebral artery arising directly. The subclavian arteries are patent bilaterally. Calcific atherosclerotic changes are present at the right ICA origin with mild, less than 50% stenosis present by NASCET criteria. Similar mild narrowing is present on the left. The vertebral arteries appear normal in course and caliber, developmentally diminutive on the left. Intracranially, the carotid siphons demonstrate evidence of prior stenting within the right ICA, with the stented segment patent. 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 The anterior cerebral arteries are normal in course and caliber. The right and left middle cerebral arteries demonstrate no evidence of flow-limiting stenosis, large vessel occlusion or aneurysm. The vertebrobasilar  system remains patent with diminutive left vertebral artery terminating in PICA. The posterior cerebral arteries are patent.     Impression: Impression: Noncontrast CT head demonstrates no acute intracranial findings. Stable generalized volume loss and multiple areas of prior infarct. CT angiogram demonstrates no evidence of new flow-limiting stenosis, large vessel occlusion or other acute finding. 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 Electronically Signed: Armen Browning MD  4/29/2025 10:22 PM EDT  Workstation ID: [id number]    CT Angiogram Head w AI Analysis of LVO  Result Date: 4/29/2025  CT HEAD WO CONTRAST, CT ANGIOGRAM HEAD W AI ANALYSIS OF LVO, CT ANGIOGRAM NECK Date of Exam: 4/29/2025 9:46 PM EDT Indication: confusion. Comparison: 10/28/2024. 10/23/2024 Technique: Axial CT images of the brain were obtained prior to and after the administration of 115 mL Isovue-370. CTA of the head and neck were performed after the administration of iodinated contrast.  Reconstructed coronal and sagittal images were also  obtained. A 3-D volume rendered image was created for interpretation. Automated exposure control and iterative reconstruction methods were used.   Findings: CT head: There is redemonstrated generalized volume loss as well as prominent chronic white matter changes and areas of chronic infarct within watershed distributions bilaterally. There is no definite new territorial ischemia and there is no evidence of hemorrhage, mass or mass effect. There is ex vacuo prominence of the ventricles. The orbits are normal. The paranasal sinuses are clear. CT ANGIOGRAM: The lung apices are clear. The neck soft tissues demonstrate no pathologic cervical adenopathy or unexpected aerodigestive tract mass. The osseous structures demonstrate no evidence of acute fracture or aggressive osseous lesion. Patent aortic arch with four-vessel branching, left vertebral artery arising  directly. The subclavian arteries are patent bilaterally. Calcific atherosclerotic changes are present at the right ICA origin with mild, less than 50% stenosis present by NASCET criteria. Similar mild narrowing is present on the left. The vertebral arteries appear normal in course and caliber, developmentally diminutive on the left. Intracranially, the carotid siphons demonstrate evidence of prior stenting within the right ICA, with the stented segment patent. 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 The anterior cerebral arteries are normal in course and caliber. The right and left middle cerebral arteries demonstrate no evidence of flow-limiting stenosis, large vessel occlusion or aneurysm. The vertebrobasilar system remains patent with diminutive left vertebral artery terminating in PICA. The posterior cerebral arteries are patent.     Impression: Impression: Noncontrast CT head demonstrates no acute intracranial findings. Stable generalized volume loss and multiple areas of prior infarct. CT angiogram demonstrates no evidence of new flow-limiting stenosis, large vessel occlusion or other acute finding. 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 Electronically Signed: Armen Browning MD  4/29/2025 10:22 PM EDT  Workstation ID: [id number]    CT Angiogram Neck  Result Date: 4/29/2025  CT HEAD WO CONTRAST, CT ANGIOGRAM HEAD W AI ANALYSIS OF LVO, CT ANGIOGRAM NECK Date of Exam: 4/29/2025 9:46 PM EDT Indication: confusion. Comparison: 10/28/2024. 10/23/2024 Technique: Axial CT images of the brain were obtained prior to and after the administration of 115 mL Isovue-370. CTA of the head and neck were performed after the administration of iodinated contrast.  Reconstructed coronal and sagittal images were also  obtained. A 3-D volume rendered image  was created for interpretation. Automated exposure control and iterative reconstruction methods were used.   Findings: CT head: There is redemonstrated generalized volume loss as well as prominent chronic white matter changes and areas of chronic infarct within watershed distributions bilaterally. There is no definite new territorial ischemia and there is no evidence of hemorrhage, mass or mass effect. There is ex vacuo prominence of the ventricles. The orbits are normal. The paranasal sinuses are clear. CT ANGIOGRAM: The lung apices are clear. The neck soft tissues demonstrate no pathologic cervical adenopathy or unexpected aerodigestive tract mass. The osseous structures demonstrate no evidence of acute fracture or aggressive osseous lesion. Patent aortic arch with four-vessel branching, left vertebral artery arising directly. The subclavian arteries are patent bilaterally. Calcific atherosclerotic changes are present at the right ICA origin with mild, less than 50% stenosis present by NASCET criteria. Similar mild narrowing is present on the left. The vertebral arteries appear normal in course and caliber, developmentally diminutive on the left. Intracranially, the carotid siphons demonstrate evidence of prior stenting within the right ICA, with the stented segment patent. 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 The anterior cerebral arteries are normal in course and caliber. The right and left middle cerebral arteries demonstrate no evidence of flow-limiting stenosis, large vessel occlusion or aneurysm. The vertebrobasilar system remains patent with diminutive left vertebral artery terminating in PICA. The posterior cerebral arteries are patent.     Impression: Impression: Noncontrast CT head demonstrates no acute intracranial findings. Stable generalized volume loss and multiple areas  of prior infarct. CT angiogram demonstrates no evidence of new flow-limiting stenosis, large vessel occlusion or other acute finding. 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 Electronically Signed: Armen Browning MD  4/29/2025 10:22 PM EDT  Workstation ID: [id number]    XR Chest 1 View  Result Date: 4/29/2025  XR CHEST 1 VW Date of Exam: 4/29/2025 6:22 PM EDT Indication: Weak/Dizzy/AMS triage protocol Comparison: 10/23/2024 Findings: No focal consolidation. No pneumothorax or pleural effusion. Calcification of the aortic arch. Cardiac size is normal. The visualized clavicles appear intact. No displaced rib fractures. The visualized upper abdomen is normal.     Impression: Impression: No acute cardiopulmonary disease. Electronically Signed: Robson Cutler MD  4/29/2025 6:44 PM EDT  Workstation ID: [id number]      Results for orders placed during the hospital encounter of 10/23/24    Adult Transthoracic Echo Complete W/ Cont if Necessary Per Protocol (With Agitated Saline)    Interpretation Summary    Left ventricular ejection fraction appears to be 51 - 55%.    Unable to fully assess valvular function due to suboptimal windows.      Assessment & Plan   Assessment & Plan       Expressive aphasia    Bipolar 2 disorder    Type 2 diabetes mellitus    Essential hypertension    Hyperlipidemia    Carotid stenosis, right    Altered mental status    69 y/o female with hx of DM, HTN, HLD who presents now w/   Expressive aphasia  -all imaging of head/neck negative other than known left ICA aneurysm  -ua equivocal and pt states she is not typically symptomatic however she feels that her sx of expressive aphasia have improved in ED and this may be attributed to improved BP control   -neuro checks  2. DM  -SSI  -check a1c  3. HTN/hypertensive urgency  -resume home meds as sx have been present for 5 days and imaging negative  -may need cardene for better control   4. HLD  -high dose  statin  -check flp  5. Elevated troponin  -likely related to elevated BP  -no chest pain and EKG not worrisome                VTE Prophylaxis:  Mechanical VTE prophylaxis orders are signed & held.            CODE STATUS:    Code Status and Medical Interventions: CPR (Attempt to Resuscitate); Full Support   Ordered at: 04/29/25 2331     Code Status (Patient has no pulse and is not breathing):    CPR (Attempt to Resuscitate)     Medical Interventions (Patient has pulse or is breathing):    Full Support       Expected Discharge   Expected discharge date/ time has not been documented.     Florencia Chi MD  04/30/25  Electronically signed by Florencia Chi MD, 04/30/25, 12:43 AM EDT.

## 2025-04-30 NOTE — PROGRESS NOTES
[facility] Medicine Services  ADMISSION FOLLOW-UP NOTE          Patient admitted after midnight, H&P by my partner performed earlier on today's date reviewed.  Interim findings, labs, and charting also reviewed.        The [facility] Hospital Problem List has been managed and updated to include any new diagnoses:  Active Hospital Problems    Diagnosis  POA    **Expressive aphasia [R47.01]  Yes    Hypertensive urgency [I16.0]  Yes    Altered mental status [R41.82]  Yes    Bipolar 2 disorder [F31.81]  Yes    Carotid stenosis, right [I65.21]  Yes    Essential hypertension [I10]  Yes    Hyperlipidemia [E78.5]  Yes    Type 2 diabetes mellitus [E11.9]  Yes      Resolved Hospital Problems   No resolved problems to display.         ADDITIONAL PLAN:  - detailed assessment and plan from admission reviewed    69 y/o female with hx of DM, HTN, HLD who presents now w/     Expressive aphasia  -all imaging of head/neck negative other than known left ICA aneurysm  -MRI brain negative   -ua equivocal and pt states she is not typically symptomatic however she feels that her sx of expressive aphasia have improved in ED and this may be attributed to improved BP control   -neuro checks  -some improvement but still some expressive aphasia/word finding difficulty.  Neuro following  -needs Better improvement in BP but still hasn't had morning meds d/t awaiting dysphagia eval but adding any more meds    DM  -SSI  -A1c 5.10    HTN/hypertensive urgency  -resume home meds as sx have been present for 5 days and imaging negative, note still hasn't had AM meds d/t awaiting SLP eval  -may need cardene for better control     HLD  -high dose statin  -total chol 125 HDL 48 LDL 59    Elevated troponin  -likely related to elevated BP  -no chest pain and EKG not worrisome    Expected Discharge   Expected Discharge Date: 5/1/2025; Expected Discharge Time:      Manohar Marquez MD  04/30/25

## 2025-04-30 NOTE — CASE MANAGEMENT/SOCIAL WORK
Discharge Planning Assessment  [facility]     Patient Name: Olga Sandoval  MRN: [id number]  Today's Date: 4/30/2025    Admit Date: 4/29/2025    Plan: HOME   Discharge Needs Assessment       Row Name 04/30/25 1041       Living Environment    People in Home child(janki), adult    Current Living Arrangements home    Primary Care Provided by self    Provides Primary Care For no one    Family Caregiver if Needed child(janki), adult    Quality of Family Relationships supportive    Able to Return to Prior Arrangements yes       Resource/Environmental Concerns    Transportation Concerns none       Transition Planning    Patient/Family Anticipates Transition to home with family    Patient/Family Anticipated Services at Transition     Transportation Anticipated family or friend will provide       Discharge Needs Assessment    Readmission Within the Last 30 Days no previous admission in last 30 days    Equipment Currently Used at Home wheelchair    Anticipated Changes Related to Illness none    Equipment Needed After Discharge none    Current Discharge Risk chronically ill                   Discharge Plan       Row Name 04/30/25 1042       Plan    Plan HOME    Patient/Family in Agreement with Plan yes    Plan Comments Met with pt at bedside for DCP.  She lives with daughter in [facility].  Pt is WC bound at baseline, but she is independent with ADLs and most IADL (dtr transports and does laundry).  Pt can transfer self.  No HH/O2.  Confirmed she has [employer] with Rx coverage.  Goal is home.  CM will cont to follow for DC needs.    Final Discharge Disposition Code 01 - home or self-care                       Demographic Summary       Row Name 04/30/25 1036       General Information    Admission Type observation    Referral Source admission list    Reason for Consult discharge planning    Preferred Language English       Contact Information    Permission Granted to Share Info With     Contact  Information Obtained for                    Functional Status       Row Name 04/30/25 1042       Functional Status, IADL    Laundry completely dependent      Row Name 04/30/25 1040       Functional Status    Usual Activity Tolerance moderate    Current Activity Tolerance moderate       Functional Status, IADL    Medications independent    Meal Preparation independent    Housekeeping independent    Laundry independent    Shopping independent                   Psychosocial    No documentation.                  Abuse/Neglect    No documentation.                  Legal    No documentation.                  Substance Abuse    No documentation.                  Patient Forms    No documentation.                     Nava Valentine RN

## 2025-04-30 NOTE — PLAN OF CARE
Goal Outcome Evaluation:  Plan of Care Reviewed With: (P) patient        Progress: (P) no change       Anticipated Discharge Disposition (SLP): (P) home with OP services    SLP Diagnosis: (P) mild, cognitive-linguistic disorder, aphasia (04/30/25 1015)  SLP Diagnosis Comments: (P) Pt was oriented to self, place, and situation. Endorsed word finding difficulties and memory deficits at baseline. Pt demonstrated difficulty with divergent naming and immediate recall tasks. Pt was intelligible at the conversation level, no apraxia or dysathria noted. Some phonemic paraphasias observed during conversation. Will follow up for treatment and to establish pt's baseline with family. (04/30/25 1015)  SLP Swallowing Diagnosis: (P) swallow WFL/no suspected pharyngeal impairment (04/30/25 1015)

## 2025-04-30 NOTE — CONSULTS
Stroke Consult Note    Patient Name: Olga Sandoval   MRN: [id number]  Age: 70 y.o.  Sex: female  : 1955    Primary Care Physician: Ileana Kerr MD  Referring Physician: Dr. Richards    TIME STROKE TEAM CALLED: 2100 EST     TIME PATIENT SEEN:  EST    Handedness: Right  Race: White    Chief Complaint/Reason for Consultation: Expressive aphasia    HPI: Ms. Sandoval is a 70-year-old female with PMH of HTN, HLD, bipolar 2 disorder, COPD, DM, remote Bell's palsy, multiple CVAs (, , ) and carotid artery aneurysm s/p pipeline embolization (2024, [facility]).  Patient is a poor historian of PMH and events leading up to ED visit. ER physician reports patient presented to [facility] ED for evaluation of confusion for at least the past 5 days. Patient stated her daughter noticed speech was worse today and notified EMS.  CT head and CTA head and neck ordered by ER team.  Stroke neurology consulted for further evaluation of confusion and expressive aphasia.    Initial NIH 7.  Blood pressure 180/88.  On exam patient is able to answer questions appropriately and follow one-step commands.  No headache, nausea, falls or recent trauma.  Denies any sensory deficits at this time.  Left facial droop with expressive/receptive aphasia and mild dysarthria noted during conversation.  Strength right lower extremity 4/5.  Former smoker and no EtOH use.  Takes prescribed medications routinely.  Patient uses a wheelchair for mobility at baseline.    Last Known Normal Date/Time: 5 days ago EST     Review of Systems   Constitutional:  Negative for fever.   HENT:  Negative for trouble swallowing and voice change.    Eyes:  Negative for visual disturbance.   Respiratory:  Negative for shortness of breath.    Cardiovascular:  Negative for chest pain.   Gastrointestinal:  Negative for abdominal pain and nausea.   Neurological:  Positive for facial asymmetry, speech difficulty and weakness. Negative for dizziness,  light-headedness, numbness and headaches.   Psychiatric/Behavioral:  Positive for confusion.       Past Medical History:   Diagnosis Date    Aneurysm     left carotid    Bipolar 2 disorder     Diabetes mellitus     H/O Bell's palsy     Left sided facial droop    History of loop recorder     Hyperlipidemia     Hypertension     Stroke     x 4     Past Surgical History:   Procedure Laterality Date    CARDIAC CATHETERIZATION      CATARACT EXTRACTION Bilateral      SECTION       Family History   Problem Relation Age of Onset    Hypertension Mother     Lung cancer Mother     Cataracts Mother     Transient ischemic attack Father     Hypertension Father     Cancer Father     Heart attack Brother     Stroke Brother      Social History     Socioeconomic History    Marital status:    Tobacco Use    Smoking status: Former     Current packs/day: 0.00     Average packs/day: 0.8 packs/day for 5.0 years (3.8 ttl pk-yrs)     Types: Cigarettes     Start date:      Quit date:      Years since quittin.3    Smokeless tobacco: Never   Vaping Use    Vaping status: Never Used   Substance and Sexual Activity    Alcohol use: Never    Drug use: Never    Sexual activity: Defer     Allergies   Allergen Reactions    Codeine Palpitations and Unknown (See Comments)     Other reaction(s): Unknown     Prior to Admission medications    Medication Sig Start Date End Date Taking? Authorizing Provider   amLODIPine (NORVASC) 10 MG tablet Take 1 tablet by mouth Daily. 24   Ileana Kerr MD   amLODIPine (NORVASC) 5 MG tablet TAKE 1 TABLET BY MOUTH DAILY 24   Linda Awan DO   aspirin 81 MG chewable tablet Chew 1 tablet Daily. 24   Provider, MD Joby   atorvastatin (LIPITOR) 80 MG tablet Take 1 tablet by mouth Every Night. 24   Ileana Kerr MD   carvedilol (COREG) 3.125 MG tablet TAKE 1 TABLET BY MOUTH TWICE DAILY WITH MEALS 3/19/25   Ileana Kerr MD   Cholecalciferol 25 MCG (1000 UT)  tablet Take 1 tablet by mouth Daily.    Provider, MD Joby   lansoprazole (PREVACID) 15 MG capsule Take 1 capsule by mouth Daily. 9/16/24   Ileana Kerr MD   losartan (COZAAR) 100 MG tablet TAKE 1 TABLET BY MOUTH DAILY 3/19/25   Ileana Kerr MD   magnesium oxide (MAG-OX) 400 MG tablet Take 1 tablet by mouth Daily.    ProviderJoby MD   metFORMIN ER (GLUCOPHAGE-XR) 500 MG 24 hr tablet Take 2 tablets by mouth Every 12 (Twelve) Hours. 10/14/24   Ileana Kerr MD   Ozempic, 1 MG/DOSE, 4 MG/3ML solution pen-injector Inject 1 mg under the skin into the appropriate area as directed 1 (One) Time Per Week. Patient takes on Friday 9/16/24   Ileana Kerr MD   QUEtiapine (SEROquel) 300 MG tablet Take 1 tablet by mouth Every Night. 12/9/24   Linda Awan DO   ticagrelor (BRILINTA) 90 MG tablet tablet Take 1 tablet by mouth 2 (Two) Times a Day. 1/31/25   Eri Fox DO   venlafaxine XR (EFFEXOR-XR) 150 MG 24 hr capsule Take 1 capsule by mouth Daily. 10/14/24   Ileana Kerr MD         Temp:  [97.3 °F (36.3 °C)] 97.3 °F (36.3 °C)  Heart Rate:  [65-80] 75  Resp:  [16] 16  BP: (180-219)/() 180/88  Neurological Exam  Mental Status  Awake, alert and oriented to person, place and time. Oriented to person, place and time. Oriented to person, place, and time. Mild dysarthria present. Expressive aphasia and receptive aphasia present.    Cranial Nerves  CN II: Right visual acuity: Counts fingers. Left visual acuity: Counts fingers.  CN III, IV, VI: Extraocular movements intact bilaterally. Pupils equal round and reactive to light bilaterally.  CN V: Facial sensation is normal.  CN VII:  Left: There is central facial weakness.  CN VIII: Hearing intact.  CN XI: Shoulder shrug strength is normal.  CN XII: Tongue midline without atrophy or fasciculations.    Motor  Normal muscle bulk throughout. Normal muscle tone.  Strength right lower extremity 4/5..    Sensory  Light touch is normal  in upper and lower extremities.     Coordination  Right: Finger-to-nose normal.Left: Unable to complete.    Gait    Unable to assess.      Physical Exam  Constitutional:       General: She is not in acute distress.  HENT:      Head: Normocephalic and atraumatic.      Nose: Nose normal.      Mouth/Throat:      Mouth: Mucous membranes are dry.   Eyes:      Extraocular Movements: Extraocular movements intact.      Pupils: Pupils are equal, round, and reactive to light.   Cardiovascular:      Pulses: Normal pulses.   Pulmonary:      Effort: Pulmonary effort is normal.   Abdominal:      General: Abdomen is flat.   Musculoskeletal:         General: Normal range of motion.      Cervical back: Normal range of motion.   Skin:     General: Skin is warm and dry.   Neurological:      Mental Status: She is oriented to person, place, and time.      Cranial Nerves: Cranial nerve deficit and dysarthria present.      Sensory: No sensory deficit.      Motor: Weakness present.      Coordination: Coordination abnormal.   Psychiatric:         Mood and Affect: Mood normal.         Behavior: Behavior normal.         Acute Stroke Data    Thrombolytic Inclusion / Exclusion Criteria    Time: 00:23 EDT  Person Administering Scale: ANGELIKA Ardon      YES NO INCLUSION CRITERIA CLASS I   [x] []   Suspected diagnosis of acute ischemic stroke with measureable neurological deficit.  Low NIHSS with disabling stroke symptoms.   [] [x]   Onset of stroke symptoms < 3 hours before beginning treatment >/ 18 years old  Stroke symptom onset = time patient was last seen well or without symptoms (LKW)   [] [x]   Onset of symptoms between 3-4.5 hours: >/= 80 years old (safe Class IIa) with history of   both diabetes and prior CVA  (reasonable Class IIb) AND NIHSS </= 25  *If not eligible for IV Thrombolytic consider neuro intervention for LKW within 24 hours     YES NO EXCLUSION CRITERIA (CONTRAINDICATIONS) CLASS III EVIDENCE HARM   [] []   Blood  pressure >185/110 medically refractory to IV medications   [] []   Active bleeding at a non-compressible site   [] []   Active intracranial hemorrhage (ICH)   [] []   Symptoms suggestive of subarachnoid hemorrhage (SAH)   [] []   GI bleed within 21 days   [] []   Ischemic stroke within 3 months   [] []   Severe head trauma within 3 months    [] []   Intracranial or intraspinal surgery within 3 months   [] []   Current GI malignancy   [] []   Intracranial neoplasm   [] []   Infective endocarditis   [] []   Aortic arch dissection   [] []   Active coagulopathy with  INR >1.7, platelets <100,000, PTT > 40 sec, PT > 15 sec   *For warfarin, administration can begin before blood tests resulted. Discontinue for above values.    [] []   Treatment dose* of LMWH (Lovenox) in last 24 hours  *prophylactic dosages are not a contraindication   [] []   Concurrent use of antiplatelet agents' glycoprotein inhibitors IIb/IIIa (Integrilin, etc.)   [] []   Thrombin or factor Xa inhibitors (Eliquis, Xarelto, Arixtra) taken in last 48 hours     YES NO CLASS II: AIS WITH THE FOLLOWING CONDITIONS -   TREATMENT RISKS SHOULD BE WEIGHED AGAINST POSSIBLE BENEFITS.    [] []   Major trauma in last 14 days, recent major surgery in last 14 days, intracranial arterial dissection, giant unruptured and unsecured intracranial aneurysm, pericarditis     [] []   The risks and benefits have been discussed with the patient or family related to the administration of IV thrombolytic therapy for stroke symptoms.   [] []   I have discussed and reviewed the patient's case and imaging with the attending prior to IV thrombolytic therapy.   TIME NA Time IV thrombolytic administered       Hospital Meds:  Scheduled-    Infusions-     PRNs-   sodium chloride    Functional Status Prior to Current Stroke/Hinsdale Score:   MODIFIED TALISHA SCALE (to be assessed for each patient having history of stroke) []Stroke history but not assessed  []0: No symptoms at all  []1: No  significant disability despite symptoms  []2: Slight disability  [x]3: Moderate disability  []4: Moderately severe disability  []5: Severe disability  []6: Death        NIH Stroke Scale  Time: 00:23 EDT  Person Administering Scale: ANGELIKA Ardon  Interval: baseline  1a. Level of Consciousness: 0-->Alert, keenly responsive  1b. LOC Questions: 0-->Answers both questions correctly  1c. LOC Commands: 0-->Performs both tasks correctly  2. Best Gaze: 0-->Normal  3. Visual: 1-->Partial hemianopia  4. Facial Palsy: 2-->Partial paralysis (total or near-total paralysis of lower face)  5a. Motor Arm, Left: 0-->No drift, limb holds 90 (or 45) degrees for full 10 secs  5b. Motor Arm, Right: 0-->No drift, limb holds 90 (or 45) degrees for full 10 secs  6a. Motor Leg, Left: 0-->No drift, leg holds 30 degree position for full 5 secs  6b. Motor Leg, Right: 1-->Drift, leg falls by the end of the 5-sec period but does not hit bed  7. Limb Ataxia: 1-->Present in one limb  8. Sensory: 0-->Normal, no sensory loss  9. Best Language: 1-->Mild-to-moderate aphasia, some obvious loss of fluency or facility of comprehension, without significant limitation on ideas expressed or form of expression. Reduction of speech and/or comprehension, however, makes conversation. . . (see row details)  10. Dysarthria: 1-->Mild-to-moderate dysarthria, patient slurs at least some words and, at worst, can be understood with some difficulty  11. Extinction and Inattention (formerly Neglect): 0-->No abnormality    Total (NIH Stroke Scale): 7     Results Reviewed:  I have personally reviewed current lab, radiology, and data and agree with results.    Results for orders placed during the hospital encounter of 10/23/24    Adult Transthoracic Echo Complete W/ Cont if Necessary Per Protocol (With Agitated Saline)    Interpretation Summary    Left ventricular ejection fraction appears to be 51 - 55%.    Unable to fully assess valvular function due to suboptimal  windows.     WBC   Date Value Ref Range Status   04/29/2025 9.30 3.40 - 10.80 10*3/mm3 Final     RBC   Date Value Ref Range Status   04/29/2025 4.24 3.77 - 5.28 10*6/mm3 Final     Hemoglobin   Date Value Ref Range Status   04/29/2025 12.5 12.0 - 15.9 g/dL Final     Hematocrit   Date Value Ref Range Status   04/29/2025 38.7 34.0 - 46.6 % Final     MCV   Date Value Ref Range Status   04/29/2025 91.3 79.0 - 97.0 fL Final     MCH   Date Value Ref Range Status   04/29/2025 29.5 26.6 - 33.0 pg Final     MCHC   Date Value Ref Range Status   04/29/2025 32.3 31.5 - 35.7 g/dL Final     RDW   Date Value Ref Range Status   04/29/2025 14.2 12.3 - 15.4 % Final     RDW-SD   Date Value Ref Range Status   04/29/2025 47.8 37.0 - 54.0 fl Final     MPV   Date Value Ref Range Status   04/29/2025 10.8 6.0 - 12.0 fL Final     Platelets   Date Value Ref Range Status   04/29/2025 337 140 - 450 10*3/mm3 Final     Neutrophil %   Date Value Ref Range Status   04/29/2025 81.6 (H) 42.7 - 76.0 % Final     Lymphocyte %   Date Value Ref Range Status   04/29/2025 10.3 (L) 19.6 - 45.3 % Final     Monocyte %   Date Value Ref Range Status   04/29/2025 5.8 5.0 - 12.0 % Final     Eosinophil %   Date Value Ref Range Status   04/29/2025 1.6 0.3 - 6.2 % Final     Basophil %   Date Value Ref Range Status   04/29/2025 0.3 0.0 - 1.5 % Final     Immature Grans %   Date Value Ref Range Status   04/29/2025 0.4 0.0 - 0.5 % Final     Neutrophils, Absolute   Date Value Ref Range Status   04/29/2025 7.58 (H) 1.70 - 7.00 10*3/mm3 Final     Lymphocytes, Absolute   Date Value Ref Range Status   04/29/2025 0.96 0.70 - 3.10 10*3/mm3 Final     Monocytes, Absolute   Date Value Ref Range Status   04/29/2025 0.54 0.10 - 0.90 10*3/mm3 Final     Eosinophils, Absolute   Date Value Ref Range Status   04/29/2025 0.15 0.00 - 0.40 10*3/mm3 Final     Basophils, Absolute   Date Value Ref Range Status   04/29/2025 0.03 0.00 - 0.20 10*3/mm3 Final     Immature Grans, Absolute   Date  Value Ref Range Status   04/29/2025 0.04 0.00 - 0.05 10*3/mm3 Final     nRBC   Date Value Ref Range Status   04/29/2025 0.0 0.0 - 0.2 /100 WBC Final      Lab Results   Component Value Date    GLUCOSE 105 (H) 04/29/2025    BUN 21 04/29/2025    CREATININE 1.24 (H) 04/29/2025     04/29/2025    K 4.7 04/29/2025     04/29/2025    CALCIUM 10.1 04/29/2025    PROTEINTOT 7.3 04/29/2025    ALBUMIN 4.5 04/29/2025    ALT 6 04/29/2025    AST 13 04/29/2025    ALKPHOS 90 04/29/2025    BILITOT 0.8 04/29/2025    GLOB 2.8 04/29/2025    AGRATIO 1.6 04/29/2025    BCR 16.9 04/29/2025    ANIONGAP 15.0 04/29/2025    EGFR 46.3 (L) 04/29/2025    MRI Brain Without Contrast  Result Date: 4/29/2025  Impression: MR examination of the brain without IV contrast demonstrating diffuse atrophy and white matter changes. No acute intracranial abnormality is seen. Electronically Signed: Omid Tamayo MD  4/29/2025 11:09 PM EDT  Workstation ID: [id number]    CT Head Without Contrast  Result Date: 4/29/2025  Impression: Noncontrast CT head demonstrates no acute intracranial findings. Stable generalized volume loss and multiple areas of prior infarct. CT angiogram demonstrates no evidence of new flow-limiting stenosis, large vessel occlusion or other acute finding. 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 Electronically Signed: Armen Browning MD  4/29/2025 10:22 PM EDT  Workstation ID: [id number]    CT Angiogram Head w AI Analysis of LVO  Result Date: 4/29/2025  Impression: Noncontrast CT head demonstrates no acute intracranial findings. Stable generalized volume loss and multiple areas of prior infarct. CT angiogram demonstrates no evidence of new flow-limiting stenosis, large vessel occlusion or other acute finding. 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 Electronically Signed: Armen Browning MD  4/29/2025 10:22 PM EDT  Workstation ID:  [id number]    CT Angiogram Neck  Result Date: 4/29/2025  Impression: Noncontrast CT head demonstrates no acute intracranial findings. Stable generalized volume loss and multiple areas of prior infarct. CT angiogram demonstrates no evidence of new flow-limiting stenosis, large vessel occlusion or other acute finding. 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 Electronically Signed: Armen Browning MD  4/29/2025 10:22 PM EDT  Workstation ID: [id number]    XR Chest 1 View  Result Date: 4/29/2025  Impression: No acute cardiopulmonary disease. Electronically Signed: Robson Cutler MD  4/29/2025 6:44 PM EDT  Workstation ID: [id number]       Assessment/Plan:  Ms. Sandoval is a 70-year-old female with PMH of HTN, HLD, bipolar 2 disorder, COPD, DM, remote Bell's palsy, multiple CVAs (2018, 2022, 2023) and carotid artery aneurysm s/p pipeline embolization (6/27/2024, [facility]).  Patient is a poor historian of PMH and events leading up to ED visit. ER physician reports patient presented to [facility] ED for evaluation of confusion for at least the past 5 days. Patient stated her daughter noticed speech was worse today and notified EMS.  CT head and CTA head and neck ordered by ER team.  Stroke neurology consulted for further evaluation of confusion and expressive aphasia. Patient is not a candidate for IV thrombolytic therapy due to last known well greater than 24 hours.  Patient is not a candidate for endovascular therapy due to no LVO noted on CT scans.    Antiplatelet PTA: Aspirin, Brilinta  Anticoagulant PTA: None        Expressive aphasia  Differentials to include TIA, CVA, stroke recrudescence, metabolic encephalopathy  Stat MRI brain without contrast  No further stroke workup at this time  General Neurology to see in a.m.    Plan of care discussed with ER team, patient and primary RN.  Stroke neurology will sign off at this time.  Thank you for this consult.  Call with any  questions or concerns.      Orlando Riggs, APRN  April 30, 2025  00:23 EDT

## 2025-04-30 NOTE — THERAPY EVALUATION
Acute Care - Speech Language Pathology   Swallow Initial Evaluation [facility]  Clinical Swallow Evaluation and  Cognitive-Communication Evaluation      Patient Name: Olga Sandoval  : 1955  MRN: [id number]  Today's Date: 2025               Admit Date: 2025    Visit Dx:     ICD-10-CM ICD-9-CM   1. Expressive aphasia  R47.01 784.3   2. Facial droop  R29.810 781.94   3. Hypertension, unspecified type  I10 401.9     Patient Active Problem List   Diagnosis    Aphasia    Bipolar 2 disorder    Type 2 diabetes mellitus    Essential hypertension    Hyperlipidemia    Carotid stenosis, right    Obesity, Class III, BMI 40-49.9 (morbid obesity)    History of CVA (cerebrovascular accident)    Hypertension    Gastroesophageal reflux disease    Encounter for screening for colorectal cancer in high risk patient    Immunization due    Medicare annual wellness visit, subsequent    Lymphedema    Bleeding from right ear    Healthcare maintenance    Post-menopausal    Altered mental status    Expressive aphasia    Hypertensive urgency     Past Medical History:   Diagnosis Date    Aneurysm     left carotid    Bipolar 2 disorder     Diabetes mellitus     H/O Bell's palsy     Left sided facial droop    History of loop recorder     Hyperlipidemia     Hypertension     Stroke     x 4     Past Surgical History:   Procedure Laterality Date    CARDIAC CATHETERIZATION      CATARACT EXTRACTION Bilateral      SECTION         SLP Recommendation and Plan  SLP Swallowing Diagnosis: (P) swallow WFL/no suspected pharyngeal impairment (25 1015)  SLP Diet Recommendation: (P) regular textures, thin liquids (25 1015)  Recommended Precautions and Strategies: (P) general aspiration precautions (25 101)  SLP Rec. for Method of Medication Administration: (P) meds whole, meds crushed, with puree, as tolerated (25 1015)     Monitor for Signs of Aspiration: (P) yes, notify SLP if any concerns (25 1015)      Swallow Criteria for Skilled Therapeutic Interventions Met: (P) demonstrates skilled criteria (04/30/25 1015)  Anticipated Discharge Disposition (SLP): (P) home with OP services (04/30/25 1015)  Rehab Potential/Prognosis, Swallowing: (P) good, to achieve stated therapy goals (04/30/25 1015)  Therapy Frequency (Swallow): (P) 3 days per week, other (see comments) (To ensure diet tolerance) (04/30/25 1015)  Predicted Duration Therapy Intervention (Days): (P) 1 week (04/30/25 1015)  Oral Care Recommendations: (P) Oral Care BID/PRN, Toothbrush (04/30/25 1015)                                        Progress: (P) no change      SWALLOW EVALUATION (Last 72 Hours)       SLP Adult Swallow Evaluation       Row Name 04/30/25 1015                   Rehab Evaluation    Document Type evaluation (P)   -MC        Subjective Information no complaints (P)   -MC        Patient Observations alert;cooperative;agree to therapy (P)   -MC        Patient/Family/Caregiver Comments/Observations no family present (P)   -MC        Patient Effort good (P)   -MC        Symptoms Noted During/After Treatment none (P)   -MC           General Information    Patient Profile Reviewed yes (P)   -MC        Pertinent History Of Current Problem Pt is a 71 y/o F who presented to [facility] ED with confusion and word finding difficultes. PMH includes L carotid aneurysm, bipolar disorder, DM, HLD, HTN, and stroke x4. Per MD note, CTA of head revealed left ICA aneursym, all other head/neck imaging was negative. (P)   -MC        Current Method of Nutrition NPO (P)   -MC        Precautions/Limitations, Vision WFL;for purposes of eval (P)   -MC        Precautions/Limitations, Hearing WFL;for purposes of eval (P)   -MC        Prior Level of Function-Communication cognitive-linguistic impairment;other (see comments) (P)   Hx of multiple strokes. Reports hx of memory deficits, unable to establish baseline due to no family present.  -MC        Prior Level of  Function-Swallowing no diet consistency restrictions (P)   -        Plans/Goals Discussed with patient (P)   -        Barriers to Rehab none identified (P)   -        Patient's Goals for Discharge patient did not state (P)   -           Pain    Pretreatment Pain Rating 2/10 (P)   -        Posttreatment Pain Rating 3/10 (P)   -        Pain Location knee (P)   -        Pain Side/Orientation generalized (P)   -        Pain Management Interventions other (see comments) (P)   Pt expressed that knee pain has been an ongoing issues prior to admission.  -           Oral Motor Structure and Function    Dentition Assessment natural, present and adequate (P)   -        Secretion Management WNL/WFL (P)   -        Mucosal Quality dry (P)   -           Oral Musculature and Cranial Nerve Assessment    Oral Motor General Assessment generalized oral motor weakness;oral labial or buccal impairment (P)   -        Oral Labial or Buccal Impairment, Detail, Cranial Nerve VII (Facial): left labial droop (P)   -        Oral Motor, Comment Pt reports left droop at baseline, has a diagnosis of bell's palsy (P)   -           General Eating/Swallowing Observations    Respiratory Support Currently in Use room air (P)   -        Eating/Swallowing Skills self-fed;fed by SLP (P)   -        Positioning During Eating upright in bed (P)   -        Utensils Used spoon;straw (P)   -        Consistencies Trialed ice chips;thin liquids;pureed;regular textures;mixed consistency (P)   -           Respiratory    Respiratory Status room air (P)   -           Clinical Swallow Eval    Oral Prep Phase WFL (P)   -        Oral Transit WFL (P)   -        Oral Residue WFL (P)   -        Pharyngeal Phase no overt signs/symptoms of pharyngeal impairment (P)   -        Esophageal Phase unremarkable (P)   -        Clinical Swallow Evaluation Summary Pt demonstrated a cough with ice chips and water trials when  presented via spoon. Would suspect that these patterns are consistent with trial presentation rather than s/sx of aspiration. Numerous subsequent trials, no overt s/sx of aspiration with all other consistencies via straw. Will follow up with pt to ensure diet tolerance. (P)   -           SLP Evaluation Clinical Impression    SLP Swallowing Diagnosis swallow WFL/no suspected pharyngeal impairment (P)   -        Functional Impact no impact on function (P)   -        Rehab Potential/Prognosis, Swallowing good, to achieve stated therapy goals (P)   -        Swallow Criteria for Skilled Therapeutic Interventions Met demonstrates skilled criteria (P)   -           Recommendations    Therapy Frequency (Swallow) 3 days per week;other (see comments) (P)   To ensure diet tolerance  -        Predicted Duration Therapy Intervention (Days) 1 week (P)   -        SLP Diet Recommendation regular textures;thin liquids (P)   -        Recommended Precautions and Strategies general aspiration precautions (P)   -        Oral Care Recommendations Oral Care BID/PRN;Toothbrush (P)   -        SLP Rec. for Method of Medication Administration meds whole;meds crushed;with puree;as tolerated (P)   -        Monitor for Signs of Aspiration yes;notify SLP if any concerns (P)   -        Anticipated Discharge Disposition (SLP) home with OP services (P)   -                  User Key  (r) = Recorded By, (t) = Taken By, (c) = Cosigned By      Initials Name Effective Dates    Aurora Santiago, Speech Therapy Student 01/16/25 -                     EDUCATION  The patient has been educated in the following areas:   Dysphagia (Swallowing Impairment).        SLP GOALS       Row Name 04/30/25 1015             Patient will demonstrate functional cognitive-linguistic skills for return to discharge environment    [place] with minimal cues (P)   -      Time frame 1 week (P)   -      Progress/Outcomes new goal (P)   -          SLP Diagnostic Treatment     Patient will participate in further assessment in the following areas clarification of baseline cognitive communication status (P)   -MC      Time Frame (Diagnostic) 1 week (P)   -MC      Progress/Outcomes (Additional Goal 1, SLP) new goal (P)   -MC         Connected Speech to Express Thoughts Goal 1 (SLP)    Improve Narrative Discourse to Express Thoughts By Goal 1 (SLP) giving multiple definitions of a word;describing a picture;80%;with minimal cues (75-90%) (P)   -MC      Time Frame (Connected Speech Goal 1, SLP) 1 week (P)   -MC      Progress/Outcomes (Connected Speech Goal 1, SLP) new goal (P)   -MC         Orientation Goal 1 (SLP)    Improve Orientation Through Goal 1 (SLP) demonstrating orientation to day;demonstrating orientation to month;demonstrating orientation to year;demonstrating orientation to place;demonstrating orientation to disease/impairment;80%;with minimal cues (75-90%) (P)   -MC      Time Frame (Orientation Goal 1, SLP) 1 week (P)   -MC      Progress/Outcomes (Orientation Goal 1, SLP) new goal (P)   -MC         Memory Skills Goal 1 (SLP)    Improve Memory Skills Through Goal 1 (SLP) recalling related word lists immediately;recalling related word lists with an imposed delay;recalling unrelated word lists immediately;recalling unrelated word lists with an imposed delay;80%;with minimal cues (75-90%) (P)   -MC      Time Frame (Memory Skills Goal 1, SLP) 1 week (P)   -MC      Progress/Outcomes (Memory Skills Goal 1, SLP) new goal (P)   -MC         Organizational Skills Goal 1 (SLP)    Improve Thought Organization Through Goal 1 (SLP) completing a divergent naming task;completing a convergent naming task;generating a list of items in a category;80%;with minimal cues (75-90%) (P)   -MC      Time Frame (Thought Organization Skills Goal 1, SLP) 1 week (P)   -MC      Progress/Outcomes (Thought Organization Skills Goal 1, SLP) new goal (P)   -                User Key  (r)  = Recorded By, (t) = Taken By, (c) = Cosigned By      Initials Name Provider Type     Aurora Jones Speech Therapy Student SLP Student                         Time Calculation:    Time Calculation- SLP       Row Name 25 1457             Time Calculation- SLP    SLP Start Time 1015 (P)   -      SLP Received On 25 (P)   -         Untimed Charges    57347-HB Eval Speech and Production w/ Language Minutes 53 (P)   -      63908-KV Eval Oral Pharyng Swallow Minutes 53 (P)   -         Total Minutes    Untimed Charges Total Minutes 106 (P)   -       Total Minutes 106 (P)   -                User Key  (r) = Recorded By, (t) = Taken By, (c) = Cosigned By      Initials Name Provider Type     Aurora Jones Speech Therapy Student SLP Student                    Therapy Charges for Today       Code Description Service Date Service Provider Modifiers Qty    48997724999 HC ST EVAL ORAL PHARYNG SWALLOW 4 2025 Aurora Jones Speech Therapy Student GN 1    [id number] HC ST EVAL SPEECH AND PROD W LANG  4 2025 Aurora Jones Speech Therapy Student GN 1                 [facility] Karen Speech Therapy Student  2025   and Acute Care - Speech Language Pathology Initial Evaluation  [facility]     Patient Name: Olga Sandoval  : 1955  MRN: [id number]  Today's Date: 2025               Admit Date: 2025     Visit Dx:    ICD-10-CM ICD-9-CM   1. Expressive aphasia  R47.01 784.3   2. Facial droop  R29.810 781.94   3. Hypertension, unspecified type  I10 401.9     Patient Active Problem List   Diagnosis    Aphasia    Bipolar 2 disorder    Type 2 diabetes mellitus    Essential hypertension    Hyperlipidemia    Carotid stenosis, right    Obesity, Class III, BMI 40-49.9 (morbid obesity)    History of CVA (cerebrovascular accident)    Hypertension    Gastroesophageal reflux disease    Encounter for screening for colorectal cancer in high risk patient    Immunization due    Medicare annual  wellness visit, subsequent    Lymphedema    Bleeding from right ear    Healthcare maintenance    Post-menopausal    Altered mental status    Expressive aphasia    Hypertensive urgency     Past Medical History:   Diagnosis Date    Aneurysm     left carotid    Bipolar 2 disorder     Diabetes mellitus     H/O Bell's palsy     Left sided facial droop    History of loop recorder     Hyperlipidemia     Hypertension     Stroke     x 4     Past Surgical History:   Procedure Laterality Date    CARDIAC CATHETERIZATION      CATARACT EXTRACTION Bilateral      SECTION         SLP Recommendation and Plan  SLP Diagnosis: (P) mild, cognitive-linguistic disorder, aphasia (25 101)  SLP Diagnosis Comments: (P) Pt was oriented to self, place, and situation. Endorsed word finding difficulties and memory deficits at baseline. Pt demonstrated difficulty with divergent naming and immediate recall tasks. Pt was intelligible at the conversation level, no apraxia or dysathria noted. Some phonemic paraphasias observed during conversation. Will follow up for treatment and to establish pt's baseline with family. (25 101)     Monitor for Signs of Aspiration: (P) yes, notify SLP if any concerns (25 101)  Swallow Criteria for Skilled Therapeutic Interventions Met: (P) demonstrates skilled criteria (25 101)  SLC Criteria for Skilled Therapy Interventions Met: (P) yes (25 101)  Anticipated Discharge Disposition (SLP): (P) home with OP services (25 101)     Therapy Frequency (Swallow): (P) 3 days per week, other (see comments) (To ensure diet tolerance) (25 101)  Therapy Frequency (SLP SLC): (P) 3 days per week (25 101)  Predicted Duration Therapy Intervention (Days): (P) 1 week (25 1015)  Oral Care Recommendations: (P) Oral Care BID/PRN, Toothbrush (25 1015)                          Progress: (P) no change (25 1456)      SLP EVALUATION (Last 72 Hours)       SLP SLC  Evaluation       Row Name 04/30/25 1015                   General Information    Patient Level of Education unknown (P)   -        Prior Level of Function-Communication cognitive-linguistic impairment (P)   Hx of multiple strokes. Reports hx of memory deficits, unable to establish baseline due to no family present.  -        Patient's Goals for Discharge patient did not state (P)   -           Comprehension Assessment/Intervention    Comprehension Assessment/Intervention Auditory Comprehension (P)   -           Auditory Comprehension Assessment/Intervention    Auditory Comprehension (Communication) WFL (P)   -        Able to Identify Objects/Pictures (Communication) WFL;body part;familiar objects (P)   -        Answers Questions (Communication) WFL;simple;concrete;yes/no (P)   -        Able to Follow Commands (Communication) WFL;1-step;2-step;3-step (P)   -           Expression Assessment/Intervention    Expression Assessment/Intervention verbal expression (P)   -           Verbal Expression Assessment/Intervention    Automatic Speech (Communication) WFL;days of week (P)   -        Repetition WFL;words;phrases;sentences (P)   -        Phrase Completion WFL;automatic/predictable;unpredictable (P)   -        Spontaneous/Functional Words mild impairment (P)   -        Sentence Formulation mild impairment (P)   -        Conversational Discourse/Fluency mild impairment;phonemic paraphasias (P)   -           Motor Speech Assessment/Intervention    Motor Speech Function WFL (P)   -           Cognitive Assessment Intervention- SLP    Cognitive Function (Cognition) mild impairment (P)   -        Orientation Status (Cognition) WFL;person;place;situation;moderate impairment;time (P)   -        Memory (Cognitive) mild impairment (P)   -        Thought Organization (Cognitive) mild impairment;concrete divergent (P)   -        Cognition, Comment Suspect some degree of expressive language  impairment contributing to cognitive difficulties. However, some deficits cognitive based in nature. (P)   -           SLP Evaluation Clinical Impressions    SLP Diagnosis mild;cognitive-linguistic disorder;aphasia (P)   -        SLP Diagnosis Comments Pt was oriented to self, place, and situation. Endorsed word finding difficulties and memory deficits at baseline. Pt demonstrated difficulty with divergent naming and immediate recall tasks. Pt was intelligible at the conversation level, no apraxia or dysathria noted. Some phonemic paraphasias observed during conversation. Will follow up for treatment and to establish pt's baseline with family. (P)   -        Rehab Potential/Prognosis good (P)   -        SLC Criteria for Skilled Therapy Interventions Met yes (P)   -        Functional Impact difficulty communicating wants, needs;difficulty communicating in an emergency;difficulty in expressing complex messages;restrictions in personal and social life (P)   -           Recommendations    Therapy Frequency (SLP SLC) 3 days per week (P)   -                  User Key  (r) = Recorded By, (t) = Taken By, (c) = Cosigned By      Initials Name Effective Dates     Aurora Jones Speech Therapy Student 01/16/25 -                        EDUCATION  The patient has been educated in the following areas:     Cognitive Impairment Communication Impairment.           SLP GOALS       Row Name 04/30/25 1015             Patient will demonstrate functional cognitive-linguistic skills for return to discharge environment    [place] with minimal cues (P)   -      Time frame 1 week (P)   -      Progress/Outcomes new goal (P)   -         SLP Diagnostic Treatment     Patient will participate in further assessment in the following areas clarification of baseline cognitive communication status (P)   -      Time Frame (Diagnostic) 1 week (P)   -      Progress/Outcomes (Additional Goal 1, SLP) new goal (P)   -          Connected Speech to Express Thoughts Goal 1 (SLP)    Improve Narrative Discourse to Express Thoughts By Goal 1 (SLP) giving multiple definitions of a word;describing a picture;80%;with minimal cues (75-90%) (P)   -      Time Frame (Connected Speech Goal 1, SLP) 1 week (P)   -MC      Progress/Outcomes (Connected Speech Goal 1, SLP) new goal (P)   -         Orientation Goal 1 (SLP)    Improve Orientation Through Goal 1 (SLP) demonstrating orientation to day;demonstrating orientation to month;demonstrating orientation to year;demonstrating orientation to place;demonstrating orientation to disease/impairment;80%;with minimal cues (75-90%) (P)   -      Time Frame (Orientation Goal 1, SLP) 1 week (P)   -MC      Progress/Outcomes (Orientation Goal 1, SLP) new goal (P)   -         Memory Skills Goal 1 (SLP)    Improve Memory Skills Through Goal 1 (SLP) recalling related word lists immediately;recalling related word lists with an imposed delay;recalling unrelated word lists immediately;recalling unrelated word lists with an imposed delay;80%;with minimal cues (75-90%) (P)   -      Time Frame (Memory Skills Goal 1, SLP) 1 week (P)   -MC      Progress/Outcomes (Memory Skills Goal 1, SLP) new goal (P)   -         Organizational Skills Goal 1 (SLP)    Improve Thought Organization Through Goal 1 (SLP) completing a divergent naming task;completing a convergent naming task;generating a list of items in a category;80%;with minimal cues (75-90%) (P)   -      Time Frame (Thought Organization Skills Goal 1, SLP) 1 week (P)   -      Progress/Outcomes (Thought Organization Skills Goal 1, SLP) new goal (P)   -                User Key  (r) = Recorded By, (t) = Taken By, (c) = Cosigned By      Initials Name Provider Type    Aurora Santiago Speech Therapy Student SLP Student                              Time Calculation:      Time Calculation- SLP       Row Name 04/30/25 7414             Time Calculation- SLP    SLP  Start Time 1015 (P)   -      SLP Received On 04/30/25 (P)   -         Untimed Charges    40259-IV Eval Speech and Production w/ Language Minutes 53 (P)   -      20078-PR Eval Oral Pharyng Swallow Minutes 53 (P)   -         Total Minutes    Untimed Charges Total Minutes 106 (P)   -       Total Minutes 106 (P)   -                User Key  (r) = Recorded By, (t) = Taken By, (c) = Cosigned By      Initials Name Provider Type     Aurora Jones Speech Therapy Student SLP Student                    Therapy Charges for Today       Code Description Service Date Service Provider Modifiers Qty    04235683591 HC ST EVAL ORAL PHARYNG SWALLOW 4 4/30/2025 Aurora Jones Speech Therapy Student GN 1    [id number] HC ST EVAL SPEECH AND PROD W LANG  4 4/30/2025 Aurora Jones Speech Therapy Student GN 1                       Aurora  Karen Speech Therapy Student  4/30/2025

## 2025-05-01 LAB
ANION GAP SERPL CALCULATED.3IONS-SCNC: 11 MMOL/L (ref 5–15)
BUN SERPL-MCNC: 12 MG/DL (ref 8–23)
BUN/CREAT SERPL: 17.1 (ref 7–25)
CALCIUM SPEC-SCNC: 8.9 MG/DL (ref 8.6–10.5)
CHLORIDE SERPL-SCNC: 106 MMOL/L (ref 98–107)
CO2 SERPL-SCNC: 24 MMOL/L (ref 22–29)
CREAT SERPL-MCNC: 0.7 MG/DL (ref 0.57–1)
EGFRCR SERPLBLD CKD-EPI 2021: 92 ML/MIN/1.73
GLUCOSE BLDC GLUCOMTR-MCNC: 101 MG/DL (ref 70–130)
GLUCOSE BLDC GLUCOMTR-MCNC: 152 MG/DL (ref 70–130)
GLUCOSE BLDC GLUCOMTR-MCNC: 155 MG/DL (ref 70–130)
GLUCOSE BLDC GLUCOMTR-MCNC: 94 MG/DL (ref 70–130)
GLUCOSE SERPL-MCNC: 99 MG/DL (ref 65–99)
POTASSIUM SERPL-SCNC: 3.9 MMOL/L (ref 3.5–5.2)
SODIUM SERPL-SCNC: 141 MMOL/L (ref 136–145)

## 2025-05-01 PROCEDURE — 25810000003 SODIUM CHLORIDE 0.9 % SOLUTION 250 ML FLEX CONT: Performed by: INTERNAL MEDICINE

## 2025-05-01 PROCEDURE — 99214 OFFICE O/P EST MOD 30 MIN: CPT | Performed by: STUDENT IN AN ORGANIZED HEALTH CARE EDUCATION/TRAINING PROGRAM

## 2025-05-01 PROCEDURE — 97530 THERAPEUTIC ACTIVITIES: CPT

## 2025-05-01 PROCEDURE — G0378 HOSPITAL OBSERVATION PER HR: HCPCS

## 2025-05-01 PROCEDURE — 97161 PT EVAL LOW COMPLEX 20 MIN: CPT

## 2025-05-01 PROCEDURE — 96366 THER/PROPH/DIAG IV INF ADDON: CPT

## 2025-05-01 PROCEDURE — 25010000002 NICARDIPINE 2.5 MG/ML SOLUTION 10 ML VIAL: Performed by: INTERNAL MEDICINE

## 2025-05-01 PROCEDURE — 99232 SBSQ HOSP IP/OBS MODERATE 35: CPT | Performed by: INTERNAL MEDICINE

## 2025-05-01 PROCEDURE — 82948 REAGENT STRIP/BLOOD GLUCOSE: CPT

## 2025-05-01 PROCEDURE — 97165 OT EVAL LOW COMPLEX 30 MIN: CPT

## 2025-05-01 PROCEDURE — 63710000001 INSULIN REGULAR HUMAN PER 5 UNITS: Performed by: INTERNAL MEDICINE

## 2025-05-01 PROCEDURE — 80048 BASIC METABOLIC PNL TOTAL CA: CPT | Performed by: INTERNAL MEDICINE

## 2025-05-01 RX ADMIN — PANTOPRAZOLE SODIUM 40 MG: 40 TABLET, DELAYED RELEASE ORAL at 06:06

## 2025-05-01 RX ADMIN — MICONAZOLE NITRATE 1 APPLICATION: 20 POWDER TOPICAL at 20:37

## 2025-05-01 RX ADMIN — TICAGRELOR 90 MG: 90 TABLET ORAL at 10:14

## 2025-05-01 RX ADMIN — LOSARTAN POTASSIUM 100 MG: 50 TABLET, FILM COATED ORAL at 10:14

## 2025-05-01 RX ADMIN — TICAGRELOR 90 MG: 90 TABLET ORAL at 20:36

## 2025-05-01 RX ADMIN — CARVEDILOL 3.12 MG: 3.12 TABLET, FILM COATED ORAL at 18:06

## 2025-05-01 RX ADMIN — Medication 1000 UNITS: at 10:14

## 2025-05-01 RX ADMIN — INSULIN HUMAN 2 UNITS: 100 INJECTION, SOLUTION PARENTERAL at 12:35

## 2025-05-01 RX ADMIN — MAGNESIUM OXIDE TAB 400 MG (241.3 MG ELEMENTAL MG) 400 MG: 400 (241.3 MG) TAB at 10:14

## 2025-05-01 RX ADMIN — Medication 10 ML: at 10:15

## 2025-05-01 RX ADMIN — Medication 10 ML: at 20:37

## 2025-05-01 RX ADMIN — INSULIN HUMAN 2 UNITS: 100 INJECTION, SOLUTION PARENTERAL at 20:36

## 2025-05-01 RX ADMIN — ATORVASTATIN CALCIUM 80 MG: 40 TABLET, FILM COATED ORAL at 20:36

## 2025-05-01 RX ADMIN — AMLODIPINE BESYLATE 10 MG: 10 TABLET ORAL at 10:14

## 2025-05-01 RX ADMIN — VENLAFAXINE HYDROCHLORIDE 150 MG: 75 CAPSULE, EXTENDED RELEASE ORAL at 10:14

## 2025-05-01 RX ADMIN — CARVEDILOL 3.12 MG: 3.12 TABLET, FILM COATED ORAL at 10:14

## 2025-05-01 RX ADMIN — ASPIRIN 81 MG 81 MG: 81 TABLET ORAL at 10:14

## 2025-05-01 RX ADMIN — SODIUM CHLORIDE 5 MG/HR: 9 INJECTION, SOLUTION INTRAVENOUS at 03:22

## 2025-05-01 RX ADMIN — QUETIAPINE FUMARATE 300 MG: 100 TABLET ORAL at 20:36

## 2025-05-01 RX ADMIN — MICONAZOLE NITRATE 1 APPLICATION: 20 POWDER TOPICAL at 12:35

## 2025-05-01 NOTE — PLAN OF CARE
Goal Outcome Evaluation:  Plan of Care Reviewed With: patient, child (SPOKE WITH DTR ON PHONE TO CONFIRM PLOF AND D/C PLANNING DUE PT APHASIC)           Outcome Evaluation: PT PRESENTS LIKELY CLOSE TO RECENT BASELINE WITH FUNCTIONAL MOBILITY. PT IS ALERT AND ABLE TO FOLLOW COMMAND.  PT HAS EXPRESSIVE APHASIA  AND B KNEE FLEXION CONTRACTURES AT BASELINE, BUT CAN NORMALLY TRANSFER TO W/C AND COMPLETE ADL'S INDEPENDENTLY. CURRENTLY PT REQUIRES MIN ASSIST OF 2 FOR PIVOT TRANSFER AND CGA FOR SUPINE TO SIT. WOULD BENEFIT FROM HHPT AT D/C,  BUT PT AND DTR POLITELY DECLINE.   RECOMMEND PIVOT TRANSER TO W/C NEXT SESSION TO WORK ON W/C MOBILITY AND HOME WITH 24/7 ASSIST.    Anticipated Discharge Disposition (PT): home with 24/7 care (PT/DTR DECLINE HHPT)

## 2025-05-01 NOTE — PROGRESS NOTES
[facility] Medicine Services  PROGRESS NOTE    Patient Name: Olga Sandoval  : 1955  MRN: [id number]    Date of Admission: 2025  Primary Care Physician: Ileana Kerr MD    Subjective   Subjective     CC:  Word finding difficulty    HPI:  Speech is back to baseline and blood pressure has been doing well off Cardene drip.  Patient able to answer all her questions and speech comprehensible      Objective   Objective     Vital Signs:   Temp:  [98 °F (36.7 °C)-98.5 °F (36.9 °C)] 98.5 °F (36.9 °C)  Heart Rate:  [69-85] 69  Resp:  [18] 18  BP: (100-187)/() 153/64     Physical Exam:  Constitutional: No acute distress, awake, alert  HENT: NCAT, mucous membranes moist, facial droop, chronic  Respiratory: Respiratory effort normal   Cardiovascular: RRR, no murmurs, rubs, or gallops  Gastrointestinal: Soft, nontender, nondistended  Musculoskeletal: LE edema, muscle atrophy  Psychiatric: Appropriate affect, cooperative  Neurologic: Oriented x 3, speech clear  Skin: No rashes      Results Reviewed:  LAB RESULTS:      Lab 25  0523 25  0126 25  0006 25  1906   WBC 10.15  --   --  9.30   HEMOGLOBIN 11.2*  --   --  12.5   HEMATOCRIT 33.7*  --   --  38.7   PLATELETS 314  --   --  337   NEUTROS ABS 6.87  --   --  7.58*   IMMATURE GRANS (ABS) 0.05  --   --  0.04   LYMPHS ABS 2.09  --   --  0.96   MONOS ABS 1.03*  --   --  0.54   EOS ABS 0.09  --   --  0.15   MCV 89.4  --   --  91.3   HSTROP T  --  22* 21* 23*         Lab 25  0729 25  0523 25  1906   SODIUM 141 139 142   POTASSIUM 3.9 4.0 4.7   CHLORIDE 106 102 101   CO2 24.0 22.0 26.0   ANION GAP 11.0 15.0 15.0   BUN 12 18 21   CREATININE 0.70 0.93 1.24*   EGFR 92.0 65.4 46.3*   GLUCOSE 99 77 105*   CALCIUM 8.9 9.4 10.1   MAGNESIUM  --  1.6 1.7   PHOSPHORUS  --  3.6  --    HEMOGLOBIN A1C  --  5.10  --    TSH  --  2.970  --          Lab 25  0523 25  1906   TOTAL PROTEIN 6.3 7.3    ALBUMIN 3.9 4.5   GLOBULIN 2.4 2.8   ALT (SGPT) 6 6   AST (SGOT) 13 13   BILIRUBIN 0.6 0.8   ALK PHOS 77 90         Lab 04/30/25  0126 04/30/25  0006 04/29/25  1906   HSTROP T 22* 21* 23*         Lab 04/30/25  0523   CHOLESTEROL 125   LDL CHOL 59   HDL CHOL 48   TRIGLYCERIDES 98             Brief Urine Lab Results  (Last result in the past 365 days)        Color   Clarity   Blood   Leuk Est   Nitrite   Protein   CREAT   Urine HCG        04/29/25 1944 Dark Yellow   Cloudy   Negative   Trace   Negative   30 mg/dL (1+)                   Microbiology Results Abnormal       None            MRI Brain Without Contrast  Result Date: 4/29/2025  MRI BRAIN WO CONTRAST Date of Exam: 4/29/2025 10:38 AM EDT Indication: expressive aphasia.  Comparison: CT head without IV contrast 4/29/2025 Technique:  Routine multiplanar/multisequence sequence images of the brain were obtained without contrast administration. Findings: The ventricles, sulci, and cerebellar folia are moderately and diffusely prominent consistent with atrophy. Ill-defined abnormal T2 bright signal is consistent with moderate small vessel disease and/or numerous old lacunar infarcts. No abnormal bright signal is seen on diffusion weighted images. No restricted diffusion is evident. No abnormal dark signal seen on magnetic susceptibility sequence sensitive for blood products. No abnormality of the pituitary or orbits is evident. The paranasal sinuses are well aerated.     Impression: Impression: MR examination of the brain without IV contrast demonstrating diffuse atrophy and white matter changes. No acute intracranial abnormality is seen. Electronically Signed: Omid Tamayo MD  4/29/2025 11:09 PM EDT  Workstation ID: [id number]    CT Head Without Contrast  Result Date: 4/29/2025  CT HEAD WO CONTRAST, CT ANGIOGRAM HEAD W AI ANALYSIS OF LVO, CT ANGIOGRAM NECK Date of Exam: 4/29/2025 9:46 PM EDT Indication: confusion. Comparison: 10/28/2024. 10/23/2024 Technique:  Axial CT images of the brain were obtained prior to and after the administration of 115 mL Isovue-370. CTA of the head and neck were performed after the administration of iodinated contrast.  Reconstructed coronal and sagittal images were also  obtained. A 3-D volume rendered image was created for interpretation. Automated exposure control and iterative reconstruction methods were used.   Findings: CT head: There is redemonstrated generalized volume loss as well as prominent chronic white matter changes and areas of chronic infarct within watershed distributions bilaterally. There is no definite new territorial ischemia and there is no evidence of hemorrhage, mass or mass effect. There is ex vacuo prominence of the ventricles. The orbits are normal. The paranasal sinuses are clear. CT ANGIOGRAM: The lung apices are clear. The neck soft tissues demonstrate no pathologic cervical adenopathy or unexpected aerodigestive tract mass. The osseous structures demonstrate no evidence of acute fracture or aggressive osseous lesion. Patent aortic arch with four-vessel branching, left vertebral artery arising directly. The subclavian arteries are patent bilaterally. Calcific atherosclerotic changes are present at the right ICA origin with mild, less than 50% stenosis present by NASCET criteria. Similar mild narrowing is present on the left. The vertebral arteries appear normal in course and caliber, developmentally diminutive on the left. Intracranially, the carotid siphons demonstrate evidence of prior stenting within the right ICA, with the stented segment patent. 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 The anterior cerebral arteries are normal in course and caliber. The right and left middle cerebral arteries demonstrate no evidence of flow-limiting stenosis, large vessel occlusion or aneurysm. The  vertebrobasilar system remains patent with diminutive left vertebral artery terminating in PICA. The posterior cerebral arteries are patent.     Impression: Impression: Noncontrast CT head demonstrates no acute intracranial findings. Stable generalized volume loss and multiple areas of prior infarct. CT angiogram demonstrates no evidence of new flow-limiting stenosis, large vessel occlusion or other acute finding. 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 Electronically Signed: Armen Browning MD  4/29/2025 10:22 PM EDT  Workstation ID: [id number]    CT Angiogram Head w AI Analysis of LVO  Result Date: 4/29/2025  CT HEAD WO CONTRAST, CT ANGIOGRAM HEAD W AI ANALYSIS OF LVO, CT ANGIOGRAM NECK Date of Exam: 4/29/2025 9:46 PM EDT Indication: confusion. Comparison: 10/28/2024. 10/23/2024 Technique: Axial CT images of the brain were obtained prior to and after the administration of 115 mL Isovue-370. CTA of the head and neck were performed after the administration of iodinated contrast.  Reconstructed coronal and sagittal images were also  obtained. A 3-D volume rendered image was created for interpretation. Automated exposure control and iterative reconstruction methods were used.   Findings: CT head: There is redemonstrated generalized volume loss as well as prominent chronic white matter changes and areas of chronic infarct within watershed distributions bilaterally. There is no definite new territorial ischemia and there is no evidence of hemorrhage, mass or mass effect. There is ex vacuo prominence of the ventricles. The orbits are normal. The paranasal sinuses are clear. CT ANGIOGRAM: The lung apices are clear. The neck soft tissues demonstrate no pathologic cervical adenopathy or unexpected aerodigestive tract mass. The osseous structures demonstrate no evidence of acute fracture or aggressive osseous lesion. Patent aortic arch with four-vessel branching, left vertebral  artery arising directly. The subclavian arteries are patent bilaterally. Calcific atherosclerotic changes are present at the right ICA origin with mild, less than 50% stenosis present by NASCET criteria. Similar mild narrowing is present on the left. The vertebral arteries appear normal in course and caliber, developmentally diminutive on the left. Intracranially, the carotid siphons demonstrate evidence of prior stenting within the right ICA, with the stented segment patent. 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 The anterior cerebral arteries are normal in course and caliber. The right and left middle cerebral arteries demonstrate no evidence of flow-limiting stenosis, large vessel occlusion or aneurysm. The vertebrobasilar system remains patent with diminutive left vertebral artery terminating in PICA. The posterior cerebral arteries are patent.     Impression: Impression: Noncontrast CT head demonstrates no acute intracranial findings. Stable generalized volume loss and multiple areas of prior infarct. CT angiogram demonstrates no evidence of new flow-limiting stenosis, large vessel occlusion or other acute finding. 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 Electronically Signed: Armen Browning MD  4/29/2025 10:22 PM EDT  Workstation ID: [id number]    CT Angiogram Neck  Result Date: 4/29/2025  CT HEAD WO CONTRAST, CT ANGIOGRAM HEAD W AI ANALYSIS OF LVO, CT ANGIOGRAM NECK Date of Exam: 4/29/2025 9:46 PM EDT Indication: confusion. Comparison: 10/28/2024. 10/23/2024 Technique: Axial CT images of the brain were obtained prior to and after the administration of 115 mL Isovue-370. CTA of the head and neck were performed after the administration of iodinated contrast.  Reconstructed coronal and sagittal images were also  obtained. A 3-D volume  rendered image was created for interpretation. Automated exposure control and iterative reconstruction methods were used.   Findings: CT head: There is redemonstrated generalized volume loss as well as prominent chronic white matter changes and areas of chronic infarct within watershed distributions bilaterally. There is no definite new territorial ischemia and there is no evidence of hemorrhage, mass or mass effect. There is ex vacuo prominence of the ventricles. The orbits are normal. The paranasal sinuses are clear. CT ANGIOGRAM: The lung apices are clear. The neck soft tissues demonstrate no pathologic cervical adenopathy or unexpected aerodigestive tract mass. The osseous structures demonstrate no evidence of acute fracture or aggressive osseous lesion. Patent aortic arch with four-vessel branching, left vertebral artery arising directly. The subclavian arteries are patent bilaterally. Calcific atherosclerotic changes are present at the right ICA origin with mild, less than 50% stenosis present by NASCET criteria. Similar mild narrowing is present on the left. The vertebral arteries appear normal in course and caliber, developmentally diminutive on the left. Intracranially, the carotid siphons demonstrate evidence of prior stenting within the right ICA, with the stented segment patent. 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 The anterior cerebral arteries are normal in course and caliber. The right and left middle cerebral arteries demonstrate no evidence of flow-limiting stenosis, large vessel occlusion or aneurysm. The vertebrobasilar system remains patent with diminutive left vertebral artery terminating in PICA. The posterior cerebral arteries are patent.     Impression: Impression: Noncontrast CT head demonstrates no acute intracranial findings. Stable generalized volume loss and  multiple areas of prior infarct. CT angiogram demonstrates no evidence of new flow-limiting stenosis, large vessel occlusion or other acute finding. 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 Electronically Signed: Armen Browning MD  4/29/2025 10:22 PM EDT  Workstation ID: [id number]    XR Chest 1 View  Result Date: 4/29/2025  XR CHEST 1 VW Date of Exam: 4/29/2025 6:22 PM EDT Indication: Weak/Dizzy/AMS triage protocol Comparison: 10/23/2024 Findings: No focal consolidation. No pneumothorax or pleural effusion. Calcification of the aortic arch. Cardiac size is normal. The visualized clavicles appear intact. No displaced rib fractures. The visualized upper abdomen is normal.     Impression: Impression: No acute cardiopulmonary disease. Electronically Signed: Robson Cutler MD  4/29/2025 6:44 PM EDT  Workstation ID: [id number]      Results for orders placed during the hospital encounter of 10/23/24    Adult Transthoracic Echo Complete W/ Cont if Necessary Per Protocol (With Agitated Saline)    Interpretation Summary    Left ventricular ejection fraction appears to be 51 - 55%.    Unable to fully assess valvular function due to suboptimal windows.      Current medications:  Scheduled Meds:amLODIPine, 10 mg, Oral, Q24H  aspirin, 81 mg, Oral, Daily  atorvastatin, 80 mg, Oral, Nightly  carvedilol, 3.125 mg, Oral, BID With Meals  cholecalciferol, 1,000 Units, Oral, Daily  insulin regular, 2-7 Units, Subcutaneous, Q6H  losartan, 100 mg, Oral, Daily  magnesium oxide, 400 mg, Oral, Daily  miconazole, 1 Application, Topical, Q12H  pantoprazole, 40 mg, Oral, Q AM  QUEtiapine, 300 mg, Oral, Nightly  sodium chloride, 10 mL, Intravenous, Q12H  ticagrelor, 90 mg, Oral, BID  venlafaxine XR, 150 mg, Oral, Daily      Continuous Infusions:niCARdipine, 5-15 mg/hr, Last Rate: Stopped (05/01/25 0830)      PRN Meds:.  acetaminophen **OR** acetaminophen **OR** acetaminophen    senna-docusate sodium  **AND** polyethylene glycol **AND** bisacodyl **AND** bisacodyl    Calcium Replacement - Follow Nurse / BPA Driven Protocol    dextrose    dextrose    glucagon (human recombinant)    hydrALAZINE    Magnesium Standard Dose Replacement - Follow Nurse / BPA Driven Protocol    nitroglycerin    Phosphorus Replacement - Follow Nurse / BPA Driven Protocol    Potassium Replacement - Follow Nurse / BPA Driven Protocol    sodium chloride    sodium chloride    sodium chloride    Assessment & Plan   Assessment & Plan     Active Hospital Problems    Diagnosis  POA    **Expressive aphasia [R47.01]  Yes    Hypertensive urgency [I16.0]  Yes    Altered mental status [R41.82]  Yes    Bipolar 2 disorder [F31.81]  Yes    Carotid stenosis, right [I65.21]  Yes    Essential hypertension [I10]  Yes    Hyperlipidemia [E78.5]  Yes    Type 2 diabetes mellitus [E11.9]  Yes      Resolved Hospital Problems   No resolved problems to display.        Brief Hospital Course to date:  Olga Sandoval is a 70 y.o. female with history of bipolar, diabetes, hypertension, stroke x 4, prior tobacco use admitted for confusion and word finding difficulty.  She was admitted as stroke rule out MRI brain is negative.  She was evaluated by general neurology.    Expressive aphasia, resolved  -all imaging of head/neck negative other than known left ICA aneurysm  -MRI brain negative   - UA without true infection  - Symptoms likely related to uncontrolled BP  - Passed dysphagia eval, continue home meds     DM  -SSI  -A1c 5.10     HTN/hypertensive urgency-better  - Wean Cardene       HLD  -high dose statin     Elevated troponin  -likely related to elevated BP  -no chest pain and EKG not worrisome        Expected Discharge Location and Transportation: Home, lives with daughter  Expected Discharge   Expected Discharge Date: 5/2/2025; Expected Discharge Time:      VTE Prophylaxis:  Mechanical VTE prophylaxis orders are present.         AM-PAC 6 Clicks Score (PT): 12  (05/01/25 1103)    CODE STATUS:   Code Status and Medical Interventions: CPR (Attempt to Resuscitate); Full Support   Ordered at: 04/29/25 0171     Code Status (Patient has no pulse and is not breathing):    CPR (Attempt to Resuscitate)     Medical Interventions (Patient has pulse or is breathing):    Full Support       Donna Whitman, DO  05/01/25

## 2025-05-01 NOTE — PROGRESS NOTES
Nutrition Services    Patient Name:  Olga Sandoval  YOB: 1955  MRN: [id number]  Admit Date:  4/29/2025    Patient screened for possible pressure injury. Chart reviewed. No pressure injury stage 2 or greater noted per documentation at this time. RDN following per protocol. Available via consult.      Electronically signed by:  Tammy Crocker RD  05/01/25 08:59 EDT
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
## 2025-05-01 NOTE — PROGRESS NOTES
[facility] Neurology    Progress Note    Patient Name: Olga Sandoval  : 1955  MRN: [id number]  Primary Care Physician:  Ileana Kerr MD  Date of admission: 2025    Subjective     Reason for consult: Expressive aphasia, negative MRI     History of Present Illness   Required Cardene drip overnight, but now blood pressure has improved.  Speech improved    Review of Systems   General: Negative for fever, nausea, or vomiting.   Neurological: Negative for headache, pain, or weakness.     Objective     Vitals:   Temp:  [98 °F (36.7 °C)-98.8 °F (37.1 °C)] 98.5 °F (36.9 °C)  Heart Rate:  [70-85] 82  Resp:  [18] 18  BP: (100-187)/() 130/66    Neurologic Exam   Mental status/Cognition: Awake and alert, oriented to self place month and year  Speech/language: fluent, able to repeat, follow commands    Cranial nerves: Tracks examiner.  Baseline left facial droop. No dysarthria.      Motor: Moves all extremities        Current Medications    Current Facility-Administered Medications:     acetaminophen (TYLENOL) tablet 650 mg, 650 mg, Oral, Q4H PRN, 650 mg at 25 0529 **OR** acetaminophen (TYLENOL) 160 MG/5ML oral solution 650 mg, 650 mg, Oral, Q4H PRN **OR** acetaminophen (TYLENOL) suppository 650 mg, 650 mg, Rectal, Q4H PRN, Day, Florencia DOMINGUEZ MD    amLODIPine (NORVASC) tablet 10 mg, 10 mg, Oral, Q24H, Day, Florencia DOMINGUEZ MD, 10 mg at 25 1426    aspirin chewable tablet 81 mg, 81 mg, Oral, Daily, Day, Florencia DOMINGUEZ MD, 81 mg at 25 1425    atorvastatin (LIPITOR) tablet 80 mg, 80 mg, Oral, Nightly, Day, Florencia DOMINGUEZ MD, 80 mg at 25 2135    sennosides-docusate (PERICOLACE) 8.6-50 MG per tablet 2 tablet, 2 tablet, Oral, BID PRN **AND** polyethylene glycol (MIRALAX) packet 17 g, 17 g, Oral, Daily PRN **AND** bisacodyl (DULCOLAX) EC tablet 5 mg, 5 mg, Oral, Daily PRN **AND** bisacodyl (DULCOLAX) suppository 10 mg, 10 mg, Rectal, Daily PRN, Arti, Florencia DOMINGUEZ MD    Calcium Replacement - Follow Nurse / BPA  Driven Protocol, , Not Applicable, PRN, DayFlorencia MD    carvedilol (COREG) tablet 3.125 mg, 3.125 mg, Oral, BID With Meals, Day, Florencia DOMINGUEZ MD, 3.125 mg at 04/30/25 1425    cholecalciferol (VITAMIN D3) tablet 1,000 Units, 1,000 Units, Oral, Daily, Day, Florencia DOMINGUEZ MD, 1,000 Units at 04/30/25 1426    dextrose (D50W) (25 g/50 mL) IV injection 25 g, 25 g, Intravenous, Q15 Min PRN, Florencia Chi MD    dextrose (GLUTOSE) oral gel 15 g, 15 g, Oral, Q15 Min PRN, Florencia Chi MD    glucagon (GLUCAGEN) injection 1 mg, 1 mg, Intramuscular, Q15 Min PRN, Florencia Chi MD    hydrALAZINE (APRESOLINE) injection 10 mg, 10 mg, Intravenous, Q6H PRN, Manohar Marquez MD    insulin regular (humuLIN R,novoLIN R) injection 2-7 Units, 2-7 Units, Subcutaneous, Q6H, Day, Florencia DOMINGUEZ MD, 3 Units at 04/30/25 1850    losartan (COZAAR) tablet 100 mg, 100 mg, Oral, Daily, Day, Florencia DOMINGUEZ MD, 100 mg at 04/30/25 1425    magnesium oxide (MAG-OX) tablet 400 mg, 400 mg, Oral, Daily, Day, Florencia DOMINGUEZ MD, 400 mg at 04/30/25 1426    Magnesium Standard Dose Replacement - Follow Nurse / BPA Driven Protocol, , Not Applicable, PRN, Florencia Chi MD    niCARdipine (CARDENE) 25mg in 250mL NS infusion, 5-15 mg/hr, Intravenous, Titrated, Florencia Chi MD, Stopped at 05/01/25 0830    nitroglycerin (NITROSTAT) SL tablet 0.4 mg, 0.4 mg, Sublingual, Q5 Min PRN, Florencia Chi MD    pantoprazole (PROTONIX) EC tablet 40 mg, 40 mg, Oral, Q AM, Florencia Chi MD, 40 mg at 05/01/25 0606    Phosphorus Replacement - Follow Nurse / BPA Driven Protocol, , Not Applicable, PRN, Arti, Florencia DOMINGUEZ MD    Potassium Replacement - Follow Nurse / BPA Driven Protocol, , Not Applicable, PRN, Arti, Florencia DOMINGUEZ MD    QUEtiapine (SEROquel) tablet 300 mg, 300 mg, Oral, Nightly, Day, Florencia DOMINGUEZ MD, 300 mg at 04/30/25 2135    sodium chloride 0.9 % flush 10 mL, 10 mL, Intravenous, PRN, Jadyn Richards MD    sodium chloride 0.9 % flush 10 mL, 10 mL, Intravenous, Q12H, Florencia Chi MD, 10  "mL at 04/30/25 2136    sodium chloride 0.9 % flush 10 mL, 10 mL, Intravenous, PRN, Day, Florencia DOMINGUEZ MD    sodium chloride 0.9 % infusion 40 mL, 40 mL, Intravenous, PRN, Day, Florencia DOMINGUEZ MD    ticagrelor (BRILINTA) tablet 90 mg, 90 mg, Oral, BID, Day, Florencia DOMINGUEZ MD, 90 mg at 04/30/25 2135    venlafaxine XR (EFFEXOR-XR) 24 hr capsule 150 mg, 150 mg, Oral, Daily, Day, Florencia DOMINGUEZ MD, 150 mg at 04/30/25 1425    Laboratory Results:   Lab Results   Component Value Date    GLUCOSE 99 05/01/2025    CALCIUM 8.9 05/01/2025     05/01/2025    K 3.9 05/01/2025    CO2 24.0 05/01/2025     05/01/2025    BUN 12 05/01/2025    CREATININE 0.70 05/01/2025    EGFRIFAFRI 77 03/24/2021    EGFRIFNONA 66 03/24/2021    BCR 17.1 05/01/2025    ANIONGAP 11.0 05/01/2025     Lab Results   Component Value Date    WBC 10.15 04/30/2025    HGB 11.2 (L) 04/30/2025    HCT 33.7 (L) 04/30/2025    MCV 89.4 04/30/2025     04/30/2025     Lab Results   Component Value Date    CHOL 125 04/30/2025    CHOL 163 10/24/2024    CHOL 133 09/16/2024     Lab Results   Component Value Date    HDL 48 04/30/2025    HDL 60 10/24/2024    HDL 57 09/16/2024     Lab Results   Component Value Date    LDL 59 04/30/2025    LDL 87 10/24/2024    LDL 54 09/16/2024     Lab Results   Component Value Date    TRIG 98 04/30/2025    TRIG 83 10/24/2024    TRIG 126 09/16/2024     Lab Results   Component Value Date    HGBA1C 5.10 04/30/2025     No results found for: \"FOLATE\"  Lab Results   Component Value Date    HAZLHKJC14 294 10/23/2024     RVP negative     TSH 2.97    Images/Procedures   CT head 4/29/2025  Multiple areas of prior infarct, generalized volume loss     CTA brain and neck 4/29/2025  No LVO or significant stenosis     MRI brain without contrast 4/29/2025  Diffuse atrophy, moderate to severe white matter changes.    Assessment / Plan   Active Hospital Problems:  Hypertensive encephalopathy  History of stroke  History of Bell's palsy  Hypertension    Brief Patient " Summary:  Olga Sandoval is a 70 y.o. female with history of multiple strokes, carotid artery aneurysm s/p pipeline embolization, bipolar disorder, Bell's palsy, diabetes, hypertension, hyperlipidemia who presented to [facility] ED on 4/29 with confusion and aphasia.  In the ER, NIH 7, /88.  CT head and MRI showed known strokes, no new acute abnormalities.  Suspect hypertensive encephalopathy contributing to her speech disturbances.  On further chart review, it appears she has been worked up for the same symptoms and may have 2024 at [facility], where she was monitored on continuous EEG with active aphasia, which showed no epileptiform discharges or any seizures.  They felt there was possibly a psychogenic component related to previous life stressors versus dementia versus CBD, but felt like this would best be worked up in the outpatient setting.    Her speech has improved, she feels she is close to her baseline.  Her exam is otherwise in tact, with her baseline left facial droop from Bell's palsy.  Blood pressure has improved.  If she continues to be at her baseline, she is okay to discharge from a neurology perspective.    Plan:   - Blood pressure control  - Secondary stroke prevention per stroke neurology  - Recommend following up with outpatient neurology, ANGELIKA Carlisle in 1 to 2 months  - She is okay to discharge home from a neurology perspective        I have discussed the above with the patient, bedside RN, hospitalist  Time spent with patient: 45 minutes in face-to-face evaluation and management of the patient.      Jose Roberto Coppola DO, MS

## 2025-05-01 NOTE — THERAPY EVALUATION
Patient Name: Olga Sandoval  : 1955    MRN: [id number]                              Today's Date: 2025       Admit Date: 2025    Visit Dx:     ICD-10-CM ICD-9-CM   1. Expressive aphasia  R47.01 784.3   2. Facial droop  R29.810 781.94   3. Hypertension, unspecified type  I10 401.9   4. Cognitive communication disorder  R41.841 315.32   5. Dysphagia, unspecified type  R13.10 787.20     Patient Active Problem List   Diagnosis    Aphasia    Bipolar 2 disorder    Type 2 diabetes mellitus    Essential hypertension    Hyperlipidemia    Carotid stenosis, right    Obesity, Class III, BMI 40-49.9 (morbid obesity)    History of CVA (cerebrovascular accident)    Hypertension    Gastroesophageal reflux disease    Encounter for screening for colorectal cancer in high risk patient    Immunization due    Medicare annual wellness visit, subsequent    Lymphedema    Bleeding from right ear    Healthcare maintenance    Post-menopausal    Altered mental status    Expressive aphasia    Hypertensive urgency     Past Medical History:   Diagnosis Date    Aneurysm     left carotid    Bipolar 2 disorder     Diabetes mellitus     H/O Bell's palsy     Left sided facial droop    History of loop recorder     Hyperlipidemia     Hypertension     Stroke     x 4     Past Surgical History:   Procedure Laterality Date    CARDIAC CATHETERIZATION      CATARACT EXTRACTION Bilateral      SECTION        General Information       Row Name 25 1025          OT Time and Intention    Document Type evaluation  -AJ     Mode of Treatment occupational therapy  -AJ       Row Name 25 1025          General Information    Patient Profile Reviewed yes  -AJ     Prior Level of Function independent:;transfer;w/c or scooter;bed mobility;ADL's  Pt reports independently transferring to w/c for mobility and independence with ADLs.  -AJ     Existing Precautions/Restrictions fall  expressive aphasia, w/c bound at baseline, bilateral knee  flexion contractures  -     Barriers to Rehab medically complex;previous functional deficit  -       Row Name 05/01/25 1025          Living Environment    Current Living Arrangements home  -     People in Home child(janki), adult;other (see comments)  Lives w/dtr and dtr's SO  -       Row Name 05/01/25 1025          Cognition    Orientation Status (Cognition) unable/difficult to assess  difficult to formally assess d/t aphasia; oriented based on yes/no questions.  -       Row Name 05/01/25 1025          Safety Issues/Impairments Affecting Functional Mobility    Safety Issues Affecting Function (Mobility) insight into deficits/self-awareness;safety precaution awareness;safety precautions follow-through/compliance;sequencing abilities;awareness of need for assistance;friction/shear risk  -     Impairments Affecting Function (Mobility) balance;coordination;endurance/activity tolerance;pain;postural/trunk control;strength;range of motion (ROM)  -               User Key  (r) = Recorded By, (t) = Taken By, (c) = Cosigned By      Initials Name Provider Type    AJ Kinjal Guzmán OT Occupational Therapist                     Mobility/ADL's       Row Name 05/01/25 1029          Bed Mobility    Bed Mobility supine-sit;scooting/bridging  -     Scooting/Bridging Galliano (Bed Mobility) contact guard;2 person assist  -     Supine-Sit Galliano (Bed Mobility) contact guard;2 person assist;verbal cues  -     Bed Mobility, Safety Issues decreased use of legs for bridging/pushing  -     Assistive Device (Bed Mobility) bed rails;head of bed elevated  -     Comment, (Bed Mobility) Cues for sequencing and technique. Increased time and effort to transition.  -       Row Name 05/01/25 1029          Transfers    Transfers sit-stand transfer;bed-chair transfer  -       Row Name 05/01/25 1029          Bed-Chair Transfer    Bed-Chair Galliano (Transfers) verbal cues;minimum assist (75% patient effort);2  person assist  -     Assistive Device (Bed-Chair Transfers) other (see comments)  BUE support  -     Comment, (Bed-Chair Transfer) Cues for sequencing.  -       Row Name 05/01/25 1029          Sit-Stand Transfer    Sit-Stand Jack (Transfers) minimum assist (75% patient effort);2 person assist;verbal cues  -     Assistive Device (Sit-Stand Transfers) other (see comments)  BUE support  -       Row Name 05/01/25 1029          Functional Mobility    Patient was able to Ambulate no, other medical factors prevent ambulation  -     Reason Patient was unable to Ambulate Non-Ambulatory at Baseline  -       Row Name 05/01/25 1029          Activities of Daily Living    BADL Assessment/Intervention lower body dressing;grooming;upper body dressing  -[facility] Name 05/01/25 1029          Hygiene Care    Oral Care teeth brushed - regular toothbrush  -[facility] Name 05/01/25 1029          Lower Body Dressing Assessment/Training    Jack Level (Lower Body Dressing) don;socks;dependent (less than 25% patient effort)  -     Position (Lower Body Dressing) supine  -[facility] Name 05/01/25 1029          Grooming Assessment/Training    Jack Level (Grooming) oral care regimen;set up  -     Position (Grooming) supported sitting  -       Row Name 05/01/25 1029          Upper Body Dressing Assessment/Training    Jack Level (Upper Body Dressing) don;front opening garment;minimum assist (75% patient effort)  -     Position (Upper Body Dressing) edge of bed sitting  -               User Key  (r) = Recorded By, (t) = Taken By, (c) = Cosigned By      Initials Name Provider Type    Kinjal Patel OT Occupational Therapist                   Obj/Interventions       Row Name 05/01/25 1035          Sensory Assessment (Somatosensory)    Sensory Assessment (Somatosensory) UE sensation intact  -       Row Name 05/01/25 1035          Vision Assessment/Intervention    Visual  Impairment/Limitations WFL;corrective lenses for reading  -       Row Name 05/01/25 1035          Range of Motion Comprehensive    General Range of Motion bilateral upper extremity ROM WFL  -       Row Name 05/01/25 1035          Strength Comprehensive (MMT)    General Manual Muscle Testing (MMT) Assessment upper extremity strength deficits identified  -     Comment, General Manual Muscle Testing (MMT) Assessment BUE grossly 5/5  -       Row Name 05/01/25 1035          Balance    Balance Assessment sitting static balance;sitting dynamic balance;sit to stand dynamic balance;standing static balance;standing dynamic balance  -     Static Sitting Balance standby assist  -     Dynamic Sitting Balance standby assist  -     Position, Sitting Balance unsupported;sitting edge of bed  -     Static Standing Balance minimal assist;2-person assist  -     Dynamic Standing Balance minimal assist;2-person assist;verbal cues;non-verbal cues (demo/gesture)  -     Position/Device Used, Standing Balance supported  -     Balance Interventions sitting;standing;sit to stand;supported;static;dynamic;occupation based/functional task  -               User Key  (r) = Recorded By, (t) = Taken By, (c) = Cosigned By      Initials Name Provider Type    AJ Kinjal Guzmán, GUILHERME Occupational Therapist                   Goals/Plan       Row Name 05/01/25 1041          Transfer Goal 1 (OT)    Activity/Assistive Device (Transfer Goal 1, OT) sit-to-stand/stand-to-sit;bed-to-chair/chair-to-bed;toilet  -     [place] Level/Cues Needed (Transfer Goal 1, OT) contact guard required  -     Time Frame (Transfer Goal 1, OT) long term goal (LTG);5 days  -     Progress/Outcome (Transfer Goal 1, OT) new goal  -       Row Name 05/01/25 1041          Dressing Goal 1 (OT)    Activity/Device (Dressing Goal 1, OT) upper body dressing;lower body dressing  -     [place]/Cues Needed (Dressing Goal 1, OT) contact guard required   -AJ     Time Frame (Dressing Goal 1, OT) long term goal (LTG);5 days  -AJ     Progress/Outcome (Dressing Goal 1, OT) new goal  -       Row Name 05/01/25 1041          Toileting Goal 1 (OT)    Activity/Device (Toileting Goal 1, OT) adjust/manage clothing;perform perineal hygiene  -AJ     [place] Level/Cues Needed (Toileting Goal 1, OT) minimum assist (75% or more patient effort)  -AJ     Time Frame (Toileting Goal 1, OT) short term goal (STG);2 days  -AJ     Progress/Outcome (Toileting Goal 1, OT) new goal  -       Row Name 05/01/25 1041          Therapy Assessment/Plan (OT)    Planned Therapy Interventions (OT) activity tolerance training;adaptive equipment training;BADL retraining;functional balance retraining;IADL retraining;occupation/activity based interventions;patient/caregiver education/training;ROM/therapeutic exercise;strengthening exercise;transfer/mobility retraining  -               User Key  (r) = Recorded By, (t) = Taken By, (c) = Cosigned By      Initials Name Provider Type    AJ Kinjal Guzmán, OT Occupational Therapist                   Clinical Impression       Row Name 05/01/25 1036          Pain Assessment    Pain Location knee  -AJ     Pain Side/Orientation bilateral  -AJ     Pain Management Interventions activity modification encouraged;exercise or physical activity utilized;positioning techniques utilized  -AJ     Response to Pain Interventions activity participation with tolerable pain  -AJ     Additional Documentation Pain Scale: FACES Pre/Post-Treatment (Group)  -       Row Name 05/01/25 1036          Pain Scale: FACES Pre/Post-Treatment    Pain: FACES Scale, Pretreatment 2-->hurts little bit  -AJ     Posttreatment Pain Rating 4-->hurts little more  -       Row Name 05/01/25 1036          Plan of Care Review    Plan of Care Reviewed With patient  -AJ     Progress no change  -AJ     Outcome Evaluation OT eval complete. Pt presents near fxl baseline, currently limited by  weakness and decreased activity tolerance. Pt is non-ambulatory at baseline-transferring to w/c independently. Pt required no physical assistance with bed mobility, however did require minAx2 to pivot to chair and stand from EOB. Grooming routine performed sitting with set-up assist. Pt would benefit from ongoing skilled OT services to progress to PLOF. Rec d/c to home with 24/7 assist- family declined HH OT/PT.  -       Row Name 05/01/25 1036          Therapy Assessment/Plan (OT)    Patient/Family Therapy Goal Statement (OT) Return to PLOF  -     Rehab Potential (OT) good  -     Criteria for Skilled Therapeutic Interventions Met (OT) yes;meets criteria  -     Therapy Frequency (OT) daily  -     Predicted Duration of Therapy Intervention (OT) 5 days  -       Row Name 05/01/25 1036          Therapy Plan Review/Discharge Plan (OT)    Anticipated Discharge Disposition (OT) home with 24/7 care  -       Row Name 05/01/25 1036          Vital Signs    Pre Systolic BP Rehab 128  -AJ     Pre Treatment Diastolic BP 53  -AJ     Post Systolic BP Rehab 132  -AJ     Post Treatment Diastolic BP 63  -AJ     Pretreatment Heart Rate (beats/min) 85  -AJ     Posttreatment Heart Rate (beats/min) 82  -AJ     Pre SpO2 (%) 91  -AJ     O2 Delivery Pre Treatment room air  -AJ     Post SpO2 (%) 94  -AJ     O2 Delivery Post Treatment room air  -AJ     Pre Patient Position Supine  -AJ     Intra Patient Position Standing  -AJ     Post Patient Position Sitting  -       Row Name 05/01/25 1036          Positioning and Restraints    Pre-Treatment Position in bed  -AJ     Post Treatment Position chair  -AJ     In Chair notified nsg;reclined;sitting;call light within reach;encouraged to call for assist;exit alarm on;legs elevated;waffle cushion;on mechanical lift sling;RUE elevated;LUE elevated;heels elevated  pillow supporting knees  -               User Key  (r) = Recorded By, (t) = Taken By, (c) = Cosigned By      Initials Name  Provider Type    Kinjal Patel OT Occupational Therapist                   Outcome Measures       Row Name 05/01/25 1042          How much help from another is currently needed...    Putting on and taking off regular lower body clothing? 1  -AJ     Bathing (including washing, rinsing, and drying) 2  -AJ     Toileting (which includes using toilet bed pan or urinal) 2  -AJ     Putting on and taking off regular upper body clothing 3  -AJ     Taking care of personal grooming (such as brushing teeth) 3  -AJ     Eating meals 4  -     AM-PAC 6 Clicks Score (OT) 15  -       Row Name 05/01/25 1042          Modified Bracken Scale    Pre-Stroke Modified Bracken Scale 6 - Unable to determine (UTD) from the medical record documentation  -     Modified Bracken Scale 4 - Moderately severe disability.  Unable to walk without assistance, and unable to attend to own bodily needs without assistance.  -       Row Name 05/01/25 1042          Functional Assessment    Outcome Measure Options AM-PAC 6 Clicks Daily Activity (OT);Modified Bracken  -               User Key  (r) = Recorded By, (t) = Taken By, (c) = Cosigned By      Initials Name Provider Type    Kinjal Patel OT Occupational Therapist                    Occupational Therapy Education       Title: PT OT SLP Therapies (In Progress)       Topic: Occupational Therapy (In Progress)       Point: ADL training (Done)       Learning Progress Summary            Patient Acceptance, E, VU by ERNA at 5/1/2025 1043                      Point: Precautions (Done)       Learning Progress Summary            Patient Acceptance, E, VU by ERNA at 5/1/2025 1043                      Point: Body mechanics (Done)       Learning Progress Summary            Patient Acceptance, E, VU by ERNA at 5/1/2025 1043                                      User Key       Initials Effective Dates Name Provider Type Pending sale to [facility] 08/26/24 -  Kinjal Guzmán OT Occupational Therapist OT                   OT Recommendation and Plan  Planned Therapy Interventions (OT): activity tolerance training, adaptive equipment training, BADL retraining, functional balance retraining, IADL retraining, occupation/activity based interventions, patient/caregiver education/training, ROM/therapeutic exercise, strengthening exercise, transfer/mobility retraining  Therapy Frequency (OT): daily  Plan of Care Review  Plan of Care Reviewed With: patient  Progress: no change  Outcome Evaluation: OT eval complete. Pt presents near fxl baseline, currently limited by weakness and decreased activity tolerance. Pt is non-ambulatory at baseline-transferring to w/c independently. Pt required no physical assistance with bed mobility, however did require minAx2 to pivot to chair and stand from EOB. Grooming routine performed sitting with set-up assist. Pt would benefit from ongoing skilled OT services to progress to PLOF. Rec d/c to home with 24/7 assist- family declined HH OT/PT.     Time Calculation:   Evaluation Complexity (OT)  Review Occupational Profile/Medical/Therapy History Complexity: brief/low complexity  Assessment, Occupational Performance/Identification of Deficit Complexity: 1-3 performance deficits  Clinical Decision Making Complexity (OT): problem focused assessment/low complexity  Overall Complexity of Evaluation (OT): low complexity     Time Calculation- OT       Row Name 05/01/25 1045             Time Calculation- OT    OT Start Time 0943  -AJ      OT Received On 05/01/25  -AJ      OT Goal Re-Cert Due Date 05/11/25  -AJ         Untimed Charges    OT Eval/Re-eval Minutes 48  -AJ         Total Minutes    Untimed Charges Total Minutes 48  -AJ       Total Minutes 48  -AJ                User Key  (r) = Recorded By, (t) = Taken By, (c) = Cosigned By      Initials Name Provider Type    Kinjal Patel OT Occupational Therapist                  Therapy Charges for Today       Code Description Service Date Service Provider  Modifiers Qty    [id number] HC OT EVAL LOW COMPLEXITY 4 5/1/2025 Kinjal Guzmán, OT GO, KX 1                 Kinjal Guzmán OT  5/1/2025

## 2025-05-01 NOTE — PLAN OF CARE
Goal Outcome Evaluation:  Plan of Care Reviewed With: patient        Progress: no change  Outcome Evaluation: OT eval complete. Pt presents near fxl baseline, currently limited by weakness and decreased activity tolerance. Pt is non-ambulatory at baseline-transferring to w/c independently. Pt required no physical assistance with bed mobility, however did require minAx2 to pivot to chair and stand from EOB. Grooming routine performed sitting with set-up assist. Pt would benefit from ongoing skilled OT services to progress to PLOF. Rec d/c to home with 24/7 assist- family declined HH OT/PT.    Anticipated Discharge Disposition (OT): home with 24/7 care

## 2025-05-02 ENCOUNTER — READMISSION MANAGEMENT (OUTPATIENT)
Dept: CALL CENTER | Facility: HOSPITAL | Age: 70
End: 2025-05-02
Payer: MEDICARE

## 2025-05-02 ENCOUNTER — TELEPHONE (OUTPATIENT)
Dept: NEUROLOGY | Facility: CLINIC | Age: 70
End: 2025-05-02
Payer: MEDICARE

## 2025-05-02 VITALS
HEART RATE: 71 BPM | DIASTOLIC BLOOD PRESSURE: 67 MMHG | WEIGHT: 230 LBS | SYSTOLIC BLOOD PRESSURE: 139 MMHG | HEIGHT: 63 IN | RESPIRATION RATE: 16 BRPM | OXYGEN SATURATION: 96 % | BODY MASS INDEX: 40.75 KG/M2 | TEMPERATURE: 98.4 F

## 2025-05-02 PROBLEM — I16.0 HYPERTENSIVE URGENCY: Status: RESOLVED | Noted: 2025-04-30 | Resolved: 2025-05-02

## 2025-05-02 PROBLEM — R41.82 ALTERED MENTAL STATUS: Status: RESOLVED | Noted: 2024-11-13 | Resolved: 2025-05-02

## 2025-05-02 LAB
GLUCOSE BLDC GLUCOMTR-MCNC: 100 MG/DL (ref 70–130)
GLUCOSE BLDC GLUCOMTR-MCNC: 114 MG/DL (ref 70–130)

## 2025-05-02 PROCEDURE — 92526 ORAL FUNCTION THERAPY: CPT

## 2025-05-02 PROCEDURE — G0378 HOSPITAL OBSERVATION PER HR: HCPCS

## 2025-05-02 PROCEDURE — 82948 REAGENT STRIP/BLOOD GLUCOSE: CPT

## 2025-05-02 PROCEDURE — 92507 TX SP LANG VOICE COMM INDIV: CPT

## 2025-05-02 PROCEDURE — 99239 HOSP IP/OBS DSCHRG MGMT >30: CPT | Performed by: NURSE PRACTITIONER

## 2025-05-02 RX ORDER — ACETAMINOPHEN 325 MG/1
650 TABLET ORAL EVERY 6 HOURS PRN
Start: 2025-05-02

## 2025-05-02 RX ORDER — AMLODIPINE BESYLATE 10 MG/1
10 TABLET ORAL
Qty: 30 TABLET | Refills: 0 | Status: SHIPPED | OUTPATIENT
Start: 2025-05-03

## 2025-05-02 RX ADMIN — LOSARTAN POTASSIUM 100 MG: 50 TABLET, FILM COATED ORAL at 09:24

## 2025-05-02 RX ADMIN — TICAGRELOR 90 MG: 90 TABLET ORAL at 09:24

## 2025-05-02 RX ADMIN — MICONAZOLE NITRATE 1 APPLICATION: 20 POWDER TOPICAL at 09:25

## 2025-05-02 RX ADMIN — AMLODIPINE BESYLATE 10 MG: 10 TABLET ORAL at 09:24

## 2025-05-02 RX ADMIN — MAGNESIUM OXIDE TAB 400 MG (241.3 MG ELEMENTAL MG) 400 MG: 400 (241.3 MG) TAB at 09:24

## 2025-05-02 RX ADMIN — PANTOPRAZOLE SODIUM 40 MG: 40 TABLET, DELAYED RELEASE ORAL at 06:06

## 2025-05-02 RX ADMIN — VENLAFAXINE HYDROCHLORIDE 150 MG: 75 CAPSULE, EXTENDED RELEASE ORAL at 09:24

## 2025-05-02 RX ADMIN — Medication 10 ML: at 09:25

## 2025-05-02 RX ADMIN — CARVEDILOL 3.12 MG: 3.12 TABLET, FILM COATED ORAL at 09:24

## 2025-05-02 RX ADMIN — ASPIRIN 81 MG 81 MG: 81 TABLET ORAL at 09:24

## 2025-05-02 RX ADMIN — Medication 1000 UNITS: at 09:24

## 2025-05-02 NOTE — TELEPHONE ENCOUNTER
Caller: KAUSHIK    Relationship to patient: DEXTER MATHEW     Best call back number:  [phone]    New or established patient?  [x] New  [] Established    Date of discharge: 5/2/25     Facility discharged from:  [facility]    Diagnosis/Symptoms: EXPRESSIVE APHASIA    Length of stay (If applicable): 3 DAYS    Specialty Only: Did you see a [facility] health provider?    [x] Yes  [] No  If so, who? JAIMEE BRANCH    Additional Details: PT NEEDS 1 MONTH HOS FU.  NEXT AVAILABLE NOT UNTIL 8/15/25.  PLEASE CALL PT IF SOONER APPT IS AVAILABLE     THANK YOU

## 2025-05-02 NOTE — THERAPY TREATMENT NOTE
Acute Care - Speech Language Pathology   Swallow Treatment Note [facility]     Patient Name: Olga Sandoval  : 1955  MRN: [id number]  Today's Date: 2025               Admit Date: 2025    Visit Dx:     ICD-10-CM ICD-9-CM   1. Expressive aphasia  R47.01 784.3   2. Facial droop  R29.810 781.94   3. Hypertension, unspecified type  I10 401.9   4. Cognitive communication disorder  R41.841 315.32   5. Dysphagia, unspecified type  R13.10 787.20     Patient Active Problem List   Diagnosis    Aphasia    Bipolar 2 disorder    Type 2 diabetes mellitus    Essential hypertension    Hyperlipidemia    Carotid stenosis, right    Obesity, Class III, BMI 40-49.9 (morbid obesity)    History of CVA (cerebrovascular accident)    Hypertension    Gastroesophageal reflux disease    Encounter for screening for colorectal cancer in high risk patient    Immunization due    Medicare annual wellness visit, subsequent    Lymphedema    Bleeding from right ear    Healthcare maintenance    Post-menopausal    Expressive aphasia     Past Medical History:   Diagnosis Date    Aneurysm     left carotid    Bipolar 2 disorder     Diabetes mellitus     H/O Bell's palsy     Left sided facial droop    History of loop recorder     Hyperlipidemia     Hypertension     Stroke     x 4     Past Surgical History:   Procedure Laterality Date    CARDIAC CATHETERIZATION      CATARACT EXTRACTION Bilateral      SECTION         SLP Recommendation and Plan  SLP Swallowing Diagnosis: swallow WFL/no suspected pharyngeal impairment (25 1100)  SLP Diet Recommendation: regular textures, thin liquids (25)     SLP Rec. for Method of Medication Administration: meds whole, meds crushed, with puree, as tolerated (25 1100)              Anticipated Discharge Disposition (SLP): home with OP services (25 1100)        Predicted Duration Therapy Intervention (Days): 1 week (25 1100)  Oral Care Recommendations: Oral Care BID/PRN,  Toothbrush (05/02/25 1100)  Demonstrates Need for Referral to Another Service: speech therapy, other (see comments) (outpatient SLP services for cognitive linguistic deficits) (05/02/25 1100)     Daily Summary of Progress (SLP): progress toward functional goals as expected (05/02/25 1100)               Treatment Assessment (SLP): continued, cognitive-linguistic disorder, aphasia, no clinical signs of, aspiration (05/02/25 1100)     Plan for Continued Treatment (SLP): continue treatment per plan of care (05/02/25 1100)                SWALLOW EVALUATION (Last 72 Hours)       SLP Adult Swallow Evaluation       Row Name 05/02/25 1100 04/30/25 1015                Rehab Evaluation    Document Type -- evaluation  -EC (r) MC (t) EC (c)       Subjective Information -- no complaints  -EC (r) MC (t) EC (c)       Patient Observations -- alert;cooperative;agree to therapy  -EC (r) MC (t) EC (c)       Patient/Family/Caregiver Comments/Observations -- no family present  -EC (r) MC (t) EC (c)       Patient Effort -- good  -EC (r) MC (t) EC (c)       Symptoms Noted During/After Treatment -- none  -EC (r) MC (t) EC (c)          General Information    Patient Profile Reviewed -- yes  -EC (r) MC (t) EC (c)       Pertinent History Of Current Problem -- Pt is a 69 y/o F who presented to [facility] ED with confusion and word finding difficultes. PMH includes L carotid aneurysm, bipolar disorder, DM, HLD, HTN, and stroke x4. Per MD note, CTA of head revealed left ICA aneursym, all other head/neck imaging was negative.  -EC (r) MC (t) EC (c)       Current Method of Nutrition -- NPO  -EC (r) MC (t) EC (c)       Precautions/Limitations, Vision -- WFL;for purposes of eval  -EC (r) MC (t) EC (c)       Precautions/Limitations, Hearing -- WFL;for purposes of eval  -EC (r) MC (t) EC (c)       Prior Level of Function-Communication -- cognitive-linguistic impairment;other (see comments)  Hx of multiple strokes. Reports hx of memory deficits, unable to  establish baseline due to no family present.  -EC (r) MC (t) EC (c)       Prior Level of Function-Swallowing -- no diet consistency restrictions  -EC (r) MC (t) EC (c)       Plans/Goals Discussed with -- patient  -EC (r) MC (t) EC (c)       Barriers to Rehab -- none identified  -EC (r) MC (t) EC (c)       Patient's Goals for Discharge -- patient did not state  -EC (r) MC (t) EC (c)          Pain    Pretreatment Pain Rating -- 2/10  -EC (r) MC (t) EC (c)       Posttreatment Pain Rating -- 3/10  -EC (r) MC (t) EC (c)       Pain Location -- knee  -EC (r) MC (t) EC (c)       Pain Side/Orientation -- generalized  -EC (r) MC (t) EC (c)       Pain Management Interventions -- other (see comments)  Pt expressed that knee pain has been an ongoing issues prior to admission.  -EC (r) MC (t) EC (c)          Oral Motor Structure and Function    Dentition Assessment -- natural, present and adequate  -EC (r) MC (t) EC (c)       Secretion Management -- WNL/WFL  -EC (r) MC (t) EC (c)       Mucosal Quality -- dry  -EC (r) MC (t) EC (c)          Oral Musculature and Cranial Nerve Assessment    Oral Motor General Assessment -- generalized oral motor weakness;oral labial or buccal impairment  -EC (r) MC (t) EC (c)       Oral Labial or Buccal Impairment, Detail, Cranial Nerve VII (Facial): -- left labial droop  -EC (r) MC (t) EC (c)       Oral Motor, Comment -- Pt reports left droop at baseline, has a diagnosis of bell's palsy  -EC (r) MC (t) EC (c)          General Eating/Swallowing Observations    Respiratory Support Currently in Use -- room air  -EC (r) MC (t) EC (c)       Eating/Swallowing Skills -- self-fed;fed by SLP  -EC (r) MC (t) EC (c)       Positioning During Eating -- upright in bed  -EC (r) MC (t) EC (c)       Utensils Used -- spoon;straw  -EC (r) MC (t) EC (c)       Consistencies Trialed -- ice chips;thin liquids;pureed;regular textures;mixed consistency  -EC (r) MC (t) EC (c)          Respiratory    Respiratory Status --  room air  -EC (r) MC (t) EC (c)          Clinical Swallow Eval    Oral Prep Phase -- WFL  -EC (r) MC (t) EC (c)       Oral Transit -- WFL  -EC (r) MC (t) EC (c)       Oral Residue -- WFL  -EC (r) MC (t) EC (c)       Pharyngeal Phase -- no overt signs/symptoms of pharyngeal impairment  -EC (r) MC (t) EC (c)       Esophageal Phase -- unremarkable  -EC (r) MC (t) EC (c)       Clinical Swallow Evaluation Summary -- Pt demonstrated a cough with ice chips and water trials when presented via spoon. Would suspect that these patterns are consistent with trial presentation rather than s/sx of aspiration. Numerous subsequent trials, no overt s/sx of aspiration with all other consistencies via straw. Will follow up with pt to ensure diet tolerance.  -EC (r) MC (t) EC (c)          SLP Evaluation Clinical Impression    SLP Swallowing Diagnosis swallow WFL/no suspected pharyngeal impairment  -CH swallow WFL/no suspected pharyngeal impairment  -EC (r) MC (t) EC (c)       Functional Impact no impact on function  -CH no impact on function  -EC (r) MC (t) EC (c)       Rehab Potential/Prognosis, Swallowing -- good, to achieve stated therapy goals  -EC (r) MC (t) EC (c)       Swallow Criteria for Skilled Therapeutic Interventions Met -- demonstrates skilled criteria  -EC (r) MC (t) EC (c)          SLP Treatment Clinical Impressions    Treatment Assessment (SLP) continued;cognitive-linguistic disorder;aphasia;no clinical signs of;aspiration  -CH --          Recommendations    Therapy Frequency (Swallow) -- 3 days per week;other (see comments)  To ensure diet tolerance  -EC (r) MC (t) EC (c)       Predicted Duration Therapy Intervention (Days) -- 1 week  -EC (r) MC (t) EC (c)       SLP Diet Recommendation regular textures;thin liquids  -CH regular textures;thin liquids  -EC (r) MC (t) EC (c)       Recommended Precautions and Strategies -- general aspiration precautions  -EC (r) MC (t) EC (c)       Oral Care Recommendations Oral Care  BID/PRN;Toothbrush  -CH Oral Care BID/PRN;Toothbrush  -EC (r) MC (t) EC (c)       SLP Rec. for Method of Medication Administration meds whole;meds crushed;with puree;as tolerated  -CH meds whole;meds crushed;with puree;as tolerated  -EC (r) MC (t) EC (c)       Monitor for Signs of Aspiration -- yes;notify SLP if any concerns  -EC (r) MC (t) EC (c)       Anticipated Discharge Disposition (SLP) -- home with OP services  -EC (r) MC (t) EC (c)                 User Key  (r) = Recorded By, (t) = Taken By, (c) = Cosigned By      Initials Name Effective Dates    CH Jen Kamara, MS CCC-SLP 01/20/25 -     Tonie Carpio, MS CCC-SLP 10/24/24 -     Aurora Santiago, Speech Therapy Student 01/16/25 -                     EDUCATION  The patient has been educated in the following areas:   Dysphagia (Swallowing Impairment) Oral Care/Hydration.        SLP GOALS       Row Name 05/02/25 1100 04/30/25 1015          Patient will demonstrate functional cognitive-linguistic skills for return to discharge environment    [place] with minimal cues  - with minimal cues  -EC (r) MC (t) EC (c)     Time frame 1 week  -CH 1 week  -EC (r) MC (t) EC (c)     Progress/Outcomes continuing progress toward goal  -CH new goal  -EC (r) MC (t) EC (c)        SLP Diagnostic Treatment     Patient will participate in further assessment in the following areas clarification of baseline cognitive communication status  -CH clarification of baseline cognitive communication status  -EC (r) MC (t) EC (c)     Time Frame (Diagnostic) 1 week  -CH 1 week  -EC (r) MC (t) EC (c)     Progress/Outcomes (Additional Goal 1, SLP) continuing progress toward goal  -CH new goal  -EC (r) MC (t) EC (c)     Comment (Diagnostic) Noone in room to clarify baseline with. Patient reported no deficits at baseline, however, patient resides with daughter.  -CH --        Connected Speech to Express Thoughts Goal 1 (SLP)    Improve Narrative Discourse to Express Thoughts By  Goal 1 (SLP) giving multiple definitions of a word;describing a picture;80%;with minimal cues (75-90%)  - giving multiple definitions of a word;describing a picture;80%;with minimal cues (75-90%)  -EC (r) MC (t) EC (c)     Time Frame (Connected Speech Goal 1, SLP) 1 week  -CH 1 week  -EC (r) MC (t) EC (c)     Progress (Connected Speech Goal 1, SLP) 60%;with minimal cues (75-90%)  - --     Progress/Outcomes (Connected Speech Goal 1, SLP) continuing progress toward goal  -CH new goal  -EC (r) MC (t) EC (c)     Comment (Connected Speech Goal 1, SLP) describing picture  -CH --        Orientation Goal 1 (SLP)    Improve Orientation Through Goal 1 (SLP) demonstrating orientation to day;demonstrating orientation to month;demonstrating orientation to year;demonstrating orientation to place;demonstrating orientation to disease/impairment;80%;with minimal cues (75-90%)  - demonstrating orientation to day;demonstrating orientation to month;demonstrating orientation to year;demonstrating orientation to place;demonstrating orientation to disease/impairment;80%;with minimal cues (75-90%)  -EC (r) MC (t) EC (c)     Time Frame (Orientation Goal 1, SLP) 1 week  -CH 1 week  -EC (r) MC (t) EC (c)     Progress/Outcomes (Orientation Goal 1, SLP) continuing progress toward goal  -CH new goal  -EC (r) MC (t) EC (c)     Comment (Orientation Goal 1, SLP) oriented to year, place and disease. Not to time.  -CH --        Memory Skills Goal 1 (SLP)    Improve Memory Skills Through Goal 1 (SLP) recalling related word lists immediately;recalling related word lists with an imposed delay;recalling unrelated word lists immediately;recalling unrelated word lists with an imposed delay;80%;with minimal cues (75-90%)  - recalling related word lists immediately;recalling related word lists with an imposed delay;recalling unrelated word lists immediately;recalling unrelated word lists with an imposed delay;80%;with minimal cues (75-90%)  -EC (r)  MC (t) EC (c)     Time Frame (Memory Skills Goal 1, SLP) 1 week  -CH 1 week  -EC (r) MC (t) EC (c)     Progress (Memory Skills Goal 1, SLP) 70%;with minimal cues (75-90%)  -CH --     Progress/Outcomes (Memory Skills Goal 1, SLP) continuing progress toward goal  -CH new goal  -EC (r) MC (t) EC (c)     Comment (Memory Skills Goal 1, SLP) recall related immediately  -CH --        Organizational Skills Goal 1 (SLP)    Improve Thought Organization Through Goal 1 (SLP) completing a divergent naming task;completing a convergent naming task;generating a list of items in a category;80%;with minimal cues (75-90%)  -CH completing a divergent naming task;completing a convergent naming task;generating a list of items in a category;80%;with minimal cues (75-90%)  -EC (r) MC (t) EC (c)     Time Frame (Thought Organization Skills Goal 1, SLP) 1 week  -CH 1 week  -EC (r) MC (t) EC (c)     Progress (Thought Organization Skills Goal 1, SLP) 60%;with minimal cues (75-90%)  -CH --     Progress/Outcomes (Thought Organization Skills Goal 1, SLP) continuing progress toward goal  -CH new goal  -EC (r) MC (t) EC (c)     Comment (Thought Organization Skills Goal 1, SLP) divergent naming  - --               User Key  (r) = Recorded By, (t) = Taken By, (c) = Cosigned By      Initials Name Provider Type    Jen Jiang, MS CCC-SLP Speech and Language Pathologist    Tonie Carpio MS CCC-SLP Speech and Language Pathologist    Aurora Santiago, Speech Therapy Student SLP Student                         Time Calculation:    Time Calculation- SLP       Row Name 05/02/25 1154             Time Calculation- SLP    SLP Start Time 1100  -CH      SLP Received On 05/02/25  -CH         Untimed Charges    10513-VM Treatment/ST Modification Prosth Aug Alter  42  -CH      95035-ZO Treatment Swallow Minutes 24  -CH         Total Minutes    Untimed Charges Total Minutes 66  -CH       Total Minutes 66  -CH                User Key  (r) = Recorded  By, (t) = Taken By, (c) = Cosigned By      Initials Name Provider Type    VALENTIN Jen Kamara, MS CCC-SLP Speech and Language Pathologist                    Therapy Charges for Today       Code Description Service Date Service Provider Modifiers Qty    08845214449 HC ST TREATMENT SWALLOW 2 2025 Jen Kamara, MS CCC-SLP GN, KX 1    [id number] HC ST TREATMENT SPEECH 3 2025 Jen Kamara MS CCC-SLP GN, KX 1                 Jen MS Anh CCC-SLP  2025   and Acute Care - Speech Language Pathology Treatment Note  [facility]     Patient Name: Olga Sandoval  : 1955  MRN: [id number]  Today's Date: 2025               Admit Date: 2025     Visit Dx:    ICD-10-CM ICD-9-CM   1. Expressive aphasia  R47.01 784.3   2. Facial droop  R29.810 781.94   3. Hypertension, unspecified type  I10 401.9   4. Cognitive communication disorder  R41.841 315.32   5. Dysphagia, unspecified type  R13.10 787.20     Patient Active Problem List   Diagnosis    Aphasia    Bipolar 2 disorder    Type 2 diabetes mellitus    Essential hypertension    Hyperlipidemia    Carotid stenosis, right    Obesity, Class III, BMI 40-49.9 (morbid obesity)    History of CVA (cerebrovascular accident)    Hypertension    Gastroesophageal reflux disease    Encounter for screening for colorectal cancer in high risk patient    Immunization due    Medicare annual wellness visit, subsequent    Lymphedema    Bleeding from right ear    Healthcare maintenance    Post-menopausal    Expressive aphasia     Past Medical History:   Diagnosis Date    Aneurysm     left carotid    Bipolar 2 disorder     Diabetes mellitus     H/O Bell's palsy     Left sided facial droop    History of loop recorder     Hyperlipidemia     Hypertension     Stroke     x 4     Past Surgical History:   Procedure Laterality Date    CARDIAC CATHETERIZATION      CATARACT EXTRACTION Bilateral      SECTION         SLP Recommendation and Plan  SLP Diagnosis: mild,  cognitive-linguistic disorder, aphasia (05/02/25 1100)              [facility] Criteria for Skilled Therapy Interventions Met: yes (05/02/25 1100)  Anticipated Discharge Disposition (SLP): home with OP services (05/02/25 1100)  Demonstrates Need for Referral to Another Service: speech therapy, other (see comments) (outpatient SLP services for cognitive linguistic deficits) (05/02/25 1100)     Therapy Frequency (SLP SLC): 3 days per week (05/02/25 1100)  Predicted Duration Therapy Intervention (Days): 1 week (05/02/25 1100)  Oral Care Recommendations: Oral Care BID/PRN, Toothbrush (05/02/25 1100)     Daily Summary of Progress (SLP): progress toward functional goals as expected (05/02/25 1100)           Treatment Assessment (SLP): continued, cognitive-linguistic disorder, aphasia, no clinical signs of, aspiration (05/02/25 1100)     Plan for Continued Treatment (SLP): continue treatment per plan of care (05/02/25 1100)         SLP EVALUATION (Last 72 Hours)       SLP SLC Evaluation       Row Name 05/02/25 1100 04/30/25 1015                Communication Assessment/Intervention    Document Type therapy note (daily note)  -CH --       Subjective Information no complaints  -CH --       Patient Observations alert;cooperative;agree to therapy  -CH --       Patient/Family/Caregiver Comments/Observations none present  -CH --       Patient Effort good  -CH --       Symptoms Noted During/After Treatment none  -CH --       Oral Care lip/mouth moisturizer applied  -CH --          General Information    Patient Profile Reviewed yes  -CH --       Patient Level of Education -- unknown  -EC (r) MC (t) EC (c)       Prior Level of Function-Communication -- cognitive-linguistic impairment  Hx of multiple strokes. Reports hx of memory deficits, unable to establish baseline due to no family present.  -EC (r) MC (t) EC (c)       Patient's Goals for Discharge -- patient did not state  -EC (r) MC (t) EC (c)          Pain    Pretreatment Pain Rating  0/10 - no pain  -CH --       Posttreatment Pain Rating 0/10 - no pain  -CH --          Pain Scale: FACES Pre/Post-Treatment    Pain: FACES Scale, Pretreatment 0-->no hurt  -CH --       Posttreatment Pain Rating 0-->no hurt  -CH --          Comprehension Assessment/Intervention    Comprehension Assessment/Intervention -- Auditory Comprehension  -EC (r) MC (t) EC (c)          Auditory Comprehension Assessment/Intervention    Auditory Comprehension (Communication) -- WFL  -EC (r) MC (t) EC (c)       Able to Identify Objects/Pictures (Communication) -- WFL;body part;familiar objects  -EC (r) MC (t) EC (c)       Answers Questions (Communication) -- WFL;simple;concrete;yes/no  -EC (r) MC (t) EC (c)       Able to Follow Commands (Communication) -- WFL;1-step;2-step;3-step  -EC (r) MC (t) EC (c)          Expression Assessment/Intervention    Expression Assessment/Intervention -- verbal expression  -EC (r) MC (t) EC (c)          Verbal Expression Assessment/Intervention    Automatic Speech (Communication) -- WFL;days of week  -EC (r) MC (t) EC (c)       Repetition -- WFL;words;phrases;sentences  -EC (r) MC (t) EC (c)       Phrase Completion -- WFL;automatic/predictable;unpredictable  -EC (r) MC (t) EC (c)       Spontaneous/Functional Words -- mild impairment  -EC (r) MC (t) EC (c)       Sentence Formulation -- mild impairment  -EC (r) MC (t) EC (c)       Conversational Discourse/Fluency -- mild impairment;phonemic paraphasias  -EC (r) MC (t) EC (c)          Motor Speech Assessment/Intervention    Motor Speech Function -- WFL  -EC (r) MC (t) EC (c)          Cognitive Assessment Intervention- SLP    Cognitive Function (Cognition) -- mild impairment  -EC (r) MC (t) EC (c)       Orientation Status (Cognition) -- WFL;person;place;situation;moderate impairment;time  -EC (r) MC (t) EC (c)       Memory (Cognitive) -- mild impairment  -EC (r) MC (t) EC (c)       Thought Organization (Cognitive) -- mild impairment;concrete divergent   -EC (r) MC (t) EC (c)       Cognition, Comment -- Suspect some degree of expressive language impairment contributing to cognitive difficulties. However, some deficits cognitive based in nature.  -EC (r) MC (t) EC (c)          SLP Evaluation Clinical Impressions    SLP Diagnosis mild;cognitive-linguistic disorder;aphasia  -CH mild;cognitive-linguistic disorder;aphasia  -EC (r) MC (t) EC (c)       SLP Diagnosis Comments -- Pt was oriented to self, place, and situation. Endorsed word finding difficulties and memory deficits at baseline. Pt demonstrated difficulty with divergent naming and immediate recall tasks. Pt was intelligible at the conversation level, no apraxia or dysathria noted. Some phonemic paraphasias observed during conversation. Will follow up for treatment and to establish pt's baseline with family.  -EC (r) MC (t) EC (c)       Rehab Potential/Prognosis good  - good  -EC (r) MC (t) EC (c)       [facility] [place] [facility] Criteria for Skilled Therapy Interventions Met yes  - yes  -EC (r) MC (t) EC (c)       Functional Impact difficulty communicating wants, needs;difficulty communicating in an emergency;difficulty in expressing complex messages;restrictions in personal and social life  - difficulty communicating wants, needs;difficulty communicating in an emergency;difficulty in expressing complex messages;restrictions in personal and social life  -EC (r) MC (t) EC (c)          SLP Treatment Clinical Impressions    Daily Summary of Progress (SLP) progress toward functional goals as expected  - --       Plan for Continued Treatment (SLP) continue treatment per plan of care  - --       Care Plan Review evaluation/treatment results reviewed;care plan/treatment goals reviewed  - --          Recommendations    Therapy Frequency (SLP SLC) 3 days per week  - 3 days per week  -EC (r) MC (t) EC (c)       Predicted Duration Therapy Intervention (Days) 1 week  - --       Anticipated Discharge Disposition (SLP) home with OP  services  -CH --       Demonstrates Need for Referral to Another Service speech therapy;other (see comments)  outpatient SLP services for cognitive linguistic deficits  -CH --                 User Key  (r) = Recorded By, (t) = Taken By, (c) = Cosigned By      Initials Name Effective Dates    CH Jen Kamara, MS CCC-SLP 01/20/25 -     EC Tonie Caballero, MS CCC-SLP 10/24/24 -     Aurora Santiago, Speech Therapy Student 01/16/25 -                        EDUCATION  The patient has been educated in the following areas:     Cognitive Impairment Communication Impairment.           SLP GOALS       Row Name 05/02/25 1100 04/30/25 1015          Patient will demonstrate functional cognitive-linguistic skills for return to discharge environment    [place] with minimal cues  -CH with minimal cues  -EC (r) MC (t) EC (c)     Time frame 1 week  -CH 1 week  -EC (r) MC (t) EC (c)     Progress/Outcomes continuing progress toward goal  -CH new goal  -EC (r) MC (t) EC (c)        SLP Diagnostic Treatment     Patient will participate in further assessment in the following areas clarification of baseline cognitive communication status  - clarification of baseline cognitive communication status  -EC (r) MC (t) EC (c)     Time Frame (Diagnostic) 1 week  -CH 1 week  -EC (r) MC (t) EC (c)     Progress/Outcomes (Additional Goal 1, SLP) continuing progress toward goal  -CH new goal  -EC (r) MC (t) EC (c)     Comment (Diagnostic) Noone in room to clarify baseline with. Patient reported no deficits at baseline, however, patient resides with daughter.  -CH --        Connected Speech to Express Thoughts Goal 1 (SLP)    Improve Narrative Discourse to Express Thoughts By Goal 1 (SLP) giving multiple definitions of a word;describing a picture;80%;with minimal cues (75-90%)  -CH giving multiple definitions of a word;describing a picture;80%;with minimal cues (75-90%)  -EC (r) MC (t) EC (c)     Time Frame (Connected Speech Goal 1, SLP) 1  week  -CH 1 week  -EC (r) MC (t) EC (c)     Progress (Connected Speech Goal 1, SLP) 60%;with minimal cues (75-90%)  -CH --     Progress/Outcomes (Connected Speech Goal 1, SLP) continuing progress toward goal  -CH new goal  -EC (r) MC (t) EC (c)     Comment (Connected Speech Goal 1, SLP) describing picture  -CH --        Orientation Goal 1 (SLP)    Improve Orientation Through Goal 1 (SLP) demonstrating orientation to day;demonstrating orientation to month;demonstrating orientation to year;demonstrating orientation to place;demonstrating orientation to disease/impairment;80%;with minimal cues (75-90%)  -CH demonstrating orientation to day;demonstrating orientation to month;demonstrating orientation to year;demonstrating orientation to place;demonstrating orientation to disease/impairment;80%;with minimal cues (75-90%)  -EC (r) MC (t) EC (c)     Time Frame (Orientation Goal 1, SLP) 1 week  -CH 1 week  -EC (r) MC (t) EC (c)     Progress/Outcomes (Orientation Goal 1, SLP) continuing progress toward goal  -CH new goal  -EC (r) MC (t) EC (c)     Comment (Orientation Goal 1, SLP) oriented to year, place and disease. Not to time.  -CH --        Memory Skills Goal 1 (SLP)    Improve Memory Skills Through Goal 1 (SLP) recalling related word lists immediately;recalling related word lists with an imposed delay;recalling unrelated word lists immediately;recalling unrelated word lists with an imposed delay;80%;with minimal cues (75-90%)  - recalling related word lists immediately;recalling related word lists with an imposed delay;recalling unrelated word lists immediately;recalling unrelated word lists with an imposed delay;80%;with minimal cues (75-90%)  -EC (r) MC (t) EC (c)     Time Frame (Memory Skills Goal 1, SLP) 1 week  -CH 1 week  -EC (r) MC (t) EC (c)     Progress (Memory Skills Goal 1, SLP) 70%;with minimal cues (75-90%)  -CH --     Progress/Outcomes (Memory Skills Goal 1, SLP) continuing progress toward goal  -CH new  goal  -EC (r) MC (t) EC (c)     Comment (Memory Skills Goal 1, SLP) recall related immediately  - --        Organizational Skills Goal 1 (SLP)    Improve Thought Organization Through Goal 1 (SLP) completing a divergent naming task;completing a convergent naming task;generating a list of items in a category;80%;with minimal cues (75-90%)  -CH completing a divergent naming task;completing a convergent naming task;generating a list of items in a category;80%;with minimal cues (75-90%)  -EC (r) MC (t) EC (c)     Time Frame (Thought Organization Skills Goal 1, SLP) 1 week  -CH 1 week  -EC (r) MC (t) EC (c)     Progress (Thought Organization Skills Goal 1, SLP) 60%;with minimal cues (75-90%)  - --     Progress/Outcomes (Thought Organization Skills Goal 1, SLP) continuing progress toward goal  -CH new goal  -EC (r) MC (t) EC (c)     Comment (Thought Organization Skills Goal 1, SLP) divergent naming  - --               User Key  (r) = Recorded By, (t) = Taken By, (c) = Cosigned By      Initials Name Provider Type    Jen Jiang, MS CCC-SLP Speech and Language Pathologist    Tonie Carpio, MS CCC-SLP Speech and Language Pathologist    Aurora Santiago, Speech Therapy Student SLP Student                              Time Calculation:      Time Calculation- SLP       Row Name 05/02/25 1154             Time Calculation- SLP    SLP Start Time 1100  -CH      SLP Received On 05/02/25  -         Untimed Charges    73846-KA Treatment/ST Modification Prosth Aug Alter  42  -CH      74953-ST Treatment Swallow Minutes 24  -CH         Total Minutes    Untimed Charges Total Minutes 66  -CH       Total Minutes 66  -CH                User Key  (r) = Recorded By, (t) = Taken By, (c) = Cosigned By      Initials Name Provider Type    Jen Jiang MS CCC-SLP Speech and Language Pathologist                    Therapy Charges for Today       Code Description Service Date Service Provider Modifiers Qty    [id number]  HC ST TREATMENT SWALLOW 2 5/2/2025 Jen Kamara, MS CCC-SLP GN, KX 1    [id number] HC ST TREATMENT SPEECH 3 5/2/2025 Jen Kamara, MS CCC-SLP GN, KX 1                       Jen Kamara, MS JOSE MANUEL-SLP  5/2/2025

## 2025-05-02 NOTE — DISCHARGE SUMMARY
[facility] Medicine Services  DISCHARGE SUMMARY    Patient Name: Olga Sandoval  : 1955  MRN: [id number]    Date of Admission: 2025  Date of Discharge:  2025  Primary Care Physician: Ileana Kerr MD    Consults       Date and Time Order Name Status Description    2025  2:04 AM Inpatient Neurology Consult General Completed             Hospital Course     Presenting Problem:   Expressive aphasia [R47.01]    Active Hospital Problems    Diagnosis  POA    **Expressive aphasia [R47.01]  Yes    Bipolar 2 disorder [F31.81]  Yes    Carotid stenosis, right [I65.21]  Yes    Essential hypertension [I10]  Yes    Hyperlipidemia [E78.5]  Yes    Type 2 diabetes mellitus [E11.9]  Yes      Resolved Hospital Problems    Diagnosis Date Resolved POA    Hypertensive urgency [I16.0] 2025 Yes    Altered mental status [R41.82] 2025 Yes      Hospital Course:  Olga Sandoval is a 70 y.o. female PMH bipolar disorder, DMII, HTN, HLD, carotid artery aneurysm s/p pipeline embolization, CVA x 4, Bell's palsy, prior tobacco use presenting with confusion and word finding difficulty. She was admitted to rule out CVA. MRI brain/CT head  showed known CVAs, no new acute abnormalities.     Assessment/Plan:    Expressive aphasia-resolved  -  MRI brain/CT head  showed known CVAs, no new acute abnormalities.   - Without transection  - Symptoms likely related to uncontrolled BP  - Passed dysphagia evaluation, continue home meds    DM2  - A1c 5.1  - Continue home medications    HTN, hypertensive urgency- improved/resolved  - Wean Cardene gtt  - Medication adjustments    HLD  - High-dose statin    Elevated troponin  - Likely due to elevated BP  - No chest pain EKG not concerning      Discharge Follow Up Recommendations for labs/diagnostics:  Follow up with PCP in 1 week  Follow up with Emmie Hutchinson- Neurology Aug 15 2025 @ 9 am    Day of Discharge     HPI:   Pt sitting up in bed watching TV. She  reports to be feeling well, wishing to return home today.     Otherwise ROS is negative except as mentioned in the HPI.    Vital Signs:   Temp:  [98.2 °F (36.8 °C)-98.5 °F (36.9 °C)] 98.5 °F (36.9 °C)  Heart Rate:  [69-79] 78  Resp:  [16-18] 16  BP: (132-165)/(57-82) 139/82     Physical Exam:  Constitutional: Awake, alert, NAD  HENT: NCAT, mucous membranes moist  Respiratory: Clear to auscultation bilaterally, non-labored  Cardiovascular: RRR, no murmurs, rubs, or gallops  Gastrointestinal: Positive bowel sounds, soft, nontender, nondistended  Musculoskeletal: Mild BLE edema  Psychiatric: Appropriate affect, cooperative  Neurologic: Oriented x 3, speech clear, left sided facial droop (baseline from having Bell's Palsy).   Skin: No rashes on exposed skin      Pertinent  and/or Most Recent Results     Results from last 7 days   Lab Units 05/01/25  0729 04/30/25 0523 04/29/25  1906   WBC 10*3/mm3  --  10.15 9.30   HEMOGLOBIN g/dL  --  11.2* 12.5   HEMATOCRIT %  --  33.7* 38.7   PLATELETS 10*3/mm3  --  314 337   SODIUM mmol/L 141 139 142   POTASSIUM mmol/L 3.9 4.0 4.7   CHLORIDE mmol/L 106 102 101   CO2 mmol/L 24.0 22.0 26.0   BUN mg/dL 12 18 21   CREATININE mg/dL 0.70 0.93 1.24*   GLUCOSE mg/dL 99 77 105*   CALCIUM mg/dL 8.9 9.4 10.1     Results from last 7 days   Lab Units 04/30/25 0523 04/29/25  1906   BILIRUBIN mg/dL 0.6 0.8   ALK PHOS U/L 77 90   ALT (SGPT) U/L 6 6   AST (SGOT) U/L 13 13     Results from last 7 days   Lab Units 04/30/25 0523   CHOLESTEROL mg/dL 125   TRIGLYCERIDES mg/dL 98   HDL CHOL mg/dL 48     Results from last 7 days   Lab Units 04/30/25  0523 04/30/25  0126 04/30/25  0006 04/29/25  1906   TSH uIU/mL 2.970  --   --   --    HEMOGLOBIN A1C % 5.10  --   --   --    HSTROP T ng/L  --  22* 21* 23*       Brief Urine Lab Results  (Last result in the past 365 days)        Color   Clarity   Blood   Leuk Est   Nitrite   Protein   CREAT   Urine HCG        04/29/25 1944 Dark Yellow   Cloudy   Negative    Trace   Negative   30 mg/dL (1+)                   Microbiology Results Abnormal       None            Imaging Results (All)       Procedure Component Value Units Date/Time    MRI Brain Without Contrast [[id number]] Collected: 04/29/25 2304     Updated: 04/29/25 2312    Narrative:      MRI BRAIN WO CONTRAST    Date of Exam: 4/29/2025 10:38 AM EDT    Indication: expressive aphasia.     Comparison: CT head without IV contrast 4/29/2025    Technique:  Routine multiplanar/multisequence sequence images of the brain were obtained without contrast administration.      Findings:  The ventricles, sulci, and cerebellar folia are moderately and diffusely prominent consistent with atrophy.    Ill-defined abnormal T2 bright signal is consistent with moderate small vessel disease and/or numerous old lacunar infarcts.    No abnormal bright signal is seen on diffusion weighted images. No restricted diffusion is evident.    No abnormal dark signal seen on magnetic susceptibility sequence sensitive for blood products.    No abnormality of the pituitary or orbits is evident.    The paranasal sinuses are well aerated.      Impression:      Impression:  MR examination of the brain without IV contrast demonstrating diffuse atrophy and white matter changes.    No acute intracranial abnormality is seen.        Electronically Signed: Omid Tamayo MD    4/29/2025 11:09 PM EDT    Workstation ID: [id number]    CT Head Without Contrast [[id number]] Collected: 04/29/25 2208     Updated: 04/29/25 2225    Narrative:      CT HEAD WO CONTRAST, CT ANGIOGRAM HEAD W AI ANALYSIS OF LVO, CT ANGIOGRAM NECK    Date of Exam: 4/29/2025 9:46 PM EDT    Indication: confusion.    Comparison: 10/28/2024. 10/23/2024    Technique: Axial CT images of the brain were obtained prior to and after the administration of 115 mL Isovue-370. CTA of the head and neck were performed after the administration of iodinated contrast.  Reconstructed coronal and sagittal images were  also   obtained. A 3-D volume rendered image was created for interpretation. Automated exposure control and iterative reconstruction methods were used.            Findings:  CT head: There is redemonstrated generalized volume loss as well as prominent chronic white matter changes and areas of chronic infarct within watershed distributions bilaterally. There is no definite new territorial ischemia and there is no evidence of   hemorrhage, mass or mass effect. There is ex vacuo prominence of the ventricles. The orbits are normal. The paranasal sinuses are clear.    CT ANGIOGRAM: The lung apices are clear. The neck soft tissues demonstrate no pathologic cervical adenopathy or unexpected aerodigestive tract mass. The osseous structures demonstrate no evidence of acute fracture or aggressive osseous lesion. Patent   aortic arch with four-vessel branching, left vertebral artery arising directly. The subclavian arteries are patent bilaterally. Calcific atherosclerotic changes are present at the right ICA origin with mild, less than 50% stenosis present by NASCET   criteria. Similar mild narrowing is present on the left. The vertebral arteries appear normal in course and caliber, developmentally diminutive on the left. Intracranially, the carotid siphons demonstrate evidence of prior stenting within the right ICA,   with the stented segment patent. 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 The anterior cerebral arteries are normal in course   and caliber. The right and left middle cerebral arteries demonstrate no evidence of flow-limiting stenosis, large vessel occlusion or aneurysm. The vertebrobasilar system remains patent with diminutive left vertebral artery terminating in PICA. The   posterior cerebral arteries are patent.      Impression:      Impression:  Noncontrast CT head demonstrates no  acute intracranial findings. Stable generalized volume loss and multiple areas of prior infarct.    CT angiogram demonstrates no evidence of new flow-limiting stenosis, large vessel occlusion or other acute finding. 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        Electronically Signed: Armen Browning MD    4/29/2025 10:22 PM EDT    Workstation ID: [id number]    CT Angiogram Head w AI Analysis of LVO [[id number]] Collected: 04/29/25 2208     Updated: 04/29/25 2225    Narrative:      CT HEAD WO CONTRAST, CT ANGIOGRAM HEAD W AI ANALYSIS OF LVO, CT ANGIOGRAM NECK    Date of Exam: 4/29/2025 9:46 PM EDT    Indication: confusion.    Comparison: 10/28/2024. 10/23/2024    Technique: Axial CT images of the brain were obtained prior to and after the administration of 115 mL Isovue-370. CTA of the head and neck were performed after the administration of iodinated contrast.  Reconstructed coronal and sagittal images were also   obtained. A 3-D volume rendered image was created for interpretation. Automated exposure control and iterative reconstruction methods were used.            Findings:  CT head: There is redemonstrated generalized volume loss as well as prominent chronic white matter changes and areas of chronic infarct within watershed distributions bilaterally. There is no definite new territorial ischemia and there is no evidence of   hemorrhage, mass or mass effect. There is ex vacuo prominence of the ventricles. The orbits are normal. The paranasal sinuses are clear.    CT ANGIOGRAM: The lung apices are clear. The neck soft tissues demonstrate no pathologic cervical adenopathy or unexpected aerodigestive tract mass. The osseous structures demonstrate no evidence of acute fracture or aggressive osseous lesion. Patent   aortic arch with four-vessel branching, left vertebral artery arising directly. The subclavian arteries are patent bilaterally. Calcific atherosclerotic  changes are present at the right ICA origin with mild, less than 50% stenosis present by NASCET   criteria. Similar mild narrowing is present on the left. The vertebral arteries appear normal in course and caliber, developmentally diminutive on the left. Intracranially, the carotid siphons demonstrate evidence of prior stenting within the right ICA,   with the stented segment patent. 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 The anterior cerebral arteries are normal in course   and caliber. The right and left middle cerebral arteries demonstrate no evidence of flow-limiting stenosis, large vessel occlusion or aneurysm. The vertebrobasilar system remains patent with diminutive left vertebral artery terminating in PICA. The   posterior cerebral arteries are patent.      Impression:      Impression:  Noncontrast CT head demonstrates no acute intracranial findings. Stable generalized volume loss and multiple areas of prior infarct.    CT angiogram demonstrates no evidence of new flow-limiting stenosis, large vessel occlusion or other acute finding. 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        Electronically Signed: Armen Browning MD    4/29/2025 10:22 PM EDT    Workstation ID: [id number]    CT Angiogram Neck [[id number]] Collected: 04/29/25 2208     Updated: 04/29/25 2225    Narrative:      CT HEAD WO CONTRAST, CT ANGIOGRAM HEAD W AI ANALYSIS OF LVO, CT ANGIOGRAM NECK    Date of Exam: 4/29/2025 9:46 PM EDT    Indication: confusion.    Comparison: 10/28/2024. 10/23/2024    Technique: Axial CT images of the brain were obtained prior to and after the administration of 115 mL Isovue-370. CTA of the head and neck were performed after the administration of iodinated contrast.  Reconstructed coronal and sagittal images were also   obtained. A 3-D volume  rendered image was created for interpretation. Automated exposure control and iterative reconstruction methods were used.            Findings:  CT head: There is redemonstrated generalized volume loss as well as prominent chronic white matter changes and areas of chronic infarct within watershed distributions bilaterally. There is no definite new territorial ischemia and there is no evidence of   hemorrhage, mass or mass effect. There is ex vacuo prominence of the ventricles. The orbits are normal. The paranasal sinuses are clear.    CT ANGIOGRAM: The lung apices are clear. The neck soft tissues demonstrate no pathologic cervical adenopathy or unexpected aerodigestive tract mass. The osseous structures demonstrate no evidence of acute fracture or aggressive osseous lesion. Patent   aortic arch with four-vessel branching, left vertebral artery arising directly. The subclavian arteries are patent bilaterally. Calcific atherosclerotic changes are present at the right ICA origin with mild, less than 50% stenosis present by NASCET   criteria. Similar mild narrowing is present on the left. The vertebral arteries appear normal in course and caliber, developmentally diminutive on the left. Intracranially, the carotid siphons demonstrate evidence of prior stenting within the right ICA,   with the stented segment patent. 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 The anterior cerebral arteries are normal in course   and caliber. The right and left middle cerebral arteries demonstrate no evidence of flow-limiting stenosis, large vessel occlusion or aneurysm. The vertebrobasilar system remains patent with diminutive left vertebral artery terminating in PICA. The   posterior cerebral arteries are patent.      Impression:      Impression:  Noncontrast CT head demonstrates no acute intracranial findings.  Stable generalized volume loss and multiple areas of prior infarct.    CT angiogram demonstrates no evidence of new flow-limiting stenosis, large vessel occlusion or other acute finding. 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        Electronically Signed: Armen Browning MD    4/29/2025 10:22 PM EDT    Workstation ID: [id number]    XR Chest 1 View [[id number]] Collected: 04/29/25 1843     Updated: 04/29/25 1847    Narrative:      XR CHEST 1 VW    Date of Exam: 4/29/2025 6:22 PM EDT    Indication: Weak/Dizzy/AMS triage protocol    Comparison: 10/23/2024    Findings: No focal consolidation. No pneumothorax or pleural effusion. Calcification of the aortic arch. Cardiac size is normal. The visualized clavicles appear intact. No displaced rib fractures. The visualized upper abdomen is normal.      Impression:      Impression: No acute cardiopulmonary disease.      Electronically Signed: Robson Cutler MD    4/29/2025 6:44 PM EDT    Workstation ID: [id number]            Results for orders placed during the hospital encounter of 09/05/23    Duplex Carotid Ultrasound CAR    Interpretation Summary    Right internal carotid artery demonstrates a less than 50% stenosis.    Left internal carotid artery demonstrates a less than 50% stenosis.    Bilateral antegrade vertebral artery flow.    No previous for comparison      Results for orders placed during the hospital encounter of 09/05/23    Duplex Carotid Ultrasound CAR    Interpretation Summary    Right internal carotid artery demonstrates a less than 50% stenosis.    Left internal carotid artery demonstrates a less than 50% stenosis.    Bilateral antegrade vertebral artery flow.    No previous for comparison      Results for orders placed during the hospital encounter of 10/23/24    Adult Transthoracic Echo Complete W/ Cont if Necessary Per Protocol (With Agitated Saline)    Interpretation Summary    Left ventricular ejection  fraction appears to be 51 - 55%.    Unable to fully assess valvular function due to suboptimal windows.        Discharge Details        Discharge Medications        New Medications        Instructions Start Date   acetaminophen 325 MG tablet  Commonly known as: TYLENOL   650 mg, Oral, Every 6 Hours PRN      miconazole 2 % powder  Commonly known as: MICOTIN   1 Application, Topical, Every 12 Hours Scheduled             Changes to Medications        Instructions Start Date   amLODIPine 10 MG tablet  Commonly known as: NORVASC  What changed: Another medication with the same name was removed. Continue taking this medication, and follow the directions you see here.   10 mg, Oral, Every 24 Hours Scheduled   Start Date: May 3, 2025            Continue These Medications        Instructions Start Date   aspirin 81 MG chewable tablet   81 mg, Daily      atorvastatin 80 MG tablet  Commonly known as: LIPITOR   80 mg, Oral, Nightly      carvedilol 3.125 MG tablet  Commonly known as: COREG   3.125 mg, Oral, 2 Times Daily With Meals      cholecalciferol 25 MCG (1000 UT) tablet  Commonly known as: VITAMIN D3   1,000 Units, Daily      lansoprazole 15 MG capsule  Commonly known as: PREVACID   15 mg, Oral, Daily      losartan 100 MG tablet  Commonly known as: COZAAR   100 mg, Oral, Daily      metFORMIN  MG 24 hr tablet  Commonly known as: GLUCOPHAGE-XR   1,000 mg, Oral, Every 12 Hours Scheduled      Ozempic (1 MG/DOSE) 4 MG/3ML solution pen-injector  Generic drug: Semaglutide (1 MG/DOSE)   1 mg, Subcutaneous, Weekly, Patient takes on Friday      QUEtiapine 300 MG tablet  Commonly known as: SEROquel   300 mg, Oral, Nightly      ticagrelor 90 MG tablet tablet  Commonly known as: BRILINTA   90 mg, Oral, 2 Times Daily      venlafaxine  MG 24 hr capsule  Commonly known as: EFFEXOR-XR   150 mg, Oral, Daily               Allergies   Allergen Reactions    Codeine Palpitations and Unknown (See Comments)     Other reaction(s):  Unknown         Discharge Disposition:  Home or Self Care    Discharge Diet:  Diet Order   Procedures    Diet: Cardiac, Diabetic; Healthy Heart (2-3 Na+); Consistent Carbohydrate; Texture: Regular (IDDSI 7); Fluid Consistency: Thin (IDDSI 0)         Discharge Activity:   Activity Instructions       Activity as Tolerated      Up WIth Assist                CODE STATUS:    Code Status and Medical Interventions: CPR (Attempt to Resuscitate); Full Support   Ordered at: 04/29/25 3391     Code Status (Patient has no pulse and is not breathing):    CPR (Attempt to Resuscitate)     Medical Interventions (Patient has pulse or is breathing):    Full Support         Future Appointments   Date Time Provider Department Center   7/24/2025  2:00 PM Emmie Hutchinson APRN [facility]   10/16/2025 10:00 AM Ileana Kerr MD [facility]       Additional Instructions for the Follow-ups that You Need to Schedule       Discharge Follow-up with PCP   As directed       Currently Documented PCP:    Ileana Kerr MD    PCP Phone Number:    [phone]     Follow Up Details: Follow up with PCP in 1 week        Discharge Follow-up with Specified Provider: [facility]   As directed      To: [facility]   Follow Up Details: 08/15/2025 @ 9 am                Time Spent on Discharge:  41 minutes    Electronically signed by ANGELIKA Shaw, 05/02/25, 11:34 AM EDT.

## 2025-05-02 NOTE — CASE MANAGEMENT/SOCIAL WORK
Case Management Discharge Note      Final Note: pt to disharge home with family to transport. No discharge needs         Selected Continued Care - Admitted Since 4/29/2025       Destination    No services have been selected for the patient.                Durable Medical Equipment    No services have been selected for the patient.                Dialysis/Infusion    No services have been selected for the patient.                Home Medical Care    No services have been selected for the patient.                Therapy    No services have been selected for the patient.                Community Resources    No services have been selected for the patient.                Community & DME    No services have been selected for the patient.                    Transportation Services  Private: Car    Final Discharge Disposition Code: 01 - home or self-care

## 2025-05-02 NOTE — TELEPHONE ENCOUNTER
Moved patient up into July, and mailed the appt reminder and BYRON to patient's address.  I also said she could call the office, and ask for a sooner appt with another one of our offices.

## 2025-05-02 NOTE — OUTREACH NOTE
Prep Survey      Flowsheet Row Responses   [facility] patient discharged from? [place]   Is LACE score < 7 ? Yes   Eligibility [facility]   Date of Admission 04/29/25   Date of Discharge 05/02/25   Discharge Disposition Home or Self Care   Discharge diagnosis *Expressive aphasia (   Does the patient have one of the following disease processes/diagnoses(primary or secondary)? Other   Does the patient have Home health ordered? No   Is there a DME ordered? No   Prep survey completed? Yes            KHOA DINH - Registered Nurse

## 2025-05-02 NOTE — PLAN OF CARE
Goal Outcome Evaluation:  Plan of Care Reviewed With: patient                Anticipated Discharge Disposition (SLP): home with OP services    SLP Diagnosis: mild, cognitive-linguistic disorder, aphasia (05/02/25 1100)     SLP Swallowing Diagnosis: swallow WFL/no suspected pharyngeal impairment (05/02/25 1100)  Treatment Assessment (SLP): continued, cognitive-linguistic disorder, aphasia, no clinical signs of, aspiration (05/02/25 1100)     Plan for Continued Treatment (SLP): continue treatment per plan of care (05/02/25 1100)

## 2025-05-05 ENCOUNTER — TRANSITIONAL CARE MANAGEMENT TELEPHONE ENCOUNTER (OUTPATIENT)
Dept: CALL CENTER | Facility: HOSPITAL | Age: 70
End: 2025-05-05
Payer: MEDICARE

## 2025-05-05 ENCOUNTER — OFFICE VISIT (OUTPATIENT)
Dept: FAMILY MEDICINE CLINIC | Facility: CLINIC | Age: 70
End: 2025-05-05
Payer: MEDICARE

## 2025-05-05 VITALS — SYSTOLIC BLOOD PRESSURE: 124 MMHG | TEMPERATURE: 97.8 F | OXYGEN SATURATION: 96 % | DIASTOLIC BLOOD PRESSURE: 82 MMHG

## 2025-05-05 DIAGNOSIS — Z86.73 HISTORY OF CVA (CEREBROVASCULAR ACCIDENT): ICD-10-CM

## 2025-05-05 DIAGNOSIS — I10 HYPERTENSION, UNSPECIFIED TYPE: Primary | ICD-10-CM

## 2025-05-05 DIAGNOSIS — E11.9 TYPE 2 DIABETES MELLITUS WITHOUT COMPLICATION, WITHOUT LONG-TERM CURRENT USE OF INSULIN: ICD-10-CM

## 2025-05-05 RX ORDER — METFORMIN HYDROCHLORIDE 500 MG/1
500 TABLET, EXTENDED RELEASE ORAL EVERY 12 HOURS SCHEDULED
Start: 2025-05-05

## 2025-05-05 NOTE — PROGRESS NOTES
Transitional Care Follow Up Visit  Subjective     Olga Sandoval is a 70 y.o. female who presents for a transitional care management visit.    Within 48 business hours after discharge our office contacted her via telephone to coordinate her care and needs.      I reviewed and discussed the details of that call along with the discharge summary, hospital problems, inpatient lab results, inpatient diagnostic studies, and consultation reports with Olga.     Current outpatient and discharge medications have been reconciled for the patient.  Reviewed by: Ileana Kerr MD          5/2/2025     1:54 PM   Date of TCM Phone Call   [facility]   Date of Admission 4/29/2025   Date of Discharge 5/2/2025   Discharge Disposition Home or Self Care     Risk for Readmission (LACE) Score: 6 (5/2/2025  6:00 AM)      History of Present Illness   Course During Hospital Stay: Patient was admitted on 4/29/2025 until 5/2/2025 for expressive aphasia.  There was initial thought that this was a recurrent stroke so MRI and CT head showed known CVAs but no new abnormalities.  They thought the symptoms were likely secondary to uncontrolled blood pressure.  She was placed on a Cardene drip. She was started on amlodipine 10 mg in addition to the carvedilol 3.125 mg twice daily and losartan 100 mg daily that she is already on.  Daughter is here with her, reports that she has been on all of these medications and did not miss any doses of her blood pressure medications prior to the hospitalization.  She continues to have some confusion and has trouble coming up with words.  She does have a neurology appointment in July.    Type 2 diabetes: Patient is currently on Ozempic 1 mg weekly and metformin 1000 mg twice daily.  She is compliant with these medications.  She is willing to go down on her metformin.    She is feeling weak but does not want to do home health.  She is wheelchair-bound and shifts her body to get from place to  place.    She is on the Brilinta because of her CVAs and will likely need to be on them lifelong.  She was following with an outside neurosurgeon from [place].     The following portions of the patient's history were reviewed and updated as appropriate: allergies, current medications, past family history, past medical history, past social history, past surgical history, and problem list.      Objective   /82   Temp 97.8 °F (36.6 °C) (Infrared)   SpO2 96%   Physical Exam  Vitals reviewed.   Constitutional:       Comments: Sitting in wheelchair   HENT:      Head: Normocephalic.      Nose: Nose normal.      Mouth/Throat:      Mouth: Mucous membranes are moist.   Eyes:      Conjunctiva/sclera: Conjunctivae normal.   Cardiovascular:      Rate and Rhythm: Normal rate and regular rhythm.   Pulmonary:      Effort: Pulmonary effort is normal.      Breath sounds: Normal breath sounds.   Abdominal:      General: Abdomen is flat.      Palpations: Abdomen is soft.   Musculoskeletal:      Comments:  Left-sided weakness in both upper and lower extremity, wheelchair-bound   Skin:     General: Skin is warm.   Neurological:      Mental Status: She is alert. Mental status is at baseline.      Comments: Left-sided facial droop   Psychiatric:         Mood and Affect: Mood normal.         Behavior: Behavior normal.         Assessment & Plan   Diagnoses and all orders for this visit:    1. Hypertension, unspecified type (Primary)  Assessment & Plan:  - Controlled, unsure what caused the initial insult as she was already on amlodipine 10 mg and they report no missed doses  - Continue losartan 100 mg daily, amlodipine 10 mg daily and carvedilol 3.125 mg twice daily       2. Type 2 diabetes mellitus without complication, without long-term current use of insulin  Assessment & Plan:  - Hemoglobin A1c of 5.1  - Will take his metformin to 500 mg twice daily, continue Ozempic 1 mg weekly  -Will attempt to get urine microalbumin at next  visit and do diabetic foot exam    Orders:  -     metFORMIN ER (GLUCOPHAGE-XR) 500 MG 24 hr tablet; Take 1 tablet by mouth Every 12 (Twelve) Hours.    3. History of CVA (cerebrovascular accident)  Assessment & Plan:  - Patient with history of strokes, has a lot of weakness in bilateral lower extremities and is currently in a wheelchair  -She follows with neurosurgery at , neurology appointment coming up in a couple of months  - She is on Brilinta, will have neurology further manage this        Return to clinic as scheduled for Medicare wellness visit

## 2025-05-05 NOTE — OUTREACH NOTE
Call Center TCM Note      Flowsheet Row Responses   [facility] patient discharged from? [place]   Does the patient have one of the following disease processes/diagnoses(primary or secondary)? Other   TCM attempt successful? Yes  [Patient completed hospital d/c follow up appt. 5/5/25. This appt satisfies TCM guidelines. No TCM call needed.]   Comments Hospital d/c follow up appt completed 5/5/25.   Does the patient have an appointment with their PCP within 7-14 days of discharge? Yes   TCM call completed? Yes   Would this patient benefit from a Referral to Amb Social Work? No   Is the patient interested in additional calls from an ambulatory ? No            Jaja DINH - Registered Nurse    5/5/2025, 09:28 EDT

## 2025-05-06 RX ORDER — VENLAFAXINE HYDROCHLORIDE 150 MG/1
150 CAPSULE, EXTENDED RELEASE ORAL DAILY
Qty: 90 CAPSULE | Refills: 1 | Status: SHIPPED | OUTPATIENT
Start: 2025-05-06

## 2025-05-06 NOTE — ASSESSMENT & PLAN NOTE
- Patient with history of strokes, has a lot of weakness in bilateral lower extremities and is currently in a wheelchair  -She follows with neurosurgery at , neurology appointment coming up in a couple of months  - She is on Brilinta, will have neurology further manage this

## 2025-05-06 NOTE — ASSESSMENT & PLAN NOTE
- Hemoglobin A1c of 5.1  - Will take his metformin to 500 mg twice daily, continue Ozempic 1 mg weekly  -Will attempt to get urine microalbumin at next visit and do diabetic foot exam

## 2025-05-06 NOTE — ASSESSMENT & PLAN NOTE
- Controlled, unsure what caused the initial insult as she was already on amlodipine 10 mg and they report no missed doses  - Continue losartan 100 mg daily, amlodipine 10 mg daily and carvedilol 3.125 mg twice daily

## 2025-05-08 DIAGNOSIS — E11.9 TYPE 2 DIABETES MELLITUS WITHOUT COMPLICATION, WITHOUT LONG-TERM CURRENT USE OF INSULIN: Primary | ICD-10-CM

## 2025-05-08 DIAGNOSIS — F31.81 BIPOLAR 2 DISORDER: ICD-10-CM

## 2025-05-08 RX ORDER — BLOOD-GLUCOSE METER
1 KIT MISCELLANEOUS ONCE
Qty: 1 EACH | Refills: 0 | Status: SHIPPED | OUTPATIENT
Start: 2025-05-08 | End: 2025-05-08

## 2025-05-08 RX ORDER — LANCETS
EACH MISCELLANEOUS
Qty: 100 EACH | Refills: 2 | Status: SHIPPED | OUTPATIENT
Start: 2025-05-08

## 2025-05-08 RX ORDER — QUETIAPINE FUMARATE 300 MG/1
300 TABLET, FILM COATED ORAL NIGHTLY
Qty: 90 TABLET | Refills: 1 | Status: SHIPPED | OUTPATIENT
Start: 2025-05-08

## 2025-05-08 NOTE — TELEPHONE ENCOUNTER
Rx Refill Note  Requested Prescriptions     Pending Prescriptions Disp Refills    QUEtiapine (SEROquel) 300 MG tablet [Pharmacy Med Name: QUETIAPINE 300MG TABLETS] 180 tablet      Sig: TAKE 2 TABLETS BY MOUTH EVERY NIGHT      Last office visit with prescribing clinician: 5/5/2025   Last telemedicine visit with prescribing clinician: Visit date not found   Next office visit with prescribing clinician: 5/8/2025                         Would you like a call back once the refill request has been completed: [] Yes [] No    If the office needs to give you a call back, can they leave a voicemail: [] Yes [] No    Huyen Snow MA  05/08/25, 11:31 EDT

## 2025-05-27 DIAGNOSIS — E11.9 TYPE 2 DIABETES MELLITUS WITHOUT COMPLICATION, WITHOUT LONG-TERM CURRENT USE OF INSULIN: ICD-10-CM

## 2025-05-27 DIAGNOSIS — E78.5 HYPERLIPIDEMIA, UNSPECIFIED HYPERLIPIDEMIA TYPE: ICD-10-CM

## 2025-05-28 RX ORDER — SEMAGLUTIDE 1.34 MG/ML
INJECTION, SOLUTION SUBCUTANEOUS
Qty: 9 ML | Refills: 1 | Status: SHIPPED | OUTPATIENT
Start: 2025-05-28

## 2025-05-28 RX ORDER — ATORVASTATIN CALCIUM 80 MG/1
80 TABLET, FILM COATED ORAL NIGHTLY
Qty: 90 TABLET | Refills: 1 | Status: SHIPPED | OUTPATIENT
Start: 2025-05-28

## 2025-05-29 DIAGNOSIS — E11.9 TYPE 2 DIABETES MELLITUS WITHOUT COMPLICATION, WITHOUT LONG-TERM CURRENT USE OF INSULIN: ICD-10-CM

## 2025-05-29 RX ORDER — METFORMIN HYDROCHLORIDE 500 MG/1
1000 TABLET, EXTENDED RELEASE ORAL EVERY 12 HOURS SCHEDULED
Qty: 360 TABLET | Refills: 0 | Status: SHIPPED | OUTPATIENT
Start: 2025-05-29

## 2025-06-12 NOTE — TELEPHONE ENCOUNTER
Caller: UMER HARTLEY    Relationship: Emergency Contact    Best call back number: [phone]     Requested Prescriptions:   Requested Prescriptions     Pending Prescriptions Disp Refills    amLODIPine (NORVASC) 10 MG tablet 30 tablet 0     Sig: Take 1 tablet by mouth Daily.        Pharmacy where request should be sent: [id number] [place] [id number]  AT [facility] [phone] [facility] [phone]      Last office visit with prescribing clinician: 5/5/2025   Last telemedicine visit with prescribing clinician: Visit date not found   Next office visit with prescribing clinician: 10/16/2025     Additional details provided by patient: OUT OF MEDICATION     Does the patient have less than a 3 day supply:  [x] Yes  [] No    Would you like a call back once the refill request has been completed: [] Yes [x] No    If the office needs to give you a call back, can they leave a voicemail: [] Yes [x] No    Jose J Parker Rep   06/12/25 15:52 EDT

## 2025-06-13 RX ORDER — AMLODIPINE BESYLATE 10 MG/1
10 TABLET ORAL
Qty: 30 TABLET | Refills: 0 | Status: SHIPPED | OUTPATIENT
Start: 2025-06-13

## 2025-07-01 DIAGNOSIS — I10 HYPERTENSION, UNSPECIFIED TYPE: ICD-10-CM

## 2025-07-01 RX ORDER — CARVEDILOL 3.12 MG/1
3.12 TABLET ORAL 2 TIMES DAILY WITH MEALS
Qty: 180 TABLET | Refills: 0 | Status: SHIPPED | OUTPATIENT
Start: 2025-07-01

## 2025-07-10 DIAGNOSIS — I10 HYPERTENSION, UNSPECIFIED TYPE: ICD-10-CM

## 2025-07-10 RX ORDER — LOSARTAN POTASSIUM 100 MG/1
100 TABLET ORAL DAILY
Qty: 90 TABLET | Refills: 0 | Status: SHIPPED | OUTPATIENT
Start: 2025-07-10

## 2025-07-16 RX ORDER — AMLODIPINE BESYLATE 10 MG/1
10 TABLET ORAL
Qty: 30 TABLET | Refills: 0 | Status: SHIPPED | OUTPATIENT
Start: 2025-07-16

## 2025-07-24 ENCOUNTER — OFFICE VISIT (OUTPATIENT)
Dept: NEUROLOGY | Facility: CLINIC | Age: 70
End: 2025-07-24
Payer: MEDICARE

## 2025-07-24 VITALS
OXYGEN SATURATION: 99 % | HEIGHT: 63 IN | HEART RATE: 88 BPM | SYSTOLIC BLOOD PRESSURE: 100 MMHG | DIASTOLIC BLOOD PRESSURE: 62 MMHG | BODY MASS INDEX: 40.75 KG/M2 | WEIGHT: 230 LBS

## 2025-07-24 DIAGNOSIS — I63.9 RECURRENT CEREBROVASCULAR ACCIDENTS (CVAS): ICD-10-CM

## 2025-07-24 DIAGNOSIS — G31.84 MILD NEUROCOGNITIVE DISORDER: ICD-10-CM

## 2025-07-24 DIAGNOSIS — I72.0 CAROTID ARTERY ANEURYSM: Primary | ICD-10-CM

## 2025-07-24 DIAGNOSIS — E55.9 VITAMIN D DEFICIENCY, UNSPECIFIED: ICD-10-CM

## 2025-07-24 DIAGNOSIS — I67.1 INTRACRANIAL ANEURYSM: ICD-10-CM

## 2025-07-24 DIAGNOSIS — Z86.73 HISTORY OF CVA (CEREBROVASCULAR ACCIDENT): ICD-10-CM

## 2025-07-24 DIAGNOSIS — Z86.69 H/O BELL'S PALSY: ICD-10-CM

## 2025-07-24 PROBLEM — G45.9 TIA (TRANSIENT ISCHEMIC ATTACK): Status: ACTIVE | Noted: 2025-07-24

## 2025-07-24 PROBLEM — I63.50 CEREBRAL ARTERY OCCLUSION WITH CEREBRAL INFARCTION: Status: ACTIVE | Noted: 2018-07-13

## 2025-07-24 PROBLEM — H26.9 CATARACT OF BOTH EYES: Status: ACTIVE | Noted: 2025-07-24

## 2025-07-24 PROBLEM — Z95.818 STATUS POST PLACEMENT OF IMPLANTABLE LOOP RECORDER: Status: ACTIVE | Noted: 2020-01-30

## 2025-07-24 PROBLEM — I65.21 STENOSIS OF RIGHT CAROTID ARTERY GREATER THAN 50%: Status: ACTIVE | Noted: 2025-07-24

## 2025-07-24 PROBLEM — N18.31 CHRONIC KIDNEY DISEASE, STAGE 3A: Status: ACTIVE | Noted: 2025-07-24

## 2025-07-24 PROBLEM — J44.9 COPD (CHRONIC OBSTRUCTIVE PULMONARY DISEASE): Status: ACTIVE | Noted: 2025-07-24

## 2025-07-24 RX ORDER — TICAGRELOR 90 MG/1
90 TABLET, FILM COATED ORAL 2 TIMES DAILY
Qty: 180 TABLET | Refills: 3 | Status: SHIPPED | OUTPATIENT
Start: 2025-07-24

## 2025-07-24 RX ORDER — THIAMINE HCL 100 MG
1 TABLET ORAL DAILY
COMMUNITY

## 2025-07-24 NOTE — Clinical Note
Please notify patient's daughter that I reviewed past labs and I placed orders for some additional labs to be drawn-this includes thyroid, vitamin b12, vitamin d and folic acid. All of these if low can cause memory issues. She can go to any outpatient [facility] at their earliest convenience to have these drawn. No fasting required. Once we receive the results, we will let them know if any changes should be made. Thanks, ANGELIKA Carlisle

## 2025-07-24 NOTE — LETTER
July 25, 2025     Ileana Kerr MD  [address] [address]    Patient: Olga Sandoval   YOB: 1955   Date of Visit: 7/24/2025       Dear Ileana Kerr MD,    Thank you for referring Olga Sandoval to me for evaluation. Below is a copy of my consult note.    If you have questions, please do not hesitate to call me. I look forward to following Olga along with you.         Sincerely,        ANGELIKA Wills        CC: No Recipients    Subjective:     Patient ID: Olga Sandoval is a 70 y.o. female.    CC:   Chief Complaint   Patient presents with   • Memory Loss   • Difficulty Walking   • Stroke     History of multiple CVA's with recent hospitalization       HPI:   History of Present Illness  Olga Sandoval is a 70 y.o. female who comes to clinic today for evaluation of cognitive impairment. She has noted symptoms since at least 2021 marked initially by forgetfulness  and word-finding difficulties . This has gradually worsened  over time. Additional symptoms have included impairments in language  and executive function. There have been associated  symptoms of anxiety , depression , and Bipolar. She notes  impairments in ADL's. Her family  manages her medications  and finances. She is no longer driving . She is currently residing at home with her daughter. She previously lived on her own in [place] and moved to live with her daughter here in [place] around August of 2024 due to physical decline.     She is presenting for a follow-up visit after her hospitalization at [facility]. She is accompanied by her daughter.    She was admitted from 04/29/2025 to 05/02/2025 for an episode of expressive aphasia. She presented with confusion and word-finding difficulties and was admitted to rule out a stroke. No evidence of a new stroke was found on neuroimaging, and her expressive aphasia resolved. It was determined that her symptoms were likely related to uncontrolled  hypertension. She experienced hypertensive urgency, required a Cardene drip, and was eventually weaned off. She is currently on a high-dose statin (atorvastatin 80 mg) and aspirin (81 mg daily), as well as Brilinta. Our neurology team consulted with her during her inpatient stay. They recommended follow-up in our clinic for evaluation of memory.    She has a history of at least four strokes, with the first one occurring approximately 10 years ago. Her daughter recalls an incident where she was texting gibberish in the middle of the night and had difficulty finding words the next morning, although she seemed otherwise fine.     Her daughter filled out a caregiver evaluation form noting that she has trouble with memory, thinking, repeating questions and stories, difficulty with judgment, word finding, disorientation, trouble with using tools and appliances, trouble with planning, organizing meals, and difficulty with taking her medication. She has had delusions, no hallucinations, and does not drive.     She has a loop recorder and has not seen a cardiologist recently.     She has been on Brilinta for at least a year, prescribed by a Vascular Surgeon at . She had a stent placed for a brain aneurysm.     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     She experiences memory issues after each stroke, but they eventually improve.   She reports no current issues with speech or word-finding difficulties.     She uses a wheelchair for mobility and reports more weakness in her legs than her arms.   She reports no urinary or bowel incontinence.   She has hearing loss and needs new hearing aids.   She has had cataract surgery and sleeps well.   She reports no nightmares, bad dreams, hallucinations, or delusions.   She reports no chronic pain but does have leg pain due to lymphedema.     She reports no swallowing difficulties or snoring. She wakes up feeling  well-rested most of the time.   She uses a shower chair and requires assistance with bathing and washing her hair.    She had Bell's palsy on the left side in 1968, which occasionally flares up causing her eye to water and nose to run when she eats certain foods. Her left eye does not close completely, leading to dry eyes.    She is treated for bipolar disorder. She has anxiety issues but will not take Xanax again.    She is a type 2 diabetic, well-controlled with a hemoglobin A1c of 5.1%.    She had left-sided weakness and slurred speech in 10/2024, along with a severe UTI that required IV antibiotics. She went to rehab for about three days. She required hospitalization for this event.    PAST MEDICAL HISTORY: Bipolar disorder, type 2 diabetes, hypertension, hyperlipidemia, carotid artery aneurysm, status post pipeline embolization, CVA x 4, Bell's palsy, prior tobacco use.    SOCIAL HISTORY:    Education Level: High school graduate    Occupations:  until 1971, homemaker for 30 years    Tobacco: Prior tobacco use-remote. Denies Alcohol use.    Sleep: Sleeps well, wakes up feeling well-rested most of the time.    PAST SURGICAL HISTORY: Stent placement for brain aneurysm, cataract surgery  Denies history of cancer, chemo or radiation.    FAMILY HISTORY  - Negative for Alzheimer's or dementia.    MEDICATIONS  CURRENT MEDS:  Atorvastatin 80 mg Oral Daily  Aspirin 81 mg Oral Daily  Brilinta  Seroquel  PREVIOUS MEDS:  Xanax  Reason for Discontinuation: Problems with anxiety    The following portions of the patient's history were reviewed and updated as appropriate: allergies, current medications, past family history, past medical history, past social history, past surgical history, and problem list.    Past Medical History:   Diagnosis Date   • Aneurysm     left carotid   • Aneurysm    • Bell palsy    • Bipolar 2 disorder    • Diabetes mellitus    • H/O Bell's palsy     Left sided facial droop   • History of  loop recorder    • Hyperlipidemia    • Hypertension    • Stroke        Past Surgical History:   Procedure Laterality Date   • CARDIAC CATHETERIZATION     • CATARACT EXTRACTION Bilateral    •  SECTION         Social History     Socioeconomic History   • Marital status:    Tobacco Use   • Smoking status: Former     Current packs/day: 0.00     Average packs/day: 0.8 packs/day for 5.0 years (3.8 ttl pk-yrs)     Types: Cigarettes     Start date:      Quit date:      Years since quittin.5   • Smokeless tobacco: Former   Vaping Use   • Vaping status: Never Used   Substance and Sexual Activity   • Alcohol use: Never   • Drug use: Never   • Sexual activity: Defer       Family History   Problem Relation Age of Onset   • Hypertension Mother    • Lung cancer Mother    • Cataracts Mother    • Transient ischemic attack Father    • Hypertension Father    • Cancer Father    • Heart attack Brother    • Stroke Brother           Current Outpatient Medications:   •  amLODIPine (NORVASC) 10 MG tablet, TAKE 1 TABLET BY MOUTH DAILY, Disp: 30 tablet, Rfl: 0  •  aspirin 81 MG chewable tablet, Chew 1 tablet Daily., Disp: , Rfl:   •  atorvastatin (LIPITOR) 80 MG tablet, TAKE 1 TABLET BY MOUTH EVERY NIGHT, Disp: 90 tablet, Rfl: 1  •  Calcium Citrate-Vitamin D3 (CITRACAL) 315-6.25 MG-MCG tablet tablet, Take 1 tablet by mouth Daily., Disp: , Rfl:   •  carvedilol (COREG) 3.125 MG tablet, TAKE 1 TABLET BY MOUTH TWICE DAILY WITH MEALS, Disp: 180 tablet, Rfl: 0  •  Cholecalciferol 25 MCG (1000 UT) tablet, Take 1 tablet by mouth Daily., Disp: , Rfl:   •  DHA-LUTEIN-ZEAXANTHIN PO, Take  by mouth., Disp: , Rfl:   •  glucose blood test strip, To test blood glucose once daily, Disp: 100 each, Rfl: 2  •  Lancets (onetouch ultrasoft) lancets, To check blood glucose once daily, Disp: 100 each, Rfl: 2  •  lansoprazole (PREVACID) 15 MG capsule, Take 1 capsule by mouth Daily., Disp: 90 capsule, Rfl: 1  •  losartan (COZAAR) 100 MG  "tablet, TAKE 1 TABLET BY MOUTH DAILY, Disp: 90 tablet, Rfl: 0  •  MAGNESIUM ASPARTATE PO, Take 500 mg by mouth Daily., Disp: , Rfl:   •  metFORMIN ER (GLUCOPHAGE-XR) 500 MG 24 hr tablet, TAKE 2 TABLETS BY MOUTH EVERY 12 HOURS, Disp: 360 tablet, Rfl: 0  •  Ozempic, 1 MG/DOSE, 4 MG/3ML solution pen-injector, INJECT 1 MG UNDER THE SKIN 1 TIME PER WEEK ON FRIDAY, Disp: 9 mL, Rfl: 1  •  QUEtiapine (SEROquel) 300 MG tablet, Take 1 tablet by mouth Every Night., Disp: 90 tablet, Rfl: 1  •  ticagrelor (BRILINTA) 90 MG tablet tablet, Take 1 tablet by mouth 2 (Two) Times a Day., Disp: 180 tablet, Rfl: 3  •  venlafaxine XR (EFFEXOR-XR) 150 MG 24 hr capsule, TAKE 1 CAPSULE BY MOUTH DAILY, Disp: 90 capsule, Rfl: 1  •  Zinc Sulfate (ZINC 15 PO), Take 15 mg by mouth Daily., Disp: , Rfl:   •  acetaminophen (TYLENOL) 325 MG tablet, Take 2 tablets by mouth Every 6 (Six) Hours As Needed for Mild Pain. (Patient not taking: Reported on 7/24/2025), Disp: , Rfl:      Review of Systems   Constitutional:  Positive for activity change and fatigue.   Musculoskeletal:  Positive for arthralgias and gait problem.   Neurological:  Positive for weakness. Negative for seizures, speech difficulty and headaches.   Psychiatric/Behavioral:  Positive for decreased concentration and dysphoric mood. The patient is nervous/anxious.    All other systems reviewed and are negative.       Objective:  /62   Pulse 88   Ht 160 cm (63\")   Wt 104 kg (230 lb)   SpO2 99%   BMI 40.74 kg/m²     Neurological Exam  Mental Status  Alert. Oriented to person, place, time and situation. Memory: 4/5. Able to copy figure. Clock drawing is normal. Speech is normal. Expressive aphasia present. Language: mild. Able to perform serial calculations. Fund of knowledge is abnormal. Apraxia absent.    Cranial Nerves  CN II: Visual acuity is normal. Visual fields full to confrontation.  CN III, IV, VI: Extraocular movements intact bilaterally. Left eyelid does not fully close " due to Bell's Palsy. Pupils equal round and reactive to light bilaterally.  CN V: Facial sensation is normal.  CN VII:  Right: There is no facial weakness.  Left: There is central facial weakness. Marked left facial weakness due to history of severe Bell's Palsy-House Brackmann Scale VI.  CN VIII: Reports decreased hearing.  CN IX, X: Palate elevates symmetrically. Normal gag reflex.  CN XI: Shoulder shrug strength is normal.  CN XII: Tongue midline without atrophy or fasciculations.    Motor  Decreased muscle bulk throughout. Normal BUE, Decreased BLE. No fasciculations present. Decreased muscle tone. BLE decreased, Normal BUE. The following abnormal movements were seen: Mild tardive dyskinesia noted and mild BUE fine kinetic hand tremors. No resting tremors. Normal finger taps. .   Strength is 5/5 in all four extremities except as noted.    BLE 4/5 strength, BUE 4+-5/5 strength.    Sensory  Light touch is normal in upper and lower extremities. Pinprick is normal in upper and lower extremities. Temperature is normal in upper and lower extremities. Vibration abnormality: Proprioception is normal in upper and lower extremities. Sensation: Decreased BLE with marked lymphedema.     Reflexes                                            Right                      Left  Brachioradialis                    1+                         1+  Biceps                                 1+                         1+  Triceps                                1+                         1+  Patellar                                1+                         1+  Achilles                                1+                         1+  Right Plantar: downgoing  Left Plantar: downgoing    Right pathological reflexes: Contreras's absent. Ankle clonus absent.  Left pathological reflexes: Contreras's absent. Ankle clonus absent.    Coordination    Finger-to-nose, rapid alternating movements and heel-to-shin normal bilaterally without  dysmetria.    Gait  Casual gait: Unable to rise from chair without using arms.    Wheelchair bound, does not ambulate, reports only transferring.        Physical Exam  Constitutional:       Appearance: Normal appearance. She is obese.      Comments: BMI 40.8, chronically ill, in no acute distress   Eyes:      Extraocular Movements: Extraocular movements intact.      Pupils: Pupils are equal, round, and reactive to light.   Cardiovascular:      Comments:   BLE lymphedema 2+ noted, no erythema or pain  Neurological:      Mental Status: She is alert.      Coordination: Coordination is intact.      Deep Tendon Reflexes:      Reflex Scores:       Tricep reflexes are 1+ on the right side and 1+ on the left side.       Bicep reflexes are 1+ on the right side and 1+ on the left side.       Brachioradialis reflexes are 1+ on the right side and 1+ on the left side.       Patellar reflexes are 1+ on the right side and 1+ on the left side.       Achilles reflexes are 1+ on the right side and 1+ on the left side.  Psychiatric:         Mood and Affect: Mood is depressed.         Speech: Speech normal.         Behavior: Behavior is slowed.         Thought Content: Thought content normal.         Cognition and Memory: She exhibits impaired recent memory.         Her [place] Cognitive Assessment score today is 26 out of 30, adjusted for high school education it is 27 out of 30.     Results:  Results  Labs   - Hemoglobin A1c: 5.1%  Vitamin B12 level 294 10/23/2024, TSH 3.950   P2Y12 platelet inhibition 130-WNL 6/28/2024.    Imaging   - MRI of the brain without contrast: 04/29/2025, Moderate to severe white matter changes, diffuse atrophy   - CTA of the head and neck: 04/29/2025, No large vessel occlusion or significant stenosis   - CT of the head: Multiple areas of prior infarct, generalized volume loss    MRI Brain Without Contrast  Result Date: 4/29/2025  Impression: MR examination of the brain without IV contrast demonstrating  diffuse atrophy and white matter changes. No acute intracranial abnormality is seen. Electronically Signed: Omid Tamayo MD  4/29/2025 11:09 PM EDT  Workstation ID: [id number]    CT Head Without Contrast  Result Date: 4/29/2025  Impression: Noncontrast CT head demonstrates no acute intracranial findings. Stable generalized volume loss and multiple areas of prior infarct. CT angiogram demonstrates no evidence of new flow-limiting stenosis, large vessel occlusion or other acute finding. 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 Electronically Signed: Armen Browning MD  4/29/2025 10:22 PM EDT  Workstation ID: [id number]    CT Angiogram Head w AI Analysis of LVO  Result Date: 4/29/2025  Impression: Noncontrast CT head demonstrates no acute intracranial findings. Stable generalized volume loss and multiple areas of prior infarct. CT angiogram demonstrates no evidence of new flow-limiting stenosis, large vessel occlusion or other acute finding. 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 Electronically Signed: Armen Browning MD  4/29/2025 10:22 PM EDT  Workstation ID: [id number]    CT Angiogram Neck  Result Date: 4/29/2025  Impression: Noncontrast CT head demonstrates no acute intracranial findings. Stable generalized volume loss and multiple areas of prior infarct. CT angiogram demonstrates no evidence of new flow-limiting stenosis, large vessel occlusion or other acute finding. 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 Electronically Signed: Armen Browning MD  4/29/2025 10:22 PM EDT  Workstation ID: [id number]    Assessment/Plan:     Diagnoses and all orders for this visit:    1. Carotid artery aneurysm (Primary)  -     ticagrelor (BRILINTA) 90 MG tablet tablet; Take 1 tablet by mouth 2 (Two) Times a Day.  Dispense: 180 tablet; Refill: 3    2. Mild neurocognitive  disorder  Comments:  suspect vascular cognitive impairment  Orders:  -     Vitamin D,25-Hydroxy; Future  -     Vitamin B12 & Folate; Future  -     TSH; Future  -     T4, free; Future  -     Methylmalonic Acid, Serum; Future    3. Recurrent cerebrovascular accidents (CVAs)  Comments:  continue brilinta, aspirin and statin  Orders:  -     ticagrelor (BRILINTA) 90 MG tablet tablet; Take 1 tablet by mouth 2 (Two) Times a Day.  Dispense: 180 tablet; Refill: 3  -     Vitamin D,25-Hydroxy; Future  -     Vitamin B12 & Folate; Future  -     TSH; Future  -     T4, free; Future  -     Methylmalonic Acid, Serum; Future    4. History of CVA (cerebrovascular accident)  -     ticagrelor (BRILINTA) 90 MG tablet tablet; Take 1 tablet by mouth 2 (Two) Times a Day.  Dispense: 180 tablet; Refill: 3    5. Intracranial aneurysm  Comments:  continue brilinta, aspirin, statin s/p intervention    6. H/O Bell's palsy    7. Vitamin D deficiency, unspecified  -     Vitamin D,25-Hydroxy; Future           Assessment & Plan  Total time of visit today was 46 minutes which included reviewing hospital records, PCP records, obtaining history from the patient, obtaining history from daughter, completing exam, discussing findings and recommendations in detail moving forward.    1. Post-hospitalization follow-up.  She was admitted to [facility] from 04/29/2025 to 05/02/2025 for an episode of expressive aphasia. No evidence of a new stroke was found on neuroimaging, and her symptoms were likely related to uncontrolled hypertension. Her expressive aphasia resolved during the hospital stay. The neurology team recommended follow-up for memory concerns. Her daughter filled out a caregiver evaluation form noting that the patient has trouble with memory, thinking, repeating questions and stories, has difficulty with judgment, word finding, disorientation, trouble with using tools and appliances, trouble with planning, organizing meals, difficulty  with taking her medication. She has had delusions, no hallucinations, does not drive. Given her history of multiple strokes and current symptoms, there is suspicion of mild cognitive impairment or early mild vascular dementia. However, she is not interested in taking medication or completing cognitive rehabilitation at this time. Her memory will be monitored closely, and therapy can be ordered if she changes her mind. Low-dose donepezil will be considered due to her current medications, including Seroquel and blood pressure medications. Information on monitoring cognitive disorders and vascular changes to the brain has been provided. I ordered additional screening labs to be done at her earliest convenience for any other causes of cognitive impairment.    2. Hypertension.  Her hypertension is currently well-controlled. She should continue her current antihypertensive medications.    3. Type 2 diabetes mellitus.  Her diabetes is well-controlled with a hemoglobin A1c of 5.1%. She should continue her current diabetes management regimen.    4. Hyperlipidemia.  She is on atorvastatin 80 mg daily. She should continue this medication as prescribed.    5. Bipolar disorder.  She is currently being treated for bipolar disorder with Seroquel. She reports that her anxiety is not well-controlled-consider establishing care with local Psychiatry/Counselor-can discuss with PCP.    6. Lymphedema.  She reports chronic pain in her legs, which appears to be due to lymphedema. A referral to a vascular surgeon or a lymphedema-certified physical therapist can be made if she decides to pursue treatment. Her daughter reports they saw Vascular surgery a year ago in [place] and they recommended lymphedema pumps.    7. Medication management.  [facility]. She should continue taking aspirin 81 mg daily and atorvastatin 80 mg daily.    8. Hearing Loss.  Daughter can call and make  appointment with Audiology for hearing screening and getting new hearing aids.     Follow-up  She will follow up in March for reevaluation of symptoms or sooner if needed.    Reviewed medications, potential side effects and signs and symptoms to report. Discussed risk versus benefits of treatment plan with patient and/or family-including medications, labs and radiology that may be ordered. Addressed questions and concerns during visit. Patient and/or family verbalized understanding and agree with plan.    Discussed signs and symptoms of stroke and when to call 911. Instructed to follow a low fat diet including the Mediterranean diet. Instructed to take all medications daily as prescribed. Encouraged 30 minutes of exercise 3-4 times a week as tolerated. Stay well hydrated. Discussed potential side effects of new medications and signs and symptoms to report. If patient is currently using tobacco products, smoking cessation was encouraged. Reviewed stroke risk factors and stroke prevention plan. Patient and/or family verbalizes understanding and agrees with plan.     During this visit the following were done:  Labs Reviewed [x]    Labs Ordered [x]    Radiology Reports Reviewed [x]    Radiology Ordered []    PCP Records Reviewed [x]    Referring Provider Records Reviewed []    ER Records Reviewed [x]    Hospital Records Reviewed [x]    History Obtained From Family [x]    Radiology Images Reviewed [x]    Other Reviewed [x]    Records Requested []      07/24/25   13:18 EDT    Patient or patient representative verbalized consent for the use of Ambient Listening during the visit with  ANGELIKA Wills for chart documentation. 7/25/2025  07:49 EDT    Note to patient: The 21st Century Cures Act makes medical notes like these available to patients in the interest of transparency. However, be advised this is a medical document. It is intended as peer to peer communication. It is written in medical language and may  contain abbreviations or verbiage that are unfamiliar. It may appear blunt or direct. Medical documents are intended to carry relevant information, facts as evident, and the clinical opinion of the provider.

## 2025-07-28 ENCOUNTER — TELEPHONE (OUTPATIENT)
Dept: NEUROLOGY | Facility: CLINIC | Age: 70
End: 2025-07-28
Payer: MEDICARE

## 2025-07-28 NOTE — TELEPHONE ENCOUNTER
----- Message from Emmie Hutchinson sent at 7/25/2025  7:58 AM EDT -----  Please notify patient's daughter that I reviewed past labs and I placed orders for some additional labs to be drawn-this includes thyroid, vitamin b12, vitamin d and folic acid. All of these if low can cause memory issues. She can go to any outpatient [facility] lab at their earliest convenience to have these drawn. No fasting required. Once we receive the results, we will let them know if any changes should be made. Thanks, ANGELIKA Carlisle

## 2025-07-28 NOTE — TELEPHONE ENCOUNTER
I spoke with pt's daughter, Reva and she is aware of the labs to have drawn and she said she is short staffed at work so she is working everyday this week and it will next week before she can get her to the lab to have those drawn/

## 2025-08-26 RX ORDER — AMLODIPINE BESYLATE 10 MG/1
10 TABLET ORAL
Qty: 30 TABLET | Refills: 0 | Status: SHIPPED | OUTPATIENT
Start: 2025-08-26